# Patient Record
Sex: FEMALE | Race: WHITE | Employment: OTHER | URBAN - METROPOLITAN AREA
[De-identification: names, ages, dates, MRNs, and addresses within clinical notes are randomized per-mention and may not be internally consistent; named-entity substitution may affect disease eponyms.]

---

## 2021-06-22 ENCOUNTER — HOSPITAL ENCOUNTER (OUTPATIENT)
Age: 76
Setting detail: SPECIMEN
Discharge: HOME | End: 2021-06-22

## 2021-06-22 LAB
ALBUMIN SERPL-MCNC: 3.5 G/DL (ref 3.5–5.2)
ALBUMIN/GLOBULIN RATIO: 1.2 (ref 1–2.5)
ALP BLD-CCNC: 66 U/L (ref 35–104)
ALT SERPL-CCNC: 12 U/L (ref 5–33)
ANION GAP SERPL CALCULATED.3IONS-SCNC: 14 MMOL/L (ref 9–17)
AST SERPL-CCNC: 22 U/L
BILIRUB SERPL-MCNC: 0.4 MG/DL (ref 0.3–1.2)
BUN BLDV-MCNC: 26 MG/DL (ref 8–23)
BUN/CREAT BLD: ABNORMAL (ref 9–20)
CALCIUM SERPL-MCNC: 9.8 MG/DL (ref 8.6–10.4)
CHLORIDE BLD-SCNC: 102 MMOL/L (ref 98–107)
CO2: 26 MMOL/L (ref 20–31)
CREAT SERPL-MCNC: 0.86 MG/DL (ref 0.5–0.9)
GFR AFRICAN AMERICAN: >60 ML/MIN
GFR NON-AFRICAN AMERICAN: >60 ML/MIN
GFR SERPL CREATININE-BSD FRML MDRD: ABNORMAL ML/MIN/{1.73_M2}
GFR SERPL CREATININE-BSD FRML MDRD: ABNORMAL ML/MIN/{1.73_M2}
GLUCOSE BLD-MCNC: 154 MG/DL (ref 70–99)
HCT VFR BLD CALC: 31.6 % (ref 36.3–47.1)
HEMOGLOBIN: 10.1 G/DL (ref 11.9–15.1)
MCH RBC QN AUTO: 28.8 PG (ref 25.2–33.5)
MCHC RBC AUTO-ENTMCNC: 32 G/DL (ref 28.4–34.8)
MCV RBC AUTO: 90 FL (ref 82.6–102.9)
NRBC AUTOMATED: 0 PER 100 WBC
PDW BLD-RTO: 13.3 % (ref 11.8–14.4)
PLATELET # BLD: 201 K/UL (ref 138–453)
PMV BLD AUTO: 11.1 FL (ref 8.1–13.5)
POTASSIUM SERPL-SCNC: 4.2 MMOL/L (ref 3.7–5.3)
RBC # BLD: 3.51 M/UL (ref 3.95–5.11)
SODIUM BLD-SCNC: 142 MMOL/L (ref 135–144)
TOTAL PROTEIN: 6.5 G/DL (ref 6.4–8.3)
WBC # BLD: 4.5 K/UL (ref 3.5–11.3)

## 2021-06-22 PROCEDURE — 80053 COMPREHEN METABOLIC PANEL: CPT

## 2021-06-22 PROCEDURE — 85027 COMPLETE CBC AUTOMATED: CPT

## 2021-06-22 PROCEDURE — P9603 ONE-WAY ALLOW PRORATED MILES: HCPCS

## 2021-06-22 PROCEDURE — 36415 COLL VENOUS BLD VENIPUNCTURE: CPT

## 2021-11-30 ENCOUNTER — HOSPITAL ENCOUNTER (OUTPATIENT)
Age: 76
Setting detail: SPECIMEN
Discharge: HOME | End: 2021-11-30

## 2021-11-30 ENCOUNTER — OFFICE VISIT (OUTPATIENT)
Dept: INTERNAL MEDICINE CLINIC | Age: 76
End: 2021-11-30
Payer: MEDICARE

## 2021-11-30 VITALS
HEIGHT: 64 IN | BODY MASS INDEX: 28.51 KG/M2 | SYSTOLIC BLOOD PRESSURE: 136 MMHG | WEIGHT: 167 LBS | DIASTOLIC BLOOD PRESSURE: 72 MMHG | HEART RATE: 69 BPM | OXYGEN SATURATION: 98 %

## 2021-11-30 DIAGNOSIS — Z78.0 POST-MENOPAUSAL: ICD-10-CM

## 2021-11-30 DIAGNOSIS — Z91.81 AT HIGH RISK FOR FALLS: ICD-10-CM

## 2021-11-30 DIAGNOSIS — E11.9 TYPE 2 DIABETES MELLITUS WITHOUT COMPLICATION, WITHOUT LONG-TERM CURRENT USE OF INSULIN (HCC): ICD-10-CM

## 2021-11-30 DIAGNOSIS — E11.59 HYPERTENSION ASSOCIATED WITH DIABETES (HCC): ICD-10-CM

## 2021-11-30 DIAGNOSIS — F32.5 MAJOR DEPRESSIVE DISORDER WITH SINGLE EPISODE, IN FULL REMISSION (HCC): ICD-10-CM

## 2021-11-30 DIAGNOSIS — Z98.890 HISTORY OF HIP SURGERY: ICD-10-CM

## 2021-11-30 DIAGNOSIS — E11.9 TYPE 2 DIABETES MELLITUS WITHOUT COMPLICATION, WITHOUT LONG-TERM CURRENT USE OF INSULIN (HCC): Primary | ICD-10-CM

## 2021-11-30 DIAGNOSIS — I15.2 HYPERTENSION ASSOCIATED WITH DIABETES (HCC): ICD-10-CM

## 2021-11-30 LAB
CREATININE URINE: 105.9 MG/DL (ref 28–217)
MICROALBUMIN/CREAT 24H UR: <12 MG/L
MICROALBUMIN/CREAT UR-RTO: NORMAL MCG/MG CREAT

## 2021-11-30 PROCEDURE — 99204 OFFICE O/P NEW MOD 45 MIN: CPT | Performed by: INTERNAL MEDICINE

## 2021-11-30 RX ORDER — MELOXICAM 7.5 MG/1
7.5 TABLET ORAL DAILY
Qty: 30 TABLET | Refills: 3 | Status: SHIPPED | OUTPATIENT
Start: 2021-11-30 | End: 2022-01-04 | Stop reason: SDUPTHER

## 2021-11-30 RX ORDER — PRAVASTATIN SODIUM 20 MG
20 TABLET ORAL DAILY
COMMUNITY
End: 2022-03-22 | Stop reason: SDUPTHER

## 2021-11-30 RX ORDER — ROPINIROLE 2 MG/1
2 TABLET, FILM COATED ORAL NIGHTLY
COMMUNITY
End: 2022-08-26 | Stop reason: SDUPTHER

## 2021-11-30 RX ORDER — VENLAFAXINE 37.5 MG/1
37.5 TABLET ORAL DAILY
COMMUNITY
End: 2022-08-26 | Stop reason: SDUPTHER

## 2021-11-30 RX ORDER — METOPROLOL TARTRATE 50 MG/1
50 TABLET, FILM COATED ORAL DAILY
COMMUNITY
End: 2022-08-26 | Stop reason: SDUPTHER

## 2021-11-30 RX ORDER — LISINOPRIL AND HYDROCHLOROTHIAZIDE 12.5; 1 MG/1; MG/1
1 TABLET ORAL DAILY
COMMUNITY
End: 2022-03-22 | Stop reason: SDUPTHER

## 2021-11-30 RX ORDER — GLIMEPIRIDE 4 MG/1
4 TABLET ORAL
COMMUNITY
End: 2022-08-26 | Stop reason: SDUPTHER

## 2021-11-30 ASSESSMENT — PATIENT HEALTH QUESTIONNAIRE - PHQ9
SUM OF ALL RESPONSES TO PHQ9 QUESTIONS 1 & 2: 2
1. LITTLE INTEREST OR PLEASURE IN DOING THINGS: 1
SUM OF ALL RESPONSES TO PHQ QUESTIONS 1-9: 2
2. FEELING DOWN, DEPRESSED OR HOPELESS: 1

## 2021-11-30 NOTE — PROGRESS NOTES
Visit Information    Have you changed or started any medications since your last visit including any over-the-counter medicines, vitamins, or herbal medicines? no   Are you having any side effects from any of your medications? -  no  Have you stopped taking any of your medications? Is so, why? -  no    Have you seen any other physician or provider since your last visit? Yes - Records Obtained  Have you had any other diagnostic tests since your last visit? Yes - Records Obtained  Have you been seen in the emergency room and/or had an admission to a hospital since we last saw you? No  Have you had your routine dental cleaning in the past 6 months? no    Have you activated your [employer] account? If not, what are your barriers? No:      Patient Care Team:  Jovana Wilson MD as PCP - General (Internal Medicine)    Medical History Review  Past Medical, Family, and Social History reviewed and does contribute to the patient presenting condition    Health Maintenance   Topic Date Due    Hepatitis C screen  Never done    DTaP/Tdap/Td vaccine (1 - Tdap) Never done    DEXA (modify frequency per FRAX score)  Never done    Flu vaccine  Completed    Shingles Vaccine  Completed    Pneumococcal 65+ years Vaccine  Completed    COVID-19 Vaccine  Completed    Hepatitis A vaccine  Aged Out    Hepatitis B vaccine  Aged Out    Hib vaccine  Aged Out    Meningococcal (ACWY) vaccine  Aged Out     SUBJECTIVE:  Nita Glaser is a 67 y.o. female who  comes for complaints of   Chief Complaint   Patient presents with   [facility]    Diabetes     8/13/21 a1c 5.7         Specialities pt is following with:   Orthopedicis- s/p hip surgery Jun 2021, ORIF # right femur  Podiatry    Chronic conditions being monitored:      Concerns today:  Pain management of right hip pain  Currently taking 800mg BID  Last BMP in Jun reviewed. Getting physical therapy  Advised mobic and tylenol.    If not effective we will consider neurontin    DIABETES MELLITUS:    diagnosed more than 5 years ago  Modifying factors on med:   Severity: un/controlled   Associated signs and symtoms: no neuropathy/ckd/ CAD. aggravated with sugar diet and better with low sugar diet      - Follows a diabetic diet most of the time.   -Is compliant with medication(s) and is tolerating med(s) without any side effects.    -  Reports checking his/her glucose on a once a day schedule with sugars in the fasting 100-120  range. No results found for: LABA1C    HbA1c 8/31 was 5.7  On januvia 100mg daily, anaryl 4mg daily  On pravachol,    never took ASA  On lisinopril  UTD with MARCY, ordered lipids today  Seeing podiatry for foot exams      HYPERTENSION:    Onset more than 2 years ago  Jolly: mild to mod  Usually controlled with current po meds:   Not associated with headaches or blurry vision  No chest pain   -- Patient denies any side effects of their medication(s) and is compliant with their regimen.    -She does not check BP's generally. -Denies regular aerobic exercise. - Watches her diet for sodium, low fat and low cholesterol most of the time. Last 3 Encounter BP Readings:     Date:        BP:  BP Readings from Last 3 Encounters:   11/30/21 136/72   09/04/12 101/64     On lisinopril HCTZ 10-12.5, lopressor 50BID     HYPERLIPIDEMIA:   Patient reports doing well on current therapy of statin:  pravachol  - Denies side effects of muscle weakness or achiness.   - Exercise  -last lipid panel  No results found for: CHOL  No results found for: TRIG  No results found for: HDL  No results found for: LDLCHOLESTEROL, LDLCALC  No results found for: LABVLDL, VLDL  No results found for: CHOLHDLRATIO      Depression   Daughter passed 15yr ago in car accident  On celexa and effexor both  carlos taper off celexa- inbstruction in AVS  effexor she is just once in am  Also on donepezil- pt denies any problems with memory        Alcohol/smoking- does not drink, quit smoking 15yr ago    REVIEW OF SYSTEMS (except Subjective (HPI))    CONSTITUTIONAL: No weight loss, malaise or fevers  HEENT: Negative for frequent or significant headaches, No changes in hearing or vision, no nose bleeds or other nasal problems  NECK: Negative for lumps, goiter, pain and significant neck swelling  RESPIRATORY: Negative for cough, hemoptysis, wheezing, or shortness of breath  CARDIOVASCULAR: Negative for chest pain, leg swelling,or palpitations  GI: No nausea, vomiting, or diarrhea or Constipation  : No history of dysuria, frequency or incontinence, or hematuria  MUSCULOSKELETAL: Negative for joint pain or swelling  SKIN: Negative for lesions, rash, and itching  PSYCH: Negative for sleep disturbance, mood disorder and recent psychosocial stressors  NEURO: No history of headaches, syncope, paralysis, seizures or tremors    ACTIVE PROBLEM LIST  Patient Active Problem List   Diagnosis    Closed fracture of neck of radius         PAST MEDICAL HISTORY:    Past Medical History:   Diagnosis Date    Hypertension     Osteoarthritis     Type II or unspecified type diabetes mellitus without mention of complication, not stated as uncontrolled        PAST SURGICAL HISTORY:    Past Surgical History:   Procedure Laterality Date    FOOT SURGERY      2 surgeries    NOSE SURGERY      3 surgeries       FAMILY HISTORY:    Family History   Problem Relation Age of Onset    Diabetes Father         SOCIAL HISTORY:    Social History     Socioeconomic History    Marital status:      Spouse name: Not on file    Number of children: Not on file    Years of education: Not on file    Highest education level: Not on file   Occupational History    Not on file   Tobacco Use    Smoking status: Never Smoker    Smokeless tobacco: Never Used   Substance and Sexual Activity    Alcohol use: No    Drug use: No    Sexual activity: Not on file   Other Topics Concern    Not on file   Social History Narrative    Not on file Social Determinants of Health     Financial Resource Strain:     Difficulty of Paying Living Expenses: Not on file   Food Insecurity:     Worried About Running Out of Food in the Last Year: Not on file    Felipe of Food in the Last Year: Not on file   Transportation Needs:     Lack of Transportation (Medical): Not on file    Lack of Transportation (Non-Medical):  Not on file   Physical Activity:     Days of Exercise per Week: Not on file    Minutes of Exercise per Session: Not on file   Stress:     Feeling of Stress : Not on file   Social Connections:     Frequency of Communication with Friends and Family: Not on file    Frequency of Social Gatherings with Friends and Family: Not on file    Attends Sabianist Services: Not on file    Active Member of [employer] or Organizations: Not on file    Attends Club or Organization Meetings: Not on file    Marital Status: Not on file   Intimate Partner Violence:     Fear of Current or Ex-Partner: Not on file    Emotionally Abused: Not on file    Physically Abused: Not on file    Sexually Abused: Not on file   Housing Stability:     Unable to Pay for Housing in the Last Year: Not on file    Number of [place] in the Last Year: Not on file    Unstable Housing in the Last Year: Not on file       CURRENT MEDICATIONS:  Current Outpatient Medications   Medication Sig Dispense Refill    lisinopril-hydroCHLOROthiazide (PRINZIDE;ZESTORETIC) 10-12.5 MG per tablet Take 1 tablet by mouth daily      glimepiride (AMARYL) 4 MG tablet Take 4 mg by mouth every morning (before breakfast)      metoprolol tartrate (LOPRESSOR) 50 MG tablet Take 50 mg by mouth 2 times daily      SITagliptin (JANUVIA) 100 MG tablet Take 100 mg by mouth daily      pravastatin (PRAVACHOL) 20 MG tablet Take 20 mg by mouth daily      rOPINIRole (REQUIP) 2 MG tablet Take 2 mg by mouth 3 times daily      DONEPEZIL HCL PO Take by mouth      venlafaxine (EFFEXOR) 37.5 MG tablet Take 37.5 mg by mouth 3 times daily      citalopram (CELEXA) 20 MG tablet Take 20 mg by mouth daily.  cyclobenzaprine (FLEXERIL) 10 MG tablet Take 10 mg by mouth 3 times daily as needed. (Patient not taking: Reported on 11/30/2021)      niacin (NIASPAN) 500 MG CR tablet Take 500 mg by mouth nightly. (Patient not taking: Reported on 11/30/2021)       No current facility-administered medications for this visit. OBJECTIVE:  Vitals:    11/30/21 0901   BP: 136/72   Pulse: 69   SpO2: 98%         General exam (except above):  Constitutional - well appearing, alert, in no acute distress  Eyes: Anicteric sclera. Pupils are equally round and reactive to light. Extraocular movements are intact. Skin: Skin color, texture, turgor normal  Respiratory - Lungs clear to auscultation. No wheezing, rhonchi, rales  Cardiovascular - RRR. S1S2 present. No leg swelling. Gastrointestinal - Abdomen soft, non-tender. Bowel sounds normal. No masses, organomegaly  Musculoskeletal - No joint swelling, deformity, or tenderness  Neuro - Pt Alert, awake and oriented x 3. No gross focal neurological deficits      ASSESSMENT AND PLAN (MEDICAL DECISION MAKING):  Teto Linn was seen today for new patient and diabetes. Diagnoses and all orders for this visit:    Type 2 diabetes mellitus without complication, without long-term current use of insulin ([facility])  -     Cancel: Hemoglobin A1C; Future  -     Microalbumin, Ur; Future  -     Lipid Panel; Future    Post-menopausal  -     DEXA Bone Density Axial Skeleton; Future    Hypertension associated with diabetes ([address]    History of hip surgery  -     meloxicam (MOBIC) 7.5 MG tablet;  Take 1 tablet by mouth daily    Major depressive disorder with single episode, in full remission ([address]  Comments:  taper down celexa    At high risk for falls            Follow up in: 2wk      Prema Bhatt MD          On the basis of positive falls risk screening, assessment and plan is as follows: home safety tips provided, pt already seeing PT.

## 2021-12-17 ENCOUNTER — OFFICE VISIT (OUTPATIENT)
Dept: INTERNAL MEDICINE CLINIC | Age: 76
End: 2021-12-17
Payer: MEDICARE

## 2021-12-17 VITALS
WEIGHT: 166 LBS | BODY MASS INDEX: 28.34 KG/M2 | HEART RATE: 67 BPM | HEIGHT: 64 IN | SYSTOLIC BLOOD PRESSURE: 138 MMHG | OXYGEN SATURATION: 97 % | DIASTOLIC BLOOD PRESSURE: 70 MMHG

## 2021-12-17 DIAGNOSIS — E11.59 HYPERTENSION ASSOCIATED WITH DIABETES (HCC): ICD-10-CM

## 2021-12-17 DIAGNOSIS — R41.81 AGE-RELATED COGNITIVE DECLINE: ICD-10-CM

## 2021-12-17 DIAGNOSIS — I15.2 HYPERTENSION ASSOCIATED WITH DIABETES (HCC): ICD-10-CM

## 2021-12-17 DIAGNOSIS — Z00.00 MEDICARE ANNUAL WELLNESS VISIT, SUBSEQUENT: Primary | ICD-10-CM

## 2021-12-17 DIAGNOSIS — Z00.00 ROUTINE GENERAL MEDICAL EXAMINATION AT A HEALTH CARE FACILITY: ICD-10-CM

## 2021-12-17 DIAGNOSIS — Z78.0 POST-MENOPAUSAL: ICD-10-CM

## 2021-12-17 DIAGNOSIS — E11.9 TYPE 2 DIABETES MELLITUS WITHOUT COMPLICATION, WITHOUT LONG-TERM CURRENT USE OF INSULIN (HCC): ICD-10-CM

## 2021-12-17 DIAGNOSIS — F32.5 MAJOR DEPRESSIVE DISORDER WITH SINGLE EPISODE, IN FULL REMISSION (HCC): ICD-10-CM

## 2021-12-17 DIAGNOSIS — Z13.220 SCREENING FOR HYPERLIPIDEMIA: ICD-10-CM

## 2021-12-17 PROCEDURE — G0438 PPPS, INITIAL VISIT: HCPCS | Performed by: INTERNAL MEDICINE

## 2021-12-17 PROCEDURE — G8484 FLU IMMUNIZE NO ADMIN: HCPCS | Performed by: INTERNAL MEDICINE

## 2021-12-17 PROCEDURE — 4040F PNEUMOC VAC/ADMIN/RCVD: CPT | Performed by: INTERNAL MEDICINE

## 2021-12-17 PROCEDURE — 1123F ACP DISCUSS/DSCN MKR DOCD: CPT | Performed by: INTERNAL MEDICINE

## 2021-12-17 RX ORDER — NAPROXEN 500 MG/1
TABLET ORAL
COMMUNITY
Start: 2021-09-30 | End: 2022-02-14

## 2021-12-17 RX ORDER — SOLIFENACIN SUCCINATE 5 MG/1
5 TABLET, FILM COATED ORAL DAILY
COMMUNITY
Start: 2021-08-16 | End: 2021-12-17

## 2021-12-17 RX ORDER — ALBUTEROL SULFATE 90 UG/1
AEROSOL, METERED RESPIRATORY (INHALATION)
COMMUNITY
Start: 2021-02-01

## 2021-12-17 RX ORDER — SOLIFENACIN SUCCINATE 5 MG/1
TABLET, FILM COATED ORAL
COMMUNITY
Start: 2021-11-15 | End: 2022-01-04 | Stop reason: DRUGHIGH

## 2021-12-17 RX ORDER — GABAPENTIN 100 MG/1
100 CAPSULE ORAL DAILY
COMMUNITY
Start: 2021-08-16 | End: 2022-02-08 | Stop reason: ALTCHOICE

## 2021-12-17 RX ORDER — IBUPROFEN 800 MG/1
TABLET ORAL
COMMUNITY
Start: 2021-11-04 | End: 2022-02-08

## 2021-12-17 RX ORDER — PIOGLITAZONEHYDROCHLORIDE 15 MG/1
15 TABLET ORAL
COMMUNITY
Start: 2021-11-04 | End: 2022-08-26 | Stop reason: SDUPTHER

## 2021-12-17 RX ORDER — B-COMPLEX WITH VITAMIN C
1 TABLET ORAL
COMMUNITY
End: 2022-03-22 | Stop reason: ALTCHOICE

## 2021-12-17 ASSESSMENT — PATIENT HEALTH QUESTIONNAIRE - PHQ9
SUM OF ALL RESPONSES TO PHQ QUESTIONS 1-9: 0
2. FEELING DOWN, DEPRESSED OR HOPELESS: 0
SUM OF ALL RESPONSES TO PHQ9 QUESTIONS 1 & 2: 0
1. LITTLE INTEREST OR PLEASURE IN DOING THINGS: 0

## 2021-12-17 ASSESSMENT — LIFESTYLE VARIABLES: HOW OFTEN DO YOU HAVE A DRINK CONTAINING ALCOHOL: 0

## 2021-12-17 NOTE — PROGRESS NOTES
Visit Information    Have you changed or started any medications since your last visit including any over-the-counter medicines, vitamins, or herbal medicines? no   Are you having any side effects from any of your medications? -  no  Have you stopped taking any of your medications? Is so, why? -  no    Have you seen any other physician or provider since your last visit? No  Have you had any other diagnostic tests since your last visit? No  Have you been seen in the emergency room and/or had an admission to a hospital since we last saw you? No  Have you had your routine dental cleaning in the past 6 months? no    Have you activated your [employer] account? If not, what are your barriers? No:      Patient Care Team:  Andrade Pearl MD as PCP - General (Internal Medicine)  Andrade Pearl MD as PCP - [facility]    Medical History Review  Past Medical, Family, and Social History reviewed and does contribute to the patient presenting condition    Health Maintenance   Topic Date Due    Hepatitis C screen  Never done    Lipid screen  Never done    DTaP/Tdap/Td vaccine (1 - Tdap) Never done    DEXA (modify frequency per FRAX score)  Never done   [employer] Visit (AWV)  Never done    Potassium monitoring  2022    Creatinine monitoring  2022    Flu vaccine  Completed    Shingles Vaccine  Completed    Pneumococcal 65+ years Vaccine  Completed    COVID-19 Vaccine  Completed    Hepatitis A vaccine  Aged Out    Hib vaccine  Aged Out    Meningococcal (ACWY) vaccine  Aged [facility] [employer] [facility] Visit  Name: Geovanni Martines Date: 2021   MRN: [id number] Sex: Female   Age: 67 y.o. Ethnicity: Non- / Non    : 1945 Race: White (non-)      Adrien Auguste is here for Medicare AWV    Screenings for behavioral, psychosocial and functional/safety risks, and cognitive dysfunction are all negative except as indicated below.  These results, Provider, MD   niacin (NIASPAN) 500 MG CR tablet Take 500 mg by mouth nightly   Yes Historical Provider, MD       Past Medical History:   Diagnosis Date    Hypertension     Osteoarthritis     Type II or unspecified type diabetes mellitus without mention of complication, not stated as uncontrolled        Past Surgical History:   Procedure Laterality Date    FOOT SURGERY      2 surgeries    NOSE SURGERY      3 surgeries       Family History   Problem Relation Age of Onset    Diabetes Father        CareTeam (Including outside providers/suppliers regularly involved in providing care):   Patient Care Team:  Oral Catalan MD as PCP - General (Internal Medicine)  Oral Catalan MD as PCP - REHABILITATION [facility] Empaneled Provider    Wt Readings from Last 3 Encounters:   12/17/21 166 lb (75.3 kg)   11/30/21 167 lb (75.8 kg)   10/08/12 164 lb (74.4 kg)     Vitals:    12/17/21 1257   BP: 138/70   Pulse: 67   SpO2: 97%   Weight: 166 lb (75.3 kg)   Height: 5' 4\" (1.626 m)     Body mass index is 28.49 kg/m². Based upon direct observation of the patient, evaluation of cognition reveals recent and remote memory intact. Patient's complete Health Risk Assessment and screening values have been reviewed and are found in Flowsheets. The following problems were reviewed today and where indicated follow up appointments were made and/or referrals ordered. Positive Risk Factor Screenings with Interventions:     Fall Risk:  2 or more falls in past year?: (!) yes  Fall with injury in past year?: (!) yes  Fall Risk Interventions:    · Home safety tips provided  · alerady getting PT        General Health and ACP:  General  In general, how would you say your health is?: Good  In the past 7 days, have you experienced any of the following?  New or Increased Pain, New or Increased Fatigue, Loneliness, Social Isolation, Stress or Anger?: None of These  Do you get the social and emotional support that you need?: Yes  Do you have a Living Will?: (!) No  Advance Directives     Power of  Living Will ACP-Advance Directive ACP-Power of     Not on File Not on File Not on File Not on File      General Health Risk Interventions:  · Pt reports she has a Living Will: [address] addressed with patient today. ADL:  ADLs  In the past 7 days, did you need help from others to perform any of the following everyday activities? Eating, dressing, grooming, bathing, toileting, or walking/balance?: (!) Walking/Balance  In the past 7 days, did you need help from others to take care of any of the following?  Laundry, housekeeping, banking/finances, shopping, telephone use, food preparation, transportation, or taking medications?: None  ADL Interventions:  · Patient declines any further evaluation/treatment for this issue    Personalized Preventive Plan   Current Health Maintenance Status  Immunization History   Administered Date(s) Administered    COVID-19, [place] Peter, PF, 30mcg/0.3mL 02/22/2021, 03/15/2021, 10/05/2021    Influenza Virus Vaccine 10/23/2013, 10/29/2014, 09/23/2015    Influenza, High Dose (Fluzone 65 yrs and older) 09/27/2016, 09/14/2018, 09/16/2019, 10/02/2019    Influenza, High-dose, Quadv, 65 yrs +, IM (Fluzone) 11/04/2020, 10/20/2021    Pneumococcal Conjugate 13-valent ([id number]) 06/12/2017    Pneumococcal Polysaccharide ([id number]) 08/09/2012    Zoster Recombinant (Shingrix) 08/02/2019, 12/30/2020        Health Maintenance   Topic Date Due    Hepatitis C screen  Never done    Lipid screen  Never done    DTaP/Tdap/Td vaccine (1 - Tdap) Never done    DEXA (modify frequency per FRAX score)  Never done   [employer] Visit (AWV)  Never done    Potassium monitoring  06/22/2022    Creatinine monitoring  06/22/2022    Flu vaccine  Completed    Shingles Vaccine  Completed    Pneumococcal 65+ years Vaccine  Completed    COVID-19 Vaccine  Completed    Hepatitis A vaccine  Aged Out    Hib vaccine  Aged C/ Mike Gemma 19 Meningococcal (ACWY) vaccine  Aged Out     Recommendations for Preventive Services Due: see orders and patient instructions/AVS.  . Recommended screening schedule for the next 5-10 years is provided to the patient in written form: see Patient Harriet Francis was seen today for medicare awv.     Diagnoses and all orders for this visit:    Medicare annual wellness visit, subsequent    Post-menopausal    Screening for hyperlipidemia    Type 2 diabetes mellitus without complication, without long-term current use of insulin ([facility]  Comments:  controlled    Major depressive disorder with single episode, in full remission ([facility]  Comments:  off celexa, doing well    Hypertension associated with diabetes ([facility]  Comments:  controlled    Age-related cognitive decline  Comments:  doing well on donepezil             Getting eye exams annually   denies any hearing issues  UTD with COVId shots- had 3 of them  UTD with flushot  Reports had tetanus shot Jun 2021

## 2021-12-20 ENCOUNTER — HOSPITAL ENCOUNTER (OUTPATIENT)
Dept: WOMENS IMAGING | Age: 76
Discharge: HOME | End: 2021-12-22
Payer: MEDICARE

## 2021-12-20 ENCOUNTER — HOSPITAL ENCOUNTER (OUTPATIENT)
Age: 76
Discharge: HOME | End: 2021-12-20
Payer: MEDICARE

## 2021-12-20 DIAGNOSIS — Z78.0 POST-MENOPAUSAL: ICD-10-CM

## 2021-12-20 DIAGNOSIS — E11.9 TYPE 2 DIABETES MELLITUS WITHOUT COMPLICATION, WITHOUT LONG-TERM CURRENT USE OF INSULIN (HCC): ICD-10-CM

## 2021-12-20 LAB
CHOLESTEROL/HDL RATIO: 4.6
CHOLESTEROL: 185 MG/DL
HDLC SERPL-MCNC: 40 MG/DL
LDL CHOLESTEROL: 94 MG/DL (ref 0–130)
TRIGL SERPL-MCNC: 257 MG/DL
VLDLC SERPL CALC-MCNC: ABNORMAL MG/DL (ref 1–30)

## 2021-12-20 PROCEDURE — 80061 LIPID PANEL: CPT

## 2021-12-20 PROCEDURE — 77080 DXA BONE DENSITY AXIAL: CPT

## 2021-12-20 PROCEDURE — 36415 COLL VENOUS BLD VENIPUNCTURE: CPT

## 2021-12-21 NOTE — RESULT ENCOUNTER NOTE
Satisfactory results, one of the numbers-triglycerides are little high but no specific treatment recommended for it right now

## 2022-01-04 ENCOUNTER — HOSPITAL ENCOUNTER (OUTPATIENT)
Age: 77
Setting detail: SPECIMEN
Discharge: HOME | End: 2022-01-04

## 2022-01-04 ENCOUNTER — OFFICE VISIT (OUTPATIENT)
Dept: INTERNAL MEDICINE CLINIC | Age: 77
End: 2022-01-04
Payer: MEDICARE

## 2022-01-04 VITALS
BODY MASS INDEX: 29.02 KG/M2 | HEART RATE: 66 BPM | DIASTOLIC BLOOD PRESSURE: 70 MMHG | HEIGHT: 64 IN | OXYGEN SATURATION: 96 % | SYSTOLIC BLOOD PRESSURE: 130 MMHG | WEIGHT: 170 LBS

## 2022-01-04 DIAGNOSIS — E11.9 TYPE 2 DIABETES MELLITUS WITHOUT COMPLICATION, WITHOUT LONG-TERM CURRENT USE OF INSULIN (HCC): ICD-10-CM

## 2022-01-04 DIAGNOSIS — Z98.890 HISTORY OF HIP SURGERY: ICD-10-CM

## 2022-01-04 DIAGNOSIS — E11.59 HYPERTENSION ASSOCIATED WITH DIABETES (HCC): ICD-10-CM

## 2022-01-04 DIAGNOSIS — M81.0 AGE-RELATED OSTEOPOROSIS WITHOUT CURRENT PATHOLOGICAL FRACTURE: Primary | ICD-10-CM

## 2022-01-04 DIAGNOSIS — R35.1 NOCTURIA: ICD-10-CM

## 2022-01-04 DIAGNOSIS — M81.0 AGE-RELATED OSTEOPOROSIS WITHOUT CURRENT PATHOLOGICAL FRACTURE: ICD-10-CM

## 2022-01-04 DIAGNOSIS — F32.5 MAJOR DEPRESSIVE DISORDER WITH SINGLE EPISODE, IN FULL REMISSION (HCC): ICD-10-CM

## 2022-01-04 DIAGNOSIS — I15.2 HYPERTENSION ASSOCIATED WITH DIABETES (HCC): ICD-10-CM

## 2022-01-04 LAB — HBA1C MFR BLD: 5.8 %

## 2022-01-04 PROCEDURE — 1123F ACP DISCUSS/DSCN MKR DOCD: CPT | Performed by: INTERNAL MEDICINE

## 2022-01-04 PROCEDURE — G8484 FLU IMMUNIZE NO ADMIN: HCPCS | Performed by: INTERNAL MEDICINE

## 2022-01-04 PROCEDURE — G8399 PT W/DXA RESULTS DOCUMENT: HCPCS | Performed by: INTERNAL MEDICINE

## 2022-01-04 PROCEDURE — 1090F PRES/ABSN URINE INCON ASSESS: CPT | Performed by: INTERNAL MEDICINE

## 2022-01-04 PROCEDURE — G8417 CALC BMI ABV UP PARAM F/U: HCPCS | Performed by: INTERNAL MEDICINE

## 2022-01-04 PROCEDURE — 99214 OFFICE O/P EST MOD 30 MIN: CPT | Performed by: INTERNAL MEDICINE

## 2022-01-04 PROCEDURE — 83036 HEMOGLOBIN GLYCOSYLATED A1C: CPT | Performed by: INTERNAL MEDICINE

## 2022-01-04 PROCEDURE — 4040F PNEUMOC VAC/ADMIN/RCVD: CPT | Performed by: INTERNAL MEDICINE

## 2022-01-04 PROCEDURE — G8427 DOCREV CUR MEDS BY ELIG CLIN: HCPCS | Performed by: INTERNAL MEDICINE

## 2022-01-04 PROCEDURE — 1036F TOBACCO NON-USER: CPT | Performed by: INTERNAL MEDICINE

## 2022-01-04 RX ORDER — ALENDRONATE SODIUM 70 MG/1
70 TABLET ORAL
Qty: 12 TABLET | Refills: 1 | Status: SHIPPED | OUTPATIENT
Start: 2022-01-04 | End: 2022-01-05 | Stop reason: SDUPTHER

## 2022-01-04 RX ORDER — MELOXICAM 7.5 MG/1
7.5 TABLET ORAL DAILY
Qty: 90 TABLET | Refills: 3 | Status: SHIPPED | OUTPATIENT
Start: 2022-01-04 | End: 2022-02-14 | Stop reason: SDUPTHER

## 2022-01-04 RX ORDER — SOLIFENACIN SUCCINATE 10 MG/1
10 TABLET, FILM COATED ORAL DAILY
Qty: 90 TABLET | Refills: 3 | Status: SHIPPED | OUTPATIENT
Start: 2022-01-04 | End: 2022-02-14 | Stop reason: SDUPTHER

## 2022-01-04 RX ORDER — ALENDRONATE SODIUM 70 MG/1
70 TABLET ORAL
Qty: 12 TABLET | Refills: 1 | Status: SHIPPED | OUTPATIENT
Start: 2022-01-04 | End: 2022-01-04

## 2022-01-04 SDOH — ECONOMIC STABILITY: FOOD INSECURITY: WITHIN THE PAST 12 MONTHS, YOU WORRIED THAT YOUR FOOD WOULD RUN OUT BEFORE YOU GOT MONEY TO BUY MORE.: NEVER TRUE

## 2022-01-04 SDOH — ECONOMIC STABILITY: FOOD INSECURITY: WITHIN THE PAST 12 MONTHS, THE FOOD YOU BOUGHT JUST DIDN'T LAST AND YOU DIDN'T HAVE MONEY TO GET MORE.: NEVER TRUE

## 2022-01-04 ASSESSMENT — SOCIAL DETERMINANTS OF HEALTH (SDOH): HOW HARD IS IT FOR YOU TO PAY FOR THE VERY BASICS LIKE FOOD, HOUSING, MEDICAL CARE, AND HEATING?: NOT HARD AT ALL

## 2022-01-04 NOTE — PROGRESS NOTES
Visit Information    Have you changed or started any medications since your last visit including any over-the-counter medicines, vitamins, or herbal medicines? no   Are you having any side effects from any of your medications? -  no  Have you stopped taking any of your medications? Is so, why? -  no    Have you seen any other physician or provider since your last visit? No  Have you had any other diagnostic tests since your last visit? Yes - Records Obtained  Have you been seen in the emergency room and/or had an admission to a hospital since we last saw you? No  Have you had your routine dental cleaning in the past 6 months? no    Have you activated your [employer] account? If not, what are your barriers?  Yes     Patient Care Team:  Gerson Poole MD as PCP - General (Internal Medicine)  Gerson Poole MD as PCP - [facility]    Medical History Review  Past Medical, Family, and Social History reviewed and does contribute to the patient presenting condition    Health Maintenance   Topic Date Due    Hepatitis C screen  Never done    DTaP/Tdap/Td vaccine (1 - Tdap) Never done    Potassium monitoring  06/22/2022    Creatinine monitoring  06/22/2022    Depression Monitoring  12/17/2022    Annual Wellness Visit (AWV)  12/18/2022    Lipid screen  12/20/2022    DEXA (modify frequency per FRAX score)  Completed    Flu vaccine  Completed    Shingles Vaccine  Completed    Pneumococcal 65+ years Vaccine  Completed    COVID-19 Vaccine  Completed    Hepatitis A vaccine  Aged Out    Hib vaccine  Aged Out    Meningococcal (ACWY) vaccine  Aged Out     SUBJECTIVE:  Darline Lee is a 67 y.o. female patient who  comes for complaints of   Chief Complaint   Patient presents with    Follow-up    Diabetes     A1c 5.8    Incontinence     Patient states that she is having urinary incontinence issues        Nocturia  5yr  Moderately severe symptoms  Getting up 6times a night  No pain/bluring/bleeding  Taking vesicare- partial help  Will increase dose      Osteoporosis  Per DEXA  Recent hip fracture  Not tried any meds for Osteoporosis  Taking calcium  vit d supplments      No History of esophageal disorders/ GI surgery:  Able to stay upright:  Any dental procedured planed:none  Does not smoke      REVIEW OF SYSTEMS (except Subjective (HPI))  GENERAL: No fevers / chills  RESPIRATORY: Negative for cough, wheezing or shortness of breath  CARDIOVASCULAR: Negative for chest pain or palpitations. GI: no nausea, vomiting, or diarrhea  NEURO: No history of headaches    Past Medical History:   Diagnosis Date    Hypertension     Osteoarthritis     Type II or unspecified type diabetes mellitus without mention of complication, not stated as uncontrolled        SOCIAL HISTORY:  Social History     Socioeconomic History    Marital status:      Spouse name: Not on file    Number of children: Not on file    Years of education: Not on file    Highest education level: Not on file   Occupational History    Not on file   Tobacco Use    Smoking status: Never Smoker    Smokeless tobacco: Never Used   Substance and Sexual Activity    Alcohol use: No    Drug use: No    Sexual activity: Not on file   Other Topics Concern    Not on file   Social History Narrative    Not on file     Social Determinants of Health     Financial Resource Strain: Low Risk     Difficulty of Paying Living Expenses: Not hard at all   Food Insecurity: No Food Insecurity    Worried About Running Out of Food in the Last Year: Never true    Felipe of Food in the Last Year: Never true   Transportation Needs:     Lack of Transportation (Medical): Not on file    Lack of Transportation (Non-Medical):  Not on file   Physical Activity:     Days of Exercise per Week: Not on file    Minutes of Exercise per Session: Not on file   Stress:     Feeling of Stress : Not on file   Social Connections:     Frequency of Communication with Friends and Family: Not on file    Frequency of Social Gatherings with Friends and Family: Not on file    Attends Yarsanism Services: Not on file    Active Member of Clubs or Organizations: Not on file    Attends Club or Organization Meetings: Not on file    Marital Status: Not on file   Intimate Partner Violence:     Fear of Current or Ex-Partner: Not on file    Emotionally Abused: Not on file    Physically Abused: Not on file    Sexually Abused: Not on file   Housing Stability:     Unable to Pay for Housing in the Last Year: Not on file    Number of [place] in the Last Year: Not on file    Unstable Housing in the Last Year: Not on file           CURRENT MEDICATIONS:  Current Outpatient Medications   Medication Sig Dispense Refill    solifenacin (VESICARE) 5 MG tablet       naproxen (NAPROSYN) 500 MG tablet       ibuprofen (ADVIL;MOTRIN) 800 MG tablet       Calcium Carbonate-Vitamin D (OYSTER SHELL CALCIUM/D) 500-200 MG-UNIT TABS Take 1 tablet by mouth      albuterol sulfate HFA (PROAIR HFA) 108 (90 Base) MCG/ACT inhaler USE 2 INHALATIONS EVERY 6 HOURS AS NEEDED FOR WHEEZING      lisinopril-hydroCHLOROthiazide (PRINZIDE;ZESTORETIC) 10-12.5 MG per tablet Take 1 tablet by mouth daily      glimepiride (AMARYL) 4 MG tablet Take 4 mg by mouth every morning (before breakfast)      metoprolol tartrate (LOPRESSOR) 50 MG tablet Take 50 mg by mouth 2 times daily      SITagliptin (JANUVIA) 100 MG tablet Take 100 mg by mouth daily      pravastatin (PRAVACHOL) 20 MG tablet Take 20 mg by mouth daily      rOPINIRole (REQUIP) 2 MG tablet Take 2 mg by mouth 3 times daily      DONEPEZIL HCL PO Take by mouth       venlafaxine (EFFEXOR) 37.5 MG tablet Take 37.5 mg by mouth daily      pioglitazone (ACTOS) 15 MG tablet  (Patient not taking: Reported on 1/4/2022)      gabapentin (NEURONTIN) 100 MG capsule Take 100 mg by mouth daily.  (Patient not taking: Reported on 1/4/2022)      meloxicam (MOBIC) 7.5 MG tablet Take 1 tablet by mouth daily 30 tablet 3    cyclobenzaprine (FLEXERIL) 10 MG tablet Take 10 mg by mouth 3 times daily as needed   (Patient not taking: Reported on 1/4/2022)      niacin (NIASPAN) 500 MG CR tablet Take 500 mg by mouth nightly   (Patient not taking: Reported on 1/4/2022)       No current facility-administered medications for this visit. OBJECTIVE:  Vitals:    01/04/22 0952   BP: 130/70   Pulse: 66   SpO2: 96%     Body mass index is 29.18 kg/m². General exam (except above):  General appearance - well appearing, alert, in no acute distress  Head - Atraumatic, normocephalic  Eyes - EOMI, no jaundice or pallor  Lungs - Lungs clear to auscultation. No wheezing, rhonchi, rales  Heart - RRR without murmur, gallop, or rubs. No ectopy  Abdomen - Abdomen soft, non-tender. Bowel sounds normal. No masses, organomegaly  Extremities -No significant edema, or skin discoloration. Good capillary refill. Neuro - Pt Alert, awake and oriented x 3. No gross focal neurological deficits    ASSESSMENT AND PLAN (MEDICAL DECISION MAKING):     Naa Plaza was seen today for follow-up, diabetes and incontinence. Diagnoses and all orders for this visit:    Age-related osteoporosis without current pathological fracture  -     Discontinue: alendronate (FOSAMAX) 70 MG tablet; Take 1 tablet by mouth every 7 days  -     alendronate (FOSAMAX) 70 MG tablet; Take 1 tablet by mouth every 7 days Take medication on an empty stomach, 1st thing in the morning, with full glass of water, say upright for 30min    Nocturia  -     solifenacin (VESICARE) 10 MG tablet; Take 1 tablet by mouth daily  -     Urinalysis Reflex to Culture;  Future    Hypertension associated with diabetes ([facility] [address]    Type 2 diabetes mellitus without complication, without long-term current use of insulin ([facility])  -     POCT glycosylated hemoglobin (Hb A1C)    Major depressive disorder with single episode, in full remission [facility])    History of hip surgery  -     meloxicam (MOBIC) 7.5 MG tablet;  Take 1 tablet by mouth daily         Follow up in: Tremaine Pimentel MD

## 2022-01-04 NOTE — PATIENT INSTRUCTIONS
Reviewed the need for Calcium and Vitamin D supplements and weight bearing exercise as tolerated  -repeat dexa in 2yrs.  -advised to take fosamax 1st thing in am, with 8ox plain water, and remain upright for 30mins after, then eat breakfast.        Diet: calcium supplements 1200mg daily, 800IU Vit D  Exercise: at least 30 mins of weight bearing exercise daily  Smoking cessation

## 2022-01-04 NOTE — TELEPHONE ENCOUNTER
This was sent to [place] and it needed to go to [facility]. Pharmacy was corrected.   Please approve

## 2022-01-05 LAB
BILIRUBIN URINE: NEGATIVE
COLOR: YELLOW
COMMENT UA: NORMAL
GLUCOSE URINE: NEGATIVE
KETONES, URINE: NEGATIVE
LEUKOCYTE ESTERASE, URINE: NEGATIVE
NITRITE, URINE: NEGATIVE
PH UA: 5 (ref 5–8)
PROTEIN UA: NEGATIVE
SPECIFIC GRAVITY UA: 1.02 (ref 1–1.03)
TURBIDITY: CLEAR
URINE HGB: NEGATIVE
UROBILINOGEN, URINE: NORMAL

## 2022-01-05 RX ORDER — ALENDRONATE SODIUM 70 MG/1
70 TABLET ORAL
Qty: 12 TABLET | Refills: 1 | Status: SHIPPED | OUTPATIENT
Start: 2022-01-05 | End: 2022-02-14 | Stop reason: SDUPTHER

## 2022-02-08 ENCOUNTER — OFFICE VISIT (OUTPATIENT)
Dept: INTERNAL MEDICINE CLINIC | Age: 77
End: 2022-02-08
Payer: MEDICARE

## 2022-02-08 VITALS
HEART RATE: 48 BPM | WEIGHT: 177.6 LBS | HEIGHT: 64 IN | OXYGEN SATURATION: 99 % | BODY MASS INDEX: 30.32 KG/M2 | DIASTOLIC BLOOD PRESSURE: 64 MMHG | SYSTOLIC BLOOD PRESSURE: 110 MMHG

## 2022-02-08 DIAGNOSIS — L30.9 FOOT DERMATITIS: ICD-10-CM

## 2022-02-08 DIAGNOSIS — F32.5 MAJOR DEPRESSIVE DISORDER WITH SINGLE EPISODE, IN FULL REMISSION (HCC): ICD-10-CM

## 2022-02-08 DIAGNOSIS — R35.1 NOCTURIA: ICD-10-CM

## 2022-02-08 DIAGNOSIS — N39.46 MIXED INCONTINENCE URGE AND STRESS: Primary | ICD-10-CM

## 2022-02-08 DIAGNOSIS — M25.559 HIP PAIN: ICD-10-CM

## 2022-02-08 DIAGNOSIS — E11.59 HYPERTENSION ASSOCIATED WITH DIABETES (HCC): ICD-10-CM

## 2022-02-08 DIAGNOSIS — I15.2 HYPERTENSION ASSOCIATED WITH DIABETES (HCC): ICD-10-CM

## 2022-02-08 DIAGNOSIS — E11.9 TYPE 2 DIABETES MELLITUS WITHOUT COMPLICATION, WITHOUT LONG-TERM CURRENT USE OF INSULIN (HCC): ICD-10-CM

## 2022-02-08 PROCEDURE — G8399 PT W/DXA RESULTS DOCUMENT: HCPCS | Performed by: INTERNAL MEDICINE

## 2022-02-08 PROCEDURE — 1036F TOBACCO NON-USER: CPT | Performed by: INTERNAL MEDICINE

## 2022-02-08 PROCEDURE — 1123F ACP DISCUSS/DSCN MKR DOCD: CPT | Performed by: INTERNAL MEDICINE

## 2022-02-08 PROCEDURE — 99214 OFFICE O/P EST MOD 30 MIN: CPT | Performed by: INTERNAL MEDICINE

## 2022-02-08 PROCEDURE — G8427 DOCREV CUR MEDS BY ELIG CLIN: HCPCS | Performed by: INTERNAL MEDICINE

## 2022-02-08 PROCEDURE — G8417 CALC BMI ABV UP PARAM F/U: HCPCS | Performed by: INTERNAL MEDICINE

## 2022-02-08 PROCEDURE — 4040F PNEUMOC VAC/ADMIN/RCVD: CPT | Performed by: INTERNAL MEDICINE

## 2022-02-08 PROCEDURE — 0509F URINE INCON PLAN DOCD: CPT | Performed by: INTERNAL MEDICINE

## 2022-02-08 PROCEDURE — 1090F PRES/ABSN URINE INCON ASSESS: CPT | Performed by: INTERNAL MEDICINE

## 2022-02-08 PROCEDURE — G8484 FLU IMMUNIZE NO ADMIN: HCPCS | Performed by: INTERNAL MEDICINE

## 2022-02-08 RX ORDER — DIAPER,BRIEF,INFANT-TODD,DISP
EACH MISCELLANEOUS
Qty: 30 G | Refills: 1 | Status: SHIPPED | OUTPATIENT
Start: 2022-02-08 | End: 2022-02-15

## 2022-02-08 RX ORDER — GABAPENTIN 300 MG/1
300 CAPSULE ORAL NIGHTLY
Qty: 30 CAPSULE | Refills: 2 | Status: SHIPPED | OUTPATIENT
Start: 2022-02-08 | End: 2022-02-14 | Stop reason: SDUPTHER

## 2022-02-08 RX ORDER — DIAPER,BRIEF,INFANT-TODD,DISP
EACH MISCELLANEOUS
Qty: 120 G | Refills: 1 | Status: CANCELLED | OUTPATIENT
Start: 2022-02-08 | End: 2022-02-15

## 2022-02-08 RX ORDER — GABAPENTIN 300 MG/1
300 CAPSULE ORAL NIGHTLY
Qty: 90 CAPSULE | Refills: 0 | Status: CANCELLED | OUTPATIENT
Start: 2022-02-08 | End: 2022-05-09

## 2022-02-08 NOTE — PROGRESS NOTES
Visit Information    Have you changed or started any medications since your last visit including any over-the-counter medicines, vitamins, or herbal medicines? no   Are you having any side effects from any of your medications? -  no  Have you stopped taking any of your medications? Is so, why? -  no    Have you seen any other physician or provider since your last visit? Yes - Records Obtained  Have you had any other diagnostic tests since your last visit? Yes - Records Obtained  Have you been seen in the emergency room and/or had an admission to a hospital since we last saw you? No  Have you had your routine dental cleaning in the past 6 months? no    Have you activated your [employer] account? If not, what are your barriers?  No:      Patient Care Team:  Sharon Ji MD as PCP - General (Internal Medicine)  Sharon Ji MD as PCP - [facility] Provider    Medical History Review  Past Medical, Family, and Social History reviewed and does contribute to the patient presenting condition    Health Maintenance   Topic Date Due    Hepatitis C screen  Never done    DTaP/Tdap/Td vaccine (1 - Tdap) Never done    Potassium monitoring  06/22/2022    Creatinine monitoring  06/22/2022    Depression Monitoring  12/17/2022    Annual Wellness Visit (AWV)  12/18/2022    Lipid screen  12/20/2022    DEXA (modify frequency per FRAX score)  Completed    Flu vaccine  Completed    Shingles Vaccine  Completed    Pneumococcal 65+ years Vaccine  Completed    COVID-19 Vaccine  Completed    Hepatitis A vaccine  Aged Out    Hib vaccine  Aged Out    Meningococcal (ACWY) vaccine  Aged Out     SUBJECTIVE:  Devorah Guevara is a 67 y.o. female patient who  comes for complaints of   Chief Complaint   Patient presents with   Little Rash Frequency at Night     same, no changes, about every hour          Nocturia  vesicare dose was increased LOV  kegels did not help  Still getting up every hour or so  Will refer to urology    Osteoporosis  Fosamax started tolerating well    H/o hip ORIF jun 2021  Still has pain and stiffness  mobic not helping  Saw ortho- was given high dose ibuprofen, did not help  Has back pain as well    Foot pain  Cracked skin  No  Infection      REVIEW OF SYSTEMS (except Subjective (HPI))  GENERAL: No fevers / chills  RESPIRATORY: Negative for cough, wheezing or shortness of breath  CARDIOVASCULAR: Negative for chest pain or palpitations. GI: no nausea, vomiting, or diarrhea  NEURO: No history of headaches    Past Medical History:   Diagnosis Date    Hypertension     Osteoarthritis     Type II or unspecified type diabetes mellitus without mention of complication, not stated as uncontrolled        SOCIAL HISTORY:  Social History     Socioeconomic History    Marital status:      Spouse name: Not on file    Number of children: Not on file    Years of education: Not on file    Highest education level: Not on file   Occupational History    Not on file   Tobacco Use    Smoking status: Never Smoker    Smokeless tobacco: Never Used   Substance and Sexual Activity    Alcohol use: No    Drug use: No    Sexual activity: Not on file   Other Topics Concern    Not on file   Social History Narrative    Not on file     Social Determinants of Health     Financial Resource Strain: Low Risk     Difficulty of Paying Living Expenses: Not hard at all   Food Insecurity: No Food Insecurity    Worried About Running Out of Food in the Last Year: Never true    Felipe of Food in the Last Year: Never true   Transportation Needs:     Lack of Transportation (Medical): Not on file    Lack of Transportation (Non-Medical):  Not on file   Physical Activity:     Days of Exercise per Week: Not on file    Minutes of Exercise per Session: Not on file   Stress:     Feeling of Stress : Not on file   Social Connections:     Frequency of Communication with Friends and Family: Not on file    Frequency of Social Gatherings with Friends and Family: Not on file    Attends Restorationist Services: Not on file    Active Member of Clubs or Organizations: Not on file    Attends Club or Organization Meetings: Not on file    Marital Status: Not on file   Intimate Partner Violence:     Fear of Current or Ex-Partner: Not on file    Emotionally Abused: Not on file    Physically Abused: Not on file    Sexually Abused: Not on file   Housing Stability:     Unable to Pay for Housing in the Last Year: Not on file    Number of [place] in the Last Year: Not on file    Unstable Housing in the Last Year: Not on file           CURRENT MEDICATIONS:  Current Outpatient Medications   Medication Sig Dispense Refill    alendronate (FOSAMAX) 70 MG tablet Take 1 tablet by mouth every 7 days Take medication on an empty stomach, 1st thing in the morning, with full glass of water, say upright for 30min 12 tablet 1    solifenacin (VESICARE) 10 MG tablet Take 1 tablet by mouth daily 90 tablet 3    meloxicam (MOBIC) 7.5 MG tablet Take 1 tablet by mouth daily 90 tablet 3    pioglitazone (ACTOS) 15 MG tablet       naproxen (NAPROSYN) 500 MG tablet       ibuprofen (ADVIL;MOTRIN) 800 MG tablet       gabapentin (NEURONTIN) 100 MG capsule Take 100 mg by mouth daily.        Calcium Carbonate-Vitamin D (OYSTER SHELL CALCIUM/D) 500-200 MG-UNIT TABS Take 1 tablet by mouth      albuterol sulfate HFA (PROAIR HFA) 108 (90 Base) MCG/ACT inhaler USE 2 INHALATIONS EVERY 6 HOURS AS NEEDED FOR WHEEZING      lisinopril-hydroCHLOROthiazide (PRINZIDE;ZESTORETIC) 10-12.5 MG per tablet Take 1 tablet by mouth daily      glimepiride (AMARYL) 4 MG tablet Take 4 mg by mouth every morning (before breakfast)      metoprolol tartrate (LOPRESSOR) 50 MG tablet Take 50 mg by mouth daily       SITagliptin (JANUVIA) 100 MG tablet Take 100 mg by mouth daily      pravastatin (PRAVACHOL) 20 MG tablet Take 20 mg by mouth daily      rOPINIRole (REQUIP) 2 MG tablet Take 2 mg by mouth nightly       DONEPEZIL HCL PO Take by mouth       venlafaxine (EFFEXOR) 37.5 MG tablet Take 37.5 mg by mouth daily      cyclobenzaprine (FLEXERIL) 10 MG tablet Take 10 mg by mouth 3 times daily as needed       niacin (NIASPAN) 500 MG CR tablet Take 500 mg by mouth nightly   (Patient not taking: Reported on 1/4/2022)       No current facility-administered medications for this visit. OBJECTIVE:  Vitals:    02/08/22 1031   BP: 110/64   Pulse: (!) 48   SpO2: 99%     Body mass index is 30.48 kg/m². Focal exam:  Cracked skin of right heel  General exam (except above):  General appearance - well appearing, alert, in no acute distress  Head - Atraumatic, normocephalic  Eyes - EOMI, no jaundice or pallor  Lungs - Lungs clear to auscultation. No wheezing, rhonchi, rales  Heart - RRR without murmur, gallop, or rubs. No ectopy  Abdomen - Abdomen soft, non-tender. Bowel sounds normal. No masses, organomegaly  Extremities -No significant edema, or skin discoloration. Good capillary refill. Neuro - Pt Alert, awake and oriented x 3. No gross focal neurological deficits    ASSESSMENT AND PLAN (MEDICAL DECISION MAKING):   Chu Curiel was seen today for urniary frequency at night. Diagnoses and all orders for this visit:    Mixed incontinence urge and stress  -     AFL - Maida Rashid MD, Urology, [place]    Hip pain  -     gabapentin (NEURONTIN) 300 MG capsule; Take 1 capsule by mouth nightly for 90 days. Hypertension associated with diabetes ([address]  Comments:  controlled      Type 2 diabetes mellitus without complication, without long-term current use of insulin ([address]    Major depressive disorder with single episode, in full remission ([address]    Nocturia  -     Jaun Beckford MD, Urology, [place]    Foot dermatitis  -     hydrocortisone (ALA-RAMONA) 1 % cream; Apply topically 2 times daily.            Follow up in: 4wk for hip pain      Juanita Carlos MD

## 2022-02-14 DIAGNOSIS — M25.559 HIP PAIN: ICD-10-CM

## 2022-02-14 DIAGNOSIS — Z98.890 HISTORY OF HIP SURGERY: ICD-10-CM

## 2022-02-14 DIAGNOSIS — M81.0 AGE-RELATED OSTEOPOROSIS WITHOUT CURRENT PATHOLOGICAL FRACTURE: ICD-10-CM

## 2022-02-14 DIAGNOSIS — R35.1 NOCTURIA: ICD-10-CM

## 2022-02-14 RX ORDER — MELOXICAM 7.5 MG/1
7.5 TABLET ORAL DAILY
Qty: 90 TABLET | Refills: 2 | Status: SHIPPED | OUTPATIENT
Start: 2022-02-14 | End: 2022-06-08

## 2022-02-14 RX ORDER — SOLIFENACIN SUCCINATE 10 MG/1
10 TABLET, FILM COATED ORAL DAILY
Qty: 90 TABLET | Refills: 2 | Status: SHIPPED | OUTPATIENT
Start: 2022-02-14 | End: 2022-03-22 | Stop reason: ALTCHOICE

## 2022-02-14 RX ORDER — GABAPENTIN 300 MG/1
300 CAPSULE ORAL NIGHTLY
Qty: 90 CAPSULE | Refills: 2 | Status: SHIPPED | OUTPATIENT
Start: 2022-02-14 | End: 2022-03-22 | Stop reason: ALTCHOICE

## 2022-02-14 RX ORDER — ALENDRONATE SODIUM 70 MG/1
70 TABLET ORAL
Qty: 12 TABLET | Refills: 2 | Status: SHIPPED | OUTPATIENT
Start: 2022-02-14

## 2022-02-14 NOTE — TELEPHONE ENCOUNTER
These were recently approved but she can not go thru her local pharmacy any longer. Pended for [facility] please.

## 2022-03-21 RX ORDER — CHLORHEXIDINE GLUCONATE 0.12 MG/ML
RINSE ORAL
COMMUNITY
Start: 2022-02-09 | End: 2022-03-22 | Stop reason: ALTCHOICE

## 2022-03-22 ENCOUNTER — OFFICE VISIT (OUTPATIENT)
Dept: INTERNAL MEDICINE CLINIC | Age: 77
End: 2022-03-22
Payer: MEDICARE

## 2022-03-22 VITALS
HEART RATE: 64 BPM | OXYGEN SATURATION: 97 % | BODY MASS INDEX: 31.07 KG/M2 | SYSTOLIC BLOOD PRESSURE: 136 MMHG | DIASTOLIC BLOOD PRESSURE: 72 MMHG | WEIGHT: 182 LBS | HEIGHT: 64 IN

## 2022-03-22 DIAGNOSIS — I15.2 HYPERTENSION ASSOCIATED WITH DIABETES (HCC): ICD-10-CM

## 2022-03-22 DIAGNOSIS — E11.59 HYPERTENSION ASSOCIATED WITH DIABETES (HCC): ICD-10-CM

## 2022-03-22 DIAGNOSIS — M25.551 RIGHT HIP PAIN: ICD-10-CM

## 2022-03-22 DIAGNOSIS — E11.9 TYPE 2 DIABETES MELLITUS WITHOUT COMPLICATION, WITHOUT LONG-TERM CURRENT USE OF INSULIN (HCC): Primary | ICD-10-CM

## 2022-03-22 PROCEDURE — 1090F PRES/ABSN URINE INCON ASSESS: CPT | Performed by: INTERNAL MEDICINE

## 2022-03-22 PROCEDURE — G8484 FLU IMMUNIZE NO ADMIN: HCPCS | Performed by: INTERNAL MEDICINE

## 2022-03-22 PROCEDURE — 99214 OFFICE O/P EST MOD 30 MIN: CPT | Performed by: INTERNAL MEDICINE

## 2022-03-22 PROCEDURE — 1123F ACP DISCUSS/DSCN MKR DOCD: CPT | Performed by: INTERNAL MEDICINE

## 2022-03-22 PROCEDURE — 4040F PNEUMOC VAC/ADMIN/RCVD: CPT | Performed by: INTERNAL MEDICINE

## 2022-03-22 PROCEDURE — G8427 DOCREV CUR MEDS BY ELIG CLIN: HCPCS | Performed by: INTERNAL MEDICINE

## 2022-03-22 PROCEDURE — G8417 CALC BMI ABV UP PARAM F/U: HCPCS | Performed by: INTERNAL MEDICINE

## 2022-03-22 PROCEDURE — 3044F HG A1C LEVEL LT 7.0%: CPT | Performed by: INTERNAL MEDICINE

## 2022-03-22 PROCEDURE — 1036F TOBACCO NON-USER: CPT | Performed by: INTERNAL MEDICINE

## 2022-03-22 PROCEDURE — G8399 PT W/DXA RESULTS DOCUMENT: HCPCS | Performed by: INTERNAL MEDICINE

## 2022-03-22 RX ORDER — PRAVASTATIN SODIUM 20 MG
20 TABLET ORAL DAILY
Qty: 90 TABLET | Refills: 3 | Status: SHIPPED | OUTPATIENT
Start: 2022-03-22 | End: 2022-08-26 | Stop reason: SDUPTHER

## 2022-03-22 RX ORDER — LISINOPRIL AND HYDROCHLOROTHIAZIDE 12.5; 1 MG/1; MG/1
1 TABLET ORAL DAILY
Qty: 90 TABLET | Refills: 3 | Status: SHIPPED | OUTPATIENT
Start: 2022-03-22 | End: 2022-08-26 | Stop reason: SDUPTHER

## 2022-03-22 NOTE — PROGRESS NOTES
Visit Information    Have you changed or started any medications since your last visit including any over-the-counter medicines, vitamins, or herbal medicines? no   Are you having any side effects from any of your medications? -  no  Have you stopped taking any of your medications? Is so, why? -  no    Have you seen any other physician or provider since your last visit? No  Have you had any other diagnostic tests since your last visit? No  Have you been seen in the emergency room and/or had an admission to a hospital since we last saw you? No  Have you had your routine dental cleaning in the past 6 months? no    Have you activated your [employer] account? If not, what are your barriers?  No:      Patient Care Team:  Roseanne Amos MD as PCP - General (Internal Medicine)  [facility]    Medical History Review  Past Medical, Family, and Social History reviewed and does contribute to the patient presenting condition    Health Maintenance   Topic Date Due    Hepatitis C screen  Never done    DTaP/Tdap/Td vaccine (1 - Tdap) Never done    Potassium monitoring  06/22/2022    Creatinine monitoring  06/22/2022    Depression Monitoring  12/17/2022    Annual Wellness Visit (AWV)  12/18/2022    Lipid screen  12/20/2022    DEXA (modify frequency per FRAX score)  Completed    Flu vaccine  Completed    Shingles Vaccine  Completed    Pneumococcal 65+ years Vaccine  Completed    COVID-19 Vaccine  Completed    Hepatitis A vaccine  Aged Out    Hib vaccine  Aged Out    Meningococcal (ACWY) vaccine  Aged Out     SUBJECTIVE:  Pricila Haines is a 67 y.o. female patient who  comes for complaints of   Chief Complaint   Patient presents with    Hypertension    Diabetes     1/4/22 a1c 5.8    Hip Pain         Hip pain  neurontin did not help- will stop  Naproxen helped-pt already on mobic advised not to take both      Hypertension   Patient indicates that s/he is feeling well and denies any symptoms referable to elevated blood pressure. - Specifically denies headache, chest pain, palpitations, dyspnea and peripheral edema.  - Patient denies any side effects of their medication(s) and is compliant with their regimen. - Watches her diet for sodium, low fat and low cholesterol most of the time. Last 3 Encounter BP Readings  BP Readings from Last 3 Encounters:   03/22/22 136/72   02/08/22 110/64   01/04/22 130/70      DIABETES MELLITUS:    diagnosed more than 5 years ago  Modifying factors on med:   Severity:controlled   Associated signs and symtoms: neuropathy/ckd/ CAD. aggravated with sugar diet and better with low sugar diet        Hemoglobin A1C   Date Value Ref Range Status   01/04/2022 5.8 % Final       On amaryljorje        HYPERLIPIDEMIA:   Patient reports doing well on current therapy of statin:  pravachol 20  - Denies side effects of muscle weakness or achiness. - Exercise  -last lipid panel  Lab Results   Component Value Date    CHOL 185 12/20/2021     Lab Results   Component Value Date    TRIG 257 (H) 12/20/2021     Lab Results   Component Value Date    HDL 40 (L) 12/20/2021     Lab Results   Component Value Date    LDLCHOLESTEROL 94 12/20/2021     Lab Results   Component Value Date    VLDL NOT REPORTED (H) 12/20/2021     Lab Results   Component Value Date    CHOLHDLRATIO 4.6 12/20/2021         Bladder instability  Does not think solifenacin is helping  Will stip and see  Has been referred toruology  Needs to set up appt        REVIEW OF SYSTEMS (except Subjective (HPI))  GENERAL: No fevers / chills  RESPIRATORY: Negative for cough, wheezing or shortness of breath  CARDIOVASCULAR: Negative for chest pain or palpitations.   GI: no nausea, vomiting, or diarrhea  NEURO: No history of headaches    Past Medical History:   Diagnosis Date    Hypertension     Osteoarthritis     Type II or unspecified type diabetes mellitus without mention of complication, not stated as uncontrolled        SOCIAL HISTORY:  Social History     Socioeconomic History    Marital status:      Spouse name: Not on file    Number of children: Not on file    Years of education: Not on file    Highest education level: Not on file   Occupational History    Not on file   Tobacco Use    Smoking status: Never Smoker    Smokeless tobacco: Never Used   Substance and Sexual Activity    Alcohol use: No    Drug use: No    Sexual activity: Not on file   Other Topics Concern    Not on file   Social History Narrative    Not on file     Social Determinants of Health     Financial Resource Strain: Low Risk     Difficulty of Paying Living Expenses: Not hard at all   Food Insecurity: No Food Insecurity    Worried About [address] [facility] in the Last Year: Never true    [facility] in the Last Year: Never true   Transportation Needs:     Lack of Transportation (Medical): Not on file    Lack of Transportation (Non-Medical):  Not on file   Physical Activity:     Days of Exercise per Week: Not on file    Minutes of Exercise per Session: Not on file   Stress:     Feeling of Stress : Not on file   Social Connections:     Frequency of Communication with Friends and Family: Not on file    Frequency of Social Gatherings with Friends and Family: Not on file    Attends [employer] Services: Not on file    Active Member of [address] [place] [address] [employer] [facility] or Organizations: Not on file    Attends Club or Organization Meetings: Not on file    Marital Status: Not on file   Intimate Partner Violence:     Fear of Current or Ex-Partner: Not on file    Emotionally Abused: Not on file    Physically Abused: Not on file    Sexually Abused: Not on file   Housing Stability:     Unable to Pay for Housing in the Last Year: Not on file    Number of [place] in the Last Year: Not on file    Unstable Housing in the Last Year: Not on file           CURRENT MEDICATIONS:  Current Outpatient Medications   Medication Sig Dispense Refill  gabapentin (NEURONTIN) 300 MG capsule Take 1 capsule by mouth nightly for 90 days. 90 capsule 2    alendronate (FOSAMAX) 70 MG tablet Take 1 tablet by mouth every 7 days Take medication on an empty stomach, 1st thing in the morning, with full glass of water, say upright for 30min 12 tablet 2    solifenacin (VESICARE) 10 MG tablet Take 1 tablet by mouth daily 90 tablet 2    meloxicam (MOBIC) 7.5 MG tablet Take 1 tablet by mouth daily 90 tablet 2    pioglitazone (ACTOS) 15 MG tablet       lisinopril-hydroCHLOROthiazide (PRINZIDE;ZESTORETIC) 10-12.5 MG per tablet Take 1 tablet by mouth daily      glimepiride (AMARYL) 4 MG tablet Take 4 mg by mouth every morning (before breakfast)      metoprolol tartrate (LOPRESSOR) 50 MG tablet Take 50 mg by mouth daily       SITagliptin (JANUVIA) 100 MG tablet Take 100 mg by mouth daily      pravastatin (PRAVACHOL) 20 MG tablet Take 20 mg by mouth daily      rOPINIRole (REQUIP) 2 MG tablet Take 2 mg by mouth nightly       DONEPEZIL HCL PO Take by mouth       venlafaxine (EFFEXOR) 37.5 MG tablet Take 37.5 mg by mouth daily      chlorhexidine (PERIDEX) 0.12 % solution  (Patient not taking: Reported on 3/22/2022)      Calcium Carbonate-Vitamin D (OYSTER SHELL CALCIUM/D) 500-200 MG-UNIT TABS Take 1 tablet by mouth (Patient not taking: Reported on 3/22/2022)      albuterol sulfate HFA (PROAIR HFA) 108 (90 Base) MCG/ACT inhaler USE 2 INHALATIONS EVERY 6 HOURS AS NEEDED FOR WHEEZING (Patient not taking: Reported on 3/22/2022)      cyclobenzaprine (FLEXERIL) 10 MG tablet Take 10 mg by mouth 3 times daily as needed  (Patient not taking: Reported on 3/22/2022)      niacin (NIASPAN) 500 MG CR tablet Take 500 mg by mouth nightly  (Patient not taking: Reported on 3/22/2022)       No current facility-administered medications for this visit. OBJECTIVE:  Vitals:    03/22/22 1136   BP: 136/72   Pulse: 64   SpO2: 97%     Body mass index is 31.24 kg/m².           General exam (except above):  General appearance - well appearing, alert, in no acute distress  Head - Atraumatic, normocephalic  Eyes - EOMI, no jaundice or pallor  Lungs - Lungs clear to auscultation. No wheezing, rhonchi, rales  Heart - RRR without murmur, gallop, or rubs. No ectopy  Abdomen - Abdomen soft, non-tender. Bowel sounds normal. No masses, organomegaly  Extremities -No significant edema, or skin discoloration. Good capillary refill. Neuro - Pt Alert, awake and oriented x 3. No gross focal neurological deficits    ASSESSMENT AND PLAN (MEDICAL DECISION MAKING):       Dena Fairchild was seen today for hypertension, diabetes and hip pain. Diagnoses and all orders for this visit:    Type 2 diabetes mellitus without complication, without long-term current use of insulin (HCC)  Comments:  controlled on amaryl, actos, januvia  Orders:  -     pravastatin (PRAVACHOL) 20 MG tablet; Take 1 tablet by mouth daily    Hypertension associated with diabetes (HCC)  -     lisinopril-hydroCHLOROthiazide (PRINZIDE;ZESTORETIC) 10-12.5 MG per tablet;  Take 1 tablet by mouth daily    Right hip pain         Follow up in: Petra Lynch MD

## 2022-04-11 ENCOUNTER — OFFICE VISIT (OUTPATIENT)
Dept: UROLOGY | Age: 77
End: 2022-04-11
Payer: MEDICARE

## 2022-04-11 VITALS
TEMPERATURE: 97.7 F | HEIGHT: 64 IN | WEIGHT: 182 LBS | DIASTOLIC BLOOD PRESSURE: 80 MMHG | SYSTOLIC BLOOD PRESSURE: 142 MMHG | BODY MASS INDEX: 31.07 KG/M2

## 2022-04-11 DIAGNOSIS — R35.1 NOCTURIA: ICD-10-CM

## 2022-04-11 DIAGNOSIS — R39.15 URGENCY OF URINATION: ICD-10-CM

## 2022-04-11 DIAGNOSIS — R35.0 FREQUENCY OF URINATION: Primary | ICD-10-CM

## 2022-04-11 DIAGNOSIS — N39.41 URGE INCONTINENCE: ICD-10-CM

## 2022-04-11 PROCEDURE — 1090F PRES/ABSN URINE INCON ASSESS: CPT | Performed by: UROLOGY

## 2022-04-11 PROCEDURE — 1123F ACP DISCUSS/DSCN MKR DOCD: CPT | Performed by: UROLOGY

## 2022-04-11 PROCEDURE — G8399 PT W/DXA RESULTS DOCUMENT: HCPCS | Performed by: UROLOGY

## 2022-04-11 PROCEDURE — 4040F PNEUMOC VAC/ADMIN/RCVD: CPT | Performed by: UROLOGY

## 2022-04-11 PROCEDURE — 99204 OFFICE O/P NEW MOD 45 MIN: CPT | Performed by: UROLOGY

## 2022-04-11 PROCEDURE — 1036F TOBACCO NON-USER: CPT | Performed by: UROLOGY

## 2022-04-11 PROCEDURE — G8427 DOCREV CUR MEDS BY ELIG CLIN: HCPCS | Performed by: UROLOGY

## 2022-04-11 PROCEDURE — 0509F URINE INCON PLAN DOCD: CPT | Performed by: UROLOGY

## 2022-04-11 PROCEDURE — G8417 CALC BMI ABV UP PARAM F/U: HCPCS | Performed by: UROLOGY

## 2022-04-11 RX ORDER — IBUPROFEN 800 MG/1
800 TABLET ORAL EVERY 8 HOURS PRN
COMMUNITY
Start: 2022-03-16

## 2022-04-11 RX ORDER — MIRABEGRON 50 MG/1
50 TABLET, FILM COATED, EXTENDED RELEASE ORAL DAILY
Qty: 90 TABLET | Refills: 3 | Status: SHIPPED | OUTPATIENT
Start: 2022-04-11 | End: 2022-06-08

## 2022-04-11 ASSESSMENT — ENCOUNTER SYMPTOMS
COUGH: 0
BACK PAIN: 0
SHORTNESS OF BREATH: 0
VOMITING: 0
EYE REDNESS: 0
ABDOMINAL PAIN: 0
NAUSEA: 0
EYE PAIN: 0
CONSTIPATION: 0
WHEEZING: 0

## 2022-04-11 NOTE — PROGRESS NOTES
Review of Systems   Constitutional: Positive for fatigue. Negative for chills and fever. Eyes: Negative for pain, redness and visual disturbance. Respiratory: Negative for cough, shortness of breath and wheezing. Cardiovascular: Negative for chest pain and leg swelling. Gastrointestinal: Negative for abdominal pain, constipation, nausea and vomiting. Genitourinary: Positive for frequency and urgency. Negative for difficulty urinating, dysuria, flank pain and hematuria. Musculoskeletal: Negative for back pain, joint swelling and myalgias. Skin: Negative for rash and wound. Neurological: Negative for dizziness, tremors and numbness. Hematological: Bruises/bleeds easily.

## 2022-04-11 NOTE — PROGRESS NOTES
[address] [facility] [id number]  Dept:  Georgina Mahajan [facility] Office Note - New Patient    Patient:  Sanchez Renee [id number]  YOB: 1945  Date: 4/11/2022    The patient is a 67 y.o. female who presentstoday for evaluation of the following problems:   Chief Complaint   Patient presents with    New Patient     nocturia mixed incontience, \"I can go every hour on the hour\"    referred by Rodríguez Corado MD.    HPI  This is a very pleasant 65-year-old female who is seeing us for voiding dysfunction. She does have significant urgency and frequency as well as urge incontinence. She is also having a tremendous amount of back pain for which she sees pain management. She has tried Vesicare in the past and thinks she has also tried a few other medications. She has not had any relief of her urinary symptoms. She does wear up to 20 pads per day per her history. She says that they are always soaked when she changes them. (Patient's old records have been requested, reviewed and summarized in today's note.)    Summary of old records: N/A    History: N/A    ProceduresToday: N/A    Urinalysis today:  No results found for this visit on 04/11/22. AUA Symptom Score (4/11/2022):                                Last BUN andcreatinine:  Lab Results   Component Value Date    BUN 26 (H) 06/22/2021     Lab Results   Component Value Date    CREATININE 0.86 06/22/2021       Additional Lab/Culture results: none    Reviewed during this Office Visit: none  (results were independently reviewed byphysician and radiology report verified)    PAST MEDICAL, FAMILY AND SOCIAL HISTORY:  Past Medical History:   Diagnosis Date    Hypertension     Osteoarthritis     Type II or unspecified type diabetes mellitus without mention of complication, not stated as uncontrolled      Past Surgical History: Procedure Laterality Date    FOOT SURGERY      2 surgeries    NOSE SURGERY      3 surgeries     Family History   Problem Relation Age of Onset    Diabetes Father      Outpatient Medications Marked as Taking for the 4/11/22 encounter (Office Visit) with Stephen Hines MD   Medication Sig Dispense Refill    ibuprofen (ADVIL;MOTRIN) 800 MG tablet       MYRBETRIQ 50 MG TB24 Take 50 mg by mouth daily 90 tablet 3    lisinopril-hydroCHLOROthiazide (PRINZIDE;ZESTORETIC) 10-12.5 MG per tablet Take 1 tablet by mouth daily 90 tablet 3    pravastatin (PRAVACHOL) 20 MG tablet Take 1 tablet by mouth daily 90 tablet 3    alendronate (FOSAMAX) 70 MG tablet Take 1 tablet by mouth every 7 days Take medication on an empty stomach, 1st thing in the morning, with full glass of water, say upright for 30min 12 tablet 2    meloxicam (MOBIC) 7.5 MG tablet Take 1 tablet by mouth daily 90 tablet 2    pioglitazone (ACTOS) 15 MG tablet       albuterol sulfate HFA (PROAIR HFA) 108 (90 Base) MCG/ACT inhaler USE 2 INHALATIONS EVERY 6 HOURS AS NEEDED FOR WHEEZING      glimepiride (AMARYL) 4 MG tablet Take 4 mg by mouth every morning (before breakfast)      metoprolol tartrate (LOPRESSOR) 50 MG tablet Take 50 mg by mouth daily       SITagliptin (JANUVIA) 100 MG tablet Take 100 mg by mouth daily      rOPINIRole (REQUIP) 2 MG tablet Take 2 mg by mouth nightly       DONEPEZIL HCL PO Take by mouth       venlafaxine (EFFEXOR) 37.5 MG tablet Take 37.5 mg by mouth daily      niacin (NIASPAN) 500 MG CR tablet Take 500 mg by mouth nightly          Diphenhydramine hcl, Dust mite extract, Guaifenesin, and Wasp venom protein  Social History     Tobacco Use   Smoking Status Never Smoker   Smokeless Tobacco Never Used      (If patient a smoker, smoking cessation counseling offered)   Social History     Substance and Sexual Activity   Alcohol Use No       REVIEW OF SYSTEMS:  Review of Systems    Physical Exam:    This a 67 y.o. female Vitals:    04/11/22 1049   BP: (!) 142/80   Temp: 97.7 °F (36.5 °C)     Body mass index is 31.24 kg/m². Physical Exam  Constitutional: Patient in no acute distress, ggod grooming, appropriately dressed  Neuro: Alert and oriented to person, place and time. Psych:Mood normal, affect normal  Skin: No rash noted  HEENT: Head: Normocephalic and atraumatic,Conjunctivae and EOM are normal,Nose- normal, Right/Left External Ear: normal, Mouth: Mucosa Moist  Neck: Supple  Lungs: Respiratory effort is normal  Cardiovascular: strong and regular, no lower leg edema  Abdomen: Soft, non-tender, non-distended with no CVA,    Lymphatics: No cervical palpable lymphadenopathy. Bladder non-tender and not distended. Musculoskeletal: Normal gait and station        Assessment and Plan      1. Frequency of urination    2. Urgency of urination    3. Nocturia    4. Urge incontinence            Plan:   myrbetriq  F/u 2 mo  Pt had failed vesicare       Prescriptions Ordered:  Orders Placed This Encounter   Medications    MYRBETRIQ 50 MG TB24     Sig: Take 50 mg by mouth daily     Dispense:  90 tablet     Refill:  3      Orders Placed:  No orders of the defined types were placed in this encounter. Matt Hernandez MD    Agree with the ROS entered by the MA.

## 2022-05-05 ENCOUNTER — OFFICE VISIT (OUTPATIENT)
Dept: INTERNAL MEDICINE CLINIC | Age: 77
End: 2022-05-05
Payer: MEDICARE

## 2022-05-05 VITALS
WEIGHT: 188.2 LBS | SYSTOLIC BLOOD PRESSURE: 124 MMHG | BODY MASS INDEX: 32.13 KG/M2 | OXYGEN SATURATION: 99 % | HEIGHT: 64 IN | HEART RATE: 58 BPM | DIASTOLIC BLOOD PRESSURE: 86 MMHG

## 2022-05-05 DIAGNOSIS — E11.9 TYPE 2 DIABETES MELLITUS WITHOUT COMPLICATION, WITHOUT LONG-TERM CURRENT USE OF INSULIN (HCC): Primary | ICD-10-CM

## 2022-05-05 DIAGNOSIS — M25.552 BILATERAL HIP PAIN: ICD-10-CM

## 2022-05-05 DIAGNOSIS — M25.551 BILATERAL HIP PAIN: ICD-10-CM

## 2022-05-05 DIAGNOSIS — Z87.81 HISTORY OF FRACTURE OF RIGHT HIP: ICD-10-CM

## 2022-05-05 LAB — HBA1C MFR BLD: 6 %

## 2022-05-05 PROCEDURE — 1090F PRES/ABSN URINE INCON ASSESS: CPT | Performed by: NURSE PRACTITIONER

## 2022-05-05 PROCEDURE — G8399 PT W/DXA RESULTS DOCUMENT: HCPCS | Performed by: NURSE PRACTITIONER

## 2022-05-05 PROCEDURE — G8427 DOCREV CUR MEDS BY ELIG CLIN: HCPCS | Performed by: NURSE PRACTITIONER

## 2022-05-05 PROCEDURE — 4040F PNEUMOC VAC/ADMIN/RCVD: CPT | Performed by: NURSE PRACTITIONER

## 2022-05-05 PROCEDURE — 83036 HEMOGLOBIN GLYCOSYLATED A1C: CPT | Performed by: NURSE PRACTITIONER

## 2022-05-05 PROCEDURE — 1123F ACP DISCUSS/DSCN MKR DOCD: CPT | Performed by: NURSE PRACTITIONER

## 2022-05-05 PROCEDURE — 3044F HG A1C LEVEL LT 7.0%: CPT | Performed by: NURSE PRACTITIONER

## 2022-05-05 PROCEDURE — 99213 OFFICE O/P EST LOW 20 MIN: CPT | Performed by: NURSE PRACTITIONER

## 2022-05-05 PROCEDURE — G8417 CALC BMI ABV UP PARAM F/U: HCPCS | Performed by: NURSE PRACTITIONER

## 2022-05-05 PROCEDURE — 1036F TOBACCO NON-USER: CPT | Performed by: NURSE PRACTITIONER

## 2022-05-05 RX ORDER — GABAPENTIN 300 MG/1
1 CAPSULE ORAL DAILY
COMMUNITY
Start: 2022-04-22 | End: 2022-06-08

## 2022-05-05 RX ORDER — CHLORHEXIDINE GLUCONATE 0.12 MG/ML
RINSE ORAL
COMMUNITY
Start: 2022-05-04

## 2022-05-05 RX ORDER — DONEPEZIL HYDROCHLORIDE 5 MG/1
1 TABLET, FILM COATED ORAL DAILY
COMMUNITY
Start: 2022-04-11 | End: 2022-08-26 | Stop reason: SDUPTHER

## 2022-05-05 NOTE — PROGRESS NOTES
Subjective:      Patient ID: Mimi Lujan is a 67 y.o. female. Chief Complaint   Patient presents with    Diabetes     A1c Today    Osteoporosis     Patient states that she has a lot of pain all over her body       HPI   Patient is here for follow up of DM, bilateral hip pain. DM-diagnosed more than 5+ years ago. Reports compliance with meds, not so much with diabetic diet. Hip pain- history of right hip fracture  Has been doing PT, states pain is  constant daily, all night, in both hips. Currently on mobic, doesn't seem to help much. Review of Systems   Constitutional: Positive for fatigue. Negative for chills and fever. Respiratory: Negative for cough, shortness of breath and wheezing. Cardiovascular: Negative for chest pain, palpitations and leg swelling. Gastrointestinal: Negative for abdominal pain. Endocrine: Negative for polydipsia, polyphagia and polyuria. Genitourinary: Positive for frequency (has been referred to urology). Musculoskeletal: Positive for arthralgias and gait problem. Neurological: Negative for weakness and numbness. Psychiatric/Behavioral: The patient is nervous/anxious. Objective:   Physical Exam  Constitutional:       General: She is not in acute distress. Appearance: Normal appearance. She is well-developed. HENT:      Head: Normocephalic and atraumatic. Right Ear: External ear normal.      Left Ear: External ear normal.      Nose: Nose normal.   Eyes:      Conjunctiva/sclera: Conjunctivae normal.   Cardiovascular:      Rate and Rhythm: Normal rate and regular rhythm. Pulses: Normal pulses. Heart sounds: Normal heart sounds. No murmur heard. Pulmonary:      Effort: Pulmonary effort is normal.      Breath sounds: Normal breath sounds. No wheezing. Abdominal:      General: Bowel sounds are normal.      Palpations: Abdomen is soft. Tenderness: There is no abdominal tenderness.    Musculoskeletal:      Cervical back: Normal range of motion and neck supple. Right hip: Tenderness present. Decreased range of motion. Left hip: Tenderness present. Decreased range of motion. Neurological:      Mental Status: She is alert. Gait: Gait normal.   Psychiatric:         Mood and Affect: Mood is anxious. Behavior: Behavior normal.         Assessment / Plan:      Visit Diagnoses and Associated Orders     Type 2 diabetes mellitus without complication, without long-term current use of insulin ([facility])    -  Primary    Controlled, A1c 6.0. Continue Januvia/Actos    POCT glycosylated hemoglobin (Hb A1C) [[address] [employer]]           Bilateral hip pain        Chronic, will refer to ortho at her request    Karen Dejesus MD, Orthopedic Surgery, [place] [employer]]           History of fracture of right hip        Karen Dejesus MD, Orthopedic Surgery, [facility]                               Return if symptoms worsen or fail to improve, for routine follow up at next scheduled appointment. Visit Information    Have you changed or started any medications since your last visit including any over-the-counter medicines, vitamins, or herbal medicines? no   Are you having any side effects from any of your medications? -  no  Have you stopped taking any of your medications? Is so, why? -  no    Have you seen any other physician or provider since your last visit? Yes - Records Obtained  Have you had any other diagnostic tests since your last visit? No  Have you been seen in the emergency room and/or had an admission to a hospital since we last saw you? No  Have you had your routine dental cleaning in the past 6 months? no    Have you activated your [employer] account? If not, what are your barriers?  No:      Patient Care Team:  Freeman Norton MD as PCP - General (Internal Medicine)  Freeman Norton MD as PCP - REHABILITATION [facility] Provider    Medical History Review  Past Medical, Family, and Social History

## 2022-05-19 ASSESSMENT — ENCOUNTER SYMPTOMS
COUGH: 0
SHORTNESS OF BREATH: 0
WHEEZING: 0
ABDOMINAL PAIN: 0

## 2022-05-25 ENCOUNTER — OFFICE VISIT (OUTPATIENT)
Dept: ORTHOPEDIC SURGERY | Age: 77
End: 2022-05-25

## 2022-05-25 DIAGNOSIS — M25.561 RIGHT KNEE PAIN, UNSPECIFIED CHRONICITY: Primary | ICD-10-CM

## 2022-05-25 PROCEDURE — G8399 PT W/DXA RESULTS DOCUMENT: HCPCS | Performed by: ORTHOPAEDIC SURGERY

## 2022-05-25 PROCEDURE — 1036F TOBACCO NON-USER: CPT | Performed by: ORTHOPAEDIC SURGERY

## 2022-05-25 PROCEDURE — 99203 OFFICE O/P NEW LOW 30 MIN: CPT | Performed by: ORTHOPAEDIC SURGERY

## 2022-05-25 PROCEDURE — 1090F PRES/ABSN URINE INCON ASSESS: CPT | Performed by: ORTHOPAEDIC SURGERY

## 2022-05-25 PROCEDURE — G8417 CALC BMI ABV UP PARAM F/U: HCPCS | Performed by: ORTHOPAEDIC SURGERY

## 2022-05-25 PROCEDURE — G8428 CUR MEDS NOT DOCUMENT: HCPCS | Performed by: ORTHOPAEDIC SURGERY

## 2022-05-25 PROCEDURE — 1123F ACP DISCUSS/DSCN MKR DOCD: CPT | Performed by: ORTHOPAEDIC SURGERY

## 2022-05-25 RX ORDER — TRAMADOL HYDROCHLORIDE 50 MG/1
50 TABLET ORAL EVERY 4 HOURS PRN
Qty: 42 TABLET | Refills: 0 | Status: SHIPPED | OUTPATIENT
Start: 2022-05-25 | End: 2022-06-01

## 2022-05-25 NOTE — PROGRESS NOTES
Macarthur Schwab M.D.            [address] [facility] [address] [facility],[address], [address] [facility]           Dept Phone: [phone]           Dept Fax:  [phone] [address] [facility]           Peter English          Dept Phone: [phone]           Dept Fax:  [phone]      Chief Compliant:  Chief Complaint   Patient presents with    Pain     Rt knee & hip        History of Present Illness: This is a 67 y.o. female who presents to the clinic today for evaluation / follow up of right knee pain. Patient is a 78-year-old patient who has had right knee pain on and off for some time but she is noticed in the last several months she has had intermittent catching locking and giving sensation to her right knee. She states that sometimes feels like she may have a fall. She denies any prior surgical intervention to her knees. She did have a TFN of her left hip done at an outside facility. Patient describes that her sometimes her knee feels wobbly wants to give way on her sometimes it occasionally will swell up on her. .       Review of Systems   Constitutional: Negative for fever, chills, sweats. Eyes: Negative for changes in vision, or pain. HENT: Negative for ear ache, epistaxis, or sore throat. Respiratory/Cardio: Negative for Chest pain, palpitations, SOB, or cough. Gastrointestinal: Negative for abdominal pain, N/V/D. Genitourinary: Negative for dysuria, frequency, urgency, or hematuria. Neurological: Negative for headache, numbness, or weakness. Integumentary: Negative for rash, itching, laceration, or abrasion. Musculoskeletal: Positive for Pain (Rt knee & hip)       Physical Exam:  Constitutional: Patient is oriented to person, place, and time. Patient appears well-developed and well nourished.    HENT: Negative otherwise noted  Head: Normocephalic and Atraumatic  Nose: Normal  Eyes: Conjunctivae and EOM are normal  Neck: Normal range of motion Neck supple. Respiratory/Cardio: Effort normal. No respiratory distress. Musculoskeletal: Examination of patient's right knee notes a modest effusion. Knee motion is 0 to 110degrees her collaterals appear to be appropriate endpoint seems to be good mild patellofemoral pain she has a very positive reproducible Fauzia's medially. No hip rotational pain no IT band findings    Neurological: Patient is alert and oriented to person, place, and time. Normal strenght. No sensory deficit. Skin: Skin is warm and dry  Psychiatric: Behavior is normal. Thought content normal.  Nursing note and vitals reviewed. Labs and Imaging:     XR taken today:  XR KNEE RIGHT (1-2 VIEWS)    Result Date: 5/25/2022  X-rays taken today reviewed by me show standing AP of both knees and a lateral of the right knee. Patient has some very modest medial joint space narrowing the right knee but certainly nothing too severe. Left knee is relatively unremarkable. Lateral view of the patient's right knee no some mild patellofemoral narrowing but nothing too severe. She may have evidence for slight effusion. No other acute process is noted          Orders Placed This Encounter   Procedures    XR KNEE RIGHT (1-2 VIEWS)     Standing Status:   Future     Number of Occurrences:   1     Standing Expiration Date:   5/25/2023       Assessment and Plan:  1. Right knee pain, unspecified chronicity    2. Suspicious for medial meniscus tear right knee        This is a 67 y.o. female who presents to the clinic today for evaluation / follow up of suspicious for medial meniscus tear right knee. Past History:    Current Outpatient Medications:     donepezil (ARICEPT) 5 MG tablet, Take 1 tablet by mouth daily, Disp: , Rfl:     gabapentin (NEURONTIN) 300 MG capsule, Take 1 capsule by mouth daily. , Disp: , Rfl:     chlorhexidine (PERIDEX) 0.12 % solution, , Disp: , Rfl:     ibuprofen (ADVIL;MOTRIN) 800 MG tablet, , Disp: , Rfl:     MYRBETRIQ 50 MG TB24, Take 50 mg by mouth daily, Disp: 90 tablet, Rfl: 3    lisinopril-hydroCHLOROthiazide (PRINZIDE;ZESTORETIC) 10-12.5 MG per tablet, Take 1 tablet by mouth daily, Disp: 90 tablet, Rfl: 3    pravastatin (PRAVACHOL) 20 MG tablet, Take 1 tablet by mouth daily, Disp: 90 tablet, Rfl: 3    alendronate (FOSAMAX) 70 MG tablet, Take 1 tablet by mouth every 7 days Take medication on an empty stomach, 1st thing in the morning, with full glass of water, say upright for 30min, Disp: 12 tablet, Rfl: 2    meloxicam (MOBIC) 7.5 MG tablet, Take 1 tablet by mouth daily, Disp: 90 tablet, Rfl: 2    pioglitazone (ACTOS) 15 MG tablet, , Disp: , Rfl:     albuterol sulfate HFA (PROAIR HFA) 108 (90 Base) MCG/ACT inhaler, USE 2 INHALATIONS EVERY 6 HOURS AS NEEDED FOR WHEEZING, Disp: , Rfl:     glimepiride (AMARYL) 4 MG tablet, Take 4 mg by mouth every morning (before breakfast), Disp: , Rfl:     metoprolol tartrate (LOPRESSOR) 50 MG tablet, Take 50 mg by mouth daily , Disp: , Rfl:     SITagliptin (JANUVIA) 100 MG tablet, Take 100 mg by mouth daily, Disp: , Rfl:     rOPINIRole (REQUIP) 2 MG tablet, Take 2 mg by mouth nightly , Disp: , Rfl:     venlafaxine (EFFEXOR) 37.5 MG tablet, Take 37.5 mg by mouth daily, Disp: , Rfl:     niacin (NIASPAN) 500 MG CR tablet, Take 500 mg by mouth nightly , Disp: , Rfl:   Allergies   Allergen Reactions    Diphenhydramine Hcl     Dust Mite Extract     Guaifenesin     Wasp Venom Protein      Social History     Socioeconomic History    Marital status:      Spouse name: Not on file    Number of children: Not on file    Years of education: Not on file    Highest education level: Not on file   Occupational History    Not on file   Tobacco Use    Smoking status: Never Smoker    Smokeless tobacco: Never Used   Substance and Sexual Activity    Alcohol use: No    Drug use: No    Sexual activity: Not on file   Other Topics Concern    Not on file   Social History Narrative    Not on file     Social Determinants of Health     Financial Resource Strain: Low Risk     Difficulty of Paying Living Expenses: Not hard at all   Food Insecurity: No Food Insecurity    Worried About Running Out of Food in the Last Year: Never true    [address] in the Last Year: Never true   Transportation Needs:     Lack of Transportation (Medical): Not on file    Lack of Transportation (Non-Medical): Not on file   Physical Activity:     Days of Exercise per Week: Not on file    Minutes of Exercise per Session: Not on file   Stress:     Feeling of Stress : Not on file   Social Connections:     Frequency of Communication with Friends and Family: Not on file    Frequency of Social Gatherings with Friends and Family: Not on file    Attends Rastafarian Services: Not on file    Active Member of [address] [place] [address] [employer] or Organizations: Not on file    Attends Club or Organization Meetings: Not on file    Marital Status: Not on file   Intimate Partner Violence:     Fear of Current or Ex-Partner: Not on file    Emotionally Abused: Not on file    Physically Abused: Not on file    Sexually Abused: Not on file   Housing Stability:     Unable to Pay for Housing in the Last Year: Not on file    Number of [place] in the Last Year: Not on file    Unstable Housing in the Last Year: Not on file     Past Medical History:   Diagnosis Date    Hypertension     Osteoarthritis     Type II or unspecified type diabetes mellitus without mention of complication, not stated as uncontrolled      Past Surgical History:   Procedure Laterality Date    FOOT SURGERY      2 surgeries    NOSE SURGERY      3 surgeries     Family History   Problem Relation Age of Onset    Diabetes Father    Plan  Patient be scheduled for MRI to her right knee.   I did discuss with her and her  that if the MRI is indeed positive for medial meniscus tear that I would recommend arthroscopic intervention. We discussed the details of procedure as well as the risk and benefits. We will get her scheduled accordingly pending the results of the MRI determine whether arthroscopic intervention is necessary      Provider Attestation:  Anne Mccain, personally performed the services described in this documentation. All medical record entries made by the scribe were at my direction and in my presence. I have reviewed the chart and discharge instructions and agree that the records reflect my personal performance and is accurate and complete. Lamar Franklin MD. 05/25/22      Please note that this chart was generated using voice recognition Dragon dictation software. Although every effort was made to ensure the accuracy of this automated transcription, some errors in transcription may have occurred.

## 2022-06-02 ENCOUNTER — HOSPITAL ENCOUNTER (OUTPATIENT)
Dept: MRI IMAGING | Age: 77
Discharge: HOME | End: 2022-06-04

## 2022-06-02 ENCOUNTER — TELEPHONE (OUTPATIENT)
Dept: ORTHOPEDIC SURGERY | Age: 77
End: 2022-06-02

## 2022-06-02 DIAGNOSIS — M25.561 RIGHT KNEE PAIN, UNSPECIFIED CHRONICITY: ICD-10-CM

## 2022-06-02 NOTE — TELEPHONE ENCOUNTER
If patient's symptoms persist then since we are unable to do the MRI then we will proceed arthroscopically again if her symptoms persist

## 2022-06-02 NOTE — TELEPHONE ENCOUNTER
Eve called from [address] [place] [address] to make Dr Jordan Haile aware per the [facility] the Patient is not able to complete an MRI due to having a [employer] she is not safe to complete even tho patient is stating the stimulator does not work. Please advise thank you!

## 2022-06-06 ENCOUNTER — TELEPHONE (OUTPATIENT)
Dept: ORTHOPEDIC SURGERY | Age: 77
End: 2022-06-06

## 2022-06-06 NOTE — TELEPHONE ENCOUNTER
Patient called, has a referral for a MRI, however, the patient has a device in her back which makes her unable to have a MRI.     Please call pt and advise [phone]

## 2022-06-08 ENCOUNTER — HOSPITAL ENCOUNTER (OUTPATIENT)
Dept: PREADMISSION TESTING | Age: 77
Discharge: HOME | End: 2022-06-12
Payer: MEDICARE

## 2022-06-08 VITALS
SYSTOLIC BLOOD PRESSURE: 140 MMHG | DIASTOLIC BLOOD PRESSURE: 52 MMHG | OXYGEN SATURATION: 100 % | WEIGHT: 173 LBS | HEIGHT: 64 IN | TEMPERATURE: 97.5 F | BODY MASS INDEX: 29.53 KG/M2 | RESPIRATION RATE: 16 BRPM | HEART RATE: 68 BPM

## 2022-06-08 DIAGNOSIS — Z01.818 PREOP EXAMINATION: ICD-10-CM

## 2022-06-08 PROBLEM — M17.9 OSTEOARTHRITIS OF KNEE: Status: ACTIVE | Noted: 2022-06-08

## 2022-06-08 PROBLEM — M96.1 POST-LAMINECTOMY SYNDROME: Status: ACTIVE | Noted: 2022-06-08

## 2022-06-08 PROBLEM — M46.1 SACROILIITIS, NOT ELSEWHERE CLASSIFIED (HCC): Status: ACTIVE | Noted: 2022-06-08

## 2022-06-08 PROBLEM — B02.29 POSTHERPETIC NEURALGIA: Status: ACTIVE | Noted: 2022-06-08

## 2022-06-08 PROBLEM — M51.26 DISPLACEMENT OF LUMBAR INTERVERTEBRAL DISC WITHOUT MYELOPATHY: Status: ACTIVE | Noted: 2022-06-08

## 2022-06-08 PROBLEM — G89.4 CHRONIC PAIN DISORDER: Status: ACTIVE | Noted: 2022-06-08

## 2022-06-08 PROBLEM — M46.1 BILATERAL SACROILIITIS (HCC): Status: ACTIVE | Noted: 2022-06-08

## 2022-06-08 LAB
ABSOLUTE EOS #: 0.2 K/UL (ref 0–0.4)
ABSOLUTE LYMPH #: 1.3 K/UL (ref 1–4.8)
ABSOLUTE MONO #: 0.5 K/UL (ref 0.1–1.3)
ANION GAP SERPL CALCULATED.3IONS-SCNC: 11 MMOL/L (ref 9–17)
BASOPHILS # BLD: 1 % (ref 0–2)
BASOPHILS ABSOLUTE: 0.1 K/UL (ref 0–0.2)
BUN BLDV-MCNC: 27 MG/DL (ref 8–23)
CALCIUM SERPL-MCNC: 10 MG/DL (ref 8.6–10.4)
CHLORIDE BLD-SCNC: 101 MMOL/L (ref 98–107)
CO2: 28 MMOL/L (ref 20–31)
CREAT SERPL-MCNC: 1.01 MG/DL (ref 0.5–0.9)
EOSINOPHILS RELATIVE PERCENT: 4 % (ref 0–4)
GFR AFRICAN AMERICAN: >60 ML/MIN
GFR NON-AFRICAN AMERICAN: 53 ML/MIN
GFR SERPL CREATININE-BSD FRML MDRD: ABNORMAL ML/MIN/{1.73_M2}
GLUCOSE BLD-MCNC: 92 MG/DL (ref 70–99)
HCT VFR BLD CALC: 41.3 % (ref 36–46)
HEMOGLOBIN: 13.7 G/DL (ref 12–16)
LYMPHOCYTES # BLD: 24 % (ref 24–44)
MCH RBC QN AUTO: 29.7 PG (ref 26–34)
MCHC RBC AUTO-ENTMCNC: 33.3 G/DL (ref 31–37)
MCV RBC AUTO: 89.2 FL (ref 80–100)
MONOCYTES # BLD: 9 % (ref 1–7)
PDW BLD-RTO: 14.1 % (ref 11.5–14.9)
PLATELET # BLD: 249 K/UL (ref 150–450)
PMV BLD AUTO: 8.4 FL (ref 6–12)
POTASSIUM SERPL-SCNC: 5 MMOL/L (ref 3.7–5.3)
RBC # BLD: 4.63 M/UL (ref 4–5.2)
SEG NEUTROPHILS: 62 % (ref 36–66)
SEGMENTED NEUTROPHILS ABSOLUTE COUNT: 3.4 K/UL (ref 1.3–9.1)
SODIUM BLD-SCNC: 140 MMOL/L (ref 135–144)
WBC # BLD: 5.4 K/UL (ref 3.5–11)

## 2022-06-08 PROCEDURE — 93005 ELECTROCARDIOGRAM TRACING: CPT | Performed by: NURSE PRACTITIONER

## 2022-06-08 PROCEDURE — 36415 COLL VENOUS BLD VENIPUNCTURE: CPT

## 2022-06-08 PROCEDURE — APPSS45 APP SPLIT SHARED TIME 31-45 MINUTES: Performed by: NURSE PRACTITIONER

## 2022-06-08 PROCEDURE — 85025 COMPLETE CBC W/AUTO DIFF WBC: CPT

## 2022-06-08 PROCEDURE — 80048 BASIC METABOLIC PNL TOTAL CA: CPT

## 2022-06-08 ASSESSMENT — ENCOUNTER SYMPTOMS
CONSTIPATION: 0
VOMITING: 0
COUGH: 0
ABDOMINAL PAIN: 0
BACK PAIN: 1
ANAL BLEEDING: 0
DIARRHEA: 0
BLOOD IN STOOL: 0
SORE THROAT: 0
RHINORRHEA: 0
NAUSEA: 0
TROUBLE SWALLOWING: 0
WHEEZING: 0
SHORTNESS OF BREATH: 0

## 2022-06-08 ASSESSMENT — PAIN DESCRIPTION - FREQUENCY: FREQUENCY: OTHER (COMMENT)

## 2022-06-08 ASSESSMENT — PAIN DESCRIPTION - DESCRIPTORS: DESCRIPTORS: ACHING;BURNING

## 2022-06-08 ASSESSMENT — PAIN - FUNCTIONAL ASSESSMENT: PAIN_FUNCTIONAL_ASSESSMENT: PREVENTS OR INTERFERES SOME ACTIVE ACTIVITIES AND ADLS

## 2022-06-08 ASSESSMENT — PAIN DESCRIPTION - LOCATION: LOCATION: KNEE

## 2022-06-08 ASSESSMENT — PAIN DESCRIPTION - ORIENTATION: ORIENTATION: RIGHT

## 2022-06-08 ASSESSMENT — PAIN DESCRIPTION - PAIN TYPE: TYPE: CHRONIC PAIN

## 2022-06-08 NOTE — H&P (VIEW-ONLY)
HISTORY and [address]       NAME:  Lamar Harding  MRN: [id number]   YOB: 1945   Date: 6/8/2022   Age: 67 y.o. Gender: female     COMPLAINT AND PRESENT HISTORY:   Lamar Harding is 67 y.o.,  female, presents for pre-anesthesia/admission testing for KNEE ARTHROSCOPY- RIGHT per Dr. Anastasiya Spears. Primary dx: MENISCUS TEAR RIGHT KNEE.    HPI:  Knee Pain   Incident onset: Patient states she fell d/t right knee kept \"wobbling\"- fall was 6-18-21, then had right hip surgery, right knee pain progressed- + numbness ankle up to knee, sometimes further. The injury mechanism was a fall. The pain is present in the right knee, left ankle and right leg. The quality of the pain is described as aching. The pain is at a severity of 4/10. The pain has been fluctuating since onset. Associated symptoms include an inability to bear weight, a loss of motion and numbness. Pertinent negatives include no tingling. The symptoms are aggravated by weight bearing (Prolonged sitting). Treatments tried: PT, pain medication- states she has high tolerance to pain medication, states oxycodone has been helpful in the past.     Testing completed r/t condition:  X-RAY RIGHT KNEE, 5-25-22:  Narrative   X-rays taken today reviewed by me show standing AP of both knees and a    lateral of the right knee.  Patient has some very modest medial joint    space narrowing the right knee but certainly nothing too severe.  Left    knee is relatively unremarkable.  Lateral view of the patient's right knee    no some mild patellofemoral narrowing but nothing too severe.  She may    have evidence for slight effusion.  No other acute process is noted     Review of additional significant medical hx:  HTN: Does not f/u with cardiology. Denies current/recent chest pain, palpitations, SOB, dizziness, + leg swelling, denies headache.   Current medications r/t condition: LISINOPRIL-HCTZ, METOPROLOL, NIACIN, PRAVASTATIN  BP Readings from Last 3 Encounters:   06/08/22 (!) 140/52   05/05/22 124/86   04/11/22 (!) 142/80     DM II: Does not check BS regularly. Current medications r/t condition: ACTOS, NAJMA MARGYERENDIRA  Lab Results   Component Value Date    LABA1C 6.0 05/05/2022     No results found for: EAG    Activity level: Patient used to do routine exercise, not currently. Functional Capacity per patient:              1. Patient IS able to walk 2 city blocks on level ground without SOB. 2. Patient IS able to climb 2 flights of stairs without SOB. 3. Patient IS able to walk up a hill or 1-2 city blocks without SOB. Denies hx of MRSA infection. Denies hx of blood clots. Denies hx of any personal or family hx of complications w/anesthesia EXCEPT PATIENT STATES SHE DID WAKE UP X1. PAST MEDICAL HISTORY     Past Medical History:   Diagnosis Date    Abnormal EKG     Arthritis     Asthma     Noted per [facility] chart- patient denies    Gout     History of fall     Hypertension     Osteoarthritis     Right knee meniscal tear     Sciatica     Seasonal allergies     Snoring     Type II or unspecified type diabetes mellitus without mention of complication, not stated as uncontrolled     Urinary incontinence     Wears dentures     partial upper and full lower       SURGICAL HISTORY       Past Surgical History:   Procedure Laterality Date    COLONOSCOPY      x 3    ELBOW FRACTURE SURGERY Right     EYE SURGERY Bilateral     optic nerve surgery as a child    FINGER TRIGGER RELEASE Right 11/05/2018    Long middle and ring    FOOT SURGERY Left     2 surgeries    HIP FRACTURE SURGERY Right 06/18/2021    with hardware /  Procedure: [address]; Surgeon: Sherian Kawasaki, MD; Location: [address] [place] [address]; Service: Orthopedics;  Laterality: Right; fluro and ortho table    HYSTERECTOMY (CERVIX STATUS UNKNOWN)      vaginal    NOSE SURGERY      3 surgeries    OTHER SURGICAL HISTORY      excision cysts from ovaries    SPINAL CORD STIMULATOR IMPLANT      Patient states it does not work    TUBAL LIGATION         SOCIAL HISTORY       Social History     Socioeconomic History    Marital status:      Spouse name: None    Number of children: None    Years of education: None    Highest education level: None   Occupational History    None   Tobacco Use    Smoking status: Former Smoker     Packs/day: 0.50     Years: 20.00     Pack years: 10.00     Types: Cigarettes     Quit date: 2001     Years since quittin.0    Smokeless tobacco: Never Used    Tobacco comment: for 20 years   Vaping Use    Vaping Use: Never used   Substance and Sexual Activity    Alcohol use: No    Drug use: Not Currently     Types: Marijuana Vanessa Dao    Sexual activity: None   Other Topics Concern    None   Social History Narrative    None     Social Determinants of Health     Financial Resource Strain: Low Risk     Difficulty of Paying Living Expenses: Not hard at all   Food Insecurity: No Food Insecurity    Worried About Running Out of Food in the Last Year: Never true    Felipe of Food in the Last Year: Never true   Transportation Needs:     Lack of Transportation (Medical): Not on file    Lack of Transportation (Non-Medical):  Not on file   Physical Activity:     Days of Exercise per Week: Not on file    Minutes of Exercise per Session: Not on file   Stress:     Feeling of Stress : Not on file   Social Connections:     Frequency of Communication with Friends and Family: Not on file    Frequency of Social Gatherings with Friends and Family: Not on file    Attends Oriental orthodox Services: Not on file    Active Member of Clubs or Organizations: Not on file    Attends Club or Organization Meetings: Not on file    Marital Status: Not on file   Intimate Partner Violence:     Fear of Current or Ex-Partner: Not on file    Emotionally Abused: Not on file   Suresh Physically Abused: Not on file    Sexually Abused: Not on file   Housing Stability:     Unable to Pay for Housing in the Last Year: Not on file    Number of [place] in the Last Year: Not on file    Unstable Housing in the Last Year: Not on file       REVIEW OF SYSTEMS      Allergies   Allergen Reactions    Diphenhydramine Hcl     Dust Mite Extract     Guaifenesin     Seasonal     Wasp Venom Protein        Current Outpatient Medications on File Prior to Encounter   Medication Sig Dispense Refill    donepezil (ARICEPT) 5 MG tablet Take 1 tablet by mouth daily      chlorhexidine (PERIDEX) 0.12 % solution       ibuprofen (ADVIL;MOTRIN) 800 MG tablet Take 800 mg by mouth every 8 hours as needed for Pain       lisinopril-hydroCHLOROthiazide (PRINZIDE;ZESTORETIC) 10-12.5 MG per tablet Take 1 tablet by mouth daily 90 tablet 3    pravastatin (PRAVACHOL) 20 MG tablet Take 1 tablet by mouth daily 90 tablet 3    alendronate (FOSAMAX) 70 MG tablet Take 1 tablet by mouth every 7 days Take medication on an empty stomach, 1st thing in the morning, with full glass of water, say upright for 30min 12 tablet 2    pioglitazone (ACTOS) 15 MG tablet Take 15 mg by mouth Daily with supper       albuterol sulfate HFA (PROAIR HFA) 108 (90 Base) MCG/ACT inhaler USE 2 INHALATIONS EVERY 6 HOURS AS NEEDED FOR WHEEZING      glimepiride (AMARYL) 4 MG tablet Take 4 mg by mouth every morning (before breakfast)      metoprolol tartrate (LOPRESSOR) 50 MG tablet Take 50 mg by mouth daily       SITagliptin (JANUVIA) 100 MG tablet Take 100 mg by mouth daily      rOPINIRole (REQUIP) 2 MG tablet Take 2 mg by mouth nightly       venlafaxine (EFFEXOR) 37.5 MG tablet Take 37.5 mg by mouth daily      niacin (NIASPAN) 500 MG CR tablet Take 500 mg by mouth nightly        No current facility-administered medications on file prior to encounter. Review of Systems   Constitutional: Negative for chills and fever.    HENT: Negative for congestion, ear pain, rhinorrhea, sore throat and trouble swallowing. Respiratory: Negative for cough, shortness of breath and wheezing. Apnea: + snoring- advised f/u with PCP. Cardiovascular: Positive for leg swelling (Chronic). Negative for chest pain and palpitations. Gastrointestinal: Negative for abdominal pain, anal bleeding, blood in stool, constipation, diarrhea, nausea and vomiting. Genitourinary: Positive for frequency (Chronic, incontinence). Negative for dysuria. Musculoskeletal: Positive for back pain and myalgias (Cramping to b/l LE's). See HPI. Skin: Negative for rash. Neurological: Positive for numbness. Negative for dizziness, tingling and headaches. Hematological: Does not bruise/bleed easily. GENERAL PHYSICAL EXAM     Vitals: BP (!) 140/52   Pulse 68   Temp 97.5 °F (36.4 °C) (Temporal)   Resp 16   Ht 5' 3.5\" (1.613 m)   Wt 173 lb (78.5 kg)   SpO2 100%   BMI 30.16 kg/m²               Physical Exam  Vitals reviewed. Constitutional:       General: She is not in acute distress. Appearance: She is well-developed. She is not ill-appearing, toxic-appearing or diaphoretic. HENT:      Head: Normocephalic. Right Ear: External ear normal.      Left Ear: External ear normal.      Nose: Nose normal.      Mouth/Throat:      Dentition: Has dentures (Top, partial). Pharynx: No oropharyngeal exudate or posterior oropharyngeal erythema. Tonsils: No tonsillar abscesses. Eyes:      General:         Right eye: No discharge. Left eye: No discharge. Conjunctiva/sclera: Conjunctivae normal.      Pupils: Pupils are equal, round, and reactive to light. Cardiovascular:      Rate and Rhythm: Normal rate and regular rhythm. Pulses: Intact distal pulses. Heart sounds: Normal heart sounds. Pulmonary:      Effort: Pulmonary effort is normal. No accessory muscle usage or respiratory distress. Breath sounds: Normal breath sounds.  No decreased breath sounds, wheezing, rhonchi or rales. Comments: Patient did appear slightly SOB w/walking, talking- patient denies SOB  Abdominal:      General: Bowel sounds are normal. There is no distension. Palpations: Abdomen is soft. There is no mass. Tenderness: There is no abdominal tenderness. There is no guarding or rebound. Musculoskeletal:      Lumbar back: Deformity (Spinal stimulator palpated right of lower spine subdermal) present. Right knee: Swelling (Mild, no erythema/warmth) present. Decreased range of motion. Tenderness present over the medial joint line. Normal pulse. Right lower leg: Swelling present. No tenderness. Left lower leg: Swelling present. No tenderness. Comments: Negative Pranav's sign b/l. Mild swelling to b/l LE's, no erythema/warmth (patient states swelling is chronic). Lymphadenopathy:      Cervical: No cervical adenopathy. Skin:     General: Skin is warm and dry. Neurological:      Mental Status: She is alert and oriented to person, place, and time. Gait: Gait abnormal (Antalgic, using rolling walker).    Psychiatric:         Behavior: Behavior normal.       LAB REVIEW     Lab Results   Component Value Date     06/08/2022    K 5.0 06/08/2022     06/08/2022    CO2 28 06/08/2022    BUN 27 (H) 06/08/2022    CREATININE 1.01 (H) 06/08/2022    GLUCOSE 92 06/08/2022    CALCIUM 10.0 06/08/2022    PROT 6.5 06/22/2021    LABALBU 3.5 06/22/2021    BILITOT 0.40 06/22/2021    ALKPHOS 66 06/22/2021    AST 22 06/22/2021    ALT 12 06/22/2021    LABGLOM 53 (L) 06/08/2022    GFRAA >60 06/08/2022     Lab Results   Component Value Date    WBC 5.4 06/08/2022    HGB 13.7 06/08/2022    HCT 41.3 06/08/2022    MCV 89.2 06/08/2022     06/08/2022     PRELIMINARY EKG REVIEW, DATE: 6-8-22     NSR  LEFT AXIS DEVIATION  INCOMPLETE RBBB  INFERIOR INFARCT, AGE UNDETERMINED  ABNORMAL ECG  60 BPM    Advised patient to f/u with PCP regarding abnormal EKG noted today. SURGERY / PROVISIONAL DIAGNOSES:      KNEE ARTHROSCOPY- RIGHT    MENISCUS TEAR RIGHT KNEE    Patient Active Problem List    Diagnosis Date Noted    Bilateral sacroiliitis ([address] 06/08/2022    Chronic pain disorder 06/08/2022    Displacement of lumbar intervertebral disc without myelopathy 06/08/2022    Osteoarthritis of knee 06/08/2022    Postherpetic neuralgia 06/08/2022    Post-laminectomy syndrome 06/08/2022    Sacroiliitis, not elsewhere classified ([address] 06/08/2022    Preop examination 06/08/2022    Right hip pain 03/22/2022    Age-related osteoporosis without current pathological fracture 01/04/2022    Nocturia 01/04/2022    Age-related cognitive decline 12/17/2021    Type 2 diabetes mellitus without complication, without long-term current use of insulin ([address] 11/30/2021    Hypertension associated with diabetes ([address] 11/30/2021    Major depressive disorder with single episode, in full remission ([address] 11/30/2021    Closed fracture of neck of radius 08/21/2012           CLEARANCE: Based on my personal evaluation of patient including review of patient's chart, MEDICAL clearance required for scheduled surgery w/special attention to abnormal EKG that was completed today. Surgery scheduling will notify Dr. Nelly Saxena office who will be responsible for making sure the clearance is obtained and is in the chart for surgery.     Total time spent on encounter- PAT provider minutes: 31-40 minutes     BETSEY Cedillo CNP on 6/8/2022 at 2:35 PM

## 2022-06-08 NOTE — H&P
HISTORY and Treinta MARVA [id number]       NAME:  Beck Thornton  MRN: [id number]   YOB: 1945   Date: 6/8/2022   Age: 67 y.o. Gender: female     COMPLAINT AND PRESENT HISTORY:   Beck Thornton is 67 y.o.,  female, presents for pre-anesthesia/admission testing for KNEE ARTHROSCOPY- RIGHT per Dr. Landy Paz. Primary dx: MENISCUS TEAR RIGHT KNEE.    HPI:  Knee Pain   Incident onset: Patient states she fell d/t right knee kept \"wobbling\"- fall was 6-18-21, then had right hip surgery, right knee pain progressed- + numbness ankle up to knee, sometimes further. The injury mechanism was a fall. The pain is present in the right knee, left ankle and right leg. The quality of the pain is described as aching. The pain is at a severity of 4/10. The pain has been fluctuating since onset. Associated symptoms include an inability to bear weight, a loss of motion and numbness. Pertinent negatives include no tingling. The symptoms are aggravated by weight bearing (Prolonged sitting). Treatments tried: PT, pain medication- states she has high tolerance to pain medication, states oxycodone has been helpful in the past.     Testing completed r/t condition:  X-RAY RIGHT KNEE, 5-25-22:  Narrative   X-rays taken today reviewed by me show standing AP of both knees and a    lateral of the right knee.  Patient has some very modest medial joint    space narrowing the right knee but certainly nothing too severe.  Left    knee is relatively unremarkable.  Lateral view of the patient's right knee    no some mild patellofemoral narrowing but nothing too severe.  She may    have evidence for slight effusion.  No other acute process is noted     Review of additional significant medical hx:  HTN: Does not f/u with cardiology. Denies current/recent chest pain, palpitations, SOB, dizziness, + leg swelling, denies headache.   Current medications r/t condition: LISINOPRIL-HCTZ, METOPROLOL, NIACIN, PRAVASTATIN  BP Readings from Last 3 Encounters:   06/08/22 (!) 140/52   05/05/22 124/86   04/11/22 (!) 142/80     DM II: Does not check BS regularly. Current medications r/t condition: ACTOS, NAJMA MARGYERENDIRA  Lab Results   Component Value Date    LABA1C 6.0 05/05/2022     No results found for: EAG    Activity level: Patient used to do routine exercise, not currently. Functional Capacity per patient:              1. Patient IS able to walk 2 city blocks on level ground without SOB. 2. Patient IS able to climb 2 flights of stairs without SOB. 3. Patient IS able to walk up a hill or 1-2 city blocks without SOB. Denies hx of MRSA infection. Denies hx of blood clots. Denies hx of any personal or family hx of complications w/anesthesia EXCEPT PATIENT STATES SHE DID WAKE UP X1. PAST MEDICAL HISTORY     Past Medical History:   Diagnosis Date    Abnormal EKG     Arthritis     Asthma     Noted per [facility] chart- patient denies    Gout     History of fall     Hypertension     Osteoarthritis     Right knee meniscal tear     Sciatica     Seasonal allergies     Snoring     Type II or unspecified type diabetes mellitus without mention of complication, not stated as uncontrolled     Urinary incontinence     Wears dentures     partial upper and full lower       SURGICAL HISTORY       Past Surgical History:   Procedure Laterality Date    COLONOSCOPY      x 3    ELBOW FRACTURE SURGERY Right     EYE SURGERY Bilateral     optic nerve surgery as a child    FINGER TRIGGER RELEASE Right 11/05/2018    Long middle and ring    FOOT SURGERY Left     2 surgeries    HIP FRACTURE SURGERY Right 06/18/2021    with hardware /  Procedure: [address]; Surgeon: Kayleigh Garner MD; Location: [address] [place] [address]; Service: Orthopedics;  Laterality: Right; fluro and ortho table    HYSTERECTOMY (CERVIX STATUS UNKNOWN)      vaginal    NOSE SURGERY      3 surgeries    OTHER SURGICAL HISTORY      excision cysts from ovaries    SPINAL CORD STIMULATOR IMPLANT      Patient states it does not work    TUBAL LIGATION         SOCIAL HISTORY       Social History     Socioeconomic History    Marital status:      Spouse name: None    Number of children: None    Years of education: None    Highest education level: None   Occupational History    None   Tobacco Use    Smoking status: Former Smoker     Packs/day: 0.50     Years: 20.00     Pack years: 10.00     Types: Cigarettes     Quit date: 2001     Years since quittin.0    Smokeless tobacco: Never Used    Tobacco comment: for 20 years   Vaping Use    Vaping Use: Never used   Substance and Sexual Activity    Alcohol use: No    Drug use: Not Currently     Types: Marijuana Zollie Axe)    Sexual activity: None   Other Topics Concern    None   Social History Narrative    None     Social Determinants of Health     Financial Resource Strain: Low Risk     Difficulty of Paying Living Expenses: Not hard at all   Food Insecurity: No Food Insecurity    Worried About Running Out of Food in the Last Year: Never true    Felipe of Food in the Last Year: Never true   Transportation Needs:     Lack of Transportation (Medical): Not on file    Lack of Transportation (Non-Medical):  Not on file   Physical Activity:     Days of Exercise per Week: Not on file    Minutes of Exercise per Session: Not on file   Stress:     Feeling of Stress : Not on file   Social Connections:     Frequency of Communication with Friends and Family: Not on file    Frequency of Social Gatherings with Friends and Family: Not on file    Attends Jehovah's witness Services: Not on file    Active Member of Clubs or Organizations: Not on file    Attends Club or Organization Meetings: Not on file    Marital Status: Not on file   Intimate Partner Violence:     Fear of Current or Ex-Partner: Not on file    Emotionally Abused: Not on file   Deshawn Loser Physically Abused: Not on file    Sexually Abused: Not on file   Housing Stability:     Unable to Pay for Housing in the Last Year: Not on file    Number of [place] in the Last Year: Not on file    Unstable Housing in the Last Year: Not on file       REVIEW OF SYSTEMS      Allergies   Allergen Reactions    Diphenhydramine Hcl     Dust Mite Extract     Guaifenesin     Seasonal     Wasp Venom Protein        Current Outpatient Medications on File Prior to Encounter   Medication Sig Dispense Refill    donepezil (ARICEPT) 5 MG tablet Take 1 tablet by mouth daily      chlorhexidine (PERIDEX) 0.12 % solution       ibuprofen (ADVIL;MOTRIN) 800 MG tablet Take 800 mg by mouth every 8 hours as needed for Pain       lisinopril-hydroCHLOROthiazide (PRINZIDE;ZESTORETIC) 10-12.5 MG per tablet Take 1 tablet by mouth daily 90 tablet 3    pravastatin (PRAVACHOL) 20 MG tablet Take 1 tablet by mouth daily 90 tablet 3    alendronate (FOSAMAX) 70 MG tablet Take 1 tablet by mouth every 7 days Take medication on an empty stomach, 1st thing in the morning, with full glass of water, say upright for 30min 12 tablet 2    pioglitazone (ACTOS) 15 MG tablet Take 15 mg by mouth Daily with supper       albuterol sulfate HFA (PROAIR HFA) 108 (90 Base) MCG/ACT inhaler USE 2 INHALATIONS EVERY 6 HOURS AS NEEDED FOR WHEEZING      glimepiride (AMARYL) 4 MG tablet Take 4 mg by mouth every morning (before breakfast)      metoprolol tartrate (LOPRESSOR) 50 MG tablet Take 50 mg by mouth daily       SITagliptin (JANUVIA) 100 MG tablet Take 100 mg by mouth daily      rOPINIRole (REQUIP) 2 MG tablet Take 2 mg by mouth nightly       venlafaxine (EFFEXOR) 37.5 MG tablet Take 37.5 mg by mouth daily      niacin (NIASPAN) 500 MG CR tablet Take 500 mg by mouth nightly        No current facility-administered medications on file prior to encounter. Review of Systems   Constitutional: Negative for chills and fever.    HENT: Negative for congestion, ear pain, rhinorrhea, sore throat and trouble swallowing. Respiratory: Negative for cough, shortness of breath and wheezing. Apnea: + snoring- advised f/u with PCP. Cardiovascular: Positive for leg swelling (Chronic). Negative for chest pain and palpitations. Gastrointestinal: Negative for abdominal pain, anal bleeding, blood in stool, constipation, diarrhea, nausea and vomiting. Genitourinary: Positive for frequency (Chronic, incontinence). Negative for dysuria. Musculoskeletal: Positive for back pain and myalgias (Cramping to b/l LE's). See HPI. Skin: Negative for rash. Neurological: Positive for numbness. Negative for dizziness, tingling and headaches. Hematological: Does not bruise/bleed easily. GENERAL PHYSICAL EXAM     Vitals: BP (!) 140/52   Pulse 68   Temp 97.5 °F (36.4 °C) (Temporal)   Resp 16   Ht 5' 3.5\" (1.613 m)   Wt 173 lb (78.5 kg)   SpO2 100%   BMI 30.16 kg/m²               Physical Exam  Vitals reviewed. Constitutional:       General: She is not in acute distress. Appearance: She is well-developed. She is not ill-appearing, toxic-appearing or diaphoretic. HENT:      Head: Normocephalic. Right Ear: External ear normal.      Left Ear: External ear normal.      Nose: Nose normal.      Mouth/Throat:      Dentition: Has dentures (Top, partial). Pharynx: No oropharyngeal exudate or posterior oropharyngeal erythema. Tonsils: No tonsillar abscesses. Eyes:      General:         Right eye: No discharge. Left eye: No discharge. Conjunctiva/sclera: Conjunctivae normal.      Pupils: Pupils are equal, round, and reactive to light. Cardiovascular:      Rate and Rhythm: Normal rate and regular rhythm. Pulses: Intact distal pulses. Heart sounds: Normal heart sounds. Pulmonary:      Effort: Pulmonary effort is normal. No accessory muscle usage or respiratory distress. Breath sounds: Normal breath sounds.  No decreased breath sounds, wheezing, rhonchi or rales. Comments: Patient did appear slightly SOB w/walking, talking- patient denies SOB  Abdominal:      General: Bowel sounds are normal. There is no distension. Palpations: Abdomen is soft. There is no mass. Tenderness: There is no abdominal tenderness. There is no guarding or rebound. Musculoskeletal:      Lumbar back: Deformity (Spinal stimulator palpated right of lower spine subdermal) present. Right knee: Swelling (Mild, no erythema/warmth) present. Decreased range of motion. Tenderness present over the medial joint line. Normal pulse. Right lower leg: Swelling present. No tenderness. Left lower leg: Swelling present. No tenderness. Comments: Negative Pranav's sign b/l. Mild swelling to b/l LE's, no erythema/warmth (patient states swelling is chronic). Lymphadenopathy:      Cervical: No cervical adenopathy. Skin:     General: Skin is warm and dry. Neurological:      Mental Status: She is alert and oriented to person, place, and time. Gait: Gait abnormal (Antalgic, using rolling walker).    Psychiatric:         Behavior: Behavior normal.       LAB REVIEW     Lab Results   Component Value Date     06/08/2022    K 5.0 06/08/2022     06/08/2022    CO2 28 06/08/2022    BUN 27 (H) 06/08/2022    CREATININE 1.01 (H) 06/08/2022    GLUCOSE 92 06/08/2022    CALCIUM 10.0 06/08/2022    PROT 6.5 06/22/2021    LABALBU 3.5 06/22/2021    BILITOT 0.40 06/22/2021    ALKPHOS 66 06/22/2021    AST 22 06/22/2021    ALT 12 06/22/2021    LABGLOM 53 (L) 06/08/2022    GFRAA >60 06/08/2022     Lab Results   Component Value Date    WBC 5.4 06/08/2022    HGB 13.7 06/08/2022    HCT 41.3 06/08/2022    MCV 89.2 06/08/2022     06/08/2022     PRELIMINARY EKG REVIEW, DATE: 6-8-22     NSR  LEFT AXIS DEVIATION  INCOMPLETE RBBB  INFERIOR INFARCT, AGE UNDETERMINED  ABNORMAL ECG  60 BPM    Advised patient to f/u with PCP regarding abnormal EKG noted today. SURGERY / PROVISIONAL DIAGNOSES:      KNEE ARTHROSCOPY- RIGHT    MENISCUS TEAR RIGHT KNEE    Patient Active Problem List    Diagnosis Date Noted    Bilateral sacroiliitis ([address] 06/08/2022    Chronic pain disorder 06/08/2022    Displacement of lumbar intervertebral disc without myelopathy 06/08/2022    Osteoarthritis of knee 06/08/2022    Postherpetic neuralgia 06/08/2022    Post-laminectomy syndrome 06/08/2022    Sacroiliitis, not elsewhere classified ([address] 06/08/2022    Preop examination 06/08/2022    Right hip pain 03/22/2022    Age-related osteoporosis without current pathological fracture 01/04/2022    Nocturia 01/04/2022    Age-related cognitive decline 12/17/2021    Type 2 diabetes mellitus without complication, without long-term current use of insulin ([address] 11/30/2021    Hypertension associated with diabetes ([address] 11/30/2021    Major depressive disorder with single episode, in full remission ([address] 11/30/2021    Closed fracture of neck of radius 08/21/2012           CLEARANCE: Based on my personal evaluation of patient including review of patient's chart, MEDICAL clearance required for scheduled surgery w/special attention to abnormal EKG that was completed today. Surgery scheduling will notify Dr. Shaq Tang office who will be responsible for making sure the clearance is obtained and is in the chart for surgery.     Total time spent on encounter- PAT provider minutes: 31-40 minutes     BETSEY Meraz CNP on 6/8/2022 at 2:35 PM

## 2022-06-09 ENCOUNTER — TELEPHONE (OUTPATIENT)
Dept: INTERNAL MEDICINE CLINIC | Age: 77
End: 2022-06-09

## 2022-06-09 LAB
EKG ATRIAL RATE: 60 BPM
EKG P AXIS: 17 DEGREES
EKG P-R INTERVAL: 164 MS
EKG Q-T INTERVAL: 424 MS
EKG QRS DURATION: 102 MS
EKG QTC CALCULATION (BAZETT): 424 MS
EKG R AXIS: -39 DEGREES
EKG T AXIS: -18 DEGREES
EKG VENTRICULAR RATE: 60 BPM

## 2022-06-09 PROCEDURE — 93010 ELECTROCARDIOGRAM REPORT: CPT | Performed by: INTERNAL MEDICINE

## 2022-06-09 NOTE — TELEPHONE ENCOUNTER
Medical surgical clearance request received 06/09/22    Surgeon: Dr Adolfo Edwards    Procedure: Arthroscopy right knee    Date of Procedure: 06/21/22  Last appt:  5/5/2022    Next appt: 9/22/2022    PATs received:    [] yes   [x] no

## 2022-06-10 NOTE — TELEPHONE ENCOUNTER
Patient needs office visit before surgery on the 21st, attempted to call patient to schedule on the 15th in the late morning but phone rang busy. Will try again later.

## 2022-06-15 RX ORDER — SOLIFENACIN SUCCINATE 10 MG/1
TABLET, FILM COATED ORAL
COMMUNITY
Start: 2022-05-18 | End: 2022-06-16 | Stop reason: ALTCHOICE

## 2022-06-16 ENCOUNTER — OFFICE VISIT (OUTPATIENT)
Dept: INTERNAL MEDICINE CLINIC | Age: 77
End: 2022-06-16
Payer: MEDICARE

## 2022-06-16 VITALS
DIASTOLIC BLOOD PRESSURE: 64 MMHG | WEIGHT: 168 LBS | HEIGHT: 64 IN | BODY MASS INDEX: 28.68 KG/M2 | OXYGEN SATURATION: 98 % | HEART RATE: 78 BPM | SYSTOLIC BLOOD PRESSURE: 108 MMHG

## 2022-06-16 DIAGNOSIS — F32.5 MAJOR DEPRESSIVE DISORDER WITH SINGLE EPISODE, IN FULL REMISSION (HCC): ICD-10-CM

## 2022-06-16 DIAGNOSIS — M23.51 CHRONIC INSTABILITY OF RIGHT KNEE: Primary | ICD-10-CM

## 2022-06-16 DIAGNOSIS — E11.59 HYPERTENSION ASSOCIATED WITH DIABETES (HCC): ICD-10-CM

## 2022-06-16 DIAGNOSIS — I15.2 HYPERTENSION ASSOCIATED WITH DIABETES (HCC): ICD-10-CM

## 2022-06-16 DIAGNOSIS — N39.46 MIXED INCONTINENCE URGE AND STRESS: ICD-10-CM

## 2022-06-16 DIAGNOSIS — E11.9 TYPE 2 DIABETES MELLITUS WITHOUT COMPLICATION, WITHOUT LONG-TERM CURRENT USE OF INSULIN (HCC): ICD-10-CM

## 2022-06-16 DIAGNOSIS — N18.31 STAGE 3A CHRONIC KIDNEY DISEASE (HCC): ICD-10-CM

## 2022-06-16 PROBLEM — N18.30 CHRONIC RENAL DISEASE, STAGE III (HCC): Status: ACTIVE | Noted: 2022-06-16

## 2022-06-16 PROCEDURE — G8399 PT W/DXA RESULTS DOCUMENT: HCPCS | Performed by: INTERNAL MEDICINE

## 2022-06-16 PROCEDURE — 0509F URINE INCON PLAN DOCD: CPT | Performed by: INTERNAL MEDICINE

## 2022-06-16 PROCEDURE — G8427 DOCREV CUR MEDS BY ELIG CLIN: HCPCS | Performed by: INTERNAL MEDICINE

## 2022-06-16 PROCEDURE — 1123F ACP DISCUSS/DSCN MKR DOCD: CPT | Performed by: INTERNAL MEDICINE

## 2022-06-16 PROCEDURE — G8417 CALC BMI ABV UP PARAM F/U: HCPCS | Performed by: INTERNAL MEDICINE

## 2022-06-16 PROCEDURE — 99214 OFFICE O/P EST MOD 30 MIN: CPT | Performed by: INTERNAL MEDICINE

## 2022-06-16 PROCEDURE — 1090F PRES/ABSN URINE INCON ASSESS: CPT | Performed by: INTERNAL MEDICINE

## 2022-06-16 PROCEDURE — 3044F HG A1C LEVEL LT 7.0%: CPT | Performed by: INTERNAL MEDICINE

## 2022-06-16 PROCEDURE — 1036F TOBACCO NON-USER: CPT | Performed by: INTERNAL MEDICINE

## 2022-06-16 RX ORDER — TOLTERODINE 4 MG/1
4 CAPSULE, EXTENDED RELEASE ORAL DAILY
Qty: 90 CAPSULE | Refills: 1 | Status: SHIPPED | OUTPATIENT
Start: 2022-06-16 | End: 2022-08-26 | Stop reason: SDUPTHER

## 2022-06-16 RX ORDER — TOLTERODINE 4 MG/1
4 CAPSULE, EXTENDED RELEASE ORAL DAILY
Qty: 30 CAPSULE | Refills: 3 | Status: SHIPPED | OUTPATIENT
Start: 2022-06-16 | End: 2022-06-16 | Stop reason: ALTCHOICE

## 2022-06-16 ASSESSMENT — PATIENT HEALTH QUESTIONNAIRE - PHQ9
SUM OF ALL RESPONSES TO PHQ9 QUESTIONS 1 & 2: 0
SUM OF ALL RESPONSES TO PHQ QUESTIONS 1-9: 2
SUM OF ALL RESPONSES TO PHQ QUESTIONS 1-9: 2
10. IF YOU CHECKED OFF ANY PROBLEMS, HOW DIFFICULT HAVE THESE PROBLEMS MADE IT FOR YOU TO DO YOUR WORK, TAKE CARE OF THINGS AT HOME, OR GET ALONG WITH OTHER PEOPLE: 0
1. LITTLE INTEREST OR PLEASURE IN DOING THINGS: 0
2. FEELING DOWN, DEPRESSED OR HOPELESS: 0
6. FEELING BAD ABOUT YOURSELF - OR THAT YOU ARE A FAILURE OR HAVE LET YOURSELF OR YOUR FAMILY DOWN: 0
SUM OF ALL RESPONSES TO PHQ QUESTIONS 1-9: 2
7. TROUBLE CONCENTRATING ON THINGS, SUCH AS READING THE NEWSPAPER OR WATCHING TELEVISION: 0
9. THOUGHTS THAT YOU WOULD BE BETTER OFF DEAD, OR OF HURTING YOURSELF: 0
8. MOVING OR SPEAKING SO SLOWLY THAT OTHER PEOPLE COULD HAVE NOTICED. OR THE OPPOSITE, BEING SO FIGETY OR RESTLESS THAT YOU HAVE BEEN MOVING AROUND A LOT MORE THAN USUAL: 0
5. POOR APPETITE OR OVEREATING: 0
SUM OF ALL RESPONSES TO PHQ QUESTIONS 1-9: 2
4. FEELING TIRED OR HAVING LITTLE ENERGY: 1
3. TROUBLE FALLING OR STAYING ASLEEP: 1

## 2022-06-16 NOTE — PROGRESS NOTES
Visit Information    Have you changed or started any medications since your last visit including any over-the-counter medicines, vitamins, or herbal medicines? no   Are you having any side effects from any of your medications? -  no  Have you stopped taking any of your medications? Is so, why? -  no    Have you seen any other physician or provider since your last visit? Yes - Records Obtained  Have you had any other diagnostic tests since your last visit? Yes - Records Obtained  Have you been seen in the emergency room and/or had an admission to a hospital since we last saw you? No  Have you had your routine dental cleaning in the past 6 months? no    Have you activated your [employer] account? If not, what are your barriers? No:      Patient Care Team:  General Juliane MD as PCP - General (Internal Medicine)  General Juliane MD as PCP [facility]    Medical History Review  Past Medical, Family, and Social History reviewed and does contribute to the patient presenting condition    Health Maintenance   Topic Date Due    Hepatitis C screen  Never done    DTaP/Tdap/Td vaccine (1 - Tdap) Never done    Depression Monitoring  12/17/2022    Annual Wellness Visit (AWV)  12/18/2022    Lipids  12/20/2022    DEXA (modify frequency per FRAX score)  Completed    Flu vaccine  Completed    Shingles vaccine  Completed    Pneumococcal 65+ years Vaccine  Completed    COVID-19 Vaccine  Completed    Hepatitis A vaccine  Aged Out    Hib vaccine  Aged Out    Meningococcal (ACWY) vaccine  Aged Out     SUBJECTIVE:  Cortney Urias is a 67 y.o. female patient who  comes for complaints of   Chief Complaint   Patient presents with    Surgical Clearance     right knee scope DOS- 6/21/22, Dr. Dominguez Oh Diabetes     A1C-5/5/22= 6       Chronic knee pain   Going for arthroscopy on 6/21 by Dr Adolfo Edwards    Due for right knee arthroscopy 6/21/22  Feels well today.       BP well controlled-  advised to take  blood pressure medication with a sip of water on the day or surgery. Diabetes well controlled   Not on any blood thinners  Not on any alpha-1 antagonists  No current infections or antibiotic therapy  reviewd drug allergies  No h/o previous anesthetic complications. RCRI: Low risk procedure (not vascular or open intraperitoneal or intrathoracic procedure)  No h/o heart failure  No h/o CVA  No h/o DM needing insulin  Pre-op serum creat <2    Pt denies any exertional symp  No sob/CP/dizzines      DIABETES MELLITUS:    Well controlled   Hemoglobin A1C   Date Value Ref Range Status   05/05/2022 6.0 % Final   01/04/2022 5.8 % Final     Januvia, amayl, actos       HYPERTENSION:    Onset more than 2 years ago  Jolly: mild to mod  Usually controlled with current po meds:   Not associated with headaches or blurry vision  Last 3 Encounter BP Readings:     Date:        BP:  BP Readings from Last 3 Encounters:   06/16/22 108/64   06/08/22 (!) 140/52   05/05/22 124/86     Metoprolol and lisinopril- HCTZ     HYPERLIPIDEMIA:   Patient reports doing well on current therapy of statin:  pravachol  - Denies side effects of muscle weakness or achiness. - Exercise  -last lipid panel  Lab Results   Component Value Date    CHOL 185 12/20/2021     Lab Results   Component Value Date    TRIG 257 (H) 12/20/2021     Lab Results   Component Value Date    HDL 40 (L) 12/20/2021     Lab Results   Component Value Date    LDLCHOLESTEROL 94 12/20/2021     Lab Results   Component Value Date    VLDL NOT REPORTED (H) 12/20/2021     Lab Results   Component Value Date    CHOLHDLRATIO 4.6 12/20/2021           REVIEW OF SYSTEMS (except Subjective (HPI))  GENERAL: No fevers / chills  RESPIRATORY: Negative for cough, wheezing or shortness of breath  CARDIOVASCULAR: Negative for chest pain or palpitations.   GI: no nausea, vomiting, or diarrhea  NEURO: No history of headaches    Past Medical History:   Diagnosis Date    Abnormal EKG     Arthritis     Asthma     Noted per [facility] chart- patient denies    Gout     History of fall     Hypertension     Incomplete RBBB     Osteoarthritis     Right knee meniscal tear     Sciatica     Seasonal allergies     Snoring     Type II or unspecified type diabetes mellitus without mention of complication, not stated as uncontrolled     Urinary incontinence     Wears dentures     partial upper and full lower       SOCIAL HISTORY:  Social History     Socioeconomic History    Marital status:      Spouse name: Not on file    Number of children: Not on file    Years of education: Not on file    Highest education level: Not on file   Occupational History    Not on file   Tobacco Use    Smoking status: Former Smoker     Packs/day: 0.50     Years: 20.00     Pack years: 10.00     Types: Cigarettes     Quit date: 2001     Years since quittin.0    Smokeless tobacco: Never Used    Tobacco comment: for 20 years   Vaping Use    Vaping Use: Never used   Substance and Sexual Activity    Alcohol use: No    Drug use: Not Currently     Types: Marijuana Alix Dinning)    Sexual activity: Not on file   Other Topics Concern    Not on file   Social History Narrative    Not on file     Social Determinants of Health     Financial Resource Strain: Low Risk     Difficulty of Paying Living Expenses: Not hard at all   Food Insecurity: No Food Insecurity    Worried About Running Out of Food in the Last Year: Never true    Felipe of Food in the Last Year: Never true   Transportation Needs:     Lack of Transportation (Medical): Not on file    Lack of Transportation (Non-Medical):  Not on file   Physical Activity:     Days of Exercise per Week: Not on file    Minutes of Exercise per Session: Not on file   Stress:     Feeling of Stress : Not on file   Social Connections:     Frequency of Communication with Friends and Family: Not on file    Frequency of Social Gatherings with Friends and Family: Not on file    Attends Mandaeism Services: Not on file   [facility] or Organizations: Not on file    Attends Club or Organization Meetings: Not on file    Marital Status: Not on file   Intimate Partner Violence:     Fear of Current or Ex-Partner: Not on file    Emotionally Abused: Not on file    Physically Abused: Not on file    Sexually Abused: Not on file   Housing Stability:     Unable to Pay for Housing in the Last Year: Not on file    Number of [place] in the Last Year: Not on file    Unstable Housing in the Last Year: Not on file           CURRENT MEDICATIONS:  Current Outpatient Medications   Medication Sig Dispense Refill    solifenacin (VESICARE) 10 MG tablet       donepezil (ARICEPT) 5 MG tablet Take 1 tablet by mouth daily      chlorhexidine (PERIDEX) 0.12 % solution       ibuprofen (ADVIL;MOTRIN) 800 MG tablet Take 800 mg by mouth every 8 hours as needed for Pain       lisinopril-hydroCHLOROthiazide (PRINZIDE;ZESTORETIC) 10-12.5 MG per tablet Take 1 tablet by mouth daily 90 tablet 3    pravastatin (PRAVACHOL) 20 MG tablet Take 1 tablet by mouth daily 90 tablet 3    alendronate (FOSAMAX) 70 MG tablet Take 1 tablet by mouth every 7 days Take medication on an empty stomach, 1st thing in the morning, with full glass of water, say upright for 30min 12 tablet 2    pioglitazone (ACTOS) 15 MG tablet Take 15 mg by mouth Daily with supper       albuterol sulfate HFA (PROAIR HFA) 108 (90 Base) MCG/ACT inhaler USE 2 INHALATIONS EVERY 6 HOURS AS NEEDED FOR WHEEZING      glimepiride (AMARYL) 4 MG tablet Take 4 mg by mouth every morning (before breakfast)      metoprolol tartrate (LOPRESSOR) 50 MG tablet Take 50 mg by mouth daily       SITagliptin (JANUVIA) 100 MG tablet Take 100 mg by mouth daily      rOPINIRole (REQUIP) 2 MG tablet Take 2 mg by mouth nightly       venlafaxine (EFFEXOR) 37.5 MG tablet Take 37.5 mg by mouth daily      niacin (NIASPAN) 500 MG CR tablet Take 500 mg by mouth nightly        No current facility-administered medications for this visit. OBJECTIVE:  Vitals:    06/16/22 1117   BP: 108/64   Pulse: 78   SpO2: 98%     Body mass index is 29.29 kg/m². General exam (except above):  General appearance - well appearing, alert, in no acute distress  Head - Atraumatic, normocephalic  Eyes - EOMI, no jaundice or pallor  Lungs - Lungs clear to auscultation. No wheezing, rhonchi, rales  Heart - RRR without murmur, gallop, or rubs. No ectopy  Abdomen - Abdomen soft, non-tender. Bowel sounds normal. No masses, organomegaly  Extremities -No significant edema, or skin discoloration. Good capillary refill. Neuro - Pt Alert, awake and oriented x 3. No gross focal neurological deficits    ASSESSMENT AND PLAN (MEDICAL DECISION MAKING):   Esau Anton was seen today for surgical clearance and diabetes. Diagnoses and all orders for this visit:    Chronic instability of right knee  Comments:  cleared for arthroscopy    Type 2 diabetes mellitus without complication, without long-term current use of insulin ([facility])    Major depressive disorder with single episode, in full remission ([address]    Hypertension associated with diabetes ([address]  Comments:  contolled    Mixed incontinence urge and stress  -     Discontinue: tolterodine (DETROL LA) 4 MG extended release capsule; Take 1 capsule by mouth daily  -     tolterodine (DETROL LA) 4 MG extended release capsule;  Take 1 capsule by mouth daily    Stage 3a chronic kidney disease ([address]  Comments:  stable,            Follow up in: Peter Nur MD

## 2022-06-20 ENCOUNTER — ANESTHESIA EVENT (OUTPATIENT)
Dept: OPERATING ROOM | Age: 77
End: 2022-06-20
Payer: MEDICARE

## 2022-06-21 ENCOUNTER — HOSPITAL ENCOUNTER (OUTPATIENT)
Age: 77
Setting detail: OUTPATIENT SURGERY
Discharge: HOME | End: 2022-06-21
Attending: ORTHOPAEDIC SURGERY | Admitting: ORTHOPAEDIC SURGERY
Payer: MEDICARE

## 2022-06-21 ENCOUNTER — ANESTHESIA (OUTPATIENT)
Dept: OPERATING ROOM | Age: 77
End: 2022-06-21
Payer: MEDICARE

## 2022-06-21 VITALS
OXYGEN SATURATION: 96 % | RESPIRATION RATE: 14 BRPM | HEIGHT: 64 IN | WEIGHT: 173 LBS | TEMPERATURE: 97.7 F | BODY MASS INDEX: 29.53 KG/M2 | HEART RATE: 66 BPM | SYSTOLIC BLOOD PRESSURE: 151 MMHG | DIASTOLIC BLOOD PRESSURE: 65 MMHG

## 2022-06-21 DIAGNOSIS — S83.241A ACUTE MEDIAL MENISCUS TEAR OF RIGHT KNEE, INITIAL ENCOUNTER: Primary | ICD-10-CM

## 2022-06-21 LAB
GLUCOSE BLD-MCNC: 132 MG/DL (ref 65–105)
GLUCOSE BLD-MCNC: 137 MG/DL (ref 65–105)

## 2022-06-21 PROCEDURE — 2580000003 HC RX 258: Performed by: ANESTHESIOLOGY

## 2022-06-21 PROCEDURE — 6360000002 HC RX W HCPCS: Performed by: NURSE ANESTHETIST, CERTIFIED REGISTERED

## 2022-06-21 PROCEDURE — 2709999900 HC NON-CHARGEABLE SUPPLY: Performed by: ORTHOPAEDIC SURGERY

## 2022-06-21 PROCEDURE — 3700000000 HC ANESTHESIA ATTENDED CARE: Performed by: ORTHOPAEDIC SURGERY

## 2022-06-21 PROCEDURE — 2500000003 HC RX 250 WO HCPCS: Performed by: NURSE ANESTHETIST, CERTIFIED REGISTERED

## 2022-06-21 PROCEDURE — 6360000002 HC RX W HCPCS: Performed by: ORTHOPAEDIC SURGERY

## 2022-06-21 PROCEDURE — 7100000000 HC PACU RECOVERY - FIRST 15 MIN: Performed by: ORTHOPAEDIC SURGERY

## 2022-06-21 PROCEDURE — 82947 ASSAY GLUCOSE BLOOD QUANT: CPT

## 2022-06-21 PROCEDURE — 29880 ARTHRS KNE SRG MNISECTMY M&L: CPT | Performed by: ORTHOPAEDIC SURGERY

## 2022-06-21 PROCEDURE — 6370000000 HC RX 637 (ALT 250 FOR IP): Performed by: ANESTHESIOLOGY

## 2022-06-21 PROCEDURE — 7100000031 HC ASPR PHASE II RECOVERY - ADDTL 15 MIN: Performed by: ORTHOPAEDIC SURGERY

## 2022-06-21 PROCEDURE — 7100000010 HC PHASE II RECOVERY - FIRST 15 MIN: Performed by: ORTHOPAEDIC SURGERY

## 2022-06-21 PROCEDURE — 3600000003 HC SURGERY LEVEL 3 BASE: Performed by: ORTHOPAEDIC SURGERY

## 2022-06-21 PROCEDURE — 3700000001 HC ADD 15 MINUTES (ANESTHESIA): Performed by: ORTHOPAEDIC SURGERY

## 2022-06-21 PROCEDURE — 7100000030 HC ASPR PHASE II RECOVERY - FIRST 15 MIN: Performed by: ORTHOPAEDIC SURGERY

## 2022-06-21 PROCEDURE — 7100000001 HC PACU RECOVERY - ADDTL 15 MIN: Performed by: ORTHOPAEDIC SURGERY

## 2022-06-21 PROCEDURE — 7100000011 HC PHASE II RECOVERY - ADDTL 15 MIN: Performed by: ORTHOPAEDIC SURGERY

## 2022-06-21 PROCEDURE — 3600000013 HC SURGERY LEVEL 3 ADDTL 15MIN: Performed by: ORTHOPAEDIC SURGERY

## 2022-06-21 RX ORDER — DEXAMETHASONE SODIUM PHOSPHATE 4 MG/ML
INJECTION, SOLUTION INTRA-ARTICULAR; INTRALESIONAL; INTRAMUSCULAR; INTRAVENOUS; SOFT TISSUE PRN
Status: DISCONTINUED | OUTPATIENT
Start: 2022-06-21 | End: 2022-06-21 | Stop reason: SDUPTHER

## 2022-06-21 RX ORDER — FENTANYL CITRATE 50 UG/ML
25 INJECTION, SOLUTION INTRAMUSCULAR; INTRAVENOUS EVERY 5 MIN PRN
Status: DISCONTINUED | OUTPATIENT
Start: 2022-06-21 | End: 2022-06-21 | Stop reason: HOSPADM

## 2022-06-21 RX ORDER — LABETALOL HYDROCHLORIDE 5 MG/ML
10 INJECTION, SOLUTION INTRAVENOUS
Status: DISCONTINUED | OUTPATIENT
Start: 2022-06-21 | End: 2022-06-21 | Stop reason: HOSPADM

## 2022-06-21 RX ORDER — SODIUM CHLORIDE 9 MG/ML
25 INJECTION, SOLUTION INTRAVENOUS PRN
Status: DISCONTINUED | OUTPATIENT
Start: 2022-06-21 | End: 2022-06-21 | Stop reason: HOSPADM

## 2022-06-21 RX ORDER — LIDOCAINE HYDROCHLORIDE 10 MG/ML
INJECTION, SOLUTION EPIDURAL; INFILTRATION; INTRACAUDAL; PERINEURAL PRN
Status: DISCONTINUED | OUTPATIENT
Start: 2022-06-21 | End: 2022-06-21 | Stop reason: SDUPTHER

## 2022-06-21 RX ORDER — PROPOFOL 10 MG/ML
INJECTION, EMULSION INTRAVENOUS PRN
Status: DISCONTINUED | OUTPATIENT
Start: 2022-06-21 | End: 2022-06-21 | Stop reason: SDUPTHER

## 2022-06-21 RX ORDER — HYDRALAZINE HYDROCHLORIDE 20 MG/ML
INJECTION INTRAMUSCULAR; INTRAVENOUS PRN
Status: DISCONTINUED | OUTPATIENT
Start: 2022-06-21 | End: 2022-06-21 | Stop reason: SDUPTHER

## 2022-06-21 RX ORDER — SODIUM CHLORIDE 9 MG/ML
INJECTION, SOLUTION INTRAVENOUS CONTINUOUS
Status: DISCONTINUED | OUTPATIENT
Start: 2022-06-21 | End: 2022-06-21 | Stop reason: HOSPADM

## 2022-06-21 RX ORDER — HYDROCODONE BITARTRATE AND ACETAMINOPHEN 5; 325 MG/1; MG/1
1 TABLET ORAL EVERY 6 HOURS PRN
Qty: 20 TABLET | Refills: 0 | Status: SHIPPED | OUTPATIENT
Start: 2022-06-21 | End: 2022-06-26

## 2022-06-21 RX ORDER — ONDANSETRON 2 MG/ML
4 INJECTION INTRAMUSCULAR; INTRAVENOUS
Status: DISCONTINUED | OUTPATIENT
Start: 2022-06-21 | End: 2022-06-21 | Stop reason: HOSPADM

## 2022-06-21 RX ORDER — METOCLOPRAMIDE HYDROCHLORIDE 5 MG/ML
10 INJECTION INTRAMUSCULAR; INTRAVENOUS
Status: DISCONTINUED | OUTPATIENT
Start: 2022-06-21 | End: 2022-06-21 | Stop reason: HOSPADM

## 2022-06-21 RX ORDER — FENTANYL CITRATE 50 UG/ML
INJECTION, SOLUTION INTRAMUSCULAR; INTRAVENOUS PRN
Status: DISCONTINUED | OUTPATIENT
Start: 2022-06-21 | End: 2022-06-21 | Stop reason: SDUPTHER

## 2022-06-21 RX ORDER — SODIUM CHLORIDE 0.9 % (FLUSH) 0.9 %
5-40 SYRINGE (ML) INJECTION PRN
Status: DISCONTINUED | OUTPATIENT
Start: 2022-06-21 | End: 2022-06-21 | Stop reason: HOSPADM

## 2022-06-21 RX ORDER — HYDROCODONE BITARTRATE AND ACETAMINOPHEN 5; 325 MG/1; MG/1
1 TABLET ORAL EVERY 6 HOURS PRN
Status: COMPLETED | OUTPATIENT
Start: 2022-06-21 | End: 2022-06-21

## 2022-06-21 RX ORDER — ROPIVACAINE HYDROCHLORIDE 5 MG/ML
INJECTION, SOLUTION EPIDURAL; INFILTRATION; PERINEURAL PRN
Status: DISCONTINUED | OUTPATIENT
Start: 2022-06-21 | End: 2022-06-21 | Stop reason: ALTCHOICE

## 2022-06-21 RX ORDER — ONDANSETRON 2 MG/ML
INJECTION INTRAMUSCULAR; INTRAVENOUS PRN
Status: DISCONTINUED | OUTPATIENT
Start: 2022-06-21 | End: 2022-06-21 | Stop reason: SDUPTHER

## 2022-06-21 RX ORDER — SODIUM CHLORIDE 0.9 % (FLUSH) 0.9 %
5-40 SYRINGE (ML) INJECTION EVERY 12 HOURS SCHEDULED
Status: DISCONTINUED | OUTPATIENT
Start: 2022-06-21 | End: 2022-06-21 | Stop reason: HOSPADM

## 2022-06-21 RX ORDER — 0.9 % SODIUM CHLORIDE 0.9 %
500 INTRAVENOUS SOLUTION INTRAVENOUS ONCE
Status: DISCONTINUED | OUTPATIENT
Start: 2022-06-21 | End: 2022-06-21 | Stop reason: HOSPADM

## 2022-06-21 RX ORDER — LIDOCAINE HYDROCHLORIDE 10 MG/ML
1 INJECTION, SOLUTION EPIDURAL; INFILTRATION; INTRACAUDAL; PERINEURAL
Status: DISCONTINUED | OUTPATIENT
Start: 2022-06-21 | End: 2022-06-21 | Stop reason: HOSPADM

## 2022-06-21 RX ORDER — HYDRALAZINE HYDROCHLORIDE 20 MG/ML
10 INJECTION INTRAMUSCULAR; INTRAVENOUS
Status: DISCONTINUED | OUTPATIENT
Start: 2022-06-21 | End: 2022-06-21 | Stop reason: HOSPADM

## 2022-06-21 RX ADMIN — LIDOCAINE HYDROCHLORIDE 40 MG: 10 INJECTION, SOLUTION EPIDURAL; INFILTRATION; INTRACAUDAL; PERINEURAL at 13:27

## 2022-06-21 RX ADMIN — HYDRALAZINE HYDROCHLORIDE 10 MG: 20 INJECTION INTRAMUSCULAR; INTRAVENOUS at 13:38

## 2022-06-21 RX ADMIN — SODIUM CHLORIDE: 9 INJECTION, SOLUTION INTRAVENOUS at 11:34

## 2022-06-21 RX ADMIN — HYDROCODONE BITARTRATE AND ACETAMINOPHEN 1 TABLET: 5; 325 TABLET ORAL at 14:51

## 2022-06-21 RX ADMIN — FENTANYL CITRATE 50 MCG: 50 INJECTION, SOLUTION INTRAMUSCULAR; INTRAVENOUS at 13:28

## 2022-06-21 RX ADMIN — PROPOFOL 140 MG: 10 INJECTION, EMULSION INTRAVENOUS at 13:27

## 2022-06-21 RX ADMIN — DEXAMETHASONE SODIUM PHOSPHATE 4 MG: 4 INJECTION, SOLUTION INTRAMUSCULAR; INTRAVENOUS at 13:37

## 2022-06-21 RX ADMIN — ONDANSETRON 4 MG: 2 INJECTION INTRAMUSCULAR; INTRAVENOUS at 13:37

## 2022-06-21 RX ADMIN — FENTANYL CITRATE 50 MCG: 50 INJECTION, SOLUTION INTRAMUSCULAR; INTRAVENOUS at 13:32

## 2022-06-21 RX ADMIN — HYDRALAZINE HYDROCHLORIDE 10 MG: 20 INJECTION INTRAMUSCULAR; INTRAVENOUS at 13:56

## 2022-06-21 ASSESSMENT — PAIN DESCRIPTION - ORIENTATION
ORIENTATION: RIGHT
ORIENTATION: RIGHT

## 2022-06-21 ASSESSMENT — PAIN DESCRIPTION - LOCATION
LOCATION: KNEE
LOCATION: KNEE

## 2022-06-21 ASSESSMENT — PAIN SCALES - GENERAL
PAINLEVEL_OUTOF10: 0
PAINLEVEL_OUTOF10: 5
PAINLEVEL_OUTOF10: 5
PAINLEVEL_OUTOF10: 4

## 2022-06-21 ASSESSMENT — PAIN DESCRIPTION - PAIN TYPE: TYPE: SURGICAL PAIN

## 2022-06-21 ASSESSMENT — PAIN DESCRIPTION - FREQUENCY: FREQUENCY: CONTINUOUS

## 2022-06-21 ASSESSMENT — PAIN DESCRIPTION - DESCRIPTORS: DESCRIPTORS: BURNING

## 2022-06-21 ASSESSMENT — PAIN - FUNCTIONAL ASSESSMENT: PAIN_FUNCTIONAL_ASSESSMENT: 0-10

## 2022-06-21 NOTE — INTERVAL H&P NOTE
Update History & Physical    The patient's History and Physical of June 8, 2022 was reviewed with the patient and I examined the patient. There was no change. Patient did obtain her medical clearance. The surgical site was confirmed by the patient and me. Patient will be having: KNEE ARTHROSCOPY - Right. Patient reports present pain at 3/10. Patient has been NPO since midnight. No blood thinners in the past 5-7 days. Patient took metoprolol with sip of water. Patient denies any personal or family problems with anesthesia.        Electronically signed by BETSEY De Anda CNP on 6/21/2022 at 10:55 AM

## 2022-06-21 NOTE — ANESTHESIA PRE PROCEDURE
Department of Anesthesiology  Preprocedure Note       Name:  Alice Sorto   Age:  67 y.o.  :  1945                                          MRN:  [id number]         Date:  2022      Surgeon: Douglas Tinsley):  Maira Mccain MD    Procedure: Procedure(s):  KNEE ARTHROSCOPY    Medications prior to admission:   Prior to Admission medications    Medication Sig Start Date End Date Taking? Authorizing Provider   HYDROcodone-acetaminophen (NORCO) 5-325 MG per tablet Take 1 tablet by mouth every 6 hours as needed for Pain for up to 5 days. Intended supply: 5 days.  Take lowest dose possible to manage pain 22 Yes Maira Mccain MD   tolterodine (DETROL LA) 4 MG extended release capsule Take 1 capsule by mouth daily 22   Henrik Peters MD   donepezil (ARICEPT) 5 MG tablet Take 1 tablet by mouth daily 22   Historical Provider, MD   chlorhexidine (PERIDEX) 0.12 % solution  22   Historical Provider, MD   ibuprofen (ADVIL;MOTRIN) 800 MG tablet Take 800 mg by mouth every 8 hours as needed for Pain  3/16/22   Historical Provider, MD   lisinopril-hydroCHLOROthiazide (PRINZIDE;ZESTORETIC) 10-12.5 MG per tablet Take 1 tablet by mouth daily 3/22/22   Henrik Peters MD   pravastatin (PRAVACHOL) 20 MG tablet Take 1 tablet by mouth daily 3/22/22   Henrik Peters MD   alendronate (FOSAMAX) 70 MG tablet Take 1 tablet by mouth every 7 days Take medication on an empty stomach, 1st thing in the morning, with full glass of water, say upright for 30min 22   Henrik Peters MD   pioglitazone (ACTOS) 15 MG tablet Take 15 mg by mouth Daily with supper  21   Historical Provider, MD   albuterol sulfate HFA (PROAIR HFA) 108 (90 Base) MCG/ACT inhaler USE 2 INHALATIONS EVERY 6 HOURS AS NEEDED FOR WHEEZING 21   Historical Provider, MD   glimepiride (AMARYL) 4 MG tablet Take 4 mg by mouth every morning (before breakfast)    Historical Provider, MD   metoprolol tartrate (LOPRESSOR) 50 MG tablet Take 50 mg by mouth daily     Historical Provider, MD   SITagliptin (JANUVIA) 100 MG tablet Take 100 mg by mouth daily    Historical Provider, MD   rOPINIRole (REQUIP) 2 MG tablet Take 2 mg by mouth nightly     Historical Provider, MD   venlafaxine (EFFEXOR) 37.5 MG tablet Take 37.5 mg by mouth daily    Historical Provider, MD   niacin (NIASPAN) 500 MG CR tablet Take 500 mg by mouth nightly     Historical Provider, MD       Current medications:    Current Facility-Administered Medications   Medication Dose Route Frequency Provider Last Rate Last Admin    0.9 % sodium chloride bolus  500 mL IntraVENous Once Cyndy Connor MD        lidocaine PF 1 % injection 1 mL  1 mL IntraDERmal Once PRN Cyndy Connor MD        0.9 % sodium chloride infusion   IntraVENous Continuous [place] MD Marlyn 100 mL/hr at 06/21/22 1134 New Bag at 06/21/22 1134       Allergies:     Allergies   Allergen Reactions    Diphenhydramine Hcl     Dust Mite Extract     Guaifenesin     Seasonal     Wasp Venom Protein        Problem List:    Patient Active Problem List   Diagnosis Code    Closed fracture of neck of radius S52.133A    Type 2 diabetes mellitus without complication, without long-term current use of insulin ([facility]) E11.9    Hypertension associated with diabetes ([facility] E11.59, I15.2    Major depressive disorder with single episode, in full remission ([facility]) F32.5    Age-related cognitive decline R41.81    Age-related osteoporosis without current pathological fracture M81.0    Nocturia R35.1    Right hip pain M25.551    Bilateral sacroiliitis ([facility]) M46.1    Chronic pain disorder G89.4    Displacement of lumbar intervertebral disc without myelopathy M51.26    Osteoarthritis of knee M17.10    Postherpetic neuralgia B02.29    Post-laminectomy syndrome M96.1    Sacroiliitis, not elsewhere classified ([address] M46.1    Preop examination Z01.818    Chronic renal disease, stage III ([address] [[id number]] N18.30       Past Medical History: Diagnosis Date    Abnormal EKG     Arthritis     Asthma     Noted per [facility] chart- patient denies    Gout     History of fall     Hypertension     Incomplete RBBB     Osteoarthritis     Right knee meniscal tear     Sciatica     Seasonal allergies     Snoring     Type II or unspecified type diabetes mellitus without mention of complication, not stated as uncontrolled     Urinary incontinence     Wears dentures     partial upper and full lower       Past Surgical History:        Procedure Laterality Date    COLONOSCOPY      x 3    ELBOW FRACTURE SURGERY Right     EYE SURGERY Bilateral     optic nerve surgery as a child    FINGER TRIGGER RELEASE Right 2018    Long middle and ring    FOOT SURGERY Left     2 surgeries    HIP FRACTURE SURGERY Right 2021    with hardware /  Procedure: [address]; Surgeon: Bennie Martinez MD; Location: [address] [place] [address]; Service: Orthopedics; Laterality: Right; fluro and ortho table    HYSTERECTOMY (CERVIX STATUS UNKNOWN)      vaginal    NOSE SURGERY      3 surgeries    OTHER SURGICAL HISTORY      excision cysts from ovaries    SPINAL CORD STIMULATOR IMPLANT      Patient states it does not work    TUBAL LIGATION         Social History:    Social History     Tobacco Use    Smoking status: Former Smoker     Packs/day: 0.50     Years: 20.00     Pack years: 10.00     Types: Cigarettes     Quit date: 2001     Years since quittin.0    Smokeless tobacco: Never Used    Tobacco comment: for 20 years   Substance Use Topics    Alcohol use:  No                                Counseling given: Not Answered  Comment: for 20 years      Vital Signs (Current):   Vitals:    22 1057 22 1104   BP:  (!) 178/55   Pulse:  57   Resp:  16   Temp:  97.5 °F (36.4 °C)   TempSrc:  Infrared   SpO2:  97%   Weight: 173 lb (78.5 kg)    Height: 5' 3.5\" (1.613 m) BP Readings from Last 3 Encounters:   06/21/22 (!) 178/55   06/16/22 108/64   06/08/22 (!) 140/52       NPO Status: Time of last liquid consumption: 2100                        Time of last solid consumption: 2100                        Date of last liquid consumption: 06/20/22                        Date of last solid food consumption: 06/20/22    BMI:   Wt Readings from Last 3 Encounters:   06/21/22 173 lb (78.5 kg)   06/16/22 168 lb (76.2 kg)   06/08/22 173 lb (78.5 kg)     Body mass index is 30.16 kg/m².     CBC:   Lab Results   Component Value Date    WBC 5.4 06/08/2022    RBC 4.63 06/08/2022    HGB 13.7 06/08/2022    HCT 41.3 06/08/2022    MCV 89.2 06/08/2022    RDW 14.1 06/08/2022     06/08/2022       CMP:   Lab Results   Component Value Date     06/08/2022    K 5.0 06/08/2022     06/08/2022    CO2 28 06/08/2022    BUN 27 06/08/2022    CREATININE 1.01 06/08/2022    GFRAA >60 06/08/2022    LABGLOM 53 06/08/2022    GLUCOSE 92 06/08/2022    PROT 6.5 06/22/2021    CALCIUM 10.0 06/08/2022    BILITOT 0.40 06/22/2021    ALKPHOS 66 06/22/2021    AST 22 06/22/2021    ALT 12 06/22/2021       POC Tests:   Recent Labs     06/21/22  1129   POCGLU 132*       Coags: No results found for: PROTIME, INR, APTT    HCG (If Applicable): No results found for: PREGTESTUR, PREGSERUM, HCG, HCGQUANT     ABGs: No results found for: PHART, PO2ART, RUF8FKH, ZQL2WHE, BEART, O4DTROLJ     Type & Screen (If Applicable):  No results found for: LABABO, LABRH    Drug/Infectious Status (If Applicable):  No results found for: HIV, HEPCAB    COVID-19 Screening (If Applicable): No results found for: COVID19        Anesthesia Evaluation  Patient summary reviewed and Nursing notes reviewed no history of anesthetic complications:   Airway: Mallampati: II  TM distance: >3 FB   Neck ROM: full  Mouth opening: > = 3 FB   Dental:    (+) partials      Pulmonary:normal exam  breath sounds clear to auscultation  (+) asthma: Cardiovascular:    (+) hypertension:, hyperlipidemia        Rhythm: regular  Rate: normal                    Neuro/Psych:   (+) neuromuscular disease:, psychiatric history:depression/anxiety              ROS comment: Back pain -s/p surgery  Post Laminectomy syndrome GI/Hepatic/Renal:   (+) renal disease: CRI,           Endo/Other:    (+) DiabetesType II DM, , : arthritis: OA., .                 Abdominal:             Vascular: Other Findings:           Anesthesia Plan      general     ASA 3       Induction: intravenous. MIPS: Postoperative opioids intended and Prophylactic antiemetics administered. Anesthetic plan and risks discussed with patient. Plan discussed with CRNA.                     Petra Yoo MD   6/21/2022

## 2022-06-21 NOTE — OP NOTE
Operative Note      Patient: Noy Bush  YOB: 1945  MRN: [id number]    Date of Procedure: 6/21/2022    Pre-Op Diagnosis: Medial and lateral meniscus tears right knee    Post-Op Diagnosis: Same       Procedure(s):  KNEE ARTHROSCOPY FOR PARTIAL MEDIAL MENISCECTOMY AND PARTIAL LATERAL MENISCECTOMY    Surgeon(s):  Vignesh Wright MD    Assistant:   Resident: Sil López DO    Anesthesia: General    Estimated Blood Loss (mL): Minimal    Complications: None    Specimens:   * No specimens in log *    Implants:  * No implants in log *      Drains: * No LDAs found *    Findings: Medial and lateral meniscus tears right knee    Detailed Description of Procedure:     Patient is a 67y.o. year old female who presents with a history of pain, locking, and giving away sensations of their right knee. Physical examination was positive for Fauzia's examination. MRI was consistent with tears of both the medial and lateral meniscus. . Having failed conservative treatment, it was advised that arthroscopic examination and treatment of their knee would be beneficial and consent was obtained with risk and benefits explained to the patient. The patient was taken tot he operative room and general anesthesia was administered. A tourniquet was placed to the operatives lower extremity and then placed in the leg santana. The leg was exsanguinated and the tourniquet inflated to the 300mmHg. The leg was the prepped and draped in the usual sterile fashion. Time-out was called to verify laterality. Medial and lateral portals were made and the scope placed in the lateral portal. The patella femoral joint was examined and noted grade III chondromalacia. The scope was then passes down the medial gutter into the medial compartment. A probe was used to assess the medial meniscus and a tear was identified in the posterior horn and into the body of the medial meniscus.  A partial medial menisectomy was carried ou with basket forceps and then smoothed out with a shaver. Repeat probing of the meniscus found it to be stable. There was early grade III chondromalacia of the medial femoral condyle but nothing requiring any intervention. The arthroscope was then passed into the notch of the knee. The ACL was found not to have any significant damage or laxity. The scope was then passed in the lateral compartment. A tear was identified at the junction of the posterior horn and body of the lateral meniscus. Partial lateral meniscectomy was carried out with straight and up-biting basket forceps down to stable remnants without the 4.0 time. Reprobed and found remainder the meniscus to be stable. She had grade 1 softening of the articular surfaces. The scope was then passed into the suprapatellar area and the shaver was used to remove any further soft tissue debris. The scope was removed and the knee evacuated of fluid and injected with 20cc of 0.5% ropivacaine. The sterile bulky dressing was applied and the leg then wrapped with a large 6-inch ace bandage from toes to the mid-thigh. The tourniquet was then deflated at less than 30 minutes. The patient was awaken and taken to the PACU in good condition.      Electronically signed by Flaco Silveira MD on 6/21/2022 at 1:59 PM

## 2022-06-22 NOTE — ANESTHESIA POSTPROCEDURE EVALUATION
Department of Anesthesiology  Postprocedure Note    Patient: Brodie Rosas  MRN: [id number]  YOB: 1945  Date of evaluation: 6/21/2022      Procedure Summary     Date: 06/21/22 Room / Location: [address] [facility]     Anesthesia Start: 3778 Anesthesia Stop: 0195    Procedure: KNEE ARTHROSCOPY FOR PARTIAL MEDIAL MENISCECTOMY AND PARTIAL LATERAL MENISCECTOMY (Right Knee) Diagnosis:       Acute medial meniscus tear of right knee, initial encounter      (MENISCUS TEAR RIGHT KNEE)    Surgeons: Petra Perez MD Responsible Provider: Radha Thompson MD    Anesthesia Type: general ASA Status: 3          Anesthesia Type: No value filed. Isela Phase I: Isela Score: 9    Isela Phase II: Isela Score: 10    Last vitals:   Vitals Value Taken Time   /47 06/21/22 1440   Temp 97.7 °F (36.5 °C) 06/21/22 1440   Pulse 71 06/21/22 1443   Resp 13 06/21/22 1443   SpO2 97 % 06/21/22 1444   Vitals shown include unvalidated device data.       Anesthesia Post Evaluation    Comments: POST- ANESTHESIA EVALUATION       Pt Name: Brodie Rosas  MRN: [id number]  YOB: 1945  Date of evaluation: 6/21/2022  Time:  9:59 PM      BP (!) 151/65   Pulse 66   Temp 97.7 °F (36.5 °C) (Infrared)   Resp 14   Ht 5' 3.5\" (1.613 m)   Wt 173 lb (78.5 kg)   SpO2 96%   BMI 30.16 kg/m²      Consciousness Level  Awake  Cardiopulmonary Status  Stable  Pain Adequately Treated YES  Nausea / Vomiting  NO  Adequate Hydration  YES  Anesthesia Related Complications NONE      Electronically signed by Ralf Javier MD on 6/21/2022 at 9:59 PM

## 2022-07-06 ENCOUNTER — OFFICE VISIT (OUTPATIENT)
Dept: ORTHOPEDIC SURGERY | Age: 77
End: 2022-07-06

## 2022-07-06 DIAGNOSIS — Z98.890 S/P RIGHT KNEE ARTHROSCOPY: Primary | ICD-10-CM

## 2022-07-06 PROCEDURE — 99024 POSTOP FOLLOW-UP VISIT: CPT | Performed by: ORTHOPAEDIC SURGERY

## 2022-07-06 RX ORDER — CELECOXIB 200 MG/1
200 CAPSULE ORAL DAILY
Qty: 90 CAPSULE | Refills: 3 | Status: SHIPPED | OUTPATIENT
Start: 2022-07-06

## 2022-07-06 NOTE — PROGRESS NOTES
Patient returns today status post right knee arthroscopy with partial (medial/lateral) meniscectomy. Patient has no major complaints other than expected tightness/swelling with ROM. Sharp/stabbing pain has improved. On exam, portal sites are without redness or drainage. No calf tenderness; negative Pranav's sign. Motion is 0-100 degrees. No significant effusion. Assessment  Status post right knee arthroscopy    Plan  Patient given exercises to perform. Patient given activities/ motions to complete. Continue activities at home. Return to work. RTO PRN. Call with any future problems.

## 2022-07-08 PROBLEM — Z01.818 PREOP EXAMINATION: Status: RESOLVED | Noted: 2022-06-08 | Resolved: 2022-07-08

## 2022-07-21 ENCOUNTER — TELEPHONE (OUTPATIENT)
Dept: INTERNAL MEDICINE CLINIC | Age: 77
End: 2022-07-21

## 2022-08-26 ENCOUNTER — OFFICE VISIT (OUTPATIENT)
Dept: INTERNAL MEDICINE CLINIC | Age: 77
End: 2022-08-26
Payer: MEDICARE

## 2022-08-26 VITALS
HEART RATE: 70 BPM | WEIGHT: 186 LBS | HEIGHT: 64 IN | SYSTOLIC BLOOD PRESSURE: 134 MMHG | DIASTOLIC BLOOD PRESSURE: 80 MMHG | BODY MASS INDEX: 31.76 KG/M2 | OXYGEN SATURATION: 98 %

## 2022-08-26 DIAGNOSIS — I15.2 HYPERTENSION ASSOCIATED WITH DIABETES (HCC): Primary | ICD-10-CM

## 2022-08-26 DIAGNOSIS — N39.46 MIXED INCONTINENCE URGE AND STRESS: ICD-10-CM

## 2022-08-26 DIAGNOSIS — F32.5 MAJOR DEPRESSIVE DISORDER WITH SINGLE EPISODE, IN FULL REMISSION (HCC): ICD-10-CM

## 2022-08-26 DIAGNOSIS — M46.1 SACROILIITIS, NOT ELSEWHERE CLASSIFIED (HCC): ICD-10-CM

## 2022-08-26 DIAGNOSIS — E11.9 TYPE 2 DIABETES MELLITUS WITHOUT COMPLICATION, WITHOUT LONG-TERM CURRENT USE OF INSULIN (HCC): ICD-10-CM

## 2022-08-26 DIAGNOSIS — G25.81 RESTLESS LEG: ICD-10-CM

## 2022-08-26 DIAGNOSIS — R41.81 AGE-RELATED COGNITIVE DECLINE: ICD-10-CM

## 2022-08-26 DIAGNOSIS — N18.31 STAGE 3A CHRONIC KIDNEY DISEASE (HCC): ICD-10-CM

## 2022-08-26 DIAGNOSIS — G30.9 ALZHEIMER'S DISEASE, UNSPECIFIED (CODE) (HCC): ICD-10-CM

## 2022-08-26 DIAGNOSIS — E11.59 HYPERTENSION ASSOCIATED WITH DIABETES (HCC): Primary | ICD-10-CM

## 2022-08-26 LAB — HBA1C MFR BLD: 5.9 %

## 2022-08-26 PROCEDURE — 1123F ACP DISCUSS/DSCN MKR DOCD: CPT | Performed by: INTERNAL MEDICINE

## 2022-08-26 PROCEDURE — 99214 OFFICE O/P EST MOD 30 MIN: CPT | Performed by: INTERNAL MEDICINE

## 2022-08-26 PROCEDURE — 1090F PRES/ABSN URINE INCON ASSESS: CPT | Performed by: INTERNAL MEDICINE

## 2022-08-26 PROCEDURE — 1036F TOBACCO NON-USER: CPT | Performed by: INTERNAL MEDICINE

## 2022-08-26 PROCEDURE — G8427 DOCREV CUR MEDS BY ELIG CLIN: HCPCS | Performed by: INTERNAL MEDICINE

## 2022-08-26 PROCEDURE — 0509F URINE INCON PLAN DOCD: CPT | Performed by: INTERNAL MEDICINE

## 2022-08-26 PROCEDURE — 83036 HEMOGLOBIN GLYCOSYLATED A1C: CPT | Performed by: INTERNAL MEDICINE

## 2022-08-26 PROCEDURE — 3044F HG A1C LEVEL LT 7.0%: CPT | Performed by: INTERNAL MEDICINE

## 2022-08-26 PROCEDURE — G8399 PT W/DXA RESULTS DOCUMENT: HCPCS | Performed by: INTERNAL MEDICINE

## 2022-08-26 PROCEDURE — G8417 CALC BMI ABV UP PARAM F/U: HCPCS | Performed by: INTERNAL MEDICINE

## 2022-08-26 RX ORDER — GLIMEPIRIDE 4 MG/1
4 TABLET ORAL
Qty: 90 TABLET | Refills: 3 | Status: SHIPPED | OUTPATIENT
Start: 2022-08-26

## 2022-08-26 RX ORDER — METOPROLOL TARTRATE 50 MG/1
50 TABLET, FILM COATED ORAL DAILY
Qty: 90 TABLET | Refills: 3 | Status: SHIPPED | OUTPATIENT
Start: 2022-08-26

## 2022-08-26 RX ORDER — VENLAFAXINE 37.5 MG/1
37.5 TABLET ORAL DAILY
Qty: 90 TABLET | Refills: 3 | Status: SHIPPED | OUTPATIENT
Start: 2022-08-26

## 2022-08-26 RX ORDER — PIOGLITAZONEHYDROCHLORIDE 15 MG/1
15 TABLET ORAL
Qty: 90 TABLET | Refills: 3 | Status: SHIPPED | OUTPATIENT
Start: 2022-08-26 | End: 2022-10-12 | Stop reason: ALTCHOICE

## 2022-08-26 RX ORDER — DONEPEZIL HYDROCHLORIDE 5 MG/1
5 TABLET, FILM COATED ORAL DAILY
Qty: 90 TABLET | Refills: 3 | Status: SHIPPED | OUTPATIENT
Start: 2022-08-26

## 2022-08-26 RX ORDER — LISINOPRIL AND HYDROCHLOROTHIAZIDE 12.5; 1 MG/1; MG/1
1 TABLET ORAL DAILY
Qty: 90 TABLET | Refills: 3 | Status: SHIPPED | OUTPATIENT
Start: 2022-08-26

## 2022-08-26 RX ORDER — ROPINIROLE 2 MG/1
2 TABLET, FILM COATED ORAL NIGHTLY
Qty: 90 TABLET | Refills: 3 | Status: SHIPPED | OUTPATIENT
Start: 2022-08-26

## 2022-08-26 RX ORDER — PRAVASTATIN SODIUM 20 MG
20 TABLET ORAL DAILY
Qty: 90 TABLET | Refills: 3 | Status: SHIPPED | OUTPATIENT
Start: 2022-08-26

## 2022-08-26 RX ORDER — TOLTERODINE 4 MG/1
4 CAPSULE, EXTENDED RELEASE ORAL DAILY
Qty: 90 CAPSULE | Refills: 1 | Status: SHIPPED | OUTPATIENT
Start: 2022-08-26

## 2022-08-26 RX ORDER — GABAPENTIN 100 MG/1
100 CAPSULE ORAL NIGHTLY
Qty: 90 CAPSULE | Refills: 1 | Status: SHIPPED | OUTPATIENT
Start: 2022-08-26 | End: 2023-02-22

## 2022-08-26 RX ORDER — MELOXICAM 7.5 MG/1
TABLET ORAL
Qty: 30 TABLET | Status: CANCELLED | OUTPATIENT
Start: 2022-08-26

## 2022-08-26 NOTE — PROGRESS NOTES
Visit Information    Have you changed or started any medications since your last visit including any over-the-counter medicines, vitamins, or herbal medicines? no   Are you having any side effects from any of your medications? -  no  Have you stopped taking any of your medications? Is so, why? -  no    Have you seen any other physician or provider since your last visit? Yes - Records Obtained  Have you had any other diagnostic tests since your last visit? Yes - Records Obtained  Have you been seen in the emergency room and/or had an admission to a hospital since we last saw you? Yes - Records Obtained  Have you had your routine dental cleaning in the past 6 months? no    Have you activated your [employer] account? If not, what are your barriers?  No:      Patient Care Team:  Glo Bradford MD as PCP - General (Internal Medicine)  Glo Bradford MD [facility] P[facility] - [facility]    Medical History Review  Past Medical, Family, and Social History reviewed and does contribute to the patient presenting condition    Health Maintenance   Topic Date Due    Hepatitis C screen  Never done    DTaP/Tdap/Td vaccine (1 - Tdap) Never done    COVID-19 Vaccine (4 - Booster for [employer] series) 02/05/2022    Flu vaccine (1) 09/01/2022    Annual Wellness Visit (AWV)  12/18/2022    Lipids  12/20/2022    Depression Monitoring  06/16/2023    DEXA (modify frequency per FRAX score)  Completed    Shingles vaccine  Completed    Pneumococcal 65+ years Vaccine  Completed    Hepatitis A vaccine  Aged Out    Hib vaccine  Aged Out    Meningococcal (ACWY) vaccine  Aged Out     SUBJECTIVE:  Jordyn Alvarez is a 67 y.o. female patient who  comes for complaints of   Chief Complaint   Patient presents with    Diabetes     A1C- 5/5/22= 6.0    Back Pain         Duration-  Severity-  Context-  Associated signs and symptoms-  Modifying factors-      Got rt knee arthroscopic repair in June  Doing better    Persistent left sciatica pain  5yr  Pt has a spine stimulator in situ  Pt due to see pain m/m Dr Shayna Colunga:    diagnosed more than 5 years ago  Modifying factors on med:   Severity: un/controlled   Associated signs and symtoms: ckd/   aggravated with sugar diet and better with low sugar diet      - Follows a diabetic diet most of the time.   -Is compliant with medication(s) and is tolerating med(s) without any side effects.    -  Reports checking his/her glucose on a once a day schedule with sugars in the fasting 101-110 range. Hemoglobin A1C   Date Value Ref Range Status   08/26/2022 5.9 % Final   05/05/2022 6.0 % Final   01/04/2022 5.8 % Final     Januvia, amaryl, actos  Will stop [place] now   [employer]  Urine test due in Nov  Lipids due in Dec  On lisinopril,        HYPERTENSION:    Onset more than 2 years ago  Jolly: mild to mod  Usually controlled with current po meds:   Not associated with headaches or blurry vision  No chest pain   -- Patient denies any side effects of their medication(s) and is compliant with their regimen. - Watches his/her diet for sodium, low fat and low cholesterol most of the time. Last 3 Encounter BP Readings:     Date:        BP:  BP Readings from Last 3 Encounters:   08/26/22 134/80   06/21/22 (!) 151/65   06/16/22 108/64   O nlisinopril - HCTZ 10-12.5  Taking metoprolol 50 once daily  BMP last done in JUne       HYPERLIPIDEMIA:   Patient reports doing well on current therapy of statin:  pravachol 20  - Denies side effects of muscle weakness or achiness.   - Exercise  -last lipid panel  Lab Results   Component Value Date    CHOL 185 12/20/2021     Lab Results   Component Value Date    TRIG 257 (H) 12/20/2021     Lab Results   Component Value Date    HDL 40 (L) 12/20/2021     Lab Results   Component Value Date    LDLCHOLESTEROL 94 12/20/2021     Lab Results   Component Value Date    VLDL NOT REPORTED (H) 12/20/2021     Lab Results   Component Value Date    CHOLHDLRATIO 4.6 12/20/2021       B/l sacroiliitis  Celebrex did not  help  Try nrointin    Dementia  On aricept      REVIEW OF SYSTEMS (except Subjective (HPI))  GENERAL: No fevers / chills  RESPIRATORY: Negative for cough, wheezing or shortness of breath  CARDIOVASCULAR: Negative for chest pain or palpitations.   GI: no nausea, vomiting, or diarrhea  NEURO: No history of headaches    Past Medical History:   Diagnosis Date    Abnormal EKG     Arthritis     Asthma     Noted per [facility] chart- patient denies    Gout     History of fall     Hypertension     Incomplete RBBB     Osteoarthritis     Right knee meniscal tear     Sciatica     Seasonal allergies     Snoring     Type II or unspecified type diabetes mellitus without mention of complication, not stated as uncontrolled     Urinary incontinence     Wears dentures     partial upper and full lower       SOCIAL HISTORY:  Social History     Socioeconomic History    Marital status:      Spouse name: Not on file    Number of children: Not on file    Years of education: Not on file    Highest education level: Not on file   Occupational History    Not on file   Tobacco Use    Smoking status: Former     Packs/day: 0.50     Years: 20.00     Pack years: 10.00     Types: Cigarettes     Quit date: 2001     Years since quittin.2    Smokeless tobacco: Never    Tobacco comments:     for 20 years   Vaping Use    Vaping Use: Never used   Substance and Sexual Activity    Alcohol use: No    Drug use: Not Currently     Types: Marijuana Michelle Gouty)    Sexual activity: Not on file   Other Topics Concern    Not on file   Social History Narrative    Not on file     Social Determinants of Health     Financial Resource Strain: Low Risk     Difficulty of Paying Living Expenses: Not hard at all   Food Insecurity: No Food Insecurity    Worried About Running Out of Food in the Last Year: Never true    Ran Out of Food in the Last Year: Never true   Transportation Needs: Not on file   Physical Activity: Not on file Stress: Not on file   Social Connections: Not on file   Intimate Partner Violence: Not on file   Housing Stability: Not on file           CURRENT MEDICATIONS:  Current Outpatient Medications   Medication Sig Dispense Refill    meloxicam (MOBIC) 7.5 MG tablet       celecoxib (CELEBREX) 200 MG capsule Take 1 capsule by mouth daily 90 capsule 3    tolterodine (DETROL LA) 4 MG extended release capsule Take 1 capsule by mouth daily 90 capsule 1    donepezil (ARICEPT) 5 MG tablet Take 1 tablet by mouth daily      chlorhexidine (PERIDEX) 0.12 % solution       ibuprofen (ADVIL;MOTRIN) 800 MG tablet Take 800 mg by mouth every 8 hours as needed for Pain       lisinopril-hydroCHLOROthiazide (PRINZIDE;ZESTORETIC) 10-12.5 MG per tablet Take 1 tablet by mouth daily 90 tablet 3    pravastatin (PRAVACHOL) 20 MG tablet Take 1 tablet by mouth daily 90 tablet 3    alendronate (FOSAMAX) 70 MG tablet Take 1 tablet by mouth every 7 days Take medication on an empty stomach, 1st thing in the morning, with full glass of water, say upright for 30min 12 tablet 2    pioglitazone (ACTOS) 15 MG tablet Take 15 mg by mouth Daily with supper       albuterol sulfate HFA (PROVENTIL;VENTOLIN;PROAIR) 108 (90 Base) MCG/ACT inhaler USE 2 INHALATIONS EVERY 6 HOURS AS NEEDED FOR WHEEZING      glimepiride (AMARYL) 4 MG tablet Take 4 mg by mouth every morning (before breakfast)      metoprolol tartrate (LOPRESSOR) 50 MG tablet Take 50 mg by mouth daily       SITagliptin (JANUVIA) 100 MG tablet Take 100 mg by mouth daily      rOPINIRole (REQUIP) 2 MG tablet Take 2 mg by mouth nightly       venlafaxine (EFFEXOR) 37.5 MG tablet Take 37.5 mg by mouth daily      niacin (NIASPAN) 500 MG CR tablet Take 500 mg by mouth nightly        No current facility-administered medications for this visit. OBJECTIVE:  Vitals:    08/26/22 1234   BP: 134/80   Pulse: 70   SpO2: 98%     Body mass index is 32.43 kg/m².         General exam (except above):  General appearance - well appearing, alert, in no acute distress  Head - Atraumatic, normocephalic  Eyes - EOMI, no jaundice or pallor  Lungs - Lungs clear to auscultation. No wheezing, rhonchi, rales  Heart - RRR without murmur, gallop, or rubs. No ectopy  Abdomen - Abdomen soft, non-tender. Bowel sounds normal. No masses, organomegaly  Extremities -No significant edema, or skin discoloration. Good capillary refill. Neuro - Pt Alert, awake and oriented x 3. No gross focal neurological deficits    ASSESSMENT AND PLAN (MEDICAL DECISION MAKING):   Marion Oneill was seen today for diabetes and back pain. Diagnoses and all orders for this visit:    Hypertension associated with diabetes ([facility]  Comments:  c/w current meds, controlled  Orders:  -     lisinopril-hydroCHLOROthiazide (PRINZIDE;ZESTORETIC) 10-12.5 MG per tablet; Take 1 tablet by mouth daily    Type 2 diabetes mellitus without complication, without long-term current use of insulin ([facility])  Comments:  , controlled  stop [place]  Orders:  -     POCT glycosylated hemoglobin (Hb A1C)  -     pravastatin (PRAVACHOL) 20 MG tablet; Take 1 tablet by mouth daily  -     pioglitazone (ACTOS) 15 MG tablet; Take 1 tablet by mouth Daily with supper  -     glimepiride (AMARYL) 4 MG tablet; Take 1 tablet by mouth every morning (before breakfast)    Stage 3a chronic kidney disease ([facility]  Comments:  stable    Sacroiliitis, not elsewhere classified (HCC)  -     gabapentin (NEURONTIN) 100 MG capsule; Take 1 capsule by mouth nightly for 180 days. Intended supply: 90 days    Major depressive disorder with single episode, in full remission ([facility]  -     venlafaxine (EFFEXOR) 37.5 MG tablet; Take 1 tablet by mouth daily    Mixed incontinence urge and stress  -     tolterodine (DETROL LA) 4 MG extended release capsule;  Take 1 capsule by mouth daily    Type 2 diabetes mellitus without complication, without long-term current use of insulin ([facility]  Comments:  controlled on amaryl, actos, januvia  Orders:  - POCT glycosylated hemoglobin (Hb A1C)  -     pravastatin (PRAVACHOL) 20 MG tablet; Take 1 tablet by mouth daily  -     pioglitazone (ACTOS) 15 MG tablet; Take 1 tablet by mouth Daily with supper  -     glimepiride (AMARYL) 4 MG tablet; Take 1 tablet by mouth every morning (before breakfast)    Age-related cognitive decline  -     donepezil (ARICEPT) 5 MG tablet; Take 1 tablet by mouth daily    Restless leg  -     rOPINIRole (REQUIP) 2 MG tablet; Take 1 tablet by mouth nightly    Alzheimer's disease, unspecified    Other orders  -     metoprolol tartrate (LOPRESSOR) 50 MG tablet;  Take 1 tablet by mouth daily           Follow up in: Eber Nur MD

## 2022-10-10 ENCOUNTER — HOSPITAL ENCOUNTER (EMERGENCY)
Age: 77
Discharge: HOME | End: 2022-10-10
Attending: EMERGENCY MEDICINE
Payer: MEDICARE

## 2022-10-10 VITALS
RESPIRATION RATE: 14 BRPM | BODY MASS INDEX: 30.05 KG/M2 | TEMPERATURE: 98.2 F | OXYGEN SATURATION: 97 % | WEIGHT: 176 LBS | HEIGHT: 64 IN | HEART RATE: 75 BPM | SYSTOLIC BLOOD PRESSURE: 137 MMHG | DIASTOLIC BLOOD PRESSURE: 105 MMHG

## 2022-10-10 DIAGNOSIS — B34.9 VIRAL ILLNESS: Primary | ICD-10-CM

## 2022-10-10 LAB
ABSOLUTE EOS #: 0.1 K/UL (ref 0–0.4)
ABSOLUTE LYMPH #: 1.2 K/UL (ref 1–4.8)
ABSOLUTE MONO #: 0.5 K/UL (ref 0.1–1.3)
ALBUMIN SERPL-MCNC: 4.4 G/DL (ref 3.5–5.2)
ALP BLD-CCNC: 75 U/L (ref 35–104)
ALT SERPL-CCNC: 17 U/L (ref 5–33)
ANION GAP SERPL CALCULATED.3IONS-SCNC: 15 MMOL/L (ref 9–17)
AST SERPL-CCNC: 22 U/L
BACTERIA: ABNORMAL
BASOPHILS # BLD: 1 % (ref 0–2)
BASOPHILS ABSOLUTE: 0 K/UL (ref 0–0.2)
BILIRUB SERPL-MCNC: 0.5 MG/DL (ref 0.3–1.2)
BILIRUBIN URINE: NEGATIVE
BUN BLDV-MCNC: 26 MG/DL (ref 8–23)
CALCIUM SERPL-MCNC: 10.1 MG/DL (ref 8.6–10.4)
CASTS UA: ABNORMAL /LPF
CHLORIDE BLD-SCNC: 98 MMOL/L (ref 98–107)
CO2: 27 MMOL/L (ref 20–31)
COLOR: YELLOW
CREAT SERPL-MCNC: 1.07 MG/DL (ref 0.5–0.9)
EOSINOPHILS RELATIVE PERCENT: 2 % (ref 0–4)
EPITHELIAL CELLS UA: ABNORMAL /HPF
GFR SERPL CREATININE-BSD FRML MDRD: 53 ML/MIN/1.73M2
GLUCOSE BLD-MCNC: 183 MG/DL (ref 70–99)
GLUCOSE URINE: NEGATIVE
HCT VFR BLD CALC: 44.5 % (ref 36–46)
HEMOGLOBIN: 14.5 G/DL (ref 12–16)
INFLUENZA A: NOT DETECTED
INFLUENZA B: NOT DETECTED
KETONES, URINE: ABNORMAL
LEUKOCYTE ESTERASE, URINE: ABNORMAL
LIPASE: 37 U/L (ref 13–60)
LYMPHOCYTES # BLD: 23 % (ref 24–44)
MCH RBC QN AUTO: 29.8 PG (ref 26–34)
MCHC RBC AUTO-ENTMCNC: 32.6 G/DL (ref 31–37)
MCV RBC AUTO: 91.5 FL (ref 80–100)
MONOCYTES # BLD: 10 % (ref 1–7)
MYOGLOBIN: 53 NG/ML (ref 25–58)
MYOGLOBIN: 54 NG/ML (ref 25–58)
NITRITE, URINE: NEGATIVE
PDW BLD-RTO: 13.9 % (ref 11.5–14.9)
PH UA: 7 (ref 5–8)
PLATELET # BLD: 220 K/UL (ref 150–450)
PMV BLD AUTO: 8.5 FL (ref 6–12)
POTASSIUM SERPL-SCNC: 4.5 MMOL/L (ref 3.7–5.3)
PROTEIN UA: NEGATIVE
RBC # BLD: 4.86 M/UL (ref 4–5.2)
RBC UA: ABNORMAL /HPF
SARS-COV-2 RNA, RT PCR: NOT DETECTED
SEG NEUTROPHILS: 64 % (ref 36–66)
SEGMENTED NEUTROPHILS ABSOLUTE COUNT: 3.3 K/UL (ref 1.3–9.1)
SODIUM BLD-SCNC: 140 MMOL/L (ref 135–144)
SOURCE: NORMAL
SPECIFIC GRAVITY UA: 1.02 (ref 1–1.03)
SPECIMEN DESCRIPTION: NORMAL
THYROXINE, FREE: 1.16 NG/DL (ref 0.93–1.7)
TOTAL PROTEIN: 7.5 G/DL (ref 6.4–8.3)
TROPONIN, HIGH SENSITIVITY: 13 NG/L (ref 0–14)
TROPONIN, HIGH SENSITIVITY: 13 NG/L (ref 0–14)
TSH SERPL DL<=0.05 MIU/L-ACNC: 1.33 UIU/ML (ref 0.3–5)
TURBIDITY: CLEAR
URINE HGB: NEGATIVE
UROBILINOGEN, URINE: NORMAL
WBC # BLD: 5.1 K/UL (ref 3.5–11)
WBC UA: ABNORMAL /HPF

## 2022-10-10 PROCEDURE — 36415 COLL VENOUS BLD VENIPUNCTURE: CPT

## 2022-10-10 PROCEDURE — 99284 EMERGENCY DEPT VISIT MOD MDM: CPT

## 2022-10-10 PROCEDURE — 83874 ASSAY OF MYOGLOBIN: CPT

## 2022-10-10 PROCEDURE — 83690 ASSAY OF LIPASE: CPT

## 2022-10-10 PROCEDURE — 93005 ELECTROCARDIOGRAM TRACING: CPT | Performed by: EMERGENCY MEDICINE

## 2022-10-10 PROCEDURE — 87636 SARSCOV2 & INF A&B AMP PRB: CPT

## 2022-10-10 PROCEDURE — 84439 ASSAY OF FREE THYROXINE: CPT

## 2022-10-10 PROCEDURE — 85025 COMPLETE CBC W/AUTO DIFF WBC: CPT

## 2022-10-10 PROCEDURE — 80053 COMPREHEN METABOLIC PANEL: CPT

## 2022-10-10 PROCEDURE — 96374 THER/PROPH/DIAG INJ IV PUSH: CPT

## 2022-10-10 PROCEDURE — 2580000003 HC RX 258: Performed by: EMERGENCY MEDICINE

## 2022-10-10 PROCEDURE — 96372 THER/PROPH/DIAG INJ SC/IM: CPT

## 2022-10-10 PROCEDURE — 84443 ASSAY THYROID STIM HORMONE: CPT

## 2022-10-10 PROCEDURE — 81001 URINALYSIS AUTO W/SCOPE: CPT

## 2022-10-10 PROCEDURE — 84484 ASSAY OF TROPONIN QUANT: CPT

## 2022-10-10 PROCEDURE — 6360000002 HC RX W HCPCS: Performed by: EMERGENCY MEDICINE

## 2022-10-10 RX ORDER — 0.9 % SODIUM CHLORIDE 0.9 %
1000 INTRAVENOUS SOLUTION INTRAVENOUS ONCE
Status: COMPLETED | OUTPATIENT
Start: 2022-10-10 | End: 2022-10-10

## 2022-10-10 RX ORDER — ONDANSETRON 2 MG/ML
4 INJECTION INTRAMUSCULAR; INTRAVENOUS ONCE
Status: COMPLETED | OUTPATIENT
Start: 2022-10-10 | End: 2022-10-10

## 2022-10-10 RX ORDER — DICYCLOMINE HYDROCHLORIDE 10 MG/1
10 CAPSULE ORAL EVERY 6 HOURS PRN
Qty: 20 CAPSULE | Refills: 0 | Status: SHIPPED | OUTPATIENT
Start: 2022-10-10

## 2022-10-10 RX ORDER — ONDANSETRON 4 MG/1
4 TABLET, ORALLY DISINTEGRATING ORAL EVERY 8 HOURS PRN
Qty: 10 TABLET | Refills: 0 | Status: SHIPPED | OUTPATIENT
Start: 2022-10-10

## 2022-10-10 RX ORDER — DICYCLOMINE HYDROCHLORIDE 10 MG/ML
20 INJECTION INTRAMUSCULAR ONCE
Status: COMPLETED | OUTPATIENT
Start: 2022-10-10 | End: 2022-10-10

## 2022-10-10 RX ADMIN — DICYCLOMINE HYDROCHLORIDE 20 MG: 20 INJECTION, SOLUTION INTRAMUSCULAR at 12:36

## 2022-10-10 RX ADMIN — SODIUM CHLORIDE 1000 ML: 9 INJECTION, SOLUTION INTRAVENOUS at 12:10

## 2022-10-10 RX ADMIN — ONDANSETRON 4 MG: 2 INJECTION INTRAMUSCULAR; INTRAVENOUS at 12:33

## 2022-10-10 ASSESSMENT — PAIN DESCRIPTION - ORIENTATION
ORIENTATION: LEFT

## 2022-10-10 ASSESSMENT — ENCOUNTER SYMPTOMS
EYE REDNESS: 0
NAUSEA: 1
COLOR CHANGE: 0
BLOOD IN STOOL: 0
RHINORRHEA: 0
BACK PAIN: 0
CONSTIPATION: 0
ABDOMINAL PAIN: 1
SINUS PRESSURE: 0
TROUBLE SWALLOWING: 0
SHORTNESS OF BREATH: 0
DIARRHEA: 0
FACIAL SWELLING: 0
SORE THROAT: 0
WHEEZING: 0
CHEST TIGHTNESS: 0
VOMITING: 1
EYE PAIN: 0
EYE DISCHARGE: 0
COUGH: 1

## 2022-10-10 ASSESSMENT — PAIN SCALES - GENERAL
PAINLEVEL_OUTOF10: 4
PAINLEVEL_OUTOF10: 2
PAINLEVEL_OUTOF10: 4

## 2022-10-10 ASSESSMENT — PAIN DESCRIPTION - LOCATION
LOCATION: ABDOMEN;CHEST
LOCATION: RIB CAGE
LOCATION: RIB CAGE

## 2022-10-10 ASSESSMENT — PAIN - FUNCTIONAL ASSESSMENT
PAIN_FUNCTIONAL_ASSESSMENT: 0-10
PAIN_FUNCTIONAL_ASSESSMENT: 0-10

## 2022-10-10 ASSESSMENT — PAIN DESCRIPTION - DESCRIPTORS
DESCRIPTORS: ACHING
DESCRIPTORS: ACHING

## 2022-10-10 ASSESSMENT — LIFESTYLE VARIABLES
HOW OFTEN DO YOU HAVE A DRINK CONTAINING ALCOHOL: NEVER
HOW MANY STANDARD DRINKS CONTAINING ALCOHOL DO YOU HAVE ON A TYPICAL DAY: PATIENT DOES NOT DRINK

## 2022-10-10 NOTE — ED PROVIDER NOTES
[address] ED  eMERGENCY dEPARTMENT eNCOUnter      Pt Name: Honey Donahue  MRN: [id number]  [place] 1945  Date of evaluation: 10/10/22      CHIEF COMPLAINT       Chief Complaint   Patient presents with    Chest Pain    Abdominal Pain    Nausea & Vomiting         HISTORY OF PRESENT ILLNESS    Honey Donahue is a 67 y.o. female who presents complaining of vomiting. Patient states for the past week she has been having problems with nausea and vomiting. Patient states that she really cannot keep anything down other than just a small sip of water to take a pill. Patient states that she is having some pain kind of in her left lower ribs side area kind of in the flank. Patient states that she is not having any fevers not having any runny nose sore throat or earaches. Patient does state that she has a slight cough but nothing that she really would consider much of a cough. Patient states that she really cannot walk. Patient states she has been having pain in her right leg for the last 2 days. Patient has had multiple orthopedic issues and bad arthritis. No recent ill contacts that she knows of. REVIEW OF SYSTEMS       Review of Systems   Constitutional:  Negative for activity change, appetite change, chills, diaphoresis and fever. HENT:  Negative for congestion, ear pain, facial swelling, nosebleeds, rhinorrhea, sinus pressure, sore throat and trouble swallowing. Eyes:  Negative for pain, discharge and redness. Respiratory:  Positive for cough. Negative for chest tightness, shortness of breath and wheezing. Cardiovascular:  Positive for chest pain. Negative for palpitations and leg swelling. Gastrointestinal:  Positive for abdominal pain, nausea and vomiting. Negative for blood in stool, constipation and diarrhea. Genitourinary:  Negative for difficulty urinating, dysuria, flank pain, frequency, genital sores and hematuria.    Musculoskeletal:  Negative for arthralgias, back pain, gait problem, joint swelling, myalgias and neck pain. Right upper leg pain   Skin:  Negative for color change, pallor, rash and wound. Neurological:  Negative for dizziness, tremors, seizures, syncope, speech difficulty, weakness, numbness and headaches. Psychiatric/Behavioral:  Negative for confusion, decreased concentration, hallucinations, self-injury, sleep disturbance and suicidal ideas. PAST MEDICAL HISTORY     Past Medical History:   Diagnosis Date    Abnormal EKG     Arthritis     Asthma     Noted per [facility] chart- patient denies    Gout     History of fall     Hypertension     Incomplete RBBB     Osteoarthritis     Right knee meniscal tear     Sciatica     Seasonal allergies     Snoring     Type II or unspecified type diabetes mellitus without mention of complication, not stated as uncontrolled     Urinary incontinence     Wears dentures     partial upper and full lower       SURGICAL HISTORY       Past Surgical History:   Procedure Laterality Date    COLONOSCOPY      x 3    ELBOW FRACTURE SURGERY Right     EYE SURGERY Bilateral     optic nerve surgery as a child    FINGER TRIGGER RELEASE Right 11/05/2018    Long middle and ring    FOOT SURGERY Left     2 surgeries    HIP FRACTURE SURGERY Right 06/18/2021    with hardware /  Procedure: [address]; Surgeon: Carlitos Beckham MD; Location: [address] [place] [address]; Service: Orthopedics;  Laterality: Right; fluro and ortho table    HYSTERECTOMY (CERVIX STATUS UNKNOWN)      vaginal    KNEE ARTHROSCOPY Right 6/21/2022    KNEE ARTHROSCOPY FOR PARTIAL MEDIAL MENISCECTOMY AND PARTIAL LATERAL MENISCECTOMY performed by Rosanna Alvarado MD at [address] [place] [address]      3 surgeries    OTHER SURGICAL HISTORY      excision cysts from ovaries    SPINAL CORD STIMULATOR IMPLANT      Patient states it does not work    TUBAL LIGATION         CURRENT MEDICATIONS       Previous Medications    ALBUTEROL SULFATE HFA (PROVENTIL;VENTOLIN;PROAIR) 108 (90 BASE) MCG/ACT INHALER    USE 2 INHALATIONS EVERY 6 HOURS AS NEEDED FOR WHEEZING    ALENDRONATE (FOSAMAX) 70 MG TABLET    Take 1 tablet by mouth every 7 days Take medication on an empty stomach, 1st thing in the morning, with full glass of water, say upright for 30min    CELECOXIB (CELEBREX) 200 MG CAPSULE    Take 1 capsule by mouth daily    CHLORHEXIDINE (PERIDEX) 0.12 % SOLUTION        DONEPEZIL (ARICEPT) 5 MG TABLET    Take 1 tablet by mouth daily    GABAPENTIN (NEURONTIN) 100 MG CAPSULE    Take 1 capsule by mouth nightly for 180 days. Intended supply: 90 days    GLIMEPIRIDE (AMARYL) 4 MG TABLET    Take 1 tablet by mouth every morning (before breakfast)    IBUPROFEN (ADVIL;MOTRIN) 800 MG TABLET    Take 800 mg by mouth every 8 hours as needed for Pain     LISINOPRIL-HYDROCHLOROTHIAZIDE (PRINZIDE;ZESTORETIC) 10-12.5 MG PER TABLET    Take 1 tablet by mouth daily    METOPROLOL TARTRATE (LOPRESSOR) 50 MG TABLET    Take 1 tablet by mouth daily    NIACIN (NIASPAN) 500 MG CR TABLET    Take 500 mg by mouth nightly     PIOGLITAZONE (ACTOS) 15 MG TABLET    Take 1 tablet by mouth Daily with supper    PRAVASTATIN (PRAVACHOL) 20 MG TABLET    Take 1 tablet by mouth daily    ROPINIROLE (REQUIP) 2 MG TABLET    Take 1 tablet by mouth nightly    TOLTERODINE (DETROL LA) 4 MG EXTENDED RELEASE CAPSULE    Take 1 capsule by mouth daily    VENLAFAXINE (EFFEXOR) 37.5 MG TABLET    Take 1 tablet by mouth daily       ALLERGIES     is allergic to diphenhydramine hcl, dust mite extract, guaifenesin, seasonal, and wasp venom protein. FAMILY HISTORY     [unfilled]     SOCIAL HISTORY      reports that she quit smoking about 21 years ago. Her smoking use included cigarettes. She has a 10.00 pack-year smoking history. She has never used smokeless tobacco. She reports that she does not currently use drugs after having used the following drugs: Marijuana Alfornia Cashing).  She reports that she does not drink alcohol. PHYSICAL EXAM     INITIAL VITALS: BP (!) 54/54   Pulse 90   Temp 98.2 °F (36.8 °C) (Oral)   Resp 17   Ht 5' 4\" (1.626 m)   Wt 176 lb (79.8 kg)   SpO2 99%   BMI 30.21 kg/m²      Physical Exam  Vitals and nursing note reviewed. Constitutional:       General: She is not in acute distress. Appearance: She is well-developed. She is not diaphoretic. HENT:      Head: Normocephalic and atraumatic. Eyes:      General: No scleral icterus. Right eye: No discharge. Left eye: No discharge. Conjunctiva/sclera: Conjunctivae normal.      Pupils: Pupils are equal, round, and reactive to light. Cardiovascular:      Rate and Rhythm: Normal rate and regular rhythm. Heart sounds: Normal heart sounds. No murmur heard. No friction rub. No gallop. Pulmonary:      Effort: Pulmonary effort is normal. Tachypnea present. No respiratory distress. Breath sounds: Normal breath sounds. No wheezing or rales. Chest:      Chest wall: No tenderness. Abdominal:      General: Bowel sounds are normal. There is no distension. Palpations: Abdomen is soft. There is no mass. Tenderness: There is abdominal tenderness in the epigastric area and left upper quadrant. There is no guarding or rebound. Musculoskeletal:         General: No tenderness. Normal range of motion. Skin:     General: Skin is warm and dry. Coloration: Skin is not pale. Findings: No erythema or rash. Neurological:      Mental Status: She is alert and oriented to person, place, and time. Cranial Nerves: No cranial nerve deficit. Sensory: No sensory deficit. Motor: No abnormal muscle tone. Coordination: Coordination normal.      Deep Tendon Reflexes: Reflexes normal.   Psychiatric:         Behavior: Behavior normal.         Thought Content:  Thought content normal.         Judgment: Judgment normal.       MEDICAL DECISION MAKING:     This point time I feel the patient's symptoms are all more infectious related could be due to pancreatitis but I think more likely this is a viral illness causing some muscle aches. We will check COVID and influenza just because of her symptoms we will get her some fluids medication try to help her feel better and check her electrolytes also do a cardiac work-up because of where her pain is. DIAGNOSTIC RESULTS     EKG: All EKG's are interpreted by the Emergency Department Physician who either signs or Co-signs this chart in the absence of a cardiologist.    EKG Interpretation    Interpreted by emergency department physician    Rhythm: normal sinus   Rate: normal  Axis: left  Ectopy: none  Conduction: left anterior fasciclar block  ST Segments: nonspecific changes  T Waves: non specific changes  Q Waves: none    Clinical Impression: EKG: normal sinus rhythm, nonspecific ST and T waves changes, left axis deviation, left anterior fascicular block. Soledad Smith MD      RADIOLOGY:All plain film, CT, MRI, and formal ultrasound images (except ED bedside ultrasound)are read by the radiologist and interpretations are directly viewed by the emergency physician. No results found. LABS: All lab results were reviewed bymyself, and all abnormals are listed below.   Labs Reviewed   CBC WITH AUTO DIFFERENTIAL - Abnormal; Notable for the following components:       Result Value    Lymphocytes 23 (*)     Monocytes 10 (*)     All other components within normal limits   COMPREHENSIVE METABOLIC PANEL - Abnormal; Notable for the following components:    Glucose 183 (*)     BUN 26 (*)     Creatinine 1.07 (*)     Est, Glom Filt Rate 53 (*)     All other components within normal limits   URINALYSIS WITH REFLEX TO CULTURE - Abnormal; Notable for the following components:    Ketones, Urine MOD (*)     Leukocyte Esterase, Urine SMALL (*)     All other components within normal limits   MICROSCOPIC URINALYSIS - Abnormal; Notable for the following components: Bacteria, UA FEW (*)     All other components within normal limits   COVID-19 & INFLUENZA COMBO   LIPASE   TSH   T4, FREE   TROP/MYOGLOBIN   TROP/MYOGLOBIN         EMERGENCY DEPARTMENT COURSE:   Vitals:    Vitals:    10/10/22 1137 10/10/22 1230 10/10/22 1300   BP: (!) 184/80  (!) 54/54   Pulse: 94 94 90   Resp: 22  17   Temp: 98.4 °F (36.9 °C)  98.2 °F (36.8 °C)   TempSrc: Oral  Oral   SpO2: 99%  99%   Weight: 176 lb (79.8 kg)     Height: 5' 4\" (1.626 m)         The patient was given the following medications while in the emergency department:     Orders Placed This Encounter   Medications    dicyclomine (BENTYL) injection 20 mg    0.9 % sodium chloride bolus    ondansetron (ZOFRAN) injection 4 mg    dicyclomine (BENTYL) 10 MG capsule     Sig: Take 1 capsule by mouth every 6 hours as needed (cramps)     Dispense:  20 capsule     Refill:  0    ondansetron (ZOFRAN ODT) 4 MG disintegrating tablet     Sig: Take 1 tablet by mouth every 8 hours as needed for Nausea or Vomiting     Dispense:  10 tablet     Refill:  0       -------------------------  2:59 PM EDT  Patient was reevaluated and is feeling much better and is very happy with her current care. I feel comfortable discharging home as she has no significant lab abnormalities and she improved with fluids and medications. CRITICAL CARE:   None    CONSULTS:  None    PROCEDURES:  None    FINAL IMPRESSION      1.  Viral illness          DISPOSITION/PLAN   DISPOSITION Decision To Discharge 10/10/2022 02:57:10 PM      PATIENT REFERRED TO:  Lino Washburn MD  [address]  [address] [address]  [phone]    In 1 week      [facility] ED  [facility] [address] [phone]    If symptoms worsen    DISCHARGE MEDICATIONS:  New Prescriptions    DICYCLOMINE (BENTYL) 10 MG CAPSULE    Take 1 capsule by mouth every 6 hours as needed (cramps)    ONDANSETRON (ZOFRAN ODT) 4 MG DISINTEGRATING TABLET    Take 1 tablet by mouth every 8 hours as needed for Nausea or Vomiting       (Please note that portions of this note were completed with a voice recognition program.  Efforts were made to edit the dictations but occasionally words are mis-transcribed.)    Brittany Alexander MD  Attending Severino Mackenzie MD  10/10/22 2313

## 2022-10-11 LAB
EKG ATRIAL RATE: 84 BPM
EKG P AXIS: 15 DEGREES
EKG P-R INTERVAL: 150 MS
EKG Q-T INTERVAL: 380 MS
EKG QRS DURATION: 104 MS
EKG QTC CALCULATION (BAZETT): 449 MS
EKG R AXIS: -54 DEGREES
EKG T AXIS: 79 DEGREES
EKG VENTRICULAR RATE: 84 BPM

## 2022-10-11 PROCEDURE — 93010 ELECTROCARDIOGRAM REPORT: CPT | Performed by: INTERNAL MEDICINE

## 2022-10-12 ENCOUNTER — OFFICE VISIT (OUTPATIENT)
Dept: INTERNAL MEDICINE CLINIC | Age: 77
End: 2022-10-12
Payer: MEDICARE

## 2022-10-12 VITALS
SYSTOLIC BLOOD PRESSURE: 154 MMHG | BODY MASS INDEX: 30.32 KG/M2 | HEIGHT: 64 IN | WEIGHT: 177.6 LBS | DIASTOLIC BLOOD PRESSURE: 100 MMHG | HEART RATE: 99 BPM | OXYGEN SATURATION: 99 %

## 2022-10-12 DIAGNOSIS — I15.2 HYPERTENSION ASSOCIATED WITH DIABETES (HCC): ICD-10-CM

## 2022-10-12 DIAGNOSIS — E11.9 TYPE 2 DIABETES MELLITUS WITHOUT COMPLICATION, WITHOUT LONG-TERM CURRENT USE OF INSULIN (HCC): Primary | ICD-10-CM

## 2022-10-12 DIAGNOSIS — F32.5 MAJOR DEPRESSIVE DISORDER WITH SINGLE EPISODE, IN FULL REMISSION (HCC): ICD-10-CM

## 2022-10-12 DIAGNOSIS — E11.59 HYPERTENSION ASSOCIATED WITH DIABETES (HCC): ICD-10-CM

## 2022-10-12 DIAGNOSIS — G30.9 ALZHEIMER'S DISEASE, UNSPECIFIED (CODE) (HCC): ICD-10-CM

## 2022-10-12 DIAGNOSIS — R41.81 AGE-RELATED COGNITIVE DECLINE: ICD-10-CM

## 2022-10-12 PROCEDURE — G8417 CALC BMI ABV UP PARAM F/U: HCPCS | Performed by: INTERNAL MEDICINE

## 2022-10-12 PROCEDURE — 1090F PRES/ABSN URINE INCON ASSESS: CPT | Performed by: INTERNAL MEDICINE

## 2022-10-12 PROCEDURE — 1123F ACP DISCUSS/DSCN MKR DOCD: CPT | Performed by: INTERNAL MEDICINE

## 2022-10-12 PROCEDURE — G8399 PT W/DXA RESULTS DOCUMENT: HCPCS | Performed by: INTERNAL MEDICINE

## 2022-10-12 PROCEDURE — G8484 FLU IMMUNIZE NO ADMIN: HCPCS | Performed by: INTERNAL MEDICINE

## 2022-10-12 PROCEDURE — 99214 OFFICE O/P EST MOD 30 MIN: CPT | Performed by: INTERNAL MEDICINE

## 2022-10-12 PROCEDURE — G8427 DOCREV CUR MEDS BY ELIG CLIN: HCPCS | Performed by: INTERNAL MEDICINE

## 2022-10-12 PROCEDURE — 1036F TOBACCO NON-USER: CPT | Performed by: INTERNAL MEDICINE

## 2022-10-12 PROCEDURE — 3044F HG A1C LEVEL LT 7.0%: CPT | Performed by: INTERNAL MEDICINE

## 2022-10-12 RX ORDER — OXYCODONE HYDROCHLORIDE AND ACETAMINOPHEN 5; 325 MG/1; MG/1
TABLET ORAL
COMMUNITY
Start: 2022-09-16

## 2022-10-12 NOTE — PROGRESS NOTES
mixed with a different medication         ER follow up  Of viral illness, Vomiting  Now tolerating fluid  Advised gatoraade- sips through th edya  BRAT diet  No fever  No diarrhea    DIABETES MELLITUS:    diagnosed more than 5 years ago  Modifying factors on med:   Severity: un/controlled   Associated signs and symtoms: ckd   aggravated with sugar diet and better with low sugar diet      - Follows a diabetic diet most of the time.   -Is compliant with medication(s) and is tolerating med(s) without any side effects. Not checked sugars in a while  Hemoglobin A1C   Date Value Ref Range Status   08/26/2022 5.9 % Final   05/05/2022 6.0 % Final   01/04/2022 5.8 % Final     Meridee Cure was stopped 8/26  Advise stop actos and amaryl  now, she  hasnt takien any for a week any way  Needs new BG monitor supplies  Gets annual MARCY  Urine test due in Nov  Lipids due in Dec  On lisinopril- HCTZ      HYPERTENSION:    Onset more than 2 years ago  Jolly: mild to mod  Usually controlled with current po meds:   Not associated with headaches or blurry vision  No chest pain   -- Patient denies any side effects of their medication(s) and is compliant with their regimen. - Watches his/her diet for sodium, low fat and low cholesterol most of the time. Last 3 Encounter BP Readings:     Date:        BP:  BP Readings from Last 3 Encounters:   10/12/22 (!) 154/100   10/10/22 (!) 137/105   08/26/22 134/80     On metorpolol 50 daily. lisinopril-HCTZ 10-12.5     HYPERLIPIDEMIA:   Patient reports doing well on current therapy of statin:  pravachol  - Denies side effects of muscle weakness or achiness.   - Exercise  -last lipid panel  Lab Results   Component Value Date    CHOL 185 12/20/2021     Lab Results   Component Value Date    TRIG 257 (H) 12/20/2021     Lab Results   Component Value Date    HDL 40 (L) 12/20/2021     Lab Results   Component Value Date    LDLCHOLESTEROL 94 12/20/2021     Lab Results   Component Value Date    VLDL NOT REPORTED (H) 2021     Lab Results   Component Value Date    MADISONLRWAYNEO 4.6 2021         Depression   On effexor- 37.5  Not taken Last week or so due to vomiting  Advised resume    Osteoporosis  C/w fosamax     REVIEW OF SYSTEMS (except Subjective (HPI))  GENERAL: No fevers / chills  RESPIRATORY: Negative for cough, wheezing or shortness of breath  CARDIOVASCULAR: Negative for chest pain or palpitations.   GI: no nausea, vomiting, or diarrhea  NEURO: No history of headaches    Past Medical History:   Diagnosis Date    Abnormal EKG     Arthritis     Asthma     Noted per [facility] chart- patient denies    Gout     History of fall     Hypertension     Incomplete RBBB     Osteoarthritis     Right knee meniscal tear     Sciatica     Seasonal allergies     Snoring     Type II or unspecified type diabetes mellitus without mention of complication, not stated as uncontrolled     Urinary incontinence     Wears dentures     partial upper and full lower       SOCIAL HISTORY:  Social History     Socioeconomic History    Marital status:      Spouse name: Not on file    Number of children: Not on file    Years of education: Not on file    Highest education level: Not on file   Occupational History    Not on file   Tobacco Use    Smoking status: Former     Packs/day: 0.50     Years: 20.00     Pack years: 10.00     Types: Cigarettes     Quit date: 2001     Years since quittin.3    Smokeless tobacco: Never    Tobacco comments:     for 20 years   Vaping Use    Vaping Use: Never used   Substance and Sexual Activity    Alcohol use: No    Drug use: Not Currently     Types: Marijuana Luis Alberto [place])    Sexual activity: Not on file   Other Topics Concern    Not on file   Social History Narrative    Not on file     Social Determinants of Health     Financial Resource Strain: Low Risk     Difficulty of Paying Living Expenses: Not hard at all   Food Insecurity: No Food Insecurity    Worried About [address] in the Last Year: Never true    Ran Out of Food in the Last Year: Never true   Transportation Needs: Not on file   Physical Activity: Not on file   Stress: Not on file   Social Connections: Not on file   Intimate Partner Violence: Not on file   Housing Stability: Not on file           CURRENT MEDICATIONS:  Current Outpatient Medications   Medication Sig Dispense Refill    oxyCODONE-acetaminophen (PERCOCET) 5-325 MG per tablet TAKE 1 TABLET BY MOUTH TWO TIMES A DAY AS NEEDED      dicyclomine (BENTYL) 10 MG capsule Take 1 capsule by mouth every 6 hours as needed (cramps) 20 capsule 0    ondansetron (ZOFRAN ODT) 4 MG disintegrating tablet Take 1 tablet by mouth every 8 hours as needed for Nausea or Vomiting 10 tablet 0    tolterodine (DETROL LA) 4 MG extended release capsule Take 1 capsule by mouth daily 90 capsule 1    pravastatin (PRAVACHOL) 20 MG tablet Take 1 tablet by mouth daily 90 tablet 3    lisinopril-hydroCHLOROthiazide (PRINZIDE;ZESTORETIC) 10-12.5 MG per tablet Take 1 tablet by mouth daily 90 tablet 3    venlafaxine (EFFEXOR) 37.5 MG tablet Take 1 tablet by mouth daily 90 tablet 3    rOPINIRole (REQUIP) 2 MG tablet Take 1 tablet by mouth nightly 90 tablet 3    pioglitazone (ACTOS) 15 MG tablet Take 1 tablet by mouth Daily with supper 90 tablet 3    metoprolol tartrate (LOPRESSOR) 50 MG tablet Take 1 tablet by mouth daily 90 tablet 3    glimepiride (AMARYL) 4 MG tablet Take 1 tablet by mouth every morning (before breakfast) 90 tablet 3    donepezil (ARICEPT) 5 MG tablet Take 1 tablet by mouth daily 90 tablet 3    gabapentin (NEURONTIN) 100 MG capsule Take 1 capsule by mouth nightly for 180 days.  Intended supply: 90 days 90 capsule 1    celecoxib (CELEBREX) 200 MG capsule Take 1 capsule by mouth daily 90 capsule 3    chlorhexidine (PERIDEX) 0.12 % solution       ibuprofen (ADVIL;MOTRIN) 800 MG tablet Take 800 mg by mouth every 8 hours as needed for Pain       alendronate (FOSAMAX) 70 MG tablet Take 1 tablet by mouth every 7 days Take medication on an empty stomach, 1st thing in the morning, with full glass of water, say upright for 30min 12 tablet 2    albuterol sulfate HFA (PROVENTIL;VENTOLIN;PROAIR) 108 (90 Base) MCG/ACT inhaler USE 2 INHALATIONS EVERY 6 HOURS AS NEEDED FOR WHEEZING      niacin (NIASPAN) 500 MG CR tablet Take 500 mg by mouth nightly        No current facility-administered medications for this visit. OBJECTIVE:  Vitals:    10/12/22 1524   BP: (!) 154/100   Pulse:    SpO2:      Body mass index is 30.48 kg/m². General exam (except above):  General appearance - well appearing, alert, in no acute distress  Head - Atraumatic, normocephalic  Eyes - EOMI, no jaundice or pallor  Lungs - Lungs clear to auscultation. No wheezing, rhonchi, rales  Heart - RRR without murmur, gallop, or rubs. No ectopy  Abdomen - Abdomen soft, non-tender. Bowel sounds normal. No masses, organomegaly  Extremities -No significant edema, or skin discoloration. Good capillary refill. Neuro - Pt Alert, awake and oriented x 3. No gross focal neurological deficits    ASSESSMENT AND PLAN (MEDICAL DECISION MAKING):     Danielle Burris was seen today for hypertension and diabetes. Diagnoses and all orders for this visit:    Type 2 diabetes mellitus without complication, without long-term current use of insulin ([facility])  -     blood glucose monitor kit and supplies; Dispense sufficient amount for indicated testing frequency plus additional to accommodate PRN testing needs. Dispense all needed supplies to include: monitor, strips, lancing device, lancets, control solutions, alcohol swabs.     Hypertension associated with diabetes ([address]  Comments:  usually runs good readings  has had viral gastritis- not increasing meds today    Alzheimer's disease, unspecified    Age-related cognitive decline    Major depressive disorder with single episode, in full remission ([address]           Follow up in: 2wk for blood pressure, blood sugar readings      Francesco Baltazar MD

## 2022-11-29 ENCOUNTER — OFFICE VISIT (OUTPATIENT)
Dept: INTERNAL MEDICINE CLINIC | Age: 77
End: 2022-11-29
Payer: MEDICARE

## 2022-11-29 VITALS
HEART RATE: 90 BPM | WEIGHT: 172 LBS | DIASTOLIC BLOOD PRESSURE: 78 MMHG | OXYGEN SATURATION: 99 % | BODY MASS INDEX: 29.37 KG/M2 | HEIGHT: 64 IN | SYSTOLIC BLOOD PRESSURE: 128 MMHG

## 2022-11-29 DIAGNOSIS — E11.9 TYPE 2 DIABETES MELLITUS WITHOUT COMPLICATION, WITHOUT LONG-TERM CURRENT USE OF INSULIN (HCC): ICD-10-CM

## 2022-11-29 DIAGNOSIS — E11.59 HYPERTENSION ASSOCIATED WITH DIABETES (HCC): Primary | ICD-10-CM

## 2022-11-29 DIAGNOSIS — M46.1 SACROILIITIS, NOT ELSEWHERE CLASSIFIED (HCC): ICD-10-CM

## 2022-11-29 DIAGNOSIS — M46.1 BILATERAL SACROILIITIS (HCC): ICD-10-CM

## 2022-11-29 DIAGNOSIS — N18.30 STAGE 3 CHRONIC KIDNEY DISEASE, UNSPECIFIED WHETHER STAGE 3A OR 3B CKD (HCC): ICD-10-CM

## 2022-11-29 DIAGNOSIS — F32.5 MAJOR DEPRESSIVE DISORDER WITH SINGLE EPISODE, IN FULL REMISSION (HCC): ICD-10-CM

## 2022-11-29 DIAGNOSIS — I15.2 HYPERTENSION ASSOCIATED WITH DIABETES (HCC): Primary | ICD-10-CM

## 2022-11-29 DIAGNOSIS — Z01.00 EYE EXAM, ROUTINE: ICD-10-CM

## 2022-11-29 LAB — HBA1C MFR BLD: 6.9 %

## 2022-11-29 PROCEDURE — G8417 CALC BMI ABV UP PARAM F/U: HCPCS | Performed by: INTERNAL MEDICINE

## 2022-11-29 PROCEDURE — 99214 OFFICE O/P EST MOD 30 MIN: CPT | Performed by: INTERNAL MEDICINE

## 2022-11-29 PROCEDURE — 83036 HEMOGLOBIN GLYCOSYLATED A1C: CPT | Performed by: INTERNAL MEDICINE

## 2022-11-29 PROCEDURE — G8399 PT W/DXA RESULTS DOCUMENT: HCPCS | Performed by: INTERNAL MEDICINE

## 2022-11-29 PROCEDURE — 1123F ACP DISCUSS/DSCN MKR DOCD: CPT | Performed by: INTERNAL MEDICINE

## 2022-11-29 PROCEDURE — G8427 DOCREV CUR MEDS BY ELIG CLIN: HCPCS | Performed by: INTERNAL MEDICINE

## 2022-11-29 PROCEDURE — 3044F HG A1C LEVEL LT 7.0%: CPT | Performed by: INTERNAL MEDICINE

## 2022-11-29 PROCEDURE — 3078F DIAST BP <80 MM HG: CPT | Performed by: INTERNAL MEDICINE

## 2022-11-29 PROCEDURE — 1036F TOBACCO NON-USER: CPT | Performed by: INTERNAL MEDICINE

## 2022-11-29 PROCEDURE — G8484 FLU IMMUNIZE NO ADMIN: HCPCS | Performed by: INTERNAL MEDICINE

## 2022-11-29 PROCEDURE — 1090F PRES/ABSN URINE INCON ASSESS: CPT | Performed by: INTERNAL MEDICINE

## 2022-11-29 PROCEDURE — 3074F SYST BP LT 130 MM HG: CPT | Performed by: INTERNAL MEDICINE

## 2022-11-29 RX ORDER — BLOOD-GLUCOSE METER
KIT MISCELLANEOUS
COMMUNITY
Start: 2022-10-12

## 2022-11-29 RX ORDER — LISINOPRIL 10 MG/1
10 TABLET ORAL DAILY
Qty: 90 TABLET | Refills: 1 | Status: SHIPPED | OUTPATIENT
Start: 2022-11-29

## 2022-11-29 RX ORDER — LANCETS 28 GAUGE
EACH MISCELLANEOUS
COMMUNITY
Start: 2022-10-12

## 2022-11-29 NOTE — PATIENT INSTRUCTIONS
Today- we will change to lisinopril 10mg daily, stop the HCTZ and pt agrees to take metoprolol 25mg twice a day

## 2022-11-29 NOTE — PROGRESS NOTES
Visit Information    Have you changed or started any medications since your last visit including any over-the-counter medicines, vitamins, or herbal medicines? no   Are you having any side effects from any of your medications? -  no  Have you stopped taking any of your medications? Is so, why? -  no    Have you seen any other physician or provider since your last visit? No  Have you had any other diagnostic tests since your last visit? No  Have you been seen in the emergency room and/or had an admission to a hospital since we last saw you? No  Have you had your routine dental cleaning in the past 6 months? no    Have you activated your [employer] account? If not, what are your barriers?  No:      Patient Care Team:  Ania Gutierrez MD as PCP - General (Internal Medicine)  Ania Gutierrez MD as PCP - [facility]    Medical History Review  Past Medical, Family, and Social History reviewed and does contribute to the patient presenting condition    Health Maintenance   Topic Date Due    Hepatitis C screen  Never done    DTaP/Tdap/Td vaccine (1 - Tdap) Never done    COVID-19 Vaccine (4 - Booster for [place] series) 11/30/2021    Flu vaccine (1) 08/01/2022    Annual Wellness Visit (AWV)  12/18/2022    Lipids  12/20/2022    Depression Monitoring  06/16/2023    DEXA (modify frequency per FRAX score)  Completed    Shingles vaccine  Completed    Pneumococcal 65+ years Vaccine  Completed    Hepatitis A vaccine  Aged Out    Hib vaccine  Aged Out    Meningococcal (ACWY) vaccine  Aged Out     SUBJECTIVE:  Sai Pichardo is a 65 y.o. female patient who  comes for complaints of   Chief Complaint   Patient presents with    Diabetes     A1C- 8/26/22= 5.9    Medication Check             DIABETES MELLITUS:    diagnosed more than 5 years ago  Modifying factors on med:   Severity: un/controlled   Associated signs and symtoms: ckd   aggravated with sugar diet and better with low sugar diet      - Follows a diabetic diet most of the time.   -Is compliant with medication(s) and is tolerating med(s) without any side effects.    -  Reports checking his/her glucose on a once a day schedule with sugars in the fasting  range. Hemoglobin A1C   Date Value Ref Range Status   11/29/2022 6.9 % Final   08/26/2022 5.9 % Final   05/05/2022 6.0 % Final     Sharin Mortimer was stopped 8/26  Pt had stopped actos and amaryl  1 wk prior to [address]  Not currently on any nmeds  Metformin made her sick. Will monitor off meds; c/w diet and exercise- discussed     HYPERTENSION:    Onset more than 2 years ago  Jolly: mild to mod  Usually controlled with current po meds:   Not associated with headaches or blurry vision  No chest pain   -- Patient denies any side effects of their medication(s) and is compliant with their regimen.    -She does not check BP's generally. -Does/ denies regular aerobic exercise. - Watches his/her diet for sodium, low fat and low cholesterol most of the time. Last 3 Encounter BP Readings:     Date:        BP:  BP Readings from Last 3 Encounters:   11/29/22 128/78   10/12/22 (!) 154/100   10/10/22 (!) 137/105     on metorpolol 50 daily. lisinopril-HCTZ 10-12.5  Says has stopped metoprolol few weeks ago  Wants to stop HCTZ  Today- we will change to lisinopril 10mg daily, stop the HCTZ and pt agrees to take metoprolol 25mg twice a day       HYPERLIPIDEMIA:   Patient reports doing well on current therapy of statin:  pravachol 20  Has not taken it for a while- agrees to resume  - Denies side effects of muscle weakness or achiness.   - Exercise  -last lipid panel  Lab Results   Component Value Date    CHOL 185 12/20/2021     Lab Results   Component Value Date    TRIG 257 (H) 12/20/2021     Lab Results   Component Value Date    HDL 40 (L) 12/20/2021     Lab Results   Component Value Date    LDLCHOLESTEROL 94 12/20/2021     Lab Results   Component Value Date    VLDL NOT REPORTED (H) 12/20/2021     Lab Results   Component Value Date CHOLHDLRATIALEXANDR 4.6 2021       Depression  Feels ok   Has been off effexor for may weeks  Will d/c   Has stopped aricept aswell- advised to resume    Says she came off Requip- will d/c    Taking fosamax- will keep    REVIEW OF SYSTEMS (except Subjective (HPI))  GENERAL: No fevers / chills  RESPIRATORY: Negative for cough, wheezing or shortness of breath  CARDIOVASCULAR: Negative for chest pain or palpitations.   GI: no nausea, vomiting, or diarrhea  NEURO: No history of headaches    Past Medical History:   Diagnosis Date    Abnormal EKG     Arthritis     Asthma     Noted per [facility] chart- patient denies    Gout     History of fall     Hypertension     Incomplete RBBB     Osteoarthritis     Right knee meniscal tear     Sciatica     Seasonal allergies     Snoring     Type II or unspecified type diabetes mellitus without mention of complication, not stated as uncontrolled     Urinary incontinence     Wears dentures     partial upper and full lower       SOCIAL HISTORY:  Social History     Socioeconomic History    Marital status:      Spouse name: Not on file    Number of children: Not on file    Years of education: Not on file    Highest education level: Not on file   Occupational History    Not on file   Tobacco Use    Smoking status: Former     Packs/day: 0.50     Years: 20.00     Pack years: 10.00     Types: Cigarettes     Quit date: 2001     Years since quittin.4    Smokeless tobacco: Never    Tobacco comments:     for 20 years   Vaping Use    Vaping Use: Never used   Substance and Sexual Activity    Alcohol use: No    Drug use: Not Currently     Types: Marijuana Burnell [place])    Sexual activity: Not on file   Other Topics Concern    Not on file   Social History Narrative    Not on file     Social Determinants of Health     Financial Resource Strain: Low Risk     Difficulty of Paying Living Expenses: Not hard at all   Food Insecurity: No Food Insecurity    Worried About [address] in the Last Year: Never true    Ran Out of Food in the Last Year: Never true   Transportation Needs: Not on file   Physical Activity: Not on file   Stress: Not on file   Social Connections: Not on file   Intimate Partner Violence: Not on file   Housing Stability: Not on file           CURRENT MEDICATIONS:  Current Outpatient Medications   Medication Sig Dispense Refill    FREESTYLE LITE strip       FreeStyle Lancets MISC       oxyCODONE-acetaminophen (PERCOCET) 5-325 MG per tablet TAKE 1 TABLET BY MOUTH TWO TIMES A DAY AS NEEDED      blood glucose monitor kit and supplies Dispense sufficient amount for indicated testing frequency plus additional to accommodate PRN testing needs. Dispense all needed supplies to include: monitor, strips, lancing device, lancets, control solutions, alcohol swabs. 1 kit 0    dicyclomine (BENTYL) 10 MG capsule Take 1 capsule by mouth every 6 hours as needed (cramps) 20 capsule 0    ondansetron (ZOFRAN ODT) 4 MG disintegrating tablet Take 1 tablet by mouth every 8 hours as needed for Nausea or Vomiting 10 tablet 0    tolterodine (DETROL LA) 4 MG extended release capsule Take 1 capsule by mouth daily 90 capsule 1    pravastatin (PRAVACHOL) 20 MG tablet Take 1 tablet by mouth daily 90 tablet 3    lisinopril-hydroCHLOROthiazide (PRINZIDE;ZESTORETIC) 10-12.5 MG per tablet Take 1 tablet by mouth daily 90 tablet 3    venlafaxine (EFFEXOR) 37.5 MG tablet Take 1 tablet by mouth daily 90 tablet 3    rOPINIRole (REQUIP) 2 MG tablet Take 1 tablet by mouth nightly 90 tablet 3    metoprolol tartrate (LOPRESSOR) 50 MG tablet Take 1 tablet by mouth daily 90 tablet 3    glimepiride (AMARYL) 4 MG tablet Take 1 tablet by mouth every morning (before breakfast) 90 tablet 3    donepezil (ARICEPT) 5 MG tablet Take 1 tablet by mouth daily 90 tablet 3    gabapentin (NEURONTIN) 100 MG capsule Take 1 capsule by mouth nightly for 180 days.  Intended supply: 90 days 90 capsule 1    celecoxib (CELEBREX) 200 MG capsule Take 1 capsule by mouth daily 90 capsule 3    chlorhexidine (PERIDEX) 0.12 % solution       ibuprofen (ADVIL;MOTRIN) 800 MG tablet Take 800 mg by mouth every 8 hours as needed for Pain       alendronate (FOSAMAX) 70 MG tablet Take 1 tablet by mouth every 7 days Take medication on an empty stomach, 1st thing in the morning, with full glass of water, say upright for 30min 12 tablet 2    albuterol sulfate HFA (PROVENTIL;VENTOLIN;PROAIR) 108 (90 Base) MCG/ACT inhaler USE 2 INHALATIONS EVERY 6 HOURS AS NEEDED FOR WHEEZING      niacin (NIASPAN) 500 MG CR tablet Take 500 mg by mouth nightly        No current facility-administered medications for this visit. OBJECTIVE:  Vitals:    11/29/22 1306   BP: 128/78   Pulse: 90   SpO2: 99%     Body mass index is 29.52 kg/m². Focal exam:    General exam (except above):  General appearance - well appearing, alert, in no acute distress  Head - Atraumatic, normocephalic  Eyes - EOMI, no jaundice or pallor  Lungs - Lungs clear to auscultation. No wheezing, rhonchi, rales  Heart - RRR without murmur, gallop, or rubs. No ectopy  Abdomen - Abdomen soft, non-tender. Bowel sounds normal. No masses, organomegaly  Extremities -No significant edema, or skin discoloration. Good capillary refill. Neuro - Pt Alert, awake and oriented x 3. No gross focal neurological deficits    ASSESSMENT AND PLAN (MEDICAL DECISION MAKING):   Carina West was seen today for diabetes and medication check. Diagnoses and all orders for this visit:    Hypertension associated with diabetes ([facility]  -     lisinopril (PRINIVIL;ZESTRIL) 10 MG tablet; Take 1 tablet by mouth daily  -     metoprolol tartrate (LOPRESSOR) 25 MG tablet; Take 1 tablet by mouth 2 times daily    Type 2 diabetes mellitus without complication, without long-term current use of insulin (HCC)  -     Lipid, Fasting;  Future  -     POCT glycosylated hemoglobin (Hb A1C)    Bilateral sacroiliitis (HCC)    Sacroiliitis, not elsewhere classified ([facility]    Stage 3 chronic kidney disease, unspecified whether stage 3a or 3b CKD ([facility]    Major depressive disorder with single episode, in full remission ([facility]           Follow up in: 1nth for Carl Knowles MD

## 2022-11-30 ENCOUNTER — HOSPITAL ENCOUNTER (OUTPATIENT)
Dept: PREADMISSION TESTING | Age: 77
Discharge: HOME | End: 2022-11-30
Payer: MEDICARE

## 2022-11-30 VITALS
HEIGHT: 64 IN | OXYGEN SATURATION: 99 % | WEIGHT: 171.96 LBS | BODY MASS INDEX: 29.36 KG/M2 | DIASTOLIC BLOOD PRESSURE: 55 MMHG | SYSTOLIC BLOOD PRESSURE: 147 MMHG | RESPIRATION RATE: 16 BRPM | TEMPERATURE: 97.8 F | HEART RATE: 80 BPM

## 2022-11-30 LAB
ANION GAP SERPL CALCULATED.3IONS-SCNC: 12 MMOL/L (ref 9–17)
BUN BLDV-MCNC: 26 MG/DL (ref 8–23)
BUN/CREAT BLD: 25 (ref 9–20)
CALCIUM SERPL-MCNC: 9.7 MG/DL (ref 8.6–10.4)
CHLORIDE BLD-SCNC: 100 MMOL/L (ref 98–107)
CO2: 27 MMOL/L (ref 20–31)
CREAT SERPL-MCNC: 1.06 MG/DL (ref 0.5–0.9)
GFR SERPL CREATININE-BSD FRML MDRD: 54 ML/MIN/1.73M2
GLUCOSE BLD-MCNC: 184 MG/DL (ref 70–99)
HCT VFR BLD CALC: 39.8 % (ref 36.3–47.1)
HEMOGLOBIN: 12.6 G/DL (ref 11.9–15.1)
MCH RBC QN AUTO: 29.5 PG (ref 25.2–33.5)
MCHC RBC AUTO-ENTMCNC: 31.7 G/DL (ref 28.4–34.8)
MCV RBC AUTO: 93.2 FL (ref 82.6–102.9)
NRBC AUTOMATED: 0 PER 100 WBC
PDW BLD-RTO: 13 % (ref 11.8–14.4)
PLATELET # BLD: 230 K/UL (ref 138–453)
PMV BLD AUTO: 9.9 FL (ref 8.1–13.5)
POTASSIUM SERPL-SCNC: 4.5 MMOL/L (ref 3.7–5.3)
RBC # BLD: 4.27 M/UL (ref 3.95–5.11)
SODIUM BLD-SCNC: 139 MMOL/L (ref 135–144)
WBC # BLD: 4.8 K/UL (ref 3.5–11.3)

## 2022-11-30 PROCEDURE — 80048 BASIC METABOLIC PNL TOTAL CA: CPT

## 2022-11-30 PROCEDURE — 85027 COMPLETE CBC AUTOMATED: CPT

## 2022-11-30 PROCEDURE — 36415 COLL VENOUS BLD VENIPUNCTURE: CPT

## 2022-11-30 ASSESSMENT — PAIN DESCRIPTION - LOCATION: LOCATION: TOE (COMMENT WHICH ONE)

## 2022-11-30 ASSESSMENT — PAIN DESCRIPTION - ORIENTATION: ORIENTATION: LEFT;RIGHT

## 2022-11-30 ASSESSMENT — PAIN DESCRIPTION - DESCRIPTORS: DESCRIPTORS: ACHING

## 2022-11-30 ASSESSMENT — PAIN SCALES - GENERAL: PAINLEVEL_OUTOF10: 8

## 2022-11-30 NOTE — PRE-PROCEDURE INSTRUCTIONS
[facility] Wednesday, December 14,2022 at 08:30 AM    Once you enter the hospital lobby, take the elevators to the second floor. Check-In is at the surgery registration desk. Continue to take your home medications as you normally do up to and including the night before surgery with the exception of any blood thinning medications. Please stop any blood thinning medications as directed by your surgeon or prescribing physician. Failure to stop certain medications may interfere with your scheduled surgery. These may include:  Aspirin, Warfarin (Coumadin), Clopidogrel (Plavix), Ibuprofen (Motrin, Advil), Naproxen (Aleve), Meloxicam (Mobic), Celecoxib (Celebrex), Eliquis, Pradaxa, Xarelto, Effient, Fish Oil, Herbal supplements. Stop ibuprofen 7 days before surgery  Tylenol is okay to take up until surgery    If you are diabetic, do not take any of your diabetic medications by mouth the morning of surgery. If you are taking insulin contact the doctor that manages your diabetes for instructions about any changes to your insulin dosages the day before surgery. Do not inject insulin or other injectable diabetic medications the morning of surgery unless otherwise instructed by the doctor who manages your diabetes. Please take the following medication(s) the day of surgery with a small sip of water:  Metoprolol,aricept,    Please use your inhaler(s) if needed and bring your inhaler(s) from home the day of surgery. PREPARING FOR YOUR SURGERY:     Before surgery, you can play an important role in your own health. Because skin is not sterile, we need to be sure that your skin is as free of germs as possible before surgery by carefully washing before surgery. Preparing or prepping skin before surgery can reduce the risk of a surgical site infection.   Do not shave the area of your body where your surgery will be performed unless you received specific permission from your physician. You will need to shower at home the night before surgery and the morning of surgery with a special soap called chlorhexidine gluconate (CHG*). *Not to be used by people allergic to Chlorhexidine Gluconate (CHG). Following these instructions will help you be sure that your skin is clean before surgery. Instructions on cleaning your skin before surgery: The night before your surgery: You will need to shower with warm water (not hot) and the CHG soap. Use a clean wash cloth and a clean towel. Have clean clothes available to put on after the shower. First wash your hair with regular shampoo. Rinse your hair and body thoroughly to remove the shampoo. Wash your face and genital area (private parts) with your regular soap or water only. Thoroughly rinse your body with warm water from the neck down. Turn water off to prevent rinsing the soap off too soon. With a clean wet washcloth and half of the CHG soap in the bottle, lather your entire body from the neck down. Do not use CHG soap near your eyes or ears to avoid injury to those areas. Wash thoroughly, paying special attention to the area where your surgery will be performed. Wash your body gently for five (5) minutes. Avoid scrubbing your skin too hard. Turn the water back on and rinse your body thoroughly. Pat yourself dry with a clean, soft towel. Do not apply lotion, cream or powder. Dress with clean freshly washed clothes. The morning of surgery:    Repeat shower following steps above - using remaining half of CHG soap in bottle. Patient Instructions: If you are having any type of anesthesia you are to have nothing to eat or drink after midnight the night before your surgery. This includes gum, hard candy, mints, water or smoking or chewing tobacco.  The only exception to this is a small sip of water to take with any morning dose of heart, blood pressure, or seizure medications.   No alcoholic beverages for 24 hours prior to surgery. Brush your teeth but do not swallow water. Bring your eyeglasses and case with you. No contacts are to be worn the day of surgery. You also may bring your hearing aids. Most surgical procedures involving anesthesia will require that you remove your dentures prior to surgery. Do not wear any jewelry or body piercings day of surgery. Also, NO lotion, perfume or deodorant to be used the day of surgery. No nail polish on the operative extremity (arm/leg surgeries)    Please wear loose, comfortable clothing. If you are potentially going to have a cast or brace bring clothing that will fit over them. In case of illness - If you have cold or flu like symptoms (high fever, runny nose, sore throat, cough, etc.) rash, nausea, vomiting, loose stools, and/or recent contact with someone who has a contagious disease (chicken pox, measles, etc.) Please call your doctor before coming to the hospital.         Day of Surgery/Procedure:    As a patient at [facility] you can expect quality medical and nursing care that is centered on your individual needs. Our goal is to make your surgical experience as comfortable as possible    . Transportation After Your Surgery/Procedure: You will need a friend or family member to drive you home after your procedure. Your  must be 25years of age or older and able to sign off on your discharge instructions. A taxi cab or any other form of public transportation is not acceptable. Your friend or family member must stay at the hospital throughout your procedure. Someone must remain with you for the first 24 hours after your surgery if you receive anesthesia or medication. If you do not have someone to stay with you, your procedure may be cancelled.       If you have any other questions regarding your procedure or the day of surgery, please call [phone]      _________________________  ____________________________  Signature (Patient)              Signature (Provider) & date

## 2022-12-01 NOTE — FLOWSHEET NOTE
12/01/22 1605   Anesthesia PAT Clearance   Anesthesia Review Status Terri has reviewed patient for surgery  (Dr. Kassie Rivas reviewed ekg and history and is requesting cardiac clearance due ekg changes. left a voice message at the office for EUFEMIA CRESPO.  asked that she calls back to confirm this message.)

## 2022-12-02 ENCOUNTER — TELEPHONE (OUTPATIENT)
Dept: INTERNAL MEDICINE CLINIC | Age: 77
End: 2022-12-02

## 2022-12-02 DIAGNOSIS — Z01.818 PRE-OPERATIVE CLEARANCE: Primary | ICD-10-CM

## 2022-12-02 NOTE — TELEPHONE ENCOUNTER
----- Message from Job Gabriel sent at 12/2/2022 10:57 AM EST -----  Subject: Referral Request    Reason for referral request? [facility] office is calling PT   is needing cardiac clearance for surgery and needs a referral to   Cardiologist  Provider patient wants to be referred to(if known):     Provider Phone Number(if known):     Additional Information for Provider?   ---------------------------------------------------------------------------  --------------  [id number]    [phone]; OK to leave message on voicemail  ---------------------------------------------------------------------------  --------------

## 2022-12-07 DIAGNOSIS — M81.0 AGE-RELATED OSTEOPOROSIS WITHOUT CURRENT PATHOLOGICAL FRACTURE: ICD-10-CM

## 2022-12-07 RX ORDER — ALENDRONATE SODIUM 70 MG/1
TABLET ORAL
Qty: 12 TABLET | Refills: 3 | Status: SHIPPED | OUTPATIENT
Start: 2022-12-07

## 2022-12-09 ENCOUNTER — HOSPITAL ENCOUNTER (OUTPATIENT)
Age: 77
Setting detail: SPECIMEN
Discharge: HOME | End: 2022-12-09
Payer: MEDICARE

## 2022-12-09 ENCOUNTER — HOSPITAL ENCOUNTER (OUTPATIENT)
Dept: WOMENS IMAGING | Age: 77
End: 2022-12-09
Payer: MEDICARE

## 2022-12-09 DIAGNOSIS — E11.9 TYPE 2 DIABETES MELLITUS WITHOUT COMPLICATION, WITHOUT LONG-TERM CURRENT USE OF INSULIN (HCC): ICD-10-CM

## 2022-12-09 DIAGNOSIS — Z12.31 ENCOUNTER FOR SCREENING MAMMOGRAM FOR BREAST CANCER: ICD-10-CM

## 2022-12-09 LAB
ALT SERPL-CCNC: 12 U/L (ref 5–33)
AST SERPL-CCNC: 18 U/L
CHOLESTEROL, FASTING: 201 MG/DL
CHOLESTEROL, FASTING: 204 MG/DL
CHOLESTEROL/HDL RATIO: 5.5
CHOLESTEROL/HDL RATIO: 5.6
HDLC SERPL-MCNC: 36 MG/DL
HDLC SERPL-MCNC: 37 MG/DL
LDL CHOLESTEROL: 100 MG/DL (ref 0–130)
LDL CHOLESTEROL: 101 MG/DL (ref 0–130)
TRIGLYCERIDE, FASTING: 324 MG/DL
TRIGLYCERIDE, FASTING: 331 MG/DL

## 2022-12-09 PROCEDURE — 80061 LIPID PANEL: CPT

## 2022-12-09 PROCEDURE — 36415 COLL VENOUS BLD VENIPUNCTURE: CPT

## 2022-12-09 PROCEDURE — 77063 BREAST TOMOSYNTHESIS BI: CPT

## 2022-12-09 PROCEDURE — 84450 TRANSFERASE (AST) (SGOT): CPT

## 2022-12-09 PROCEDURE — 84460 ALANINE AMINO (ALT) (SGPT): CPT

## 2022-12-09 NOTE — RESULT ENCOUNTER NOTE
Satisfactory results  Benign, no evidence of malignancy. Normal interval follow-up is recommended  in 12 months.

## 2022-12-15 ENCOUNTER — HOSPITAL ENCOUNTER (OUTPATIENT)
Dept: NON INVASIVE DIAGNOSTICS | Age: 77
Discharge: HOME | End: 2022-12-15
Payer: MEDICARE

## 2022-12-15 ENCOUNTER — HOSPITAL ENCOUNTER (OUTPATIENT)
Dept: VASCULAR LAB | Age: 77
Discharge: HOME | End: 2022-12-15
Payer: MEDICARE

## 2022-12-15 ENCOUNTER — HOSPITAL ENCOUNTER (OUTPATIENT)
Dept: NUCLEAR MEDICINE | Age: 77
Discharge: HOME | End: 2022-12-17
Payer: MEDICARE

## 2022-12-15 DIAGNOSIS — R09.89 BRUIT: ICD-10-CM

## 2022-12-15 DIAGNOSIS — R06.09 DYSPNEA ON EXERTION: ICD-10-CM

## 2022-12-15 DIAGNOSIS — R09.89 CAROTID BRUIT, UNSPECIFIED LATERALITY: ICD-10-CM

## 2022-12-15 DIAGNOSIS — R94.31 ABNORMAL EKG: ICD-10-CM

## 2022-12-15 DIAGNOSIS — I10 HYPERTENSION, UNSPECIFIED TYPE: ICD-10-CM

## 2022-12-15 PROCEDURE — 6360000002 HC RX W HCPCS: Performed by: INTERNAL MEDICINE

## 2022-12-15 PROCEDURE — A9500 TC99M SESTAMIBI: HCPCS | Performed by: INTERNAL MEDICINE

## 2022-12-15 PROCEDURE — 93880 EXTRACRANIAL BILAT STUDY: CPT

## 2022-12-15 PROCEDURE — 78452 HT MUSCLE IMAGE SPECT MULT: CPT

## 2022-12-15 PROCEDURE — 3430000000 HC RX DIAGNOSTIC RADIOPHARMACEUTICAL: Performed by: INTERNAL MEDICINE

## 2022-12-15 PROCEDURE — 2580000003 HC RX 258: Performed by: INTERNAL MEDICINE

## 2022-12-15 PROCEDURE — 93017 CV STRESS TEST TRACING ONLY: CPT

## 2022-12-15 PROCEDURE — 93306 TTE W/DOPPLER COMPLETE: CPT

## 2022-12-15 RX ORDER — SODIUM CHLORIDE 0.9 % (FLUSH) 0.9 %
5-40 SYRINGE (ML) INJECTION PRN
Status: DISCONTINUED | OUTPATIENT
Start: 2022-12-15 | End: 2022-12-15 | Stop reason: HOSPADM

## 2022-12-15 RX ORDER — AMINOPHYLLINE DIHYDRATE 25 MG/ML
50 INJECTION, SOLUTION INTRAVENOUS PRN
Status: DISCONTINUED | OUTPATIENT
Start: 2022-12-15 | End: 2022-12-15 | Stop reason: HOSPADM

## 2022-12-15 RX ORDER — METOPROLOL TARTRATE 5 MG/5ML
5 INJECTION INTRAVENOUS EVERY 5 MIN PRN
Status: DISCONTINUED | OUTPATIENT
Start: 2022-12-15 | End: 2022-12-15 | Stop reason: HOSPADM

## 2022-12-15 RX ORDER — TECHNETIUM TC-99M SESTAMIBI 1 MG/10ML
34.8 INJECTION INTRAVENOUS
Status: COMPLETED | OUTPATIENT
Start: 2022-12-15 | End: 2022-12-15

## 2022-12-15 RX ORDER — SODIUM CHLORIDE 0.9 % (FLUSH) 0.9 %
10 SYRINGE (ML) INJECTION PRN
Status: DISCONTINUED | OUTPATIENT
Start: 2022-12-15 | End: 2022-12-18 | Stop reason: HOSPADM

## 2022-12-15 RX ORDER — SODIUM CHLORIDE 9 MG/ML
500 INJECTION, SOLUTION INTRAVENOUS CONTINUOUS PRN
Status: DISCONTINUED | OUTPATIENT
Start: 2022-12-15 | End: 2022-12-15 | Stop reason: HOSPADM

## 2022-12-15 RX ORDER — NITROGLYCERIN 0.4 MG/1
0.4 TABLET SUBLINGUAL EVERY 5 MIN PRN
Status: DISCONTINUED | OUTPATIENT
Start: 2022-12-15 | End: 2022-12-15 | Stop reason: HOSPADM

## 2022-12-15 RX ORDER — ATROPINE SULFATE 0.1 MG/ML
0.5 INJECTION INTRAVENOUS EVERY 5 MIN PRN
Status: DISCONTINUED | OUTPATIENT
Start: 2022-12-15 | End: 2022-12-15 | Stop reason: HOSPADM

## 2022-12-15 RX ORDER — ALBUTEROL SULFATE 90 UG/1
2 AEROSOL, METERED RESPIRATORY (INHALATION) PRN
Status: DISCONTINUED | OUTPATIENT
Start: 2022-12-15 | End: 2022-12-15 | Stop reason: HOSPADM

## 2022-12-15 RX ORDER — TECHNETIUM TC-99M SESTAMIBI 1 MG/10ML
10 INJECTION INTRAVENOUS
Status: COMPLETED | OUTPATIENT
Start: 2022-12-15 | End: 2022-12-15

## 2022-12-15 RX ADMIN — SODIUM CHLORIDE, PRESERVATIVE FREE 10 ML: 5 INJECTION INTRAVENOUS at 07:53

## 2022-12-15 RX ADMIN — REGADENOSON 0.4 MG: 0.08 INJECTION, SOLUTION INTRAVENOUS at 09:40

## 2022-12-15 RX ADMIN — Medication 10.9 MILLICURIE: at 07:53

## 2022-12-15 RX ADMIN — Medication 34.8 MILLICURIE: at 09:42

## 2022-12-15 NOTE — PROCEDURES
[address] [place], [place] [address]                              CARDIAC STRESS TEST    PATIENT NAME: Primo BUSCH              :        1945  MED REC NO:   [id number]                              ROOM:  ACCOUNT NO:   [de-identified]                           ADMIT DATE: 12/15/2022  PROVIDER:     Michelle Sanchez    DATE OF STUDY:  12/15/2022    TEST TYPE: LEXISCAN CARDIOLYTE STRESS TEST  INDICATION: ABNORMAL EKG  REFERRING PHYSICIAN: REGAN BARAJAS    RESTING HEART RATE: 69 BEATS PER MINUTE  RESTING BLOOD PRESSURE: 188/75    MEDICATION(S) GIVEN: 0.4MG IV LEXISCAN  REASON FOR TERMINATION: MEDICATION INFUSION COMPLETE    RESTING EKG: ABNORMAL, SINUS RHYTHM, 69 BEATS PER MINUTE NON-SPECIFIC  T-WAVE CHANGES  STRESS HEART RESPONSE: NORMAL RESPONSE  BLOOD PRESSURE RESPONSE: APPROPRIATE  STRESS EKGs: T-WAVE ABNORMALITY, NORMALIZED  CHEST DISCOMFORT: NO PAIN DURING STRESS  ISCHEMIC EKG CHANGES: NON-DIAGNOSTIC    EKG IMPRESSION: ELECTROCARDIOGRAPHICALLY NON-DIAGNOSTIC LEXISCAN STRESS  TEST. RADIOISOTOPE RESULTS TO FOLLOW FROM THE DEPARTMENT OF NUCLEAR  MEDICINE.     COMMENTS: ECG ABNORMALITIES (T-WAVE), NORMALIZED 1 MINUTE AFTER Yareli Henry    D: 12/15/2022 12:58:35       T: 12/15/2022 13:00:10     AS/GHADA  Job#: [id number]     Doc#: Unknown    CC:    (Retain this field even if not dictated or not decipherable)

## 2022-12-15 NOTE — PROGRESS NOTES
Asad Bhat. Stress Tech performs patient preparation of physical comfort, review test procedures, pre-stress EKG. Lung Sounds clear in all fields. Consent verified. Educated patient on test procedure and possible side effects of Lexiscan. Cardiologist reviewed pre-test EKG and is present for test. Patient tolerated test well with minor lightheadedness which resolved to baseline after test with caffeine. EKG portion of test complete, Nuc Med portion pending.   Pretest VS: /75 HR 69  Post test VS: /64 HR 82

## 2023-01-07 ENCOUNTER — HOSPITAL ENCOUNTER (OUTPATIENT)
Age: 78
Discharge: HOME | End: 2023-01-07
Payer: MEDICARE

## 2023-01-07 LAB
ABSOLUTE EOS #: 0.2 K/UL (ref 0–0.4)
ABSOLUTE LYMPH #: 1.3 K/UL (ref 1–4.8)
ABSOLUTE MONO #: 0.3 K/UL (ref 0.1–1.3)
ANION GAP SERPL CALCULATED.3IONS-SCNC: 11 MMOL/L (ref 9–17)
BASOPHILS # BLD: 1 % (ref 0–2)
BASOPHILS ABSOLUTE: 0.1 K/UL (ref 0–0.2)
BUN BLDV-MCNC: 27 MG/DL (ref 8–23)
CALCIUM SERPL-MCNC: 9 MG/DL (ref 8.6–10.4)
CHLORIDE BLD-SCNC: 101 MMOL/L (ref 98–107)
CO2: 27 MMOL/L (ref 20–31)
CREAT SERPL-MCNC: 1.02 MG/DL (ref 0.5–0.9)
EOSINOPHILS RELATIVE PERCENT: 3 % (ref 0–4)
GFR SERPL CREATININE-BSD FRML MDRD: 56 ML/MIN/1.73M2
GLUCOSE BLD-MCNC: 190 MG/DL (ref 70–99)
HCT VFR BLD CALC: 38.9 % (ref 36–46)
HEMOGLOBIN: 12.8 G/DL (ref 12–16)
LYMPHOCYTES # BLD: 27 % (ref 24–44)
MCH RBC QN AUTO: 29.5 PG (ref 26–34)
MCHC RBC AUTO-ENTMCNC: 32.9 G/DL (ref 31–37)
MCV RBC AUTO: 89.6 FL (ref 80–100)
MONOCYTES # BLD: 7 % (ref 1–7)
PDW BLD-RTO: 13.3 % (ref 11.5–14.9)
PLATELET # BLD: 245 K/UL (ref 150–450)
PMV BLD AUTO: 8.1 FL (ref 6–12)
POTASSIUM SERPL-SCNC: 3.7 MMOL/L (ref 3.7–5.3)
RBC # BLD: 4.34 M/UL (ref 4–5.2)
SEG NEUTROPHILS: 62 % (ref 36–66)
SEGMENTED NEUTROPHILS ABSOLUTE COUNT: 3 K/UL (ref 1.3–9.1)
SODIUM BLD-SCNC: 139 MMOL/L (ref 135–144)
WBC # BLD: 4.8 K/UL (ref 3.5–11)

## 2023-01-07 PROCEDURE — 36415 COLL VENOUS BLD VENIPUNCTURE: CPT

## 2023-01-07 PROCEDURE — 80048 BASIC METABOLIC PNL TOTAL CA: CPT

## 2023-01-07 PROCEDURE — 85025 COMPLETE CBC W/AUTO DIFF WBC: CPT

## 2023-01-10 ENCOUNTER — HOSPITAL ENCOUNTER (INPATIENT)
Dept: CARDIAC CATH/INVASIVE PROCEDURES | Age: 78
LOS: 7 days | Discharge: HOME HEALTH | DRG: 233 | End: 2023-01-17
Attending: INTERNAL MEDICINE | Admitting: INTERNAL MEDICINE
Payer: MEDICARE

## 2023-01-10 ENCOUNTER — HOSPITAL ENCOUNTER (OUTPATIENT)
Dept: CT IMAGING | Age: 78
Discharge: HOME | End: 2023-01-12
Attending: INTERNAL MEDICINE
Payer: MEDICARE

## 2023-01-10 DIAGNOSIS — M81.0 AGE-RELATED OSTEOPOROSIS WITHOUT CURRENT PATHOLOGICAL FRACTURE: ICD-10-CM

## 2023-01-10 PROBLEM — I25.10 CAD IN NATIVE ARTERY: Status: ACTIVE | Noted: 2023-01-10

## 2023-01-10 LAB
HCT VFR BLD CALC: 36 % (ref 36.3–47.1)
HEMOGLOBIN: 11.6 G/DL (ref 11.9–15.1)
MCH RBC QN AUTO: 30.1 PG (ref 25.2–33.5)
MCHC RBC AUTO-ENTMCNC: 32.2 G/DL (ref 28.4–34.8)
MCV RBC AUTO: 93.5 FL (ref 82.6–102.9)
NRBC AUTOMATED: 0 PER 100 WBC
PARTIAL THROMBOPLASTIN TIME: 25.5 SEC (ref 20.5–30.5)
PDW BLD-RTO: 13.2 % (ref 11.8–14.4)
PLATELET # BLD: 194 K/UL (ref 138–453)
PMV BLD AUTO: 10.6 FL (ref 8.1–13.5)
RBC # BLD: 3.85 M/UL (ref 3.95–5.11)
WBC # BLD: 5.5 K/UL (ref 3.5–11.3)

## 2023-01-10 PROCEDURE — 85027 COMPLETE CBC AUTOMATED: CPT

## 2023-01-10 PROCEDURE — B2111ZZ FLUOROSCOPY OF MULTIPLE CORONARY ARTERIES USING LOW OSMOLAR CONTRAST: ICD-10-PCS | Performed by: INTERNAL MEDICINE

## 2023-01-10 PROCEDURE — B2151ZZ FLUOROSCOPY OF LEFT HEART USING LOW OSMOLAR CONTRAST: ICD-10-PCS | Performed by: INTERNAL MEDICINE

## 2023-01-10 PROCEDURE — 2500000003 HC RX 250 WO HCPCS

## 2023-01-10 PROCEDURE — 7100000000 HC PACU RECOVERY - FIRST 15 MIN

## 2023-01-10 PROCEDURE — 71250 CT THORAX DX C-: CPT

## 2023-01-10 PROCEDURE — 2709999900 HC NON-CHARGEABLE SUPPLY

## 2023-01-10 PROCEDURE — 99222 1ST HOSP IP/OBS MODERATE 55: CPT | Performed by: NURSE PRACTITIONER

## 2023-01-10 PROCEDURE — 99153 MOD SED SAME PHYS/QHP EA: CPT

## 2023-01-10 PROCEDURE — 2060000000 HC ICU INTERMEDIATE R&B

## 2023-01-10 PROCEDURE — 36415 COLL VENOUS BLD VENIPUNCTURE: CPT

## 2023-01-10 PROCEDURE — C1894 INTRO/SHEATH, NON-LASER: HCPCS

## 2023-01-10 PROCEDURE — 93458 L HRT ARTERY/VENTRICLE ANGIO: CPT

## 2023-01-10 PROCEDURE — 85730 THROMBOPLASTIN TIME PARTIAL: CPT

## 2023-01-10 PROCEDURE — 6370000000 HC RX 637 (ALT 250 FOR IP): Performed by: STUDENT IN AN ORGANIZED HEALTH CARE EDUCATION/TRAINING PROGRAM

## 2023-01-10 PROCEDURE — 4A023N7 MEASUREMENT OF CARDIAC SAMPLING AND PRESSURE, LEFT HEART, PERCUTANEOUS APPROACH: ICD-10-PCS | Performed by: INTERNAL MEDICINE

## 2023-01-10 PROCEDURE — 6360000002 HC RX W HCPCS: Performed by: STUDENT IN AN ORGANIZED HEALTH CARE EDUCATION/TRAINING PROGRAM

## 2023-01-10 PROCEDURE — C1769 GUIDE WIRE: HCPCS

## 2023-01-10 PROCEDURE — 7100000001 HC PACU RECOVERY - ADDTL 15 MIN

## 2023-01-10 PROCEDURE — 99152 MOD SED SAME PHYS/QHP 5/>YRS: CPT

## 2023-01-10 PROCEDURE — 6360000002 HC RX W HCPCS

## 2023-01-10 PROCEDURE — C1887 CATHETER, GUIDING: HCPCS

## 2023-01-10 PROCEDURE — 6360000004 HC RX CONTRAST MEDICATION

## 2023-01-10 RX ORDER — SODIUM CHLORIDE 0.9 % (FLUSH) 0.9 %
5-40 SYRINGE (ML) INJECTION PRN
Status: DISCONTINUED | OUTPATIENT
Start: 2023-01-10 | End: 2023-01-13

## 2023-01-10 RX ORDER — ACETAMINOPHEN 325 MG/1
650 TABLET ORAL EVERY 4 HOURS PRN
Status: DISCONTINUED | OUTPATIENT
Start: 2023-01-10 | End: 2023-01-14

## 2023-01-10 RX ORDER — HEPARIN SODIUM AND DEXTROSE 10000; 5 [USP'U]/100ML; G/100ML
5-30 INJECTION INTRAVENOUS CONTINUOUS
Status: DISCONTINUED | OUTPATIENT
Start: 2023-01-10 | End: 2023-01-13

## 2023-01-10 RX ORDER — HEPARIN SODIUM 1000 [USP'U]/ML
2000 INJECTION, SOLUTION INTRAVENOUS; SUBCUTANEOUS PRN
Status: DISCONTINUED | OUTPATIENT
Start: 2023-01-10 | End: 2023-01-13

## 2023-01-10 RX ORDER — ATORVASTATIN CALCIUM 40 MG/1
40 TABLET, FILM COATED ORAL NIGHTLY
Status: DISCONTINUED | OUTPATIENT
Start: 2023-01-10 | End: 2023-01-14

## 2023-01-10 RX ORDER — HEPARIN SODIUM 1000 [USP'U]/ML
4000 INJECTION, SOLUTION INTRAVENOUS; SUBCUTANEOUS ONCE
Status: COMPLETED | OUTPATIENT
Start: 2023-01-10 | End: 2023-01-10

## 2023-01-10 RX ORDER — SODIUM CHLORIDE 0.9 % (FLUSH) 0.9 %
5-40 SYRINGE (ML) INJECTION EVERY 12 HOURS SCHEDULED
Status: DISCONTINUED | OUTPATIENT
Start: 2023-01-10 | End: 2023-01-13

## 2023-01-10 RX ORDER — HEPARIN SODIUM 1000 [USP'U]/ML
4000 INJECTION, SOLUTION INTRAVENOUS; SUBCUTANEOUS PRN
Status: DISCONTINUED | OUTPATIENT
Start: 2023-01-10 | End: 2023-01-13

## 2023-01-10 RX ORDER — ASPIRIN 81 MG/1
81 TABLET, CHEWABLE ORAL DAILY
Status: DISCONTINUED | OUTPATIENT
Start: 2023-01-10 | End: 2023-01-14

## 2023-01-10 RX ORDER — SODIUM CHLORIDE 9 MG/ML
INJECTION, SOLUTION INTRAVENOUS PRN
Status: DISCONTINUED | OUTPATIENT
Start: 2023-01-10 | End: 2023-01-13

## 2023-01-10 RX ADMIN — ASPIRIN 81 MG: 81 TABLET, CHEWABLE ORAL at 19:47

## 2023-01-10 RX ADMIN — ACETAMINOPHEN 650 MG: 325 TABLET ORAL at 15:46

## 2023-01-10 RX ADMIN — HEPARIN SODIUM 4000 UNITS: 1000 INJECTION INTRAVENOUS; SUBCUTANEOUS at 19:03

## 2023-01-10 RX ADMIN — HEPARIN SODIUM 957.6 UNITS/HR: 10000 INJECTION, SOLUTION INTRAVENOUS at 19:02

## 2023-01-10 RX ADMIN — ACETAMINOPHEN 650 MG: 325 TABLET ORAL at 22:32

## 2023-01-10 RX ADMIN — DESMOPRESSIN ACETATE 40 MG: 0.2 TABLET ORAL at 19:47

## 2023-01-10 ASSESSMENT — PAIN SCALES - GENERAL
PAINLEVEL_OUTOF10: 4
PAINLEVEL_OUTOF10: 0
PAINLEVEL_OUTOF10: 4

## 2023-01-10 NOTE — PROGRESS NOTES
Patient admitted, consent signed and questions answered. Patient ready for procedure. Call light to reach with side rails up 2 of 2. B/L Groin clipped with writer and Goyo Lane present. Family at bedside with patient. History and physical Needs completed.

## 2023-01-10 NOTE — H&P
[facility]  Procedure History and Physical Update          Patient Name: Ronan Adan  MRN:    [id number]  YOB: 1945  Date of evaluation:  1/10/2023    Procedure:    Cardiac cath +/- PCI    Indication for procedure:  Abnormal stress test      Please refer to the office note completed by MsDeb Otis Thompson on 1/3/2023 in the medical record and note that:    [x] I have examined the patient and reviewed the H&P/Consult and there are no changes to be made to the assessment or plan. [] I have examined the patient and reviewed the H&P/Consult and have noted the following changes:    Past Medical History:   Diagnosis Date    Abnormal EKG     Arthritis     Asthma     Noted per [facility] chart- patient denies    Colon polyps     Gout     History of fall     Hyperlipidemia     Hypertension     Incomplete RBBB     Osteoarthritis     Osteopetrosis     Right knee meniscal tear     Sciatica     Seasonal allergies     Snoring     Type II or unspecified type diabetes mellitus without mention of complication, not stated as uncontrolled     diet controlled    Urinary incontinence     Wears dentures     partial upper and full lower       Past Surgical History:   Procedure Laterality Date    COLONOSCOPY      x 3    ELBOW FRACTURE SURGERY Right     EYE SURGERY Bilateral     optic nerve surgery as a child    FINGER TRIGGER RELEASE Right 11/05/2018    Long middle and ring    FOOT SURGERY Left     2 surgeries    HIP FRACTURE SURGERY Right 06/18/2021    with hardware /  Procedure: [address]; Surgeon: Prabha Hartmann MD; Location: [address] [place] [address]; Service: Orthopedics;  Laterality: Right; fluro and ortho table    HYSTERECTOMY (CERVIX STATUS UNKNOWN)      vaginal    KNEE ARTHROSCOPY Right 6/21/2022    KNEE ARTHROSCOPY FOR PARTIAL MEDIAL MENISCECTOMY AND PARTIAL LATERAL MENISCECTOMY performed by Gavino Kerr MD at [address] [place] [address] 3 surgeries    OTHER SURGICAL HISTORY      excision cysts from ovaries    SPINAL CORD STIMULATOR IMPLANT      Patient states it does not work    TUBAL LIGATION         Family History   Problem Relation Age of Onset    Stroke Mother     Diabetes Maternal Grandmother        Allergies   Allergen Reactions    Wasp Venom Protein Other (See Comments)     Redness & swelling of area that was stung    Dust Mite Extract     Seasonal     Guaifenesin Other (See Comments)     unsure       Prior to Admission medications    Medication Sig Start Date End Date Taking? Authorizing Provider   alendronate (FOSAMAX) 70 MG tablet TAKE 1 TABLET EVERY 7 DAYS ON AN EMPTY STOMACH, FIRST THING IN THE MORNING, WITH FULL GLASS OF WATER, STAY UPRIGHT FOR 30 MINUTES. 12/7/22   Kenny Sampson MD   FREESTYLE LITE strip  10/12/22   Historical Provider, MD Ramseyyle Lancets [address] [facility]  10/12/22   Historical Provider, MD   lisinopril (PRINIVIL;ZESTRIL) 10 MG tablet Take 1 tablet by mouth daily 11/29/22   Kenny Sampson MD   metoprolol tartrate (LOPRESSOR) 25 MG tablet Take 1 tablet by mouth 2 times daily 11/29/22   Kenny Sampson MD   oxyCODONE-acetaminophen (PERCOCET) 5-325 MG per tablet TAKE 1 TABLET BY MOUTH TWO TIMES A DAY AS NEEDED 9/16/22   Historical Provider, MD   blood glucose monitor kit and supplies Dispense sufficient amount for indicated testing frequency plus additional to accommodate PRN testing needs. Dispense all needed supplies to include: monitor, strips, lancing device, lancets, control solutions, alcohol swabs.  10/12/22   Kenny Sampson MD   dicyclomine (BENTYL) 10 MG capsule Take 1 capsule by mouth every 6 hours as needed (cramps) 10/10/22   Piedad Zaragoza MD   ondansetron (ZOFRAN ODT) 4 MG disintegrating tablet Take 1 tablet by mouth every 8 hours as needed for Nausea or Vomiting 10/10/22   Piedad Zaragoza MD   pravastatin (PRAVACHOL) 20 MG tablet Take 1 tablet by mouth daily 8/26/22   Kenny Sampson MD   donepezil (ARICEPT) 5 MG tablet Take 1 tablet by mouth daily 8/26/22   Samantha Lozano MD   chlorhexidine (PERIDEX) 0.12 % solution Take 15 mLs by mouth as needed 5/4/22   Historical Provider, MD   ibuprofen (ADVIL;MOTRIN) 800 MG tablet Take 800 mg by mouth every 8 hours as needed for Pain  3/16/22   Historical Provider, MD   albuterol sulfate HFA (PROVENTIL;VENTOLIN;PROAIR) 108 (90 Base) MCG/ACT inhaler USE 2 INHALATIONS EVERY 6 HOURS AS NEEDED FOR WHEEZING 2/1/21   Historical Provider, MD   niacin (NIASPAN) 500 MG CR tablet Take 500 mg by mouth nightly     Historical Provider, MD     Data:   Stress test 12/15/2022    1. Infarct of the inferior wall. Inferoapical wall minimal reversible   ischemia is suspected. 2. Left ventricular ejection fraction of 58%   3. Please see report for EKG portion of the examination which will be   performed separately by physician from cardiology. Risk stratification:  Intermediate risk     EKG IMPRESSION: ELECTROCARDIOGRAPHICALLY NON-DIAGNOSTIC LEXISCAN STRESS  TEST. RADIOISOTOPE RESULTS TO FOLLOW FROM THE DEPARTMENT OF NUCLEAR  MEDICINE. COMMENTS: ECG ABNORMALITIES (T-WAVE), NORMALIZED 1 MINUTE AFTER LEXISCAN  BOLUSING    ECHO 12/15/2022  The Left ventricle appears normal in size. Normal wall thickness. No obvious wall motion abnormality noted. Normal LV systolic function, Ejection Fraction > 55%  Normal RV size and function. RV systolic pressure is normal  The LA and RA appears normal in size. No obvious significant structural valvular abnormality noted. No significant valvular stenosis or regurgitation noted. Normal aortic root dimensions. No significant pericardial infusion seen. The IVC appears normal in size, normal respiratory variation suggestive of  normal right atrial filling pressure. There were no vitals filed for this visit.     Constitutional and General Appearance:   alert, cooperative, no distress and appears stated age  HEENT:  PERRL, EOMI  Respiratory:  Normal excursion and expansion without use of accessory muscles  Resp Auscultation:  Good respiratory effort. No for increased work of breathing. On auscultation: clear to auscultation bilaterally  Cardiovascular:  Regular rate and rhythm. S1/S2  No murmurs. The apical impulse is not displaced  Abdomen:  Soft  Bowel sounds present  Non-tender to palpation  Extremities:  No cyanosis or clubbing  Lower extremity edema: No.  Skin:  Warm and dry  Neurological:  Alert and oriented. Moves all extremities well    Impression:   1. Abnormal stress test as above  2. HTN  3. HLD  4. Type II DM    Plan:  Proceed with planned procedure. Further orders to follow. Risks, benefits, and alternatives of cardiac catheterization were discussed, in detail, with patient. Risks include, but not limited to, bleeding, requiring blood transfusion, vascular complication requiring surgery, renal failure with need of dialysis, CVA, MI, death and anesthesia complications including intubation were discussed. Patient verbalized understanding and agreed to proceed with the procedure understanding the above risks and alternatives to the procedure.     Electronically signed on 01/10/23 at 8:22 AM by:    Sol Lozoya MD, MD   Fellow, [address]

## 2023-01-10 NOTE — OP NOTE
[facility]    CARDIAC CATHETERIZATION    Date:   1/10/2023  Patient name:  Marlyn Pedro  Date of admission:  1/10/2023  9:14 AM  MRN:   [id number]  YOB: 1945    Operators:  Primary:   ASHLIE King MD (Attending Physician)    Assistant/CV fellow:  Blanca Jesus MD      Procedure performed:      Left Heart Catheterization. [] Graft Angiography. [x][] Left Ventriculography. [] Right Heart Catheterization. [x] Coronary Angiography. [] Aortic Valve Studies. [] PCI:      [] Other:       Pre Procedure Conscious Sedation Data:  ASA Class:    [] I [] II [x] III [] IV    Mallampati Class:  [x] I [] II [] III [] IV      Indication:  [] STEMI      [x] + Stress test  [] ACS      [] + EKG Changes  [] Non Q MI       [] Significant Risk Factors  [] Recurrent Angina             [] Diabetes Mellitus    [] New LBBB      [] Uncontrolled HTN. [] CHF / Low EF changes     [] Abnormal CTA / Ca Score      Procedure:  Access:  [] Femoral  [x] Radial  artery       [x] Right  [] Left    Procedure: After informed consent was obtained with explanation of the risks and benefits, patient was brought to the cath lab. The access area was prepped and draped in sterile fashion. 1% lidocaine was used for local block. The artery was cannulated with 6  Fr sheath with brisk arterial blood return. The side port was frequently flushed and aspirated with normal saline. Findings:  Angiographic Findings        Cardiac Arteries and Lesion Findings       LMCA: has ostial 75% stenosis with moderate calcification. LAD: has proximal 30% stenosis. The D1 is normal.     LCx: is normal. The OM1 is normal. The OM2 is small and is normal.     RCA: has ostial 99% stenosis and mid 50% stenosis. Coronary Tree        Dominance: Right       LV Analysis   LV function assessed as:Normal.   Ejection Fraction   +----------------------------------------------------------------------+---+   ! Method !EF%!   +----------------------------------------------------------------------+---+   ! LV gram                                                               !55 !   +----------------------------------------------------------------------+---+     Procedure Data      Procedure Summary        Tortuous right subclavian with difficulty engaging coronary arteries. Significant ostial RCA and significant ostial LM disease. Normal LV systolic function. Recommendations        Medical therapy as needed. Risk factor modification. Routine Post Diagnostic Cath orders. CT surgery consult for CABG. Estimated Blood Loss: 10 mL      ____________________________________________________________________    History and Risk Factors    [x] Hypertension     [] Family history of CAD  [x] Hyperlipidemia     [] Cerebrovascular Disease   [] Prior MI       [] Peripheral Vascular disease   [] Prior PCI              [] Diabetes Mellitus    [] Left Main PCI. [] Currently on Dialysis. [] Prior CABG. [] Currently smoker. [] Cardiac Arrest outside of healthcare facility. [] Yes    [x] No        Witnessed     [] Yes   [] No     Arrest after arrival of EMS  [] Yes   [] No     [] Cardiac Arrest at other Facility. [] Yes   [x] No    Pre-Procedure Information. Heart Failure       [] Yes    [x] No        Class  [] I      [] II  [] III    [] IV. New Diagnosis    [] Yes  [] No    HF Type      [] Systolic   [] Diastolic          [] Unknown. Diagnostic Test:   EKG       [] Normal   [x] Abnormal    New antiarrhythmia medications:    [] Yes   [] No   New onset atrial fibrillation / Flutter     [] Yes   [] No   ECG Abnormalities:      [] V. Fib   [] Lashawn V. Tach           [] NS V. T   [] New LBBB           [] T.  Inv  []  ST dev > 0.5 mm         [] PVC's freq  [] PVC's infrequent    Stress Test Performed:      [x] Yes    [] No     Type:     [] Stress Echo   [] Exercise Stress Test (no imaging)      [x] Stress Nuclear  [] Stress Imaging     Results   [] Negative   [x] Positive        [] Indeterminate  [] Unavailable     If Positive/ Risk / Extent of Ischemia:       [] Low  [x] Intermediate         [] High  [] Unavailable      Cardiac CTA Performed:     [] Yes    [x] No      Results   [] CAD   [] Non obstructive CAD      [] No CAD   [] Uncertain      [] Unknown   [] Structural Disease. Pre Procedure Medications:   [x] Yes    [] No         [x] ASA   [] Beta Blockers      [] Nitrate   [] Ca Channel Blockers      [] Ranolazine   [] Statin       [] Plavix/Others antiplatelets      Electronically signed on 1/10/2023 at 12:28 PM by:    Deidre Julian MD  Fellow, [address]    I was present during entire procedure and performed all critical elements of the procedure.     Hans Morrow MD

## 2023-01-10 NOTE — PROGRESS NOTES
Received post cardiac cath procedure to [facility] room 05. Assessment obtained. Restrictions reviewed with patient. Post procedure pathway initiated. Rt. Radial site soft. No hematoma noted. Family at side. Patient without complaints.

## 2023-01-10 NOTE — CONSULTS
[facility]  Consult    Patient's Name/Date of Birth: Reza Lee / [birth year] (23 y.o.)    Date: January 10, 2023     Chief Complaint: MVCAD    HPI: Reza Lee is a 65 y.o.  female who presented to cardiothoracic surgery after multivessel CAD specifically the left main was diagnosed after cardiac catheterization. Patient was in [facility] today after abnormal EKG. Patient is in the process of getting a spinal pain stimulator exchanged requiring cardiac clearance. She does not have any significant comorbidities. Considers himself very healthy. Is able to ambulate at home but has pain due to ingrown toenails. Currently patient is resting in bed with no chest pain or shortness of breath. Patient is planned to be admitted per cardiology for further work-up. She does not take any AC or AP medications at home. Is patient on any AC or AP medication prior to CTS consult?  No    ROS:   CONSTITUTIONAL: Alert and oriented x4  Respiratory: negative  Cardiovascular: negative  Gastrointestinal: negative  Genitourinary:negative  Hematologic/lymphatic: negative  Musculoskeletal: No pain due to ingrown toenails makes it difficult for her to walk  Neurological: negative  Endocrine: negative  Psychiatric: negative  Past Medical History:   Diagnosis Date    Abnormal EKG     Arthritis     Asthma     Noted per [facility] chart- patient denies    Colon polyps     Gout     History of fall     Hyperlipidemia     Hypertension     Incomplete RBBB     Osteoarthritis     Osteopetrosis     Right knee meniscal tear     Sciatica     Seasonal allergies     Snoring     Type II or unspecified type diabetes mellitus without mention of complication, not stated as uncontrolled     diet controlled    Urinary incontinence     Wears dentures     partial upper and full lower     Past Surgical History:   Procedure Laterality Date    COLONOSCOPY      x 3    ELBOW FRACTURE SURGERY Right     EYE SURGERY Bilateral     optic nerve surgery as a child    FINGER TRIGGER RELEASE Right 2018    Long middle and ring    FOOT SURGERY Left     2 surgeries    HIP FRACTURE SURGERY Right 2021    with hardware /  Procedure: INSERTION INTRAMEDULLARY TROCHANTERIC FIXATION NAIL HIP; Surgeon: Nixon Coleman MD; Location: [address] [place] [address]; Service: Orthopedics;  Laterality: Right; fluro and ortho table    HYSTERECTOMY (CERVIX STATUS UNKNOWN)      vaginal    KNEE ARTHROSCOPY Right 2022    KNEE ARTHROSCOPY FOR PARTIAL MEDIAL MENISCECTOMY AND PARTIAL LATERAL MENISCECTOMY performed by Sorin Ahumada MD at [address] [place] [address]      3 surgeries    OTHER SURGICAL HISTORY      excision cysts from ovaries    SPINAL CORD STIMULATOR IMPLANT      Patient states it does not work    TUBAL LIGATION       Allergies   Allergen Reactions    Wasp Venom Protein Other (See Comments)     Redness & swelling of area that was stung    Dust Mite Extract     Seasonal     Guaifenesin Other (See Comments)     unsure     Family History   Problem Relation Age of Onset    Stroke Mother     Diabetes Maternal Grandmother      Social History     Socioeconomic History    Marital status:      Spouse name: Not on file    Number of children: Not on file    Years of education: Not on file    Highest education level: Not on file   Occupational History    Not on file   Tobacco Use    Smoking status: Former     Packs/day: 0.50     Years: 20.00     Pack years: 10.00     Types: Cigarettes     Quit date: 2001     Years since quittin.6    Smokeless tobacco: Never    Tobacco comments:     for 20 years   Vaping Use    Vaping Use: Never used   Substance and Sexual Activity    Alcohol use: No    Drug use: Not Currently     Types: Marijuana Garon Kettle)    Sexual activity: Not on file   Other Topics Concern    Not on file   Social History Narrative    Not on file     Social Determinants of Health     Financial Resource Strain: Not on file   Food Insecurity: Not on file   Transportation Needs: Not on file   Physical Activity: Not on file   Stress: Not on file   Social Connections: Not on file   Intimate Partner Violence: Not on file   Housing Stability: Not on file       Current Facility-Administered Medications   Medication Dose Route Frequency Provider Last Rate Last Admin    aspirin chewable tablet 81 mg  81 mg Oral Daily Cj Morse MD        atorvastatin (LIPITOR) tablet 40 mg  40 mg Oral Nightly Cj Morse MD        heparin (porcine) injection 4,790 Units  60 Units/kg IntraVENous Once Cj Morse MD        heparin (porcine) injection 4,790 Units  60 Units/kg IntraVENous PRN Cj Morse MD        heparin (porcine) injection 2,390 Units  30 Units/kg IntraVENous PRN Cj Morse MD        heparin 25,000 units in dextrose 5 % 250 mL infusion (rate based)  5-30 Units/kg/hr IntraVENous Continuous Cj Morse MD        sodium chloride flush 0.9 % injection 5-40 mL  5-40 mL IntraVENous 2 times per day Cj Morse MD        sodium chloride flush 0.9 % injection 5-40 mL  5-40 mL IntraVENous PRN Cj Morse MD        0.9 % sodium chloride infusion   IntraVENous PRN Cj Morse MD        acetaminophen (TYLENOL) tablet 650 mg  650 mg Oral Q4H PRN Cj Morse MD         Current Outpatient Medications   Medication Sig Dispense Refill    alendronate (FOSAMAX) 70 MG tablet TAKE 1 TABLET EVERY 7 DAYS ON AN EMPTY STOMACH, FIRST THING IN THE MORNING, WITH FULL GLASS OF WATER, STAY UPRIGHT FOR 30 MINUTES.  12 tablet 3    FREESTYLE LITE strip       FreeStyle Lancets MISC       lisinopril (PRINIVIL;ZESTRIL) 10 MG tablet Take 1 tablet by mouth daily 90 tablet 1    metoprolol tartrate (LOPRESSOR) 25 MG tablet Take 1 tablet by mouth 2 times daily 180 tablet 1    oxyCODONE-acetaminophen (PERCOCET) 5-325 MG per tablet TAKE 1 TABLET BY MOUTH TWO TIMES A DAY AS NEEDED      blood glucose monitor kit and supplies Dispense sufficient amount for indicated testing frequency plus additional to accommodate PRN testing needs. Dispense all needed supplies to include: monitor, strips, lancing device, lancets, control solutions, alcohol swabs. 1 kit 0    dicyclomine (BENTYL) 10 MG capsule Take 1 capsule by mouth every 6 hours as needed (cramps) 20 capsule 0    ondansetron (ZOFRAN ODT) 4 MG disintegrating tablet Take 1 tablet by mouth every 8 hours as needed for Nausea or Vomiting 10 tablet 0    pravastatin (PRAVACHOL) 20 MG tablet Take 1 tablet by mouth daily 90 tablet 3    donepezil (ARICEPT) 5 MG tablet Take 1 tablet by mouth daily 90 tablet 3    chlorhexidine (PERIDEX) 0.12 % solution Take 15 mLs by mouth as needed      ibuprofen (ADVIL;MOTRIN) 800 MG tablet Take 800 mg by mouth every 8 hours as needed for Pain       albuterol sulfate HFA (PROVENTIL;VENTOLIN;PROAIR) 108 (90 Base) MCG/ACT inhaler USE 2 INHALATIONS EVERY 6 HOURS AS NEEDED FOR WHEEZING      niacin (NIASPAN) 500 MG CR tablet Take 500 mg by mouth nightly          Physical Exam:  Vitals:    01/10/23 1330   BP: (!) 166/46   Pulse: 70   Resp:    SpO2: 100%     Weight: Weight: 176 lb (79.8 kg)    Weight: 176 lb (79.8 kg)        General: Alert and Oriented x3. Sitting up in bed. No apparent distress. HEENT:  Normocephalic and atraumatic. PERRL. EOMI. Lips and oral mucosa moist and without lesions. Neck:  Supple. Trachea midline. Chest:  No abnormality. Equal and symmetric expansion with respiration. Lungs:  Clear to auscultation. Cardiac:  Regular rate and rhythm without murmurs, rubs or gallops. Abdomen:  Soft, non-tender, normoactive bowel sounds. No masses or organomegaly. Extremities:  No cyanosis, clubbing, or edema. Intact pulses in all four extremities. Musculoskeletal:  Intact range of motion of peripheral joints. Normal muscular strength. Has sciatica pain that is chronic  Neurologic:  Cranial nerves are grossly intact.  Non-focal sensory deficits on exam.  Psychiatric: Mood and affect are appropriate. Imaging Studies:    Cardiac Cath:     Cardiac Arteries and Lesion Findings       LMCA: has ostial 75% stenosis with moderate calcification. LAD: has proximal 30% stenosis. The D1 is normal.     LCx: is normal. The OM1 is normal. The OM2 is small and is normal.     RCA: has ostial 99% stenosis and mid 50% stenosis. Coronary Tree     Echo:  The Left ventricle appears normal in size. Normal wall thickness. No obvious wall motion abnormality noted. Normal LV systolic function, Ejection Fraction > 55%  Normal RV size and function. RV systolic pressure is normal  The LA and RA appears normal in size. No obvious significant structural valvular abnormality noted. No significant valvular stenosis or regurgitation noted. Normal aortic root dimensions. No significant pericardial infusion seen. The IVC appears normal in size, normal respiratory variation suggestive of  normal right atrial filling pressure. CT:Pending Ct scan    Prior Labs reviewed:   No appreciated labs of concern noted. Normal CBC normal BMP    Patient has no concerns noted on carotid ultrasound.     Assessment   Patient Active Problem List   Diagnosis    Closed fracture of neck of radius    Type 2 diabetes mellitus without complication, without long-term current use of insulin ([address]    Hypertension associated with diabetes ([address]    Major depressive disorder with single episode, in full remission ([address]    Age-related cognitive decline    Age-related osteoporosis without current pathological fracture    Nocturia    Right hip pain    Bilateral sacroiliitis (HCC)    Chronic pain disorder    Displacement of lumbar intervertebral disc without myelopathy    Osteoarthritis of knee    Postherpetic neuralgia    Post-laminectomy syndrome    Sacroiliitis, not elsewhere classified ([address]    Chronic renal disease, stage III ([address] [671966]    Alzheimer's disease, unspecified    CAD in native artery       Risks Reviewed w/pt  - Risk for stroke: Yes  - Risk for bleeding:Yes  - Risk for cardiac arrythmias: Yes  - Small risk of death: Yes  - Risks of pneumonia from ventilator: Yes  - Risk for infection: Yes    PLAN:  -We will obtain vascular scans. Vein mapping, review carotid ultrasound and upper arterial mapping. Obtain CT chest.    -Review echocardiogram.  -We will discuss timing of surgery with surgeon     30 min spent    [facility], BETSEY - NP, CNP  Phone: [phone]    Attending Physician Statement:     I have discussed the care of this patient , including pertinent history and exam findings, with the mid-level and resident. I have seen and examined the patient and the key elements of all parts of the encounter have been performed by me. I agree with the assessment, plan and orders as documented by the Mid-level/resident, after I modified exam findings and plan of treatments, and the final version is my approved version of the assessment.

## 2023-01-11 LAB
ANION GAP SERPL CALCULATED.3IONS-SCNC: 10 MMOL/L (ref 9–17)
BUN BLDV-MCNC: 17 MG/DL (ref 8–23)
CALCIUM SERPL-MCNC: 8.7 MG/DL (ref 8.6–10.4)
CHLORIDE BLD-SCNC: 103 MMOL/L (ref 98–107)
CO2: 24 MMOL/L (ref 20–31)
CREAT SERPL-MCNC: 0.81 MG/DL (ref 0.5–0.9)
GFR SERPL CREATININE-BSD FRML MDRD: >60 ML/MIN/1.73M2
GLUCOSE BLD-MCNC: 151 MG/DL (ref 70–99)
HCT VFR BLD CALC: 35.2 % (ref 36.3–47.1)
HEMOGLOBIN: 11 G/DL (ref 11.9–15.1)
MCH RBC QN AUTO: 29.7 PG (ref 25.2–33.5)
MCHC RBC AUTO-ENTMCNC: 31.3 G/DL (ref 28.4–34.8)
MCV RBC AUTO: 95.1 FL (ref 82.6–102.9)
NRBC AUTOMATED: 0 PER 100 WBC
PARTIAL THROMBOPLASTIN TIME: 40.3 SEC (ref 20.5–30.5)
PARTIAL THROMBOPLASTIN TIME: 47.5 SEC (ref 20.5–30.5)
PARTIAL THROMBOPLASTIN TIME: 72.8 SEC (ref 20.5–30.5)
PDW BLD-RTO: 13.2 % (ref 11.8–14.4)
PLATELET # BLD: 178 K/UL (ref 138–453)
PMV BLD AUTO: 11.2 FL (ref 8.1–13.5)
POTASSIUM SERPL-SCNC: 3.9 MMOL/L (ref 3.7–5.3)
RBC # BLD: 3.7 M/UL (ref 3.95–5.11)
SODIUM BLD-SCNC: 137 MMOL/L (ref 135–144)
WBC # BLD: 5 K/UL (ref 3.5–11.3)

## 2023-01-11 PROCEDURE — 6370000000 HC RX 637 (ALT 250 FOR IP)

## 2023-01-11 PROCEDURE — 36415 COLL VENOUS BLD VENIPUNCTURE: CPT

## 2023-01-11 PROCEDURE — 93970 EXTREMITY STUDY: CPT

## 2023-01-11 PROCEDURE — 2580000003 HC RX 258: Performed by: STUDENT IN AN ORGANIZED HEALTH CARE EDUCATION/TRAINING PROGRAM

## 2023-01-11 PROCEDURE — 2060000000 HC ICU INTERMEDIATE R&B

## 2023-01-11 PROCEDURE — 6360000002 HC RX W HCPCS: Performed by: STUDENT IN AN ORGANIZED HEALTH CARE EDUCATION/TRAINING PROGRAM

## 2023-01-11 PROCEDURE — 80048 BASIC METABOLIC PNL TOTAL CA: CPT

## 2023-01-11 PROCEDURE — 85027 COMPLETE CBC AUTOMATED: CPT

## 2023-01-11 PROCEDURE — 6370000000 HC RX 637 (ALT 250 FOR IP): Performed by: STUDENT IN AN ORGANIZED HEALTH CARE EDUCATION/TRAINING PROGRAM

## 2023-01-11 PROCEDURE — 85730 THROMBOPLASTIN TIME PARTIAL: CPT

## 2023-01-11 PROCEDURE — 93931 UPPER EXTREMITY STUDY: CPT

## 2023-01-11 RX ORDER — HYDRALAZINE HYDROCHLORIDE 20 MG/ML
10 INJECTION INTRAMUSCULAR; INTRAVENOUS EVERY 6 HOURS PRN
Status: DISCONTINUED | OUTPATIENT
Start: 2023-01-11 | End: 2023-01-17 | Stop reason: HOSPADM

## 2023-01-11 RX ADMIN — METOPROLOL TARTRATE 25 MG: 25 TABLET ORAL at 20:35

## 2023-01-11 RX ADMIN — ASPIRIN 81 MG: 81 TABLET, CHEWABLE ORAL at 09:17

## 2023-01-11 RX ADMIN — METOPROLOL TARTRATE 25 MG: 25 TABLET ORAL at 13:34

## 2023-01-11 RX ADMIN — ACETAMINOPHEN 650 MG: 325 TABLET ORAL at 09:18

## 2023-01-11 RX ADMIN — ACETAMINOPHEN 650 MG: 325 TABLET ORAL at 13:39

## 2023-01-11 RX ADMIN — DESMOPRESSIN ACETATE 40 MG: 0.2 TABLET ORAL at 20:35

## 2023-01-11 RX ADMIN — ACETAMINOPHEN 650 MG: 325 TABLET ORAL at 20:35

## 2023-01-11 RX ADMIN — HEPARIN SODIUM 2000 UNITS: 1000 INJECTION INTRAVENOUS; SUBCUTANEOUS at 13:34

## 2023-01-11 RX ADMIN — HYDRALAZINE HYDROCHLORIDE 10 MG: 20 INJECTION INTRAMUSCULAR; INTRAVENOUS at 18:15

## 2023-01-11 RX ADMIN — HEPARIN SODIUM 2000 UNITS: 1000 INJECTION INTRAVENOUS; SUBCUTANEOUS at 04:20

## 2023-01-11 RX ADMIN — HEPARIN SODIUM 16 UNITS/KG/HR: 10000 INJECTION, SOLUTION INTRAVENOUS at 17:59

## 2023-01-11 RX ADMIN — SODIUM CHLORIDE, PRESERVATIVE FREE 10 ML: 5 INJECTION INTRAVENOUS at 09:28

## 2023-01-11 ASSESSMENT — PAIN SCALES - GENERAL
PAINLEVEL_OUTOF10: 10
PAINLEVEL_OUTOF10: 5
PAINLEVEL_OUTOF10: 2
PAINLEVEL_OUTOF10: 4
PAINLEVEL_OUTOF10: 8
PAINLEVEL_OUTOF10: 4

## 2023-01-11 ASSESSMENT — PAIN DESCRIPTION - PAIN TYPE
TYPE: CHRONIC PAIN
TYPE: CHRONIC PAIN;ACUTE PAIN

## 2023-01-11 ASSESSMENT — PAIN DESCRIPTION - ORIENTATION
ORIENTATION: RIGHT
ORIENTATION: RIGHT
ORIENTATION: MID
ORIENTATION: RIGHT
ORIENTATION: RIGHT

## 2023-01-11 ASSESSMENT — PAIN DESCRIPTION - LOCATION
LOCATION: FOOT
LOCATION: FOOT
LOCATION: HEAD
LOCATION: FOOT
LOCATION: FOOT

## 2023-01-11 ASSESSMENT — PAIN DESCRIPTION - DESCRIPTORS
DESCRIPTORS: ACHING;CRAMPING
DESCRIPTORS: ACHING;DISCOMFORT;TINGLING
DESCRIPTORS: ACHING;DISCOMFORT
DESCRIPTORS: CRAMPING;ACHING

## 2023-01-11 ASSESSMENT — PAIN - FUNCTIONAL ASSESSMENT
PAIN_FUNCTIONAL_ASSESSMENT: PREVENTS OR INTERFERES SOME ACTIVE ACTIVITIES AND ADLS
PAIN_FUNCTIONAL_ASSESSMENT: PREVENTS OR INTERFERES WITH MANY ACTIVE NOT PASSIVE ACTIVITIES
PAIN_FUNCTIONAL_ASSESSMENT: PREVENTS OR INTERFERES SOME ACTIVE ACTIVITIES AND ADLS
PAIN_FUNCTIONAL_ASSESSMENT: PREVENTS OR INTERFERES WITH MANY ACTIVE NOT PASSIVE ACTIVITIES

## 2023-01-11 NOTE — PLAN OF CARE
Problem: Chronic Conditions and Co-morbidities  Goal: Patient's chronic conditions and co-morbidity symptoms are monitored and maintained or improved  Outcome: Progressing     Problem: Discharge Planning  Goal: Discharge to home or other facility with appropriate resources  Outcome: Progressing  Flowsheets  Taken 1/10/2023 1607 by Colt James RN  Discharge to home or other facility with appropriate resources:   Identify barriers to discharge with patient and caregiver   Arrange for needed discharge resources and transportation as appropriate   Identify discharge learning needs (meds, wound care, etc)  Taken 1/10/2023 1600 by Colt James RN  Discharge to home or other facility with appropriate resources:   Identify barriers to discharge with patient and caregiver   Arrange for needed discharge resources and transportation as appropriate     Problem: Pain  Goal: Verbalizes/displays adequate comfort level or baseline comfort level  Outcome: Progressing     Problem: Safety - Adult  Goal: Free from fall injury  Outcome: Progressing

## 2023-01-11 NOTE — PROGRESS NOTES
[facility]  Progress Note                   Date:   1/11/2023  Patient name: Carissa Wall  Date of admission:  1/10/2023  3:33 PM  MRN:   [id number]  YOB: 1945  PCP: Isa Velazquez MD    Reason for Admission: Abnormal stress ECG [R94.39]  CAD in native artery [I25.10]    Subjective:       Clinical Changes /Abnormalities: Patient seen and examined in room. She denies chest pain and SOB. Labs/vitals/tele reviewed. Review of Systems    Medications:   Scheduled Meds:   aspirin  81 mg Oral Daily    atorvastatin  40 mg Oral Nightly    sodium chloride flush  5-40 mL IntraVENous 2 times per day     Continuous Infusions:   heparin (PORCINE) Infusion 14 Units/kg/hr (01/11/23 0426)    sodium chloride       CBC:   Recent Labs     01/10/23  1605 01/11/23  0236   WBC 5.5 5.0   HGB 11.6* 11.0*    178     BMP:    Recent Labs     01/11/23  0236      K 3.9      CO2 24   BUN 17   CREATININE 0.81   GLUCOSE 151*     Hepatic:No results for input(s): AST, ALT, ALB, BILITOT, ALKPHOS in the last 72 hours. Troponin: No results for input(s): TROPHS in the last 72 hours. BNP: No results for input(s): BNP in the last 72 hours. Lipids: No results for input(s): CHOL, HDL in the last 72 hours. Invalid input(s): LDLCALCU  INR: No results for input(s): INR in the last 72 hours. Objective:   Vitals: BP (!) 159/49   Pulse 88   Temp 97.6 °F (36.4 °C) (Oral)   Resp 22   Ht 5' 4\" (1.626 m)   Wt 176 lb (79.8 kg)   SpO2 99%   BMI 30.21 kg/m²   General appearance: alert and cooperative with exam  HEENT: Head: Normocephalic, no lesions, without obvious abnormality. Neck:no JVD, trachea midline, no adenopathy  Lungs: Clear to auscultation  Heart: Regular rate and rhythm, s1/s2 auscultated, no murmurs, SR, right radial ecchymosis noted, soft, CDI, no active bleeding, positive pulse.    Abdomen: soft, non-tender, bowel sounds active  Extremities: no edema  Neurologic: not done        Assessment / Acute Cardiac Problems:   Abnormal stress test as above  HTN  HLD  Type II DM    Patient Active Problem List:     Closed fracture of neck of radius     Type 2 diabetes mellitus without complication, without long-term current use of insulin ([address]     Hypertension associated with diabetes ([address]     Major depressive disorder with single episode, in full remission ([address]     Age-related cognitive decline     Age-related osteoporosis without current pathological fracture     Nocturia     Right hip pain     Bilateral sacroiliitis (HCC)     Chronic pain disorder     Displacement of lumbar intervertebral disc without myelopathy     Osteoarthritis of knee     Postherpetic neuralgia     Post-laminectomy syndrome     Sacroiliitis, not elsewhere classified ([address]     Chronic renal disease, stage III ([address] [155290]     Alzheimer's disease, unspecified     CAD in native artery      Plan of Treatment:   Cardiac cath reviewed as above. Discussed in detail with patient post cath POC including but not limited to medications, diet, exercise, right radial artery site care, and follow-up. Questions and concerns addressed. Continue ASA, statin, and heparin gtt. CTS consult for CABG. Pre op testing in progress. Await surgical timing. Will resume home lopressor 25 mg BID.      Electronically signed by BETSEY Hay CNP on 1/11/2023 at [address] [place] [address].  [phone]

## 2023-01-11 NOTE — CARE COORDINATION
Case Management Assessment  Initial Evaluation    Date/Time of Evaluation: 1/11/2023 1:13 PM  Assessment Completed by: Flavia Oneil RN    If patient is discharged prior to next notation, then this note serves as note for discharge by case management. Patient Name: Carissa Wall                   YOB: 1945  Diagnosis: Abnormal stress ECG [R94.39]  CAD in native artery [I25.10]                   Date / Time: 1/10/2023  3:33 PM    Patient Admission Status: Inpatient   Readmission Risk (Low < 19, Mod (19-27), High > 27): Readmission Risk Score: 8.9    Current PCP: Isa Velazquez MD  PCP verified by CM? Yes    Chart Reviewed: Yes      History Provided by: Patient  Patient Orientation: Alert and Oriented    Patient Cognition: Alert    Hospitalization in the last 30 days (Readmission):  No    If yes, Readmission Assessment in CM Navigator will be completed. Advance Directives:      Code Status: Full Code   Patient's Primary Decision Maker is: Legal Next of Kin    Primary Decision MakerLance Amador Spouse - [phone]    Discharge Planning:    Patient lives with: Spouse/Significant Other Type of Home: House  Primary Care Giver: Self  Patient Support Systems include: Spouse/Significant Other, Friends/Neighbors   Current Financial resources: Medicare  Current community resources:    Current services prior to admission: None            Current DME:              Type of Home Care services:  None    ADLS  Prior functional level: Independent in ADLs/IADLs  Current functional level: Independent in ADLs/IADLs    PT AM-PAC:   /24  OT AM-PAC:   /24    Family can provide assistance at DC: Yes  Would you like Case Management to discuss the discharge plan with any other family members/significant others, and if so, who?     Plans to Return to Present Housing: Yes  Other Identified Issues/Barriers to RETURNING to current housing: if has OHS may need rehab  Potential Assistance needed at Patient called regarding the work excuse. Informed him about  recommendation and agreed to schedule an apt with pcp   discharge: Other (Comment) (possible rehab or HC if has OHS)            Potential DME:    Patient expects to discharge to: [facility] for transportation at discharge:      Financial    Payor: Ernie Hough / Plan: MEDICARE PART A AND B / Product Type: *No Product type* /     Does insurance require precert for SNF: No    Potential assistance Purchasing Medications: No  Meds-to-Beds request:        [address], [address] [phone] - f [phone]  [address]  Phone: [phone] Fax: [phone]    Arturo Gold [id number] [facility] [address] [place] [address] [phone] Orlean Figures [phone]  [facility] [address]  Phone: [phone] Fax: [phone] [address] [facility] [address] [facility] [address], [address] [place] [address] [phone] - f [phone]  [address] [place] [address]  Phone: [phone] Fax: [address] [phone] Orlean Figures [phone]  [facility] [place] [address]  Phone: [phone] Fax: [phone] [facility][note 2], [address] [facility] [phone] Orlean Figures [phone]  [address] [facility] [address]  Phone: [phone] Fax: [phone]      Notes:    Factors facilitating achievement of predicted outcomes: Family support, Cooperative, and Pleasant    Barriers to discharge: Medical complications    Additional Case Management Notes: Patient plans to return home with . OHS consult. Follow for needs if has surgery. The Plan for Transition of Care is related to the following treatment goals of Abnormal stress ECG [R94.39]  CAD in native artery [C84.28]    IF APPLICABLE: The Patient and/or patient representative Kristi Donis and her family were provided with a choice of provider and agrees with the discharge plan.  Freedom of choice list with basic dialogue that supports the patient's individualized plan of care/goals and shares the quality data associated with the providers was provided to: Patient   Patient Representative Name:       The Patient and/or Patient Representative Agree with the Discharge Plan?  Yes    Lima Lowe RN  Case Management Department  Ph:   Fax:

## 2023-01-11 NOTE — PLAN OF CARE
After rounding with Dr. Danielle Gotti we would like to see patient get evaluated with IVUS regarding left main that is 75%  Pt NPO tonight

## 2023-01-12 ENCOUNTER — ANESTHESIA EVENT (OUTPATIENT)
Dept: OPERATING ROOM | Age: 78
End: 2023-01-12
Payer: MEDICARE

## 2023-01-12 ENCOUNTER — APPOINTMENT (OUTPATIENT)
Dept: CARDIAC CATH/INVASIVE PROCEDURES | Age: 78
DRG: 233 | End: 2023-01-12
Attending: INTERNAL MEDICINE
Payer: MEDICARE

## 2023-01-12 LAB
ABSOLUTE EOS #: 0.09 K/UL (ref 0–0.44)
ABSOLUTE IMMATURE GRANULOCYTE: <0.03 K/UL (ref 0–0.3)
ABSOLUTE LYMPH #: 1.29 K/UL (ref 1.1–3.7)
ABSOLUTE MONO #: 0.61 K/UL (ref 0.1–1.2)
ACTIVATED CLOTTING TIME: 156 SEC (ref 79–149)
ANION GAP SERPL CALCULATED.3IONS-SCNC: 11 MMOL/L (ref 9–17)
BASOPHILS # BLD: 1 % (ref 0–2)
BASOPHILS ABSOLUTE: 0.03 K/UL (ref 0–0.2)
BILIRUBIN URINE: NEGATIVE
BUN BLDV-MCNC: 13 MG/DL (ref 8–23)
C-REACTIVE PROTEIN: 34.3 MG/L (ref 0–5)
CALCIUM SERPL-MCNC: 9.3 MG/DL (ref 8.6–10.4)
CHLORIDE BLD-SCNC: 104 MMOL/L (ref 98–107)
CO2: 25 MMOL/L (ref 20–31)
COLOR: YELLOW
COMMENT UA: NORMAL
CREAT SERPL-MCNC: 0.82 MG/DL (ref 0.5–0.9)
EOSINOPHILS RELATIVE PERCENT: 2 % (ref 1–4)
GFR SERPL CREATININE-BSD FRML MDRD: >60 ML/MIN/1.73M2
GLUCOSE BLD-MCNC: 174 MG/DL (ref 70–99)
GLUCOSE URINE: NEGATIVE
HCT VFR BLD CALC: 39.6 % (ref 36.3–47.1)
HEMOGLOBIN: 12.4 G/DL (ref 11.9–15.1)
IMMATURE GRANULOCYTES: 0 %
KETONES, URINE: NEGATIVE
LEUKOCYTE ESTERASE, URINE: NEGATIVE
LYMPHOCYTES # BLD: 21 % (ref 24–43)
MCH RBC QN AUTO: 29.6 PG (ref 25.2–33.5)
MCHC RBC AUTO-ENTMCNC: 31.3 G/DL (ref 28.4–34.8)
MCV RBC AUTO: 94.5 FL (ref 82.6–102.9)
MONOCYTES # BLD: 10 % (ref 3–12)
NITRITE, URINE: NEGATIVE
NRBC AUTOMATED: 0 PER 100 WBC
PARTIAL THROMBOPLASTIN TIME: 47.1 SEC (ref 20.5–30.5)
PARTIAL THROMBOPLASTIN TIME: 49.6 SEC (ref 20.5–30.5)
PARTIAL THROMBOPLASTIN TIME: 56 SEC (ref 20.5–30.5)
PDW BLD-RTO: 13.1 % (ref 11.8–14.4)
PH UA: 7 (ref 5–8)
PLATELET # BLD: 212 K/UL (ref 138–453)
PMV BLD AUTO: 11.2 FL (ref 8.1–13.5)
POTASSIUM SERPL-SCNC: 4.1 MMOL/L (ref 3.7–5.3)
PROTEIN UA: NEGATIVE
RBC # BLD: 4.19 M/UL (ref 3.95–5.11)
SEDIMENTATION RATE, ERYTHROCYTE: 54 MM/HR (ref 0–30)
SEG NEUTROPHILS: 66 % (ref 36–65)
SEGMENTED NEUTROPHILS ABSOLUTE COUNT: 4.03 K/UL (ref 1.5–8.1)
SODIUM BLD-SCNC: 140 MMOL/L (ref 135–144)
SPECIFIC GRAVITY UA: 1.03 (ref 1–1.03)
TURBIDITY: CLEAR
URIC ACID: 6.6 MG/DL (ref 2.4–5.7)
URINE HGB: NEGATIVE
UROBILINOGEN, URINE: NORMAL
WBC # BLD: 6.1 K/UL (ref 3.5–11.3)

## 2023-01-12 PROCEDURE — C1753 CATH, INTRAVAS ULTRASOUND: HCPCS

## 2023-01-12 PROCEDURE — B2151ZZ FLUOROSCOPY OF LEFT HEART USING LOW OSMOLAR CONTRAST: ICD-10-PCS | Performed by: INTERNAL MEDICINE

## 2023-01-12 PROCEDURE — C1894 INTRO/SHEATH, NON-LASER: HCPCS

## 2023-01-12 PROCEDURE — 2500000003 HC RX 250 WO HCPCS

## 2023-01-12 PROCEDURE — 2580000003 HC RX 258: Performed by: PHYSICIAN ASSISTANT

## 2023-01-12 PROCEDURE — 6360000002 HC RX W HCPCS: Performed by: STUDENT IN AN ORGANIZED HEALTH CARE EDUCATION/TRAINING PROGRAM

## 2023-01-12 PROCEDURE — 6370000000 HC RX 637 (ALT 250 FOR IP): Performed by: PHYSICIAN ASSISTANT

## 2023-01-12 PROCEDURE — 36415 COLL VENOUS BLD VENIPUNCTURE: CPT

## 2023-01-12 PROCEDURE — 81003 URINALYSIS AUTO W/O SCOPE: CPT

## 2023-01-12 PROCEDURE — 93454 CORONARY ARTERY ANGIO S&I: CPT

## 2023-01-12 PROCEDURE — 99152 MOD SED SAME PHYS/QHP 5/>YRS: CPT

## 2023-01-12 PROCEDURE — 2580000003 HC RX 258: Performed by: STUDENT IN AN ORGANIZED HEALTH CARE EDUCATION/TRAINING PROGRAM

## 2023-01-12 PROCEDURE — 86850 RBC ANTIBODY SCREEN: CPT

## 2023-01-12 PROCEDURE — 6370000000 HC RX 637 (ALT 250 FOR IP)

## 2023-01-12 PROCEDURE — 6360000002 HC RX W HCPCS

## 2023-01-12 PROCEDURE — 85347 COAGULATION TIME ACTIVATED: CPT

## 2023-01-12 PROCEDURE — 6360000004 HC RX CONTRAST MEDICATION

## 2023-01-12 PROCEDURE — 93005 ELECTROCARDIOGRAM TRACING: CPT | Performed by: PHYSICIAN ASSISTANT

## 2023-01-12 PROCEDURE — 80048 BASIC METABOLIC PNL TOTAL CA: CPT

## 2023-01-12 PROCEDURE — 86140 C-REACTIVE PROTEIN: CPT

## 2023-01-12 PROCEDURE — 86920 COMPATIBILITY TEST SPIN: CPT

## 2023-01-12 PROCEDURE — 84550 ASSAY OF BLOOD/URIC ACID: CPT

## 2023-01-12 PROCEDURE — 2709999900 HC NON-CHARGEABLE SUPPLY

## 2023-01-12 PROCEDURE — 85652 RBC SED RATE AUTOMATED: CPT

## 2023-01-12 PROCEDURE — 99222 1ST HOSP IP/OBS MODERATE 55: CPT | Performed by: INTERNAL MEDICINE

## 2023-01-12 PROCEDURE — 86900 BLOOD TYPING SEROLOGIC ABO: CPT

## 2023-01-12 PROCEDURE — 86901 BLOOD TYPING SEROLOGIC RH(D): CPT

## 2023-01-12 PROCEDURE — P9047 ALBUMIN (HUMAN), 25%, 50ML: HCPCS

## 2023-01-12 PROCEDURE — 99153 MOD SED SAME PHYS/QHP EA: CPT

## 2023-01-12 PROCEDURE — 6370000000 HC RX 637 (ALT 250 FOR IP): Performed by: STUDENT IN AN ORGANIZED HEALTH CARE EDUCATION/TRAINING PROGRAM

## 2023-01-12 PROCEDURE — 85730 THROMBOPLASTIN TIME PARTIAL: CPT

## 2023-01-12 PROCEDURE — 87641 MR-STAPH DNA AMP PROBE: CPT

## 2023-01-12 PROCEDURE — C1887 CATHETER, GUIDING: HCPCS

## 2023-01-12 PROCEDURE — 85025 COMPLETE CBC W/AUTO DIFF WBC: CPT

## 2023-01-12 PROCEDURE — 2060000000 HC ICU INTERMEDIATE R&B

## 2023-01-12 PROCEDURE — 93880 EXTRACRANIAL BILAT STUDY: CPT

## 2023-01-12 PROCEDURE — C1769 GUIDE WIRE: HCPCS

## 2023-01-12 PROCEDURE — B2101ZZ FLUOROSCOPY OF SINGLE CORONARY ARTERY USING LOW OSMOLAR CONTRAST: ICD-10-PCS | Performed by: INTERNAL MEDICINE

## 2023-01-12 RX ORDER — SODIUM CHLORIDE 9 MG/ML
INJECTION, SOLUTION INTRAVENOUS CONTINUOUS
Status: DISCONTINUED | OUTPATIENT
Start: 2023-01-13 | End: 2023-01-13

## 2023-01-12 RX ORDER — SODIUM CHLORIDE 9 MG/ML
INJECTION, SOLUTION INTRAVENOUS PRN
Status: DISCONTINUED | OUTPATIENT
Start: 2023-01-12 | End: 2023-01-13

## 2023-01-12 RX ORDER — ALPRAZOLAM 0.5 MG/1
0.5 TABLET ORAL 2 TIMES DAILY PRN
Status: DISCONTINUED | OUTPATIENT
Start: 2023-01-12 | End: 2023-01-17 | Stop reason: HOSPADM

## 2023-01-12 RX ORDER — PANTOPRAZOLE SODIUM 40 MG/1
40 TABLET, DELAYED RELEASE ORAL
Status: DISCONTINUED | OUTPATIENT
Start: 2023-01-12 | End: 2023-01-14

## 2023-01-12 RX ORDER — NAPROXEN 250 MG/1
500 TABLET ORAL 2 TIMES DAILY WITH MEALS
Status: DISCONTINUED | OUTPATIENT
Start: 2023-01-12 | End: 2023-01-12

## 2023-01-12 RX ORDER — COLCHICINE 0.6 MG/1
1.2 TABLET ORAL ONCE
Status: COMPLETED | OUTPATIENT
Start: 2023-01-12 | End: 2023-01-12

## 2023-01-12 RX ORDER — AMLODIPINE BESYLATE 5 MG/1
5 TABLET ORAL DAILY
Status: DISCONTINUED | OUTPATIENT
Start: 2023-01-12 | End: 2023-01-15

## 2023-01-12 RX ORDER — SODIUM CHLORIDE 0.9 % (FLUSH) 0.9 %
5-40 SYRINGE (ML) INJECTION EVERY 12 HOURS SCHEDULED
Status: DISCONTINUED | OUTPATIENT
Start: 2023-01-12 | End: 2023-01-13

## 2023-01-12 RX ORDER — COLCHICINE 0.6 MG/1
0.6 TABLET ORAL DAILY
Status: DISCONTINUED | OUTPATIENT
Start: 2023-01-12 | End: 2023-01-13

## 2023-01-12 RX ORDER — CHLORHEXIDINE GLUCONATE 4 G/100ML
SOLUTION TOPICAL SEE ADMIN INSTRUCTIONS
Status: DISCONTINUED | OUTPATIENT
Start: 2023-01-12 | End: 2023-01-13 | Stop reason: HOSPADM

## 2023-01-12 RX ORDER — SODIUM CHLORIDE 0.9 % (FLUSH) 0.9 %
10 SYRINGE (ML) INJECTION PRN
Status: DISCONTINUED | OUTPATIENT
Start: 2023-01-12 | End: 2023-01-13

## 2023-01-12 RX ADMIN — MUPIROCIN: 20 OINTMENT TOPICAL at 21:37

## 2023-01-12 RX ADMIN — ACETAMINOPHEN 650 MG: 325 TABLET ORAL at 08:42

## 2023-01-12 RX ADMIN — COLCHICINE 1.2 MG: 0.6 TABLET, FILM COATED ORAL at 18:46

## 2023-01-12 RX ADMIN — SODIUM CHLORIDE, PRESERVATIVE FREE 10 ML: 5 INJECTION INTRAVENOUS at 08:37

## 2023-01-12 RX ADMIN — PANTOPRAZOLE SODIUM 40 MG: 40 TABLET, DELAYED RELEASE ORAL at 10:28

## 2023-01-12 RX ADMIN — COLCHICINE 0.6 MG: 0.6 TABLET, FILM COATED ORAL at 18:46

## 2023-01-12 RX ADMIN — HEPARIN SODIUM 2000 UNITS: 1000 INJECTION INTRAVENOUS; SUBCUTANEOUS at 04:23

## 2023-01-12 RX ADMIN — METOPROLOL TARTRATE 25 MG: 25 TABLET ORAL at 21:37

## 2023-01-12 RX ADMIN — DESMOPRESSIN ACETATE 40 MG: 0.2 TABLET ORAL at 21:37

## 2023-01-12 RX ADMIN — HEPARIN SODIUM 16 UNITS/KG/HR: 10000 INJECTION, SOLUTION INTRAVENOUS at 16:42

## 2023-01-12 RX ADMIN — NAPROXEN 500 MG: 250 TABLET ORAL at 10:28

## 2023-01-12 RX ADMIN — SODIUM CHLORIDE, PRESERVATIVE FREE 10 ML: 5 INJECTION INTRAVENOUS at 21:37

## 2023-01-12 RX ADMIN — NAPROXEN 500 MG: 250 TABLET ORAL at 16:45

## 2023-01-12 RX ADMIN — ACETAMINOPHEN 650 MG: 325 TABLET ORAL at 02:07

## 2023-01-12 RX ADMIN — ASPIRIN 81 MG: 81 TABLET, CHEWABLE ORAL at 08:30

## 2023-01-12 RX ADMIN — HYDRALAZINE HYDROCHLORIDE 10 MG: 20 INJECTION INTRAMUSCULAR; INTRAVENOUS at 00:22

## 2023-01-12 RX ADMIN — ALPRAZOLAM 0.5 MG: 0.5 TABLET ORAL at 13:20

## 2023-01-12 RX ADMIN — AMLODIPINE BESYLATE 5 MG: 5 TABLET ORAL at 12:19

## 2023-01-12 RX ADMIN — METOPROLOL TARTRATE 25 MG: 25 TABLET ORAL at 08:30

## 2023-01-12 ASSESSMENT — PAIN SCALES - GENERAL
PAINLEVEL_OUTOF10: 10
PAINLEVEL_OUTOF10: 10

## 2023-01-12 ASSESSMENT — ENCOUNTER SYMPTOMS
APNEA: 0
ABDOMINAL PAIN: 0
ABDOMINAL DISTENTION: 0

## 2023-01-12 NOTE — PLAN OF CARE
Problem: Chronic Conditions and Co-morbidities  Goal: Patient's chronic conditions and co-morbidity symptoms are monitored and maintained or improved  1/12/2023 1155 by Qing Cardenas RN  Outcome: Progressing  1/12/2023 0559 by Yahir Solis  Outcome: Progressing  1/11/2023 2205 by Kezia Rogers RN  Outcome: Progressing  Flowsheets (Taken 1/11/2023 2000)  Care Plan - Patient's Chronic Conditions and Co-Morbidity Symptoms are Monitored and Maintained or Improved:   Monitor and assess patient's chronic conditions and comorbid symptoms for stability, deterioration, or improvement   Update acute care plan with appropriate goals if chronic or comorbid symptoms are exacerbated and prevent overall improvement and discharge     Problem: Discharge Planning  Goal: Discharge to home or other facility with appropriate resources  1/12/2023 1155 by Qing Cardenas RN  Outcome: Progressing  1/12/2023 0559 by Yahir Solis  Outcome: Progressing  1/11/2023 2205 by Kezia Rogers RN  Outcome: Progressing  Flowsheets (Taken 1/11/2023 2000)  Discharge to home or other facility with appropriate resources:   Identify barriers to discharge with patient and caregiver   Identify discharge learning needs (meds, wound care, etc)     Problem: Pain  Goal: Verbalizes/displays adequate comfort level or baseline comfort level  1/12/2023 1155 by Qing Cardenas RN  Outcome: Progressing  1/12/2023 0559 by Yahir Solis  Outcome: Not Progressing  1/11/2023 2205 by Kezia Rogers RN  Outcome: Progressing  Flowsheets (Taken 1/11/2023 1912)  Verbalizes/displays adequate comfort level or baseline comfort level:   Encourage patient to monitor pain and request assistance   Assess pain using appropriate pain scale     Problem: Safety - Adult  Goal: Free from fall injury  1/12/2023 1155 by Qing Cardenas RN  Outcome: Progressing  1/12/2023 0559 by Yahir Solis  Outcome: Progressing  1/11/2023 2205 by Ana Oswald RN  Outcome: Progressing  Flowsheets (Taken 1/11/2023 2000)  Free From Fall Injury:   Instruct family/caregiver on patient safety   Based on caregiver fall risk screen, instruct family/caregiver to ask for assistance with transferring infant if caregiver noted to have fall risk factors     Problem: Pain  Goal: Verbalizes/displays adequate comfort level or baseline comfort level  1/12/2023 1155 by Emmett Whitley RN  Outcome: Progressing  1/12/2023 0559 by Keysha Franco  Outcome: Not Progressing  1/11/2023 2205 by Ana Oswald RN  Outcome: Progressing  Flowsheets (Taken 1/11/2023 1912)  Verbalizes/displays adequate comfort level or baseline comfort level:   Encourage patient to monitor pain and request assistance   Assess pain using appropriate pain scale

## 2023-01-12 NOTE — CONSULTS
[facility]     Department of Internal Medicine - Staff Internal Medicine Teaching Service          ADMISSION NOTE/HISTORY AND PHYSICAL EXAMINATION   Date: 1/12/2023  Patient Name: Ruby Toussaint  Date of admission: 1/10/2023  3:33 PM  YOB: 1945  PCP: Carmencita Sneed MD  History Obtained From:  patient, electronic medical record    Consult Reason:     Consult Reason: Acute Gout Flare     HISTORY OF PRESENTING ILLNESS     The patient is a pleasant 65 y.o. female with past medical history coronary artery disease, hypertension, diabetes, CKD stage III and arthritis. Patient is currently being followed primarily by cardiology due to 90% ostial stenosis seen in recent cardiac cath. Patient is scheduled for CABG tomorrow. Cardiology consulted internal medicine for acute gout flare. After speaking with the patient she has had GERD symptoms for the last 5-1/2 months recently within the last few weeks it has gotten worse. Episodes in general have been intermittent. Pain is mostly associated with left great toe. Patient states that she is not on any Medications at home. Review of Systems   Constitutional:  Positive for activity change. Negative for fever. HENT:  Negative for congestion. Respiratory:  Negative for apnea. Cardiovascular:  Negative for leg swelling. Gastrointestinal:  Negative for abdominal distention and abdominal pain. Endocrine: Negative for polyuria. Genitourinary:  Negative for difficulty urinating. Musculoskeletal:  Negative for arthralgias. Allergic/Immunologic: Negative for immunocompromised state. Neurological:  Negative for dizziness. Hematological:  Negative for adenopathy. Psychiatric/Behavioral:  Negative for agitation.       PAST MEDICAL HISTORY     Past Medical History:   Diagnosis Date    Abnormal EKG     Arthritis     Asthma     Noted per [facility] chart- patient denies    Colon polyps     Gout     History of fall     Hyperlipidemia     Hypertension     Incomplete RBBB     Osteoarthritis     Osteopetrosis     Right knee meniscal tear     Sciatica     Seasonal allergies     Snoring     Type II or unspecified type diabetes mellitus without mention of complication, not stated as uncontrolled     diet controlled    Urinary incontinence     Wears dentures     partial upper and full lower       PAST SURGICAL HISTORY     Past Surgical History:   Procedure Laterality Date    COLONOSCOPY      x 3    ELBOW FRACTURE SURGERY Right     EYE SURGERY Bilateral     optic nerve surgery as a child    FINGER TRIGGER RELEASE Right 11/05/2018    Long middle and ring    FOOT SURGERY Left     2 surgeries    HIP FRACTURE SURGERY Right 06/18/2021    with hardware /  Procedure: Kendra [address] Dr; Surgeon: Anastasiya Stephens MD; Location: [address] [place] [address]; Service: Orthopedics; Laterality: Right; fluro and ortho table    HYSTERECTOMY (CERVIX STATUS UNKNOWN)      vaginal    KNEE ARTHROSCOPY Right 6/21/2022    KNEE ARTHROSCOPY FOR PARTIAL MEDIAL MENISCECTOMY AND PARTIAL LATERAL MENISCECTOMY performed by Clementina Boswell MD at [address] [place] [address]      3 surgeries    OTHER SURGICAL HISTORY      excision cysts from ovaries    SPINAL CORD STIMULATOR IMPLANT      Patient states it does not work    TUBAL LIGATION         ALLERGIES     Wasp venom protein, Dust mite extract, Seasonal, and Guaifenesin    MEDICATIONS PRIOR TO ADMISSION     Prior to Admission medications    Medication Sig Start Date End Date Taking? Authorizing Provider   alendronate (FOSAMAX) 70 MG tablet TAKE 1 TABLET EVERY 7 DAYS ON AN EMPTY STOMACH, FIRST THING IN THE MORNING, WITH FULL GLASS OF WATER, STAY UPRIGHT FOR 30 MINUTES.  12/7/22   MD JAYDEN Srivastava LITE strip  10/12/22   Historical Provider, MD   FreeStyle [facility] [address] [facility]  10/12/22   Historical Provider, MD   lisinopril (PRINIVIL;ZESTRIL) 10 MG tablet Take 1 tablet by mouth daily  Patient taking differently: Take 20 mg by mouth daily 22   Lilia Isbell MD   metoprolol tartrate (LOPRESSOR) 25 MG tablet Take 1 tablet by mouth 2 times daily 22   Lilia Isbell MD   oxyCODONE-acetaminophen (PERCOCET) 5-325 MG per tablet TAKE 1 TABLET BY MOUTH TWO TIMES A DAY AS NEEDED 22   Historical Provider, MD   blood glucose monitor kit and supplies Dispense sufficient amount for indicated testing frequency plus additional to accommodate PRN testing needs. Dispense all needed supplies to include: monitor, strips, lancing device, lancets, control solutions, alcohol swabs. 10/12/22   Lilia Isbell MD   ondansetron (ZOFRAN ODT) 4 MG disintegrating tablet Take 1 tablet by mouth every 8 hours as needed for Nausea or Vomiting 10/10/22   Sienna Spence MD   donepezil (ARICEPT) 5 MG tablet Take 1 tablet by mouth daily 22   Lilia Isbell MD   chlorhexidine (PERIDEX) 0.12 % solution Take 15 mLs by mouth as needed 22   Historical Provider, MD   ibuprofen (ADVIL;MOTRIN) 800 MG tablet Take 800 mg by mouth every 8 hours as needed for Pain  3/16/22   Historical Provider, MD   albuterol sulfate HFA (PROVENTIL;VENTOLIN;PROAIR) 108 (90 Base) MCG/ACT inhaler USE 2 INHALATIONS EVERY 6 HOURS AS NEEDED FOR WHEEZING 21   Historical Provider, MD       SOCIAL HISTORY     Tobacco: Former 20-pack-year history, quit in   Alcohol: None  Illicits: History of marijuana use, not currently using.   Occupation: None    FAMILY HISTORY     Family History   Problem Relation Age of Onset    Stroke Mother     Diabetes Maternal Grandmother        PHYSICAL EXAM     Vitals: BP (!) 144/49   Pulse 70   Temp 97.7 °F (36.5 °C) (Oral)   Resp 18   Ht 5' 4\" (1.626 m)   Wt 176 lb (79.8 kg)   SpO2 96%   BMI 30.21 kg/m²   Tmax: Temp (24hrs), Av °F (36.7 °C), Min:97.7 °F (36.5 °C), Max:98.7 °F (37.1 °C)    Last Body weight:   Wt Readings from Last 3 Encounters:   01/10/23 176 lb (79.8 kg)   11/30/22 171 lb 15.3 oz (78 kg)   11/29/22 172 lb (78 kg)     Body Mass Index : Body mass index is 30.21 kg/m². Physical Exam  Constitutional:       General: She is not in acute distress. Appearance: She is not ill-appearing. HENT:      Head: Normocephalic and atraumatic. Right Ear: External ear normal.      Left Ear: External ear normal.      Mouth/Throat:      Mouth: Mucous membranes are moist.      Pharynx: Oropharynx is clear. Eyes:      Extraocular Movements: Extraocular movements intact. Conjunctiva/sclera: Conjunctivae normal.   Cardiovascular:      Rate and Rhythm: Normal rate and regular rhythm. Pulses: Normal pulses. Heart sounds: Normal heart sounds. Pulmonary:      Breath sounds: Normal breath sounds. Abdominal:      General: Bowel sounds are normal.      Palpations: Abdomen is soft. Musculoskeletal:         General: Swelling and tenderness present. Right lower leg: No edema. Left lower leg: No edema. Comments: Swelling and tenderness over left great toe. Neurological:      Mental Status: She is alert and oriented to person, place, and time.    Psychiatric:         Mood and Affect: Mood normal.         Behavior: Behavior normal.           INVESTIGATIONS     Laboratory Testing:     Recent Results (from the past 24 hour(s))   APTT    Collection Time: 01/11/23  7:12 PM   Result Value Ref Range    PTT 72.8 (H) 20.5 - 30.5 sec   APTT    Collection Time: 01/12/23  3:34 AM   Result Value Ref Range    PTT 47.1 (H) 20.5 - 30.5 sec   APTT    Collection Time: 01/12/23 10:01 AM   Result Value Ref Range    PTT 56.0 (H) 20.5 - 30.5 sec   CBC with Auto Differential    Collection Time: 01/12/23 10:01 AM   Result Value Ref Range    WBC 6.1 3.5 - 11.3 k/uL    RBC 4.19 3.95 - 5.11 m/uL    Hemoglobin 12.4 11.9 - 15.1 g/dL    Hematocrit 39.6 36.3 - 47.1 %    MCV 94.5 82.6 - 102.9 fL    MCH 29.6 25.2 - 33.5 pg    MCHC 31.3 28.4 - 34.8 g/dL    RDW 13.1 11.8 - 14.4 %    Platelets 656 138 - 453 k/uL    MPV 11.2 8.1 - 13.5 fL    NRBC Automated 0.0 0.0 per 100 WBC    Seg Neutrophils 66 (H) 36 - 65 %    Lymphocytes 21 (L) 24 - 43 %    Monocytes 10 3 - 12 %    Eosinophils % 2 1 - 4 %    Basophils 1 0 - 2 %    Immature Granulocytes 0 0 %    Segs Absolute 4.03 1.50 - 8.10 k/uL    Absolute Lymph # 1.29 1.10 - 3.70 k/uL    Absolute Mono # 0.61 0.10 - 1.20 k/uL    Absolute Eos # 0.09 0.00 - 0.44 k/uL    Basophils Absolute 0.03 0.00 - 0.20 k/uL    Absolute Immature Granulocyte <0.03 0.00 - 0.30 k/uL   Basic Metabolic Panel    Collection Time: 01/12/23 10:01 AM   Result Value Ref Range    Glucose 174 (H) 70 - 99 mg/dL    BUN 13 8 - 23 mg/dL    Creatinine 0.82 0.50 - 0.90 mg/dL    Est, Glom Filt Rate >60 >60 mL/min/1.73m2    Calcium 9.3 8.6 - 10.4 mg/dL    Sodium 140 135 - 144 mmol/L    Potassium 4.1 3.7 - 5.3 mmol/L    Chloride 104 98 - 107 mmol/L    CO2 25 20 - 31 mmol/L    Anion Gap 11 9 - 17 mmol/L   C-Reactive Protein    Collection Time: 01/12/23 10:01 AM   Result Value Ref Range    CRP 34.3 (H) 0.0 - 5.0 mg/L   Sedimentation Rate    Collection Time: 01/12/23 10:01 AM   Result Value Ref Range    Sed Rate 54 (H) 0 - 30 mm/Hr   Uric Acid    Collection Time: 01/12/23 10:01 AM   Result Value Ref Range    Uric Acid 6.6 (H) 2.4 - 5.7 mg/dL   Activated clotting time    Collection Time: 01/12/23 11:18 AM   Result Value Ref Range    Activated Clotting Time 156 (H) 79 - 149 sec       Imaging:   CT CHEST WO CONTRAST    Result Date: 1/11/2023  1. No acute intrathoracic process. 2.  Moderate atherosclerosis with incidental aberrant right subclavian artery. ASSESSMENT & PLAN     ASSESSMENT / PLAN:     Thank you for allowing us to see Berna Rafi in consultation for acute gout flare. Patient states that this has been an ongoing issue for the last 5-1/2 months, has had intermittent episodes. Over the last few weeks episodes are becoming more frequent.      Acute gout flare:  - Uric acid: 6.6  - ESR: 54  - CRP: 34.3  - Colchicine    Coronary artery disease: 90% stenosis of the left ostial  - Cardiac cath today showed 90% stenosis of left ostial.  - Patient scheduled for CABG tomorrow  - Cardiology is primary and currently treating. Eddie Mir MD  Internal Medicine Resident, PGY-1  [address], [place]  1/12/2023, 1:38 PM  Attending Physician Statement  I have discussed the care of Ruby Toussaint, including pertinent history and exam findings,  with the resident. I have seen and examined the patient and the key elements of all parts of the encounter have been performed by me. I agree with the assessment, plan and orders as documented by the resident with additions . Agree with colchici n  Casth rsults noted. No CHF    n  N      Treatment plan Discussed with nursing staff in detail , all questions answered . Electronically signed by Richy Narayanan MD on   1/12/23 at 6:52 PM EST    Please note that this chart was generated using voice recognition Dragon dictation software. Although every effort was made to ensure the accuracy of this automated transcription, some errors in transcription may have occurred. Please note that part of this chart was generated using voice recognition dictation software. Although every effort was made to ensure the accuracy of this automated transcription, some errors in transcription may have occurred.

## 2023-01-12 NOTE — PLAN OF CARE
Problem: Chronic Conditions and Co-morbidities  Goal: Patient's chronic conditions and co-morbidity symptoms are monitored and maintained or improved  Outcome: Progressing  Flowsheets (Taken 1/11/2023 2000)  Care Plan - Patient's Chronic Conditions and Co-Morbidity Symptoms are Monitored and Maintained or Improved:   Monitor and assess patient's chronic conditions and comorbid symptoms for stability, deterioration, or improvement   Update acute care plan with appropriate goals if chronic or comorbid symptoms are exacerbated and prevent overall improvement and discharge     Problem: Discharge Planning  Goal: Discharge to home or other facility with appropriate resources  Outcome: Progressing  Flowsheets (Taken 1/11/2023 2000)  Discharge to home or other facility with appropriate resources:   Identify barriers to discharge with patient and caregiver   Identify discharge learning needs (meds, wound care, etc)     Problem: Pain  Goal: Verbalizes/displays adequate comfort level or baseline comfort level  Outcome: Progressing  Flowsheets (Taken 1/11/2023 1912)  Verbalizes/displays adequate comfort level or baseline comfort level:   Encourage patient to monitor pain and request assistance   Assess pain using appropriate pain scale     Problem: Safety - Adult  Goal: Free from fall injury  Outcome: Progressing  Flowsheets (Taken 1/11/2023 2000)  Free From Fall Injury:   Instruct family/caregiver on patient safety   Based on caregiver fall risk screen, instruct family/caregiver to ask for assistance with transferring infant if caregiver noted to have fall risk factors

## 2023-01-12 NOTE — OP NOTE
[facility]    CARDIAC CATHETERIZATION    Date:   1/12/2023  Patient name:  Reza Lee  Date of admission:  1/10/2023  3:33 PM  MRN:   [id number]  YOB: 1945    Operators:  Primary:   Adrien Alfaro MD (Attending Physician)    Procedure performed:       [x] Left Heart Catheterization. [] Graft Angiography. [x] Left Ventriculography. [] Right Heart Catheterization. [x] Coronary Angiography. [] Aortic Valve Studies. [] PCI:      [] Other:       Pre Procedure Conscious Sedation Data:  ASA Class:    [] I [x] II [] III [] IV    Mallampati Class:  [] I [x] II [] III [] IV      Indication:  [] STEMI      [x] + Stress test  [] ACS      [] + EKG Changes  [] Non Q MI       [] Significant Risk Factors  [] Recurrent Angina             [] Diabetes Mellitus    [] New LBBB      [] Uncontrolled HTN. [] CHF / Low EF changes     [] Abnormal CTA / Ca Score      Procedure:  Access:  [x] Femoral  [] Radial  artery       [x] Right  [] Left    Procedure: After informed consent was obtained with explanation of the risks and benefits, patient was brought to the cath lab. The access area was prepped and draped in sterile fashion. 1% lidocaine was used for local block. The artery was cannulated with 6  Fr sheath with brisk arterial blood return. The side port was frequently flushed and aspirated with normal saline. Findings:     Angiographic Findings        Cardiac Arteries and Lesion Findings       LMCA: Single stenosis. Lesion on LMCA:         Lesion on LMCA: Ostial.90% stenosis. Coronary Tree        Dominance: Right       LV Analysis   LV function assessed as:Normal.   Ejection Fraction   +----------------------------------------------------------------------+---+   ! Method                                                                ! EF%! +----------------------------------------------------------------------+---+   ! LV gram !55 !   +----------------------------------------------------------------------+---+     Estimated Blood Loss: 10 mL    Conclusions       Procedure Summary        90% ostial left main stenosis with pressure dampning. No need for IVUS       Recommendations        CTS consult for CABG. Medical therapy as needed. Risk factor modification. ____________________________________________________________________  History and Risk Factors    [x] Hypertension                                                         [] Family history of CAD  [x] Hyperlipidemia                                                       [] Cerebrovascular Disease    [] Prior MI                                                                   [] Peripheral Vascular disease   [] Prior PCI                                                                [] Diabetes Mellitus     [] Left Main PCI. [] Currently on Dialysis. [] Prior CABG. [] Currently smoker. [] Cardiac Arrest outside of healthcare facility. [] Yes              [x] No                                           Witnessed                                [] Yes              [] No                                      Arrest after arrival of EMS      [] Yes              [] No        [] Cardiac Arrest at other Facility. [] Yes              [x] No     Pre-Procedure Information. Heart Failure                                                   [] Yes              [x] No                                                    Class               [] I          [] II              [] III                 [] IV.                            New Diagnosis                                    [] Yes             [] No                          HF Type                                              [] Systolic        [] Diastolic [] Unknown. Diagnostic Test:              EKG                                                                  [] Normal        [x] Abnormal               New antiarrhythmia medications:                    [] Yes              [] No              New onset atrial fibrillation / Flutter                  [] Yes              [] No              ECG Abnormalities:                                         [] V. Fib           [] Lashawn V. Tach                                                                                        [] NS V. T       [] New LBBB                                                                                        [] T.  Inv           []  ST dev > 0.5 mm                                                                                      [] PVC's freq   [] PVC's infrequent     Stress Test Performed:                                            [x] Yes              [] No       Type:                                        [] Stress Echo            [] Exercise Stress Test (no imaging)                                                  [x] Stress Nuclear        [] Stress Imaging                Results                                    [] Negative                  [x] Positive                                                    [] Indeterminate         [] Unavailable                 If Positive/ Risk / Extent of Ischemia:                                                  [] Low  [x] Intermediate                                                             [] High            [] Unavailable                 Cardiac CTA Performed:                                          [] Yes              [x] No                   Results                         [] CAD            [] Non obstructive CAD                                                  [] No CAD       [] Uncertain                                                  [] Unknown     [] Structural Disease. Pre Procedure Medications:                                    [x] Yes              [] No                                                       [x] ASA                                    [x] Beta Blockers                                                  [] Nitrate                                [x] Ca Channel Blockers                                                  [] Ranolazine              [x] Statin                                                   [] Plavix/Others antiplatelets    Electronically signed on 1/12/2023 at 10:49 AM by:    Ihsan Rowe MD  Fellow, [address]      Physician Statement  I have discussed the case of Darion Howell including pertinent history and exam findings with the resident. I have seen and examined the patient and the key elements of the encounter have been performed by me. I agree with the assessment, plan and orders as documented by the resident With changes made to the note. Procedure performed by me.     Electronically signed by Gallo Carlson MD on 1/15/2023 at 12:45 PM.    [facility]      [phone]

## 2023-01-12 NOTE — PLAN OF CARE
Problem: Pain  Goal: Verbalizes/displays adequate comfort level or baseline comfort level  1/12/2023 0559 by JULIETH Wright  Outcome: Not Progressing  1/11/2023 2205 by Bryce Mejia RN  Outcome: Progressing  Flowsheets (Taken 1/11/2023 1912)  Verbalizes/displays adequate comfort level or baseline comfort level:   Encourage patient to monitor pain and request assistance   Assess pain using appropriate pain scale     Problem: Chronic Conditions and Co-morbidities  Goal: Patient's chronic conditions and co-morbidity symptoms are monitored and maintained or improved  1/12/2023 0559 by JULIETH Wright  Outcome: Progressing  1/11/2023 2205 by Bryce Mejia RN  Outcome: Progressing  Flowsheets (Taken 1/11/2023 2000)  Care Plan - Patient's Chronic Conditions and Co-Morbidity Symptoms are Monitored and Maintained or Improved:   Monitor and assess patient's chronic conditions and comorbid symptoms for stability, deterioration, or improvement   Update acute care plan with appropriate goals if chronic or comorbid symptoms are exacerbated and prevent overall improvement and discharge     Problem: Discharge Planning  Goal: Discharge to home or other facility with appropriate resources  1/12/2023 0559 by JULIETH Wright  Outcome: Progressing  1/11/2023 2205 by Bryce Mejai RN  Outcome: Progressing  Flowsheets (Taken 1/11/2023 2000)  Discharge to home or other facility with appropriate resources:   Identify barriers to discharge with patient and caregiver   Identify discharge learning needs (meds, wound care, etc)     Problem: Safety - Adult  Goal: Free from fall injury  1/12/2023 0559 by Major Delaney  Outcome: Progressing  1/11/2023 2205 by Bryce Mejia RN  Outcome: Progressing  Flowsheets (Taken 1/11/2023 2000)  Free From Fall Injury:   Instruct family/caregiver on patient safety   Based on caregiver fall risk screen, instruct family/caregiver to ask for assistance with transferring infant if caregiver noted to have fall risk factors     Problem: Pain  Goal: Verbalizes/displays adequate comfort level or baseline comfort level  1/12/2023 0559 by JULIETH Schroeder  Outcome: Not Progressing  1/11/2023 2205 by Esteban Palmer RN  Outcome: Progressing  Flowsheets (Taken 1/11/2023 1912)  Verbalizes/displays adequate comfort level or baseline comfort level:   Encourage patient to monitor pain and request assistance   Assess pain using appropriate pain scale

## 2023-01-12 NOTE — PROGRESS NOTES
[facility]  Progress Note                   Date:   1/12/2023  Patient name: Vladimir Rodrigues  Date of admission:  1/10/2023  3:33 PM  MRN:   [id number]  YOB: 1945  PCP: Kenny Sampson MD    Reason for Admission: Abnormal stress ECG [R94.39]  CAD in native artery [I25.10]    Subjective:       Clinical Changes /Abnormalities: Patient seen and examined in room. She denies chest pain and SOB. Discussed POC in detail with patient. Labs/vitals/tele reviewed. AM labs pending. Discussed with RN, no acute CV issues/concerns overnight. On heparin gtt. Review of Systems    Medications:   Scheduled Meds:   naproxen  500 mg Oral BID WC    pantoprazole  40 mg Oral QAM AC    metoprolol tartrate  25 mg Oral BID    aspirin  81 mg Oral Daily    atorvastatin  40 mg Oral Nightly    sodium chloride flush  5-40 mL IntraVENous 2 times per day     Continuous Infusions:   heparin (PORCINE) Infusion 16 Units/kg/hr (01/12/23 0423)    sodium chloride       CBC:   Recent Labs     01/10/23  1605 01/11/23  0236   WBC 5.5 5.0   HGB 11.6* 11.0*    178       BMP:    Recent Labs     01/11/23  0236      K 3.9      CO2 24   BUN 17   CREATININE 0.81   GLUCOSE 151*       Hepatic:No results for input(s): AST, ALT, ALB, BILITOT, ALKPHOS in the last 72 hours. Troponin: No results for input(s): TROPHS in the last 72 hours. BNP: No results for input(s): BNP in the last 72 hours. Lipids: No results for input(s): CHOL, HDL in the last 72 hours. Invalid input(s): LDLCALCU  INR: No results for input(s): INR in the last 72 hours. Objective:   Vitals: BP (!) 153/54   Pulse 90   Temp 97.7 °F (36.5 °C) (Oral)   Resp 18   Ht 5' 4\" (1.626 m)   Wt 176 lb (79.8 kg)   SpO2 95%   BMI 30.21 kg/m²   General appearance: alert and cooperative with exam  HEENT: Head: Normocephalic, no lesions, without obvious abnormality.   Neck:no JVD, trachea midline, no adenopathy  Lungs: Clear to auscultation  Heart: Regular rate and rhythm, s1/s2 auscultated, no murmurs, SR, right radial ecchymosis noted, soft, CDI, no active bleeding, positive pulse. Abdomen: soft, non-tender, bowel sounds active  Extremities: no edema  Neurologic: not done        Assessment / Acute Cardiac Problems:   Abnormal stress test as above  HTN  HLD  Type II DM    Patient Active Problem List:     Closed fracture of neck of radius     Type 2 diabetes mellitus without complication, without long-term current use of insulin ([address]     Hypertension associated with diabetes ([address]     Major depressive disorder with single episode, in full remission ([address]     Age-related cognitive decline     Age-related osteoporosis without current pathological fracture     Nocturia     Right hip pain     Bilateral sacroiliitis (HCC)     Chronic pain disorder     Displacement of lumbar intervertebral disc without myelopathy     Osteoarthritis of knee     Postherpetic neuralgia     Post-laminectomy syndrome     Sacroiliitis, not elsewhere classified ([address]     Chronic renal disease, stage III ([address] [108074]     Alzheimer's disease, unspecified     CAD in native artery      Plan of Treatment:   Continue ASA, statin, and heparin gtt. Current NPO. I have discussed risks (including but not limited to vascular injury, infection, hematoma, contrast induced kidney dysfunction, CVA and MI), benefits, alternatives in detail. All questions answered. Patient agrees to proceed. AM labs pending. CTS consult for CABG. Pre op testing in progress. CTS requesting IVUS of left main. Case discussed with Dr. Aida Miranda. HTN. Elevated. Continue BB. Will add norvasc 5 mg PO QD.      Electronically signed by BETSEY Bianchi CNP on 1/12/2023 at 9:06 AM  [address]  [phone]

## 2023-01-12 NOTE — PROGRESS NOTES
Patient back to Car[facility] from Cath lab with light dressing at right groin,site clean, no hematoma, no bruising, no oozing of blood. Hooked to cardiac monitor. All vitals taken. No complaints of pain. Will continue to monitor.

## 2023-01-13 ENCOUNTER — ANESTHESIA (OUTPATIENT)
Dept: OPERATING ROOM | Age: 78
End: 2023-01-13
Payer: MEDICARE

## 2023-01-13 ENCOUNTER — APPOINTMENT (OUTPATIENT)
Dept: GENERAL RADIOLOGY | Age: 78
DRG: 233 | End: 2023-01-13
Attending: INTERNAL MEDICINE
Payer: MEDICARE

## 2023-01-13 DIAGNOSIS — Z01.818 PRE-OP EXAM: Primary | ICD-10-CM

## 2023-01-13 PROBLEM — M10.00 ACUTE IDIOPATHIC GOUT: Status: ACTIVE | Noted: 2023-01-13

## 2023-01-13 LAB
ALLEN TEST: ABNORMAL
ANGLE TEG: 69.2 DEG (ref 63–78)
ANGLE TEG: 79 DEG (ref 63–78)
ANION GAP SERPL CALCULATED.3IONS-SCNC: 11 MMOL/L (ref 9–17)
ANION GAP: 17 MMOL/L (ref 7–16)
BUN BLDV-MCNC: 14 MG/DL (ref 8–23)
CALCIUM IONIZED: 1.16 MMOL/L (ref 1.13–1.33)
CALCIUM SERPL-MCNC: 8.1 MG/DL (ref 8.6–10.4)
CHLORIDE BLD-SCNC: 111 MMOL/L (ref 98–107)
CO2: 20 MMOL/L (ref 20–31)
CREAT SERPL-MCNC: 0.78 MG/DL (ref 0.5–0.9)
EGFR, POC: >60 ML/MIN/1.73M2
EKG ATRIAL RATE: 85 BPM
EKG P AXIS: 59 DEGREES
EKG P-R INTERVAL: 180 MS
EKG Q-T INTERVAL: 404 MS
EKG QRS DURATION: 98 MS
EKG QTC CALCULATION (BAZETT): 480 MS
EKG R AXIS: -41 DEGREES
EKG T AXIS: 34 DEGREES
EKG VENTRICULAR RATE: 85 BPM
FIBRINOGEN, FUNCTIONAL TEG: 18.7 MM (ref 15–32)
FIBRINOGEN, FUNCTIONAL TEG: 41.4 MM (ref 15–32)
FIO2: 40
FIO2: 40
GFR SERPL CREATININE-BSD FRML MDRD: >60 ML/MIN/1.73M2
GLUCOSE BLD-MCNC: 125 MG/DL (ref 65–105)
GLUCOSE BLD-MCNC: 129 MG/DL (ref 74–100)
GLUCOSE BLD-MCNC: 134 MG/DL (ref 65–105)
GLUCOSE BLD-MCNC: 138 MG/DL (ref 65–105)
GLUCOSE BLD-MCNC: 143 MG/DL (ref 74–100)
GLUCOSE BLD-MCNC: 144 MG/DL (ref 74–100)
GLUCOSE BLD-MCNC: 146 MG/DL (ref 65–105)
GLUCOSE BLD-MCNC: 147 MG/DL (ref 74–100)
GLUCOSE BLD-MCNC: 155 MG/DL (ref 65–105)
GLUCOSE BLD-MCNC: 156 MG/DL (ref 74–100)
GLUCOSE BLD-MCNC: 160 MG/DL (ref 74–100)
GLUCOSE BLD-MCNC: 165 MG/DL (ref 74–100)
GLUCOSE BLD-MCNC: 168 MG/DL (ref 70–99)
GLUCOSE BLD-MCNC: 169 MG/DL (ref 74–100)
GLUCOSE BLD-MCNC: 182 MG/DL (ref 74–100)
HCT VFR BLD CALC: 33.1 % (ref 36.3–47.1)
HEMOGLOBIN: 10.9 G/DL (ref 11.9–15.1)
INR BLD: 1.4
KINETICS TEG: 0.8 MIN (ref 0.8–2.1)
KINETICS TEG: 1.9 MIN (ref 0.8–2.1)
MA (MAX CLOT) TEG: 57.2 MM (ref 52–69)
MA (MAX CLOT) TEG: 69 MM (ref 52–69)
MA(MAX CLOT) RAPID TEG: 56.7 MM (ref 52–70)
MA(MAX CLOT) RAPID TEG: 69.4 MM (ref 52–70)
MAGNESIUM: 2.7 MG/DL (ref 1.6–2.6)
MCH RBC QN AUTO: 30.7 PG (ref 25.2–33.5)
MCHC RBC AUTO-ENTMCNC: 32.9 G/DL (ref 28.4–34.8)
MCV RBC AUTO: 93.2 FL (ref 82.6–102.9)
MODE: 100
MODE: AC
MODE: AC
MRSA, DNA, NASAL: NEGATIVE
NEGATIVE BASE EXCESS, ART: 1 (ref 0–2)
NEGATIVE BASE EXCESS, ART: 2 (ref 0–2)
NEGATIVE BASE EXCESS, ART: 2 (ref 0–2)
NEGATIVE BASE EXCESS, ART: 3 (ref 0–2)
NEGATIVE BASE EXCESS, ART: 5 (ref 0–2)
NEGATIVE BASE EXCESS, ART: 5 (ref 0–2)
NRBC AUTOMATED: 0 PER 100 WBC
O2 DEVICE/FLOW/%: ABNORMAL
PARTIAL THROMBOPLASTIN TIME: 29.9 SEC (ref 20.5–30.5)
PDW BLD-RTO: 13.6 % (ref 11.8–14.4)
PERFORMING LOCATION: ABNORMAL
PERFORMING LOCATION: ABNORMAL
PLATELET # BLD: 105 K/UL (ref 138–453)
PMV BLD AUTO: 10.9 FL (ref 8.1–13.5)
POC BUN: 16 MG/DL (ref 8–26)
POC CHLORIDE: 111 MMOL/L (ref 98–107)
POC CREATININE: 0.89 MG/DL (ref 0.51–1.19)
POC HCO3: 19.7 MMOL/L (ref 21–28)
POC HCO3: 21.7 MMOL/L (ref 21–28)
POC HCO3: 22.4 MMOL/L (ref 21–28)
POC HCO3: 22.5 MMOL/L (ref 21–28)
POC HCO3: 23.8 MMOL/L (ref 21–28)
POC HCO3: 23.9 MMOL/L (ref 21–28)
POC HCO3: 24.9 MMOL/L (ref 21–28)
POC HCO3: 25 MMOL/L (ref 21–28)
POC HCO3: 25.5 MMOL/L (ref 21–28)
POC HEMATOCRIT: 19 % (ref 36–46)
POC HEMATOCRIT: 20 % (ref 36–46)
POC HEMATOCRIT: 21 % (ref 36–46)
POC HEMATOCRIT: 22 % (ref 36–46)
POC HEMATOCRIT: 28 % (ref 36–46)
POC HEMATOCRIT: 32 % (ref 36–46)
POC HEMATOCRIT: 33 % (ref 36–46)
POC HEMOGLOBIN: 10.8 G/DL (ref 12–16)
POC HEMOGLOBIN: 11.2 G/DL (ref 12–16)
POC HEMOGLOBIN: 6.6 G/DL (ref 12–16)
POC HEMOGLOBIN: 6.9 G/DL (ref 12–16)
POC HEMOGLOBIN: 7.1 G/DL (ref 12–16)
POC HEMOGLOBIN: 7.3 G/DL (ref 12–16)
POC HEMOGLOBIN: 9.5 G/DL (ref 12–16)
POC IONIZED CALCIUM: 1.03 MMOL/L (ref 1.15–1.33)
POC IONIZED CALCIUM: 1.14 MMOL/L (ref 1.15–1.33)
POC IONIZED CALCIUM: 1.15 MMOL/L (ref 1.15–1.33)
POC IONIZED CALCIUM: 1.19 MMOL/L (ref 1.15–1.33)
POC IONIZED CALCIUM: 1.2 MMOL/L (ref 1.15–1.33)
POC IONIZED CALCIUM: 1.22 MMOL/L (ref 1.15–1.33)
POC IONIZED CALCIUM: 1.3 MMOL/L (ref 1.15–1.33)
POC LACTIC ACID: 2.96 MMOL/L (ref 0.56–1.39)
POC O2 SATURATION: 100 % (ref 94–98)
POC O2 SATURATION: 95 % (ref 94–98)
POC O2 SATURATION: 96 % (ref 94–98)
POC O2 SATURATION: 99 % (ref 94–98)
POC PCO2: 36.1 MM HG (ref 35–48)
POC PCO2: 38.2 MM HG (ref 35–48)
POC PCO2: 38.4 MM HG (ref 35–48)
POC PCO2: 40.4 MM HG (ref 35–48)
POC PCO2: 42.1 MM HG (ref 35–48)
POC PCO2: 42.5 MM HG (ref 35–48)
POC PCO2: 42.7 MM HG (ref 35–48)
POC PCO2: 44.2 MM HG (ref 35–48)
POC PCO2: 48.4 MM HG (ref 35–48)
POC PH: 7.3 (ref 7.35–7.45)
POC PH: 7.3 (ref 7.35–7.45)
POC PH: 7.33 (ref 7.35–7.45)
POC PH: 7.35 (ref 7.35–7.45)
POC PH: 7.38 (ref 7.35–7.45)
POC PH: 7.38 (ref 7.35–7.45)
POC PH: 7.39 (ref 7.35–7.45)
POC PH: 7.4 (ref 7.35–7.45)
POC PH: 7.4 (ref 7.35–7.45)
POC PO2: 156.7 MM HG (ref 83–108)
POC PO2: 202.2 MM HG (ref 83–108)
POC PO2: 265.9 MM HG (ref 83–108)
POC PO2: 401.9 MM HG (ref 83–108)
POC PO2: 423.2 MM HG (ref 83–108)
POC PO2: 452.8 MM HG (ref 83–108)
POC PO2: 467.6 MM HG (ref 83–108)
POC PO2: 85.1 MM HG (ref 83–108)
POC PO2: 86.8 MM HG (ref 83–108)
POC POTASSIUM: 3.7 MMOL/L (ref 3.5–4.5)
POC POTASSIUM: 3.7 MMOL/L (ref 3.5–4.5)
POC POTASSIUM: 3.9 MMOL/L (ref 3.5–4.5)
POC POTASSIUM: 4.1 MMOL/L (ref 3.5–4.5)
POC POTASSIUM: 4.2 MMOL/L (ref 3.5–4.5)
POC POTASSIUM: 4.3 MMOL/L (ref 3.5–4.5)
POC POTASSIUM: 4.6 MMOL/L (ref 3.5–4.5)
POC SODIUM: 146 MMOL/L (ref 138–146)
POC TCO2: 19 MMOL/L (ref 22–30)
POSITIVE BASE EXCESS, ART: 0 (ref 0–3)
POTASSIUM SERPL-SCNC: 4.2 MMOL/L (ref 3.7–5.3)
PROTHROMBIN TIME: 14.3 SEC (ref 9.1–12.3)
RBC # BLD: 3.55 M/UL (ref 3.95–5.11)
REACTION TIME TEG W HEPARIN: 10.4 MIN (ref 4.3–8.3)
REACTION TIME TEG W HEPARIN: 6.2 MIN (ref 4.3–8.3)
REACTION TIME TEG: 10 MIN (ref 4.6–9.1)
REACTION TIME TEG: 6.3 MIN (ref 4.6–9.1)
SAMPLE SITE: ABNORMAL
SODIUM BLD-SCNC: 142 MMOL/L (ref 135–144)
SPECIMEN DESCRIPTION: NORMAL
WBC # BLD: 12.5 K/UL (ref 3.5–11.3)

## 2023-01-13 PROCEDURE — 2580000003 HC RX 258: Performed by: NURSE ANESTHETIST, CERTIFIED REGISTERED

## 2023-01-13 PROCEDURE — 80048 BASIC METABOLIC PNL TOTAL CA: CPT

## 2023-01-13 PROCEDURE — 3700000001 HC ADD 15 MINUTES (ANESTHESIA): Performed by: THORACIC SURGERY (CARDIOTHORACIC VASCULAR SURGERY)

## 2023-01-13 PROCEDURE — 06BP4ZZ EXCISION OF RIGHT SAPHENOUS VEIN, PERCUTANEOUS ENDOSCOPIC APPROACH: ICD-10-PCS | Performed by: THORACIC SURGERY (CARDIOTHORACIC VASCULAR SURGERY)

## 2023-01-13 PROCEDURE — 2709999900 HC NON-CHARGEABLE SUPPLY: Performed by: THORACIC SURGERY (CARDIOTHORACIC VASCULAR SURGERY)

## 2023-01-13 PROCEDURE — 6360000002 HC RX W HCPCS: Performed by: STUDENT IN AN ORGANIZED HEALTH CARE EDUCATION/TRAINING PROGRAM

## 2023-01-13 PROCEDURE — 6370000000 HC RX 637 (ALT 250 FOR IP)

## 2023-01-13 PROCEDURE — 2100000001 HC CVICU R&B

## 2023-01-13 PROCEDURE — 2720000010 HC SURG SUPPLY STERILE: Performed by: THORACIC SURGERY (CARDIOTHORACIC VASCULAR SURGERY)

## 2023-01-13 PROCEDURE — 021109W BYPASS CORONARY ARTERY, TWO ARTERIES FROM AORTA WITH AUTOLOGOUS VENOUS TISSUE, OPEN APPROACH: ICD-10-PCS | Performed by: THORACIC SURGERY (CARDIOTHORACIC VASCULAR SURGERY)

## 2023-01-13 PROCEDURE — A4217 STERILE WATER/SALINE, 500 ML: HCPCS | Performed by: THORACIC SURGERY (CARDIOTHORACIC VASCULAR SURGERY)

## 2023-01-13 PROCEDURE — 82947 ASSAY GLUCOSE BLOOD QUANT: CPT

## 2023-01-13 PROCEDURE — 85730 THROMBOPLASTIN TIME PARTIAL: CPT

## 2023-01-13 PROCEDURE — 85347 COAGULATION TIME ACTIVATED: CPT

## 2023-01-13 PROCEDURE — 83605 ASSAY OF LACTIC ACID: CPT

## 2023-01-13 PROCEDURE — 3700000000 HC ANESTHESIA ATTENDED CARE: Performed by: THORACIC SURGERY (CARDIOTHORACIC VASCULAR SURGERY)

## 2023-01-13 PROCEDURE — 86900 BLOOD TYPING SEROLOGIC ABO: CPT

## 2023-01-13 PROCEDURE — 31500 INSERT EMERGENCY AIRWAY: CPT

## 2023-01-13 PROCEDURE — 93005 ELECTROCARDIOGRAM TRACING: CPT | Performed by: THORACIC SURGERY (CARDIOTHORACIC VASCULAR SURGERY)

## 2023-01-13 PROCEDURE — 85027 COMPLETE CBC AUTOMATED: CPT

## 2023-01-13 PROCEDURE — 33533 CABG ARTERIAL SINGLE: CPT | Performed by: THORACIC SURGERY (CARDIOTHORACIC VASCULAR SURGERY)

## 2023-01-13 PROCEDURE — 6370000000 HC RX 637 (ALT 250 FOR IP): Performed by: NURSE ANESTHETIST, CERTIFIED REGISTERED

## 2023-01-13 PROCEDURE — 82803 BLOOD GASES ANY COMBINATION: CPT

## 2023-01-13 PROCEDURE — 2580000003 HC RX 258: Performed by: THORACIC SURGERY (CARDIOTHORACIC VASCULAR SURGERY)

## 2023-01-13 PROCEDURE — 6370000000 HC RX 637 (ALT 250 FOR IP): Performed by: STUDENT IN AN ORGANIZED HEALTH CARE EDUCATION/TRAINING PROGRAM

## 2023-01-13 PROCEDURE — P9045 ALBUMIN (HUMAN), 5%, 250 ML: HCPCS | Performed by: NURSE ANESTHETIST, CERTIFIED REGISTERED

## 2023-01-13 PROCEDURE — 6370000000 HC RX 637 (ALT 250 FOR IP): Performed by: THORACIC SURGERY (CARDIOTHORACIC VASCULAR SURGERY)

## 2023-01-13 PROCEDURE — 85014 HEMATOCRIT: CPT

## 2023-01-13 PROCEDURE — 82330 ASSAY OF CALCIUM: CPT

## 2023-01-13 PROCEDURE — 6370000000 HC RX 637 (ALT 250 FOR IP): Performed by: PHYSICIAN ASSISTANT

## 2023-01-13 PROCEDURE — P9016 RBC LEUKOCYTES REDUCED: HCPCS

## 2023-01-13 PROCEDURE — 37799 UNLISTED PX VASCULAR SURGERY: CPT

## 2023-01-13 PROCEDURE — 02100Z9 BYPASS CORONARY ARTERY, ONE ARTERY FROM LEFT INTERNAL MAMMARY, OPEN APPROACH: ICD-10-PCS | Performed by: THORACIC SURGERY (CARDIOTHORACIC VASCULAR SURGERY)

## 2023-01-13 PROCEDURE — 85384 FIBRINOGEN ACTIVITY: CPT

## 2023-01-13 PROCEDURE — 33508 ENDOSCOPIC VEIN HARVEST: CPT | Performed by: THORACIC SURGERY (CARDIOTHORACIC VASCULAR SURGERY)

## 2023-01-13 PROCEDURE — 7100000001 HC PACU RECOVERY - ADDTL 15 MIN

## 2023-01-13 PROCEDURE — 6360000002 HC RX W HCPCS: Performed by: THORACIC SURGERY (CARDIOTHORACIC VASCULAR SURGERY)

## 2023-01-13 PROCEDURE — C9113 INJ PANTOPRAZOLE SODIUM, VIA: HCPCS | Performed by: THORACIC SURGERY (CARDIOTHORACIC VASCULAR SURGERY)

## 2023-01-13 PROCEDURE — C1729 CATH, DRAINAGE: HCPCS | Performed by: THORACIC SURGERY (CARDIOTHORACIC VASCULAR SURGERY)

## 2023-01-13 PROCEDURE — 85610 PROTHROMBIN TIME: CPT

## 2023-01-13 PROCEDURE — 3600000008 HC SURGERY OHS BASE: Performed by: THORACIC SURGERY (CARDIOTHORACIC VASCULAR SURGERY)

## 2023-01-13 PROCEDURE — 2700000000 HC OXYGEN THERAPY PER DAY

## 2023-01-13 PROCEDURE — 84520 ASSAY OF UREA NITROGEN: CPT

## 2023-01-13 PROCEDURE — 7100000000 HC PACU RECOVERY - FIRST 15 MIN

## 2023-01-13 PROCEDURE — 3600000018 HC SURGERY OHS ADDTL 15MIN: Performed by: THORACIC SURGERY (CARDIOTHORACIC VASCULAR SURGERY)

## 2023-01-13 PROCEDURE — 5A1221Z PERFORMANCE OF CARDIAC OUTPUT, CONTINUOUS: ICD-10-PCS | Performed by: THORACIC SURGERY (CARDIOTHORACIC VASCULAR SURGERY)

## 2023-01-13 PROCEDURE — 82565 ASSAY OF CREATININE: CPT

## 2023-01-13 PROCEDURE — 2580000003 HC RX 258: Performed by: PHYSICIAN ASSISTANT

## 2023-01-13 PROCEDURE — 71045 X-RAY EXAM CHEST 1 VIEW: CPT

## 2023-01-13 PROCEDURE — 84132 ASSAY OF SERUM POTASSIUM: CPT

## 2023-01-13 PROCEDURE — 2500000003 HC RX 250 WO HCPCS: Performed by: THORACIC SURGERY (CARDIOTHORACIC VASCULAR SURGERY)

## 2023-01-13 PROCEDURE — 83735 ASSAY OF MAGNESIUM: CPT

## 2023-01-13 PROCEDURE — 6360000002 HC RX W HCPCS: Performed by: NURSE ANESTHETIST, CERTIFIED REGISTERED

## 2023-01-13 PROCEDURE — 2580000003 HC RX 258

## 2023-01-13 PROCEDURE — 2500000003 HC RX 250 WO HCPCS: Performed by: NURSE ANESTHETIST, CERTIFIED REGISTERED

## 2023-01-13 PROCEDURE — 2500000003 HC RX 250 WO HCPCS

## 2023-01-13 PROCEDURE — 76998 US GUIDE INTRAOP: CPT | Performed by: THORACIC SURGERY (CARDIOTHORACIC VASCULAR SURGERY)

## 2023-01-13 PROCEDURE — 6360000002 HC RX W HCPCS

## 2023-01-13 PROCEDURE — 99232 SBSQ HOSP IP/OBS MODERATE 35: CPT | Performed by: INTERNAL MEDICINE

## 2023-01-13 PROCEDURE — 80051 ELECTROLYTE PANEL: CPT

## 2023-01-13 PROCEDURE — P9045 ALBUMIN (HUMAN), 5%, 250 ML: HCPCS | Performed by: THORACIC SURGERY (CARDIOTHORACIC VASCULAR SURGERY)

## 2023-01-13 PROCEDURE — B24BZZ4 ULTRASONOGRAPHY OF HEART WITH AORTA, TRANSESOPHAGEAL: ICD-10-PCS | Performed by: ANESTHESIOLOGY

## 2023-01-13 PROCEDURE — 85576 BLOOD PLATELET AGGREGATION: CPT

## 2023-01-13 PROCEDURE — 94761 N-INVAS EAR/PLS OXIMETRY MLT: CPT

## 2023-01-13 PROCEDURE — 94002 VENT MGMT INPAT INIT DAY: CPT

## 2023-01-13 PROCEDURE — 33518 CABG ARTERY-VEIN TWO: CPT | Performed by: THORACIC SURGERY (CARDIOTHORACIC VASCULAR SURGERY)

## 2023-01-13 DEVICE — IMPLANTABLE DEVICE: Type: IMPLANTABLE DEVICE | Site: CHEST | Status: FUNCTIONAL

## 2023-01-13 RX ORDER — OXYCODONE HYDROCHLORIDE AND ACETAMINOPHEN 5; 325 MG/1; MG/1
2 TABLET ORAL EVERY 4 HOURS PRN
Status: DISCONTINUED | OUTPATIENT
Start: 2023-01-13 | End: 2023-01-14

## 2023-01-13 RX ORDER — SODIUM CHLORIDE 9 MG/ML
INJECTION, SOLUTION INTRAVENOUS PRN
Status: DISCONTINUED | OUTPATIENT
Start: 2023-01-13 | End: 2023-01-13

## 2023-01-13 RX ORDER — ASPIRIN 81 MG/1
81 TABLET ORAL DAILY
Status: DISCONTINUED | OUTPATIENT
Start: 2023-01-13 | End: 2023-01-16

## 2023-01-13 RX ORDER — HEPARIN SODIUM 1000 [USP'U]/ML
INJECTION, SOLUTION INTRAVENOUS; SUBCUTANEOUS
Status: COMPLETED
Start: 2023-01-13 | End: 2023-01-13

## 2023-01-13 RX ORDER — SODIUM CHLORIDE 9 MG/ML
INJECTION, SOLUTION INTRAVENOUS PRN
Status: DISCONTINUED | OUTPATIENT
Start: 2023-01-13 | End: 2023-01-17 | Stop reason: HOSPADM

## 2023-01-13 RX ORDER — SODIUM CHLORIDE, SODIUM LACTATE, POTASSIUM CHLORIDE, CALCIUM CHLORIDE 600; 310; 30; 20 MG/100ML; MG/100ML; MG/100ML; MG/100ML
INJECTION, SOLUTION INTRAVENOUS CONTINUOUS PRN
Status: DISCONTINUED | OUTPATIENT
Start: 2023-01-13 | End: 2023-01-13 | Stop reason: SDUPTHER

## 2023-01-13 RX ORDER — OXYCODONE HYDROCHLORIDE AND ACETAMINOPHEN 5; 325 MG/1; MG/1
1 TABLET ORAL EVERY 4 HOURS PRN
Status: DISCONTINUED | OUTPATIENT
Start: 2023-01-13 | End: 2023-01-15

## 2023-01-13 RX ORDER — DIPHENHYDRAMINE HCL 25 MG
25 TABLET ORAL NIGHTLY PRN
Status: DISCONTINUED | OUTPATIENT
Start: 2023-01-14 | End: 2023-01-17 | Stop reason: HOSPADM

## 2023-01-13 RX ORDER — DONEPEZIL HYDROCHLORIDE 5 MG/1
5 TABLET, FILM COATED ORAL DAILY
Status: DISCONTINUED | OUTPATIENT
Start: 2023-01-14 | End: 2023-01-17 | Stop reason: HOSPADM

## 2023-01-13 RX ORDER — GLYCOPYRROLATE 0.2 MG/ML
INJECTION INTRAMUSCULAR; INTRAVENOUS PRN
Status: DISCONTINUED | OUTPATIENT
Start: 2023-01-13 | End: 2023-01-13 | Stop reason: SDUPTHER

## 2023-01-13 RX ORDER — PAPAVERINE HYDROCHLORIDE 30 MG/ML
INJECTION INTRAMUSCULAR; INTRAVENOUS
Status: DISCONTINUED
Start: 2023-01-13 | End: 2023-01-13

## 2023-01-13 RX ORDER — PROPOFOL 10 MG/ML
INJECTION, EMULSION INTRAVENOUS PRN
Status: DISCONTINUED | OUTPATIENT
Start: 2023-01-13 | End: 2023-01-13 | Stop reason: SDUPTHER

## 2023-01-13 RX ORDER — TRANEXAMIC ACID 10 MG/ML
INJECTION, SOLUTION INTRAVENOUS
Status: COMPLETED
Start: 2023-01-13 | End: 2023-01-13

## 2023-01-13 RX ORDER — POTASSIUM CHLORIDE 29.8 MG/ML
20 INJECTION INTRAVENOUS PRN
Status: DISCONTINUED | OUTPATIENT
Start: 2023-01-13 | End: 2023-01-17 | Stop reason: HOSPADM

## 2023-01-13 RX ORDER — ASPIRIN 81 MG/1
81 TABLET ORAL
Status: COMPLETED | OUTPATIENT
Start: 2023-01-13 | End: 2023-01-13

## 2023-01-13 RX ORDER — MAGNESIUM SULFATE IN WATER 40 MG/ML
2000 INJECTION, SOLUTION INTRAVENOUS PRN
Status: DISCONTINUED | OUTPATIENT
Start: 2023-01-13 | End: 2023-01-17 | Stop reason: HOSPADM

## 2023-01-13 RX ORDER — POLYETHYLENE GLYCOL 3350 17 G/17G
17 POWDER, FOR SOLUTION ORAL DAILY
Status: DISCONTINUED | OUTPATIENT
Start: 2023-01-13 | End: 2023-01-17 | Stop reason: HOSPADM

## 2023-01-13 RX ORDER — ONDANSETRON 4 MG/1
4 TABLET, ORALLY DISINTEGRATING ORAL EVERY 8 HOURS PRN
Status: DISCONTINUED | OUTPATIENT
Start: 2023-01-13 | End: 2023-01-17 | Stop reason: HOSPADM

## 2023-01-13 RX ORDER — POTASSIUM CHLORIDE 29.8 MG/ML
INJECTION INTRAVENOUS
Status: COMPLETED
Start: 2023-01-13 | End: 2023-01-13

## 2023-01-13 RX ORDER — SODIUM CHLORIDE 0.9 % (FLUSH) 0.9 %
5-40 SYRINGE (ML) INJECTION EVERY 12 HOURS SCHEDULED
Status: DISCONTINUED | OUTPATIENT
Start: 2023-01-13 | End: 2023-01-17 | Stop reason: HOSPADM

## 2023-01-13 RX ORDER — PHENYLEPHRINE HCL IN 0.9% NACL 0.5 MG/5ML
SYRINGE (ML) INTRAVENOUS PRN
Status: DISCONTINUED | OUTPATIENT
Start: 2023-01-13 | End: 2023-01-13 | Stop reason: SDUPTHER

## 2023-01-13 RX ORDER — SODIUM CHLORIDE 9 MG/ML
INJECTION, SOLUTION INTRAVENOUS CONTINUOUS PRN
Status: DISCONTINUED | OUTPATIENT
Start: 2023-01-13 | End: 2023-01-13 | Stop reason: SDUPTHER

## 2023-01-13 RX ORDER — ALBUMIN, HUMAN INJ 5% 5 %
25 SOLUTION INTRAVENOUS PRN
Status: DISCONTINUED | OUTPATIENT
Start: 2023-01-13 | End: 2023-01-17 | Stop reason: HOSPADM

## 2023-01-13 RX ORDER — INSULIN GLARGINE 100 [IU]/ML
0.15 INJECTION, SOLUTION SUBCUTANEOUS NIGHTLY
Status: DISCONTINUED | OUTPATIENT
Start: 2023-01-14 | End: 2023-01-17 | Stop reason: HOSPADM

## 2023-01-13 RX ORDER — PANTOPRAZOLE SODIUM 40 MG/1
40 TABLET, DELAYED RELEASE ORAL DAILY
Status: DISCONTINUED | OUTPATIENT
Start: 2023-01-13 | End: 2023-01-17 | Stop reason: HOSPADM

## 2023-01-13 RX ORDER — METOPROLOL TARTRATE 5 MG/5ML
2.5 INJECTION INTRAVENOUS EVERY 10 MIN PRN
Status: COMPLETED | OUTPATIENT
Start: 2023-01-13 | End: 2023-01-13

## 2023-01-13 RX ORDER — DEXTROSE MONOHYDRATE 100 MG/ML
INJECTION, SOLUTION INTRAVENOUS CONTINUOUS PRN
Status: DISCONTINUED | OUTPATIENT
Start: 2023-01-13 | End: 2023-01-17 | Stop reason: HOSPADM

## 2023-01-13 RX ORDER — MIDAZOLAM HYDROCHLORIDE 1 MG/ML
INJECTION INTRAMUSCULAR; INTRAVENOUS PRN
Status: DISCONTINUED | OUTPATIENT
Start: 2023-01-13 | End: 2023-01-13 | Stop reason: SDUPTHER

## 2023-01-13 RX ORDER — POTASSIUM CHLORIDE 29.8 MG/ML
INJECTION INTRAVENOUS PRN
Status: DISCONTINUED | OUTPATIENT
Start: 2023-01-13 | End: 2023-01-13 | Stop reason: SDUPTHER

## 2023-01-13 RX ORDER — PROTAMINE SULFATE 10 MG/ML
50 INJECTION, SOLUTION INTRAVENOUS
Status: ACTIVE | OUTPATIENT
Start: 2023-01-13 | End: 2023-01-14

## 2023-01-13 RX ORDER — FENTANYL CITRATE 50 UG/ML
50 INJECTION, SOLUTION INTRAMUSCULAR; INTRAVENOUS
Status: DISCONTINUED | OUTPATIENT
Start: 2023-01-13 | End: 2023-01-14

## 2023-01-13 RX ORDER — CLOPIDOGREL BISULFATE 75 MG/1
75 TABLET ORAL DAILY
Status: DISCONTINUED | OUTPATIENT
Start: 2023-01-14 | End: 2023-01-17 | Stop reason: HOSPADM

## 2023-01-13 RX ORDER — POLYETHYLENE GLYCOL 3350 17 G/17G
17 POWDER, FOR SOLUTION ORAL DAILY PRN
Status: DISCONTINUED | OUTPATIENT
Start: 2023-01-13 | End: 2023-01-17 | Stop reason: HOSPADM

## 2023-01-13 RX ORDER — FENTANYL CITRATE 0.05 MG/ML
INJECTION, SOLUTION INTRAMUSCULAR; INTRAVENOUS PRN
Status: DISCONTINUED | OUTPATIENT
Start: 2023-01-13 | End: 2023-01-13 | Stop reason: SDUPTHER

## 2023-01-13 RX ORDER — PROTAMINE SULFATE 10 MG/ML
INJECTION, SOLUTION INTRAVENOUS PRN
Status: DISCONTINUED | OUTPATIENT
Start: 2023-01-13 | End: 2023-01-13 | Stop reason: SDUPTHER

## 2023-01-13 RX ORDER — ISOFLURANE 1 ML/ML
LIQUID RESPIRATORY (INHALATION)
Status: DISCONTINUED
Start: 2023-01-13 | End: 2023-01-13

## 2023-01-13 RX ORDER — TRANEXAMIC ACID 10 MG/ML
INJECTION, SOLUTION INTRAVENOUS PRN
Status: DISCONTINUED | OUTPATIENT
Start: 2023-01-13 | End: 2023-01-13 | Stop reason: SDUPTHER

## 2023-01-13 RX ORDER — HYDRALAZINE HYDROCHLORIDE 20 MG/ML
5 INJECTION INTRAMUSCULAR; INTRAVENOUS EVERY 5 MIN PRN
Status: DISCONTINUED | OUTPATIENT
Start: 2023-01-13 | End: 2023-01-17 | Stop reason: HOSPADM

## 2023-01-13 RX ORDER — SENNA AND DOCUSATE SODIUM 50; 8.6 MG/1; MG/1
1 TABLET, FILM COATED ORAL 2 TIMES DAILY
Status: DISCONTINUED | OUTPATIENT
Start: 2023-01-13 | End: 2023-01-17 | Stop reason: HOSPADM

## 2023-01-13 RX ORDER — LIDOCAINE HYDROCHLORIDE 10 MG/ML
INJECTION, SOLUTION INFILTRATION; PERINEURAL PRN
Status: DISCONTINUED | OUTPATIENT
Start: 2023-01-13 | End: 2023-01-13 | Stop reason: SDUPTHER

## 2023-01-13 RX ORDER — MAGNESIUM HYDROXIDE 1200 MG/15ML
LIQUID ORAL CONTINUOUS PRN
Status: COMPLETED | OUTPATIENT
Start: 2023-01-13 | End: 2023-01-13

## 2023-01-13 RX ORDER — INSULIN LISPRO 100 [IU]/ML
0-6 INJECTION, SOLUTION INTRAVENOUS; SUBCUTANEOUS
Status: DISCONTINUED | OUTPATIENT
Start: 2023-01-13 | End: 2023-01-15

## 2023-01-13 RX ORDER — ALBUMIN, HUMAN INJ 5% 5 %
SOLUTION INTRAVENOUS PRN
Status: DISCONTINUED | OUTPATIENT
Start: 2023-01-13 | End: 2023-01-13 | Stop reason: SDUPTHER

## 2023-01-13 RX ORDER — AMIODARONE HYDROCHLORIDE 200 MG/1
200 TABLET ORAL 3 TIMES DAILY
Status: DISCONTINUED | OUTPATIENT
Start: 2023-01-13 | End: 2023-01-17 | Stop reason: HOSPADM

## 2023-01-13 RX ORDER — HEPARIN SODIUM 1000 [USP'U]/ML
INJECTION, SOLUTION INTRAVENOUS; SUBCUTANEOUS PRN
Status: DISCONTINUED | OUTPATIENT
Start: 2023-01-13 | End: 2023-01-13 | Stop reason: SDUPTHER

## 2023-01-13 RX ORDER — PROPOFOL 10 MG/ML
10 INJECTION, EMULSION INTRAVENOUS CONTINUOUS
Status: DISCONTINUED | OUTPATIENT
Start: 2023-01-13 | End: 2023-01-13

## 2023-01-13 RX ORDER — PROPOFOL 10 MG/ML
INJECTION, EMULSION INTRAVENOUS CONTINUOUS PRN
Status: DISCONTINUED | OUTPATIENT
Start: 2023-01-13 | End: 2023-01-13 | Stop reason: SDUPTHER

## 2023-01-13 RX ORDER — ALBUMIN (HUMAN) 12.5 G/50ML
25 SOLUTION INTRAVENOUS PRN
Status: DISCONTINUED | OUTPATIENT
Start: 2023-01-13 | End: 2023-01-17 | Stop reason: HOSPADM

## 2023-01-13 RX ORDER — PROPOFOL 10 MG/ML
INJECTION, EMULSION INTRAVENOUS
Status: COMPLETED
Start: 2023-01-13 | End: 2023-01-13

## 2023-01-13 RX ORDER — ATORVASTATIN CALCIUM 20 MG/1
20 TABLET, FILM COATED ORAL NIGHTLY
Status: DISCONTINUED | OUTPATIENT
Start: 2023-01-14 | End: 2023-01-16

## 2023-01-13 RX ORDER — PROTAMINE SULFATE 10 MG/ML
INJECTION, SOLUTION INTRAVENOUS
Status: COMPLETED
Start: 2023-01-13 | End: 2023-01-13

## 2023-01-13 RX ORDER — IPRATROPIUM BROMIDE AND ALBUTEROL SULFATE 2.5; .5 MG/3ML; MG/3ML
1 SOLUTION RESPIRATORY (INHALATION) EVERY 4 HOURS PRN
Status: DISCONTINUED | OUTPATIENT
Start: 2023-01-13 | End: 2023-01-17 | Stop reason: HOSPADM

## 2023-01-13 RX ORDER — FENTANYL CITRATE 50 UG/ML
25 INJECTION, SOLUTION INTRAMUSCULAR; INTRAVENOUS
Status: DISCONTINUED | OUTPATIENT
Start: 2023-01-13 | End: 2023-01-14

## 2023-01-13 RX ORDER — ONDANSETRON 2 MG/ML
4 INJECTION INTRAMUSCULAR; INTRAVENOUS EVERY 6 HOURS PRN
Status: DISCONTINUED | OUTPATIENT
Start: 2023-01-13 | End: 2023-01-17 | Stop reason: HOSPADM

## 2023-01-13 RX ORDER — ROCURONIUM BROMIDE 10 MG/ML
INJECTION, SOLUTION INTRAVENOUS PRN
Status: DISCONTINUED | OUTPATIENT
Start: 2023-01-13 | End: 2023-01-13 | Stop reason: SDUPTHER

## 2023-01-13 RX ORDER — INSULIN LISPRO 100 [IU]/ML
0-3 INJECTION, SOLUTION INTRAVENOUS; SUBCUTANEOUS NIGHTLY
Status: DISCONTINUED | OUTPATIENT
Start: 2023-01-13 | End: 2023-01-15

## 2023-01-13 RX ORDER — CHLORHEXIDINE GLUCONATE 0.12 MG/ML
15 RINSE ORAL ONCE
Status: COMPLETED | OUTPATIENT
Start: 2023-01-13 | End: 2023-01-13

## 2023-01-13 RX ORDER — SODIUM CHLORIDE 0.9 % (FLUSH) 0.9 %
5-40 SYRINGE (ML) INJECTION PRN
Status: DISCONTINUED | OUTPATIENT
Start: 2023-01-13 | End: 2023-01-17 | Stop reason: HOSPADM

## 2023-01-13 RX ORDER — MEPERIDINE HYDROCHLORIDE 50 MG/ML
25 INJECTION INTRAMUSCULAR; INTRAVENOUS; SUBCUTANEOUS
Status: ACTIVE | OUTPATIENT
Start: 2023-01-13 | End: 2023-01-14

## 2023-01-13 RX ORDER — NOREPINEPHRINE BIT/0.9 % NACL 16MG/250ML
.01-.08 INFUSION BOTTLE (ML) INTRAVENOUS CONTINUOUS PRN
Status: DISCONTINUED | OUTPATIENT
Start: 2023-01-13 | End: 2023-01-17 | Stop reason: HOSPADM

## 2023-01-13 RX ADMIN — AMIODARONE HYDROCHLORIDE 200 MG: 200 TABLET ORAL at 16:30

## 2023-01-13 RX ADMIN — METOPROLOL TARTRATE 2.5 MG: 5 INJECTION, SOLUTION INTRAVENOUS at 17:11

## 2023-01-13 RX ADMIN — Medication 100 MCG: at 09:05

## 2023-01-13 RX ADMIN — Medication 100 MCG: at 09:41

## 2023-01-13 RX ADMIN — SODIUM CHLORIDE: 9 INJECTION, SOLUTION INTRAVENOUS at 14:35

## 2023-01-13 RX ADMIN — MIDAZOLAM 2 MG: 1 INJECTION INTRAMUSCULAR; INTRAVENOUS at 08:50

## 2023-01-13 RX ADMIN — Medication 200 MCG: at 14:16

## 2023-01-13 RX ADMIN — SODIUM CHLORIDE, POTASSIUM CHLORIDE, SODIUM LACTATE AND CALCIUM CHLORIDE: 600; 310; 30; 20 INJECTION, SOLUTION INTRAVENOUS at 08:46

## 2023-01-13 RX ADMIN — ALBUMIN (HUMAN) 25 G: 12.5 INJECTION, SOLUTION INTRAVENOUS at 18:40

## 2023-01-13 RX ADMIN — Medication 100 MCG: at 11:24

## 2023-01-13 RX ADMIN — MUPIROCIN: 20 OINTMENT TOPICAL at 07:43

## 2023-01-13 RX ADMIN — ASPIRIN 81 MG: 81 TABLET, COATED ORAL at 07:42

## 2023-01-13 RX ADMIN — EPINEPHRINE 0.04 MCG/KG/MIN: 1 INJECTION PARENTERAL at 12:27

## 2023-01-13 RX ADMIN — POLYETHYLENE GLYCOL 3350 17 G: 17 POWDER, FOR SOLUTION ORAL at 16:18

## 2023-01-13 RX ADMIN — SODIUM CHLORIDE, PRESERVATIVE FREE 10 ML: 5 INJECTION INTRAVENOUS at 20:32

## 2023-01-13 RX ADMIN — PROPOFOL 15 MCG/KG/MIN: 10 INJECTION, EMULSION INTRAVENOUS at 14:29

## 2023-01-13 RX ADMIN — FENTANYL CITRATE 150 MCG: 50 INJECTION INTRAVENOUS at 10:12

## 2023-01-13 RX ADMIN — Medication 100 MCG: at 11:30

## 2023-01-13 RX ADMIN — PROTAMINE SULFATE 50 MG: 10 INJECTION, SOLUTION INTRAVENOUS at 13:36

## 2023-01-13 RX ADMIN — HEPARIN SODIUM 5000 UNITS: 1000 INJECTION INTRAVENOUS; SUBCUTANEOUS at 10:48

## 2023-01-13 RX ADMIN — FENTANYL CITRATE 100 MCG: 50 INJECTION INTRAVENOUS at 13:55

## 2023-01-13 RX ADMIN — MUPIROCIN: 20 OINTMENT TOPICAL at 20:39

## 2023-01-13 RX ADMIN — Medication 1000 MG: at 09:53

## 2023-01-13 RX ADMIN — POTASSIUM CHLORIDE 20 MEQ: 29.8 INJECTION, SOLUTION INTRAVENOUS at 13:53

## 2023-01-13 RX ADMIN — METOPROLOL TARTRATE 2.5 MG: 5 INJECTION, SOLUTION INTRAVENOUS at 17:00

## 2023-01-13 RX ADMIN — ROCURONIUM BROMIDE 50 MG: 10 SOLUTION INTRAVENOUS at 08:54

## 2023-01-13 RX ADMIN — AMIODARONE HYDROCHLORIDE 200 MG: 200 TABLET ORAL at 20:32

## 2023-01-13 RX ADMIN — HYDRALAZINE HYDROCHLORIDE 5 MG: 20 INJECTION INTRAMUSCULAR; INTRAVENOUS at 16:51

## 2023-01-13 RX ADMIN — POTASSIUM CHLORIDE 20 MEQ: 29.8 INJECTION, SOLUTION INTRAVENOUS at 19:51

## 2023-01-13 RX ADMIN — SODIUM CHLORIDE: 9 INJECTION, SOLUTION INTRAVENOUS at 19:48

## 2023-01-13 RX ADMIN — Medication 200 MCG: at 13:26

## 2023-01-13 RX ADMIN — Medication 1000 MG: at 21:06

## 2023-01-13 RX ADMIN — SODIUM CHLORIDE, POTASSIUM CHLORIDE, SODIUM LACTATE AND CALCIUM CHLORIDE: 600; 310; 30; 20 INJECTION, SOLUTION INTRAVENOUS at 11:23

## 2023-01-13 RX ADMIN — Medication 100 MCG: at 13:50

## 2023-01-13 RX ADMIN — TRANEXAMIC ACID 1000 MG: 10 INJECTION, SOLUTION INTRAVENOUS at 09:51

## 2023-01-13 RX ADMIN — SODIUM CHLORIDE: 9 INJECTION, SOLUTION INTRAVENOUS at 19:00

## 2023-01-13 RX ADMIN — SODIUM BICARBONATE 50 MEQ: 84 INJECTION INTRAVENOUS at 18:39

## 2023-01-13 RX ADMIN — CHLORHEXIDINE GLUCONATE 15 ML: 1.2 SOLUTION ORAL at 07:42

## 2023-01-13 RX ADMIN — OXYCODONE HYDROCHLORIDE AND ACETAMINOPHEN 2 TABLET: 5; 325 TABLET ORAL at 16:36

## 2023-01-13 RX ADMIN — ROCURONIUM BROMIDE 30 MG: 10 SOLUTION INTRAVENOUS at 10:17

## 2023-01-13 RX ADMIN — FENTANYL CITRATE 100 MCG: 50 INJECTION INTRAVENOUS at 13:56

## 2023-01-13 RX ADMIN — OXYCODONE HYDROCHLORIDE AND ACETAMINOPHEN 2 TABLET: 5; 325 TABLET ORAL at 20:32

## 2023-01-13 RX ADMIN — SODIUM CHLORIDE 2 UNITS/HR: 9 INJECTION, SOLUTION INTRAVENOUS at 10:04

## 2023-01-13 RX ADMIN — ROCURONIUM BROMIDE 20 MG: 10 SOLUTION INTRAVENOUS at 13:56

## 2023-01-13 RX ADMIN — SODIUM CHLORIDE: 9 INJECTION, SOLUTION INTRAVENOUS at 00:19

## 2023-01-13 RX ADMIN — SODIUM CHLORIDE: 9 INJECTION, SOLUTION INTRAVENOUS at 09:40

## 2023-01-13 RX ADMIN — ROCURONIUM BROMIDE 30 MG: 10 SOLUTION INTRAVENOUS at 13:25

## 2023-01-13 RX ADMIN — Medication 200 MCG: at 14:07

## 2023-01-13 RX ADMIN — ONDANSETRON 4 MG: 2 INJECTION INTRAMUSCULAR; INTRAVENOUS at 20:38

## 2023-01-13 RX ADMIN — SODIUM CHLORIDE 5 MG/HR: 9 INJECTION, SOLUTION INTRAVENOUS at 19:36

## 2023-01-13 RX ADMIN — SODIUM CHLORIDE, POTASSIUM CHLORIDE, SODIUM LACTATE AND CALCIUM CHLORIDE: 600; 310; 30; 20 INJECTION, SOLUTION INTRAVENOUS at 09:48

## 2023-01-13 RX ADMIN — METOPROLOL TARTRATE 25 MG: 25 TABLET ORAL at 07:40

## 2023-01-13 RX ADMIN — Medication 100 MCG: at 14:03

## 2023-01-13 RX ADMIN — PROPOFOL 10 MCG/KG/MIN: 10 INJECTION, EMULSION INTRAVENOUS at 15:48

## 2023-01-13 RX ADMIN — FENTANYL CITRATE 100 MCG: 50 INJECTION INTRAVENOUS at 13:33

## 2023-01-13 RX ADMIN — GLYCOPYRROLATE 0.2 MG: 0.2 INJECTION INTRAMUSCULAR; INTRAVENOUS at 09:19

## 2023-01-13 RX ADMIN — FENTANYL CITRATE 100 MCG: 50 INJECTION INTRAVENOUS at 10:21

## 2023-01-13 RX ADMIN — HYDRALAZINE HYDROCHLORIDE 10 MG: 20 INJECTION INTRAMUSCULAR; INTRAVENOUS at 07:56

## 2023-01-13 RX ADMIN — HYDRALAZINE HYDROCHLORIDE 5 MG: 20 INJECTION INTRAMUSCULAR; INTRAVENOUS at 16:19

## 2023-01-13 RX ADMIN — HEPARIN SODIUM 25000 UNITS: 1000 INJECTION INTRAVENOUS; SUBCUTANEOUS at 11:14

## 2023-01-13 RX ADMIN — ALBUMIN (HUMAN) 12.5 G: 12.5 INJECTION, SOLUTION INTRAVENOUS at 14:18

## 2023-01-13 RX ADMIN — METOPROLOL TARTRATE 2.5 MG: 5 INJECTION, SOLUTION INTRAVENOUS at 17:45

## 2023-01-13 RX ADMIN — SODIUM CHLORIDE: 9 INJECTION, SOLUTION INTRAVENOUS at 15:09

## 2023-01-13 RX ADMIN — FENTANYL CITRATE 50 MCG: 50 INJECTION INTRAMUSCULAR; INTRAVENOUS at 17:16

## 2023-01-13 RX ADMIN — DOCUSATE SODIUM 50 MG AND SENNOSIDES 8.6 MG 1 TABLET: 8.6; 5 TABLET, FILM COATED ORAL at 20:32

## 2023-01-13 RX ADMIN — PANTOPRAZOLE SODIUM 40 MG: 40 TABLET, DELAYED RELEASE ORAL at 07:41

## 2023-01-13 RX ADMIN — FENTANYL CITRATE 250 MCG: 50 INJECTION INTRAVENOUS at 08:54

## 2023-01-13 RX ADMIN — Medication 2 G: at 13:49

## 2023-01-13 RX ADMIN — PROPOFOL 15 MCG/KG/MIN: 10 INJECTION, EMULSION INTRAVENOUS at 13:32

## 2023-01-13 RX ADMIN — FENTANYL CITRATE 50 MCG: 50 INJECTION INTRAMUSCULAR; INTRAVENOUS at 16:16

## 2023-01-13 RX ADMIN — HYDRALAZINE HYDROCHLORIDE 5 MG: 20 INJECTION INTRAMUSCULAR; INTRAVENOUS at 16:54

## 2023-01-13 RX ADMIN — ALBUMIN (HUMAN) 12.5 G: 12.5 INJECTION, SOLUTION INTRAVENOUS at 14:25

## 2023-01-13 RX ADMIN — FENTANYL CITRATE 25 MCG: 50 INJECTION INTRAMUSCULAR; INTRAVENOUS at 20:44

## 2023-01-13 RX ADMIN — FENTANYL CITRATE 150 MCG: 50 INJECTION INTRAVENOUS at 09:32

## 2023-01-13 RX ADMIN — PROTAMINE SULFATE 250 MG: 10 INJECTION, SOLUTION INTRAVENOUS at 13:17

## 2023-01-13 RX ADMIN — GLYCOPYRROLATE 0.2 MG: 0.2 INJECTION INTRAMUSCULAR; INTRAVENOUS at 09:08

## 2023-01-13 RX ADMIN — Medication 100 MCG: at 10:39

## 2023-01-13 RX ADMIN — ROCURONIUM BROMIDE 20 MG: 10 SOLUTION INTRAVENOUS at 11:32

## 2023-01-13 RX ADMIN — Medication 2 G: at 09:53

## 2023-01-13 RX ADMIN — SODIUM CHLORIDE, POTASSIUM CHLORIDE, SODIUM LACTATE AND CALCIUM CHLORIDE: 600; 310; 30; 20 INJECTION, SOLUTION INTRAVENOUS at 13:33

## 2023-01-13 RX ADMIN — TRANEXAMIC ACID 1000 MG: 10 INJECTION, SOLUTION INTRAVENOUS at 10:01

## 2023-01-13 RX ADMIN — ALBUMIN (HUMAN) 25 G: 12.5 INJECTION, SOLUTION INTRAVENOUS at 14:50

## 2023-01-13 RX ADMIN — LIDOCAINE HYDROCHLORIDE 50 MG: 10 INJECTION, SOLUTION INFILTRATION; PERINEURAL at 08:54

## 2023-01-13 RX ADMIN — HYDRALAZINE HYDROCHLORIDE 5 MG: 20 INJECTION INTRAMUSCULAR; INTRAVENOUS at 16:14

## 2023-01-13 RX ADMIN — PROPOFOL 50 MG: 10 INJECTION, EMULSION INTRAVENOUS at 08:54

## 2023-01-13 RX ADMIN — MIDAZOLAM 2 MG: 1 INJECTION INTRAMUSCULAR; INTRAVENOUS at 11:31

## 2023-01-13 RX ADMIN — FENTANYL CITRATE 50 MCG: 50 INJECTION INTRAVENOUS at 13:40

## 2023-01-13 RX ADMIN — SODIUM CHLORIDE, PRESERVATIVE FREE 40 MG: 5 INJECTION INTRAVENOUS at 17:38

## 2023-01-13 RX ADMIN — METOPROLOL TARTRATE 12.5 MG: 25 TABLET ORAL at 20:31

## 2023-01-13 RX ADMIN — INSULIN HUMAN 1 UNITS: 100 INJECTION, SOLUTION PARENTERAL at 12:58

## 2023-01-13 RX ADMIN — ALPRAZOLAM 0.5 MG: 0.5 TABLET ORAL at 20:32

## 2023-01-13 RX ADMIN — Medication 2000 MG: at 19:52

## 2023-01-13 ASSESSMENT — PULMONARY FUNCTION TESTS
PIF_VALUE: 13
PIF_VALUE: 26
PIF_VALUE: 23
PIF_VALUE: 14
PIF_VALUE: 14
PIF_VALUE: 27

## 2023-01-13 ASSESSMENT — ENCOUNTER SYMPTOMS
SHORTNESS OF BREATH: 0
COLOR CHANGE: 0
APNEA: 0
ABDOMINAL PAIN: 0
ABDOMINAL DISTENTION: 0

## 2023-01-13 NOTE — PROGRESS NOTES
[facility] Cardiothoracic [facility]  Pre-op Note    1/13/2023    Lisa Aldana is scheduled for surgery today. All of the pertinent studies have been reviewed and patient is NPO. I have discussed the procedure with the patient and family, and they have been given opportunity to ask questions. The attendant risks, benefits, and possible outcomes have been discussed with them. They understand and have signed informed consent.       Justin Ruffin MD   Ready at 8:29

## 2023-01-13 NOTE — PLAN OF CARE
Problem: Respiratory - Adult  Goal: Achieves optimal ventilation and oxygenation  Outcome: Progressing  Flowsheets (Taken 1/13/2023 1800)  Achieves optimal ventilation and oxygenation:   Assess for changes in respiratory status   Assess for changes in mentation and behavior   Position to facilitate oxygenation and minimize respiratory effort   Oxygen supplementation based on oxygen saturation or arterial blood gases   Assess the need for suctioning and aspirate as needed   Assess and instruct to report shortness of breath or any respiratory difficulty   Respiratory therapy support as indicated

## 2023-01-13 NOTE — FLOWSHEET NOTE
01/13/23 1700   Treatment Team Notification   Reason for Communication Abnormal vitals  (HTN)   Team Member Name [facility]   Treatment Team Role Advanced Practice Nurse   Method of Communication Secure Message   Response See orders   Notification Time [address], RN

## 2023-01-13 NOTE — PLAN OF CARE
Problem: Chronic Conditions and Co-morbidities  Goal: Patient's chronic conditions and co-morbidity symptoms are monitored and maintained or improved  1/13/2023 1846 by Angle Miguel RN  Outcome: Progressing  Flowsheets (Taken 1/13/2023 1430)  Care Plan - Patient's Chronic Conditions and Co-Morbidity Symptoms are Monitored and Maintained or Improved:   Monitor and assess patient's chronic conditions and comorbid symptoms for stability, deterioration, or improvement   Collaborate with multidisciplinary team to address chronic and comorbid conditions and prevent exacerbation or deterioration  1/13/2023 0856 by Juanita Myles RN  Outcome: Progressing  1/13/2023 0457 by Gabi Friend RN  Outcome: Progressing     Problem: Discharge Planning  Goal: Discharge to home or other facility with appropriate resources  1/13/2023 1846 by Angle Miguel RN  Outcome: Progressing  Flowsheets (Taken 1/13/2023 1430)  Discharge to home or other facility with appropriate resources:   Identify barriers to discharge with patient and caregiver   Arrange for needed discharge resources and transportation as appropriate   Identify discharge learning needs (meds, wound care, etc)  1/13/2023 0856 by Juanita Myles RN  Outcome: Progressing  1/13/2023 0457 by Gabi Friend RN  Outcome: Progressing     Problem: Pain  Goal: Verbalizes/displays adequate comfort level or baseline comfort level  1/13/2023 1846 by Angle Miguel RN  Outcome: Progressing  1/13/2023 0856 by Juanita Myles RN  Outcome: Progressing  1/13/2023 0457 by Gabi Friend RN  Outcome: Progressing     Problem: Safety - Adult  Goal: Free from fall injury  1/13/2023 1846 by Angle Miguel RN  Outcome: Telma Kwong (Taken 1/13/2023 1824)  Free From Fall Injury: Instruct family/caregiver on patient safety  1/13/2023 0856 by Juanita Myles RN  Outcome: Progressing  1/13/2023 0457 by Penny Foster Sana Lincoln RN  Outcome: Progressing     Problem: Respiratory - Adult  Goal: Achieves optimal ventilation and oxygenation  1/13/2023 1846 by Chaim Fernandes RN  Outcome: Progressing  1/13/2023 1800 by Zahraa Keane  Outcome: Progressing  Flowsheets (Taken 1/13/2023 1800)  Achieves optimal ventilation and oxygenation:   Assess for changes in respiratory status   Assess for changes in mentation and behavior   Position to facilitate oxygenation and minimize respiratory effort   Oxygen supplementation based on oxygen saturation or arterial blood gases   Assess the need for suctioning and aspirate as needed   Assess and instruct to report shortness of breath or any respiratory difficulty   Respiratory therapy support as indicated     Problem: Skin/Tissue Integrity  Goal: Absence of new skin breakdown  Description: 1. Monitor for areas of redness and/or skin breakdown  2. Assess vascular access sites hourly  3. Every 4-6 hours minimum:  Change oxygen saturation probe site  4. Every 4-6 hours:  If on nasal continuous positive airway pressure, respiratory therapy assess nares and determine need for appliance change or resting period.   Outcome: Progressing     Problem: ABCDS Injury Assessment  Goal: Absence of physical injury  Outcome: Vineet Jacobson RN

## 2023-01-13 NOTE — BRIEF OP NOTE
Brief Postoperative Note      Patient: Adron Lesch  YOB: 1945  MRN: [id number]    Date of Procedure: 1/13/2023    Pre-Op Diagnosis: MULTI-VESSEL CAD    Post-Op Diagnosis: Same       Procedure(s):  CORONARY ARTERY BYPASS X 3 ON PUMP,  ROBSON    Surgeon(s):  Asad Sanders MD    Assistant:  First Assistant: Canary Felty, RN; Julianne Almaguer RN    Anesthesia: General    Estimated Blood Loss (mL): 399    Complications: None    Specimens:   * No specimens in log *    Implants:  Implant Name Type Inv.  Item Serial No.  Lot No. LRB No. Used Action   CLIP INT M L GRN TI TRNSVRS GRV CHEVRON SHP W/ PRECIS TIP - [id number]  CLIP INT M L GRN TI TRNSVRS GRV [place]VRON [address] W/ PRECIS TIP  [employer]  N/A 1 Implanted         Drains:   Chest Tube Anterior Mediastinal 1 (Active)       Chest Tube Anterior Mediastinal 2 (Active)       Chest Tube Left Pleural 3 (Active)       NG/OG/NJ/NE Tube Orogastric 18 fr Center mouth (Active)       Urinary Catheter 01/13/23 [place]-Temperature (Active)       Findings: Good EF    Electronically signed by Barry Jones MD on 1/13/2023 at 2:59 PM

## 2023-01-13 NOTE — ANESTHESIA PROCEDURE NOTES
Procedure Performed: ROBSON       Start Time:  1/13/2023 9:35 AM       End Time:   1/13/2023 9:47 AM    Preanesthesia Checklist:  Patient identified, IV assessed, risks and benefits discussed, monitors and equipment assessed, procedure being performed at surgeon's request and anesthesia consent obtained. General Procedure Information  Diagnostic Indications for Echo:  assessment of ascending aorta, assessment of surgical repair, hemodynamic monitoring and assessment of valve function  Physician Requesting Echo: Sana Porter MD  Location performed:  OR  Intubated  Heart visualized  Probe Type:  Mulitplane  Modalities:  2D only    Echocardiographic and Doppler Measurements    Ventricles    Right Ventricle:  Cavity size normal.  Hypertrophy not present. Thrombus not present. Global function normal.  Ejection Fraction 55%. Left Ventricle:  Cavity size normal.  Hypertrophy not present. Thrombus not present. Global Function normal.  Ejection Fraction 55%. Ventricular Regional Function:  1- Basal Anteroseptal:  hypokinetic  2- Basal Anterior:  hypokinetic  3- Basal Anterolateral:  hypokinetic  4- Basal Inferolateral:  hypokinetic  5- Basal Inferior:  hypokinetic  6- Basal Inferoseptal:  hypokinetic  7- Mid Anteroseptal:  hypokinetic  8- Mid Anterior:  hypokinetic  9- Mid Anterolateral:  hypokinetic  10- Mid Inferolateral:  hypokinetic  11- Mid Inferior:  hypokinetic  12- Mid Inferoseptal:  hypokinetic  13- Apical Anterior:  hypokinetic  14- Apical Lateral:  hypokinetic  15- Apical Inferior:  hypokinetic  16- Apical Septal:  hypokinetic  17- [place]:  hypokinetic      Valves    Aortic Valve: Annulus normal.  Stenosis not present. Regurgitation none. Leaflet motions normal.      Mitral Valve: Annulus normal.  Stenosis moderate. Regurgitation moderate. Leaflet motions normal.      Tricuspid Valve:  Stenosis not present. Regurgitation none. Leaflet motions normal.    Pulmonic Valve:  Stenosis not present. Aorta    Ascending Aorta:  Size normal.  Dissection not present. Aortic Arch:  Size normal.  Dissection not present. Descending Aorta:  Size normal.  Dissection not present. Atria    Right Atrium:  Size normal.  Spontaneous echo contrast not present. Thrombus not present. Tumor not present. Device not present. Left Atrium:  Size normal.  Spontaneous echo contrast not present. Thrombus not present. Tumor not present. Device not present. Left atrial appendage normal.      Septa    Atrial Septum:  Intra-atrial septal morphology normal and lipomatous hypertrophy.       Ventricular Septum:  Intra-ventricular septum morphology normal.      Diastolic Function Measurements:  Diastolic Dysfunction Grade= II (Pseudonormal)  E=  ms  A=  ms  E/A Ratio=   DT=  ms  S/D=   IVRT=    Other Findings  Pericardium:  normal  Pleural Effusion:  none  Pulmonary Arteries:  normal  Pulmonary Venous Flow:  normal    Anesthesia Information  Performed Personally  Anesthesiologist:  Emil Unger MD    Post Intervention Follow-up Study  Aortic Function: unchangedMitral Function: unchanged2+Tricuspid Function: unchangedNone

## 2023-01-13 NOTE — PROGRESS NOTES
Writer attempted to call ANDERS Michel regarding pulmonary function tests not being completed and patient being the first case tomorrow morning. Call was not answered and voicemail was not set up. Will attempt again at a later time. 2000: Writer called PA again, he is aware PFT's are not completed. Patient still okay to go for CABG tomorrow morning as scheduled.

## 2023-01-13 NOTE — PLAN OF CARE
Problem: Chronic Conditions and Co-morbidities  Goal: Patient's chronic conditions and co-morbidity symptoms are monitored and maintained or improved  1/13/2023 0856 by Sandra Gibbs RN  Outcome: Progressing  1/13/2023 0457 by Champ Mirza RN  Outcome: Progressing     Problem: Discharge Planning  Goal: Discharge to home or other facility with appropriate resources  1/13/2023 0856 by Sandra Gibbs RN  Outcome: Progressing  1/13/2023 0457 by Champ Mirza RN  Outcome: Progressing     Problem: Pain  Goal: Verbalizes/displays adequate comfort level or baseline comfort level  1/13/2023 0856 by Sandra Gibbs RN  Outcome: Progressing  1/13/2023 0457 by Champ Mirza RN  Outcome: Progressing     Problem: Safety - Adult  Goal: Free from fall injury  1/13/2023 0856 by Sandra Gibbs RN  Outcome: Progressing  1/13/2023 0457 by Champ Mirza RN  Outcome: Progressing

## 2023-01-13 NOTE — ANESTHESIA PROCEDURE NOTES
Arterial Line:    An arterial line was placed using ultrasound guidance and surface landmarks, in the OR for the following indication(s): continuous blood pressure monitoring and blood sampling needed. A 20 gauge (size), 1 and 3/4 inch (length), Arrow (type) catheter was placed, Seldinger technique used, into the left radial artery, secured by Tegaderm and tape. Anesthesia type: General    Events:  patient tolerated procedure well with no complications and EBL < 5mL. 1/13/2023 9:00 AM1/13/2023 9:10 AM  Anesthesiologist: Jana Merino MD  Other anesthesia staff: Ariella Hung RN  Performed:  Other anesthesia staff   Preanesthetic Checklist  Completed: patient identified, IV checked, site marked, risks and benefits discussed, surgical/procedural consents, equipment checked, pre-op evaluation, timeout performed, anesthesia consent given, oxygen available and monitors applied/VS acknowledged

## 2023-01-13 NOTE — FLOWSHEET NOTE
01/13/23 1615   Treatment Team Notification   Reason for Communication Abnormal vitals  (HTN)   Team Member Name [facility]   Treatment Team Role Advanced Practice Nurse   Method of Communication Secure Message   Response No new orders   Notification Time [address] Demetris Morales RN

## 2023-01-13 NOTE — ANESTHESIA PRE PROCEDURE
Department of Anesthesiology  Preprocedure Note       Name:  Valerie Sarah   Age:  65 y.o.  :  1945                                          MRN:  [id number]         Date:  2023      Surgeon: Camryn Huber):  Myesha Mcdermott MD    Procedure: Procedure(s):  CABG CORONARY ARTERY BYPASS X 3 ON PUMP, SWAN, ROBSON    Medications prior to admission:   Prior to Admission medications    Medication Sig Start Date End Date Taking? Authorizing Provider   alendronate (FOSAMAX) 70 MG tablet TAKE 1 TABLET EVERY 7 DAYS ON AN EMPTY STOMACH, FIRST THING IN THE MORNING, WITH FULL GLASS OF WATER, STAY UPRIGHT FOR 30 MINUTES. 22   MD NATALEE MaldonadoSTYLE LITE strip  10/12/22   Historical Provider, MD   FreeStyle Lancets [address] [facility]  10/12/22   Historical Provider, MD   lisinopril (PRINIVIL;ZESTRIL) 10 MG tablet Take 1 tablet by mouth daily  Patient taking differently: Take 20 mg by mouth daily 22   Meryl Celestin MD   metoprolol tartrate (LOPRESSOR) 25 MG tablet Take 1 tablet by mouth 2 times daily 22   Meryl Celestin MD   oxyCODONE-acetaminophen (PERCOCET) 5-325 MG per tablet TAKE 1 TABLET BY MOUTH TWO TIMES A DAY AS NEEDED 22   Historical Provider, MD   blood glucose monitor kit and supplies Dispense sufficient amount for indicated testing frequency plus additional to accommodate PRN testing needs. Dispense all needed supplies to include: monitor, strips, lancing device, lancets, control solutions, alcohol swabs.  10/12/22   Meryl Celestin MD   ondansetron (ZOFRAN ODT) 4 MG disintegrating tablet Take 1 tablet by mouth every 8 hours as needed for Nausea or Vomiting 10/10/22   Mitali Powell MD   donepezil (ARICEPT) 5 MG tablet Take 1 tablet by mouth daily 22   Meryl Celestin MD   chlorhexidine (PERIDEX) 0.12 % solution Take 15 mLs by mouth as needed 22   Historical Provider, MD   ibuprofen (ADVIL;MOTRIN) 800 MG tablet Take 800 mg by mouth every 8 hours as needed for Pain  3/16/22 Historical Provider, MD   albuterol sulfate HFA (PROVENTIL;VENTOLIN;PROAIR) 108 (90 Base) MCG/ACT inhaler USE 2 INHALATIONS EVERY 6 HOURS AS NEEDED FOR WHEEZING 2/1/21   Historical Provider, MD       Current medications:    Current Facility-Administered Medications   Medication Dose Route Frequency Provider Last Rate Last Admin    ceFAZolin (ANCEF) 2000 mg in sterile water 20 mL IV syringe  2,000 mg IntraVENous On Call to [address], [place]        metoprolol tartrate (LOPRESSOR) tablet 12.5 mg  12.5 mg Oral Once TiANDERS Nuno        aspirin EC tablet 81 mg  81 mg Oral On Call to [address], [place]        chlorhexidine (PERIDEX) 0.12 % solution 15 mL  15 mL Mouth/Throat Once TiANDERS Nuno        pantoprazole (PROTONIX) tablet 40 mg  40 mg Oral QAM AC Inelfranki Willoughby MD   40 mg at 01/12/23 1028    amLODIPine (NORVASC) tablet 5 mg  5 mg Oral Daily BETSEY Mcmillan CNP   5 mg at 01/12/23 1219    ALPRAZolam (XANAX) tablet 0.5 mg  0.5 mg Oral BID PRN Tiaustin Patterson PA   0.5 mg at 01/12/23 1320    0.9 % sodium chloride infusion   IntraVENous Continuous Tiaustin Patterson PA 75 mL/hr at 01/13/23 0019 New Bag at 01/13/23 0019    sodium chloride flush 0.9 % injection 5-40 mL  5-40 mL IntraVENous 2 times per day Tivis Forts, PA   10 mL at 01/12/23 2137    sodium chloride flush 0.9 % injection 10 mL  10 mL IntraVENous PRN Tivis Forts, PA        0.9 % sodium chloride infusion   IntraVENous PRN Tivis Forts, PA        mupirocin [facility]) 2 % ointment   Nasal BID Tivis Leonardo PA   Given at 01/12/23 2137    chlorhexidine (HIBICLENS) 4 % liquid   Topical See Admin Instructions Tiaustin Forts, [place]        colchicine (COLCRYS) tablet 0.6 mg  0.6 mg Oral Daily Florentin Reilly MD   0.6 mg at 01/12/23 1846    metoprolol tartrate (LOPRESSOR) tablet 25 mg  25 mg Oral BID EbBETSEY Sandhu CNP   25 mg at 01/12/23 4775    hydrALAZINE (APRESOLINE) injection 10 mg 10 mg IntraVENous Q6H PRN Cuong Carrasco MD   10 mg at 01/12/23 0022    aspirin chewable tablet 81 mg  81 mg Oral Daily Cuong Carrasco MD   81 mg at 01/12/23 0830    atorvastatin (LIPITOR) tablet 40 mg  40 mg Oral Nightly Cuong Carrasco MD   40 mg at 01/12/23 2137    heparin (porcine) injection 4,000 Units  4,000 Units IntraVENous PRN Cuong Carrasco MD        heparin (porcine) injection 2,000 Units  2,000 Units IntraVENous PRN Cuong Carrasco MD   2,000 Units at 01/12/23 0423    heparin 25,000 units in dextrose 5 % 250 mL infusion (rate based)  5-30 Units/kg/hr IntraVENous Continuous Cuong Carrasco MD 12.8 mL/hr at 01/12/23 2343 16 Units/kg/hr at 01/12/23 2343    sodium chloride flush 0.9 % injection 5-40 mL  5-40 mL IntraVENous 2 times per day Cuong Carrasco MD   10 mL at 01/12/23 0837    sodium chloride flush 0.9 % injection 5-40 mL  5-40 mL IntraVENous PRN Cuong Carrasco MD        0.9 % sodium chloride infusion   IntraVENous PRN Cuong Carrasco MD        acetaminophen (TYLENOL) tablet 650 mg  650 mg Oral Q4H PRN Cuong Carrasco MD   650 mg at 01/12/23 0842       Allergies:     Allergies   Allergen Reactions    Wasp Venom Protein Other (See Comments)     Redness & swelling of area that was stung    Dust Mite Extract     Seasonal     Guaifenesin Other (See Comments)     unsure       Problem List:    Patient Active Problem List   Diagnosis Code    Closed fracture of neck of radius S52.133A    Type 2 diabetes mellitus without complication, without long-term current use of insulin ([address] E11.9    Hypertension associated with diabetes ([address] E11.59, I15.2    Major depressive disorder with single episode, in full remission ([address] F32.5    Age-related cognitive decline R41.81    Age-related osteoporosis without current pathological fracture M81.0    Nocturia R35.1    Right hip pain M25.551    Bilateral sacroiliitis (HCC) M46.1    Chronic pain disorder G89.4    Displacement of lumbar intervertebral disc without myelopathy M51.26    Osteoarthritis of knee M17.9    Postherpetic neuralgia B02.29    Post-laminectomy syndrome M96.1    Sacroiliitis, not elsewhere classified ([facility] M46.1    Chronic renal disease, stage III ([facility] [348363] N18.30    Alzheimer's disease, unspecified G30.9    CAD in native artery I25.10       Past Medical History:        Diagnosis Date    Abnormal EKG     Arthritis     Asthma     Noted per [facility] chart- patient denies    Colon polyps     Gout     History of fall     Hyperlipidemia     Hypertension     Incomplete RBBB     Osteoarthritis     Osteopetrosis     Right knee meniscal tear     Sciatica     Seasonal allergies     Snoring     Type II or unspecified type diabetes mellitus without mention of complication, not stated as uncontrolled     diet controlled    Urinary incontinence     Wears dentures     partial upper and full lower       Past Surgical History:        Procedure Laterality Date    COLONOSCOPY      x 3    ELBOW FRACTURE SURGERY Right     EYE SURGERY Bilateral     optic nerve surgery as a child    FINGER TRIGGER RELEASE Right 11/05/2018    Long middle and ring    FOOT SURGERY Left     2 surgeries    HIP FRACTURE SURGERY Right 06/18/2021    with hardware /  Procedure: [address]; Surgeon: Juvenal Solomon MD; Location: [address] [place] [address]; Service: Orthopedics;  Laterality: Right; fluro and ortho table    HYSTERECTOMY (CERVIX STATUS UNKNOWN)      vaginal    KNEE ARTHROSCOPY Right 6/21/2022    KNEE ARTHROSCOPY FOR PARTIAL MEDIAL MENISCECTOMY AND PARTIAL LATERAL MENISCECTOMY performed by Bhanu Grey MD at [address]      3 surgeries    OTHER SURGICAL HISTORY      excision cysts from ovaries    SPINAL CORD STIMULATOR IMPLANT      Patient states it does not work    TUBAL LIGATION         Social History:    Social History     Tobacco Use    Smoking status: Former Packs/day: 0.50     Years: 20.00     Pack years: 10.00     Types: Cigarettes     Quit date: 2001     Years since quittin.6    Smokeless tobacco: Never    Tobacco comments:     for 20 years   Substance Use Topics    Alcohol use: No                                Counseling given: Not Answered  Tobacco comments: for 20 years      Vital Signs (Current):   Vitals:    23 1910 23 2000 23 2355 23 0400   BP: (!) 127/54  (!) 146/54 (!) 163/39   Pulse: 73 74 62 55   Resp:    Temp: 97.8 °F (36.6 °C)  98.1 °F (36.7 °C) 98 °F (36.7 °C)   TempSrc: Oral  Oral Oral   SpO2: 96%  95% 96%   Weight:       Height:                                                  BP Readings from Last 3 Encounters:   23 (!) 163/39   22 (!) 147/55   22 128/78       NPO Status:                                                                                 BMI:   Wt Readings from Last 3 Encounters:   01/10/23 176 lb (79.8 kg)   22 171 lb 15.3 oz (78 kg)   22 172 lb (78 kg)     Body mass index is 30.21 kg/m².     CBC:   Lab Results   Component Value Date/Time    WBC 6.1 2023 10:01 AM    RBC 4.19 2023 10:01 AM    HGB 12.4 2023 10:01 AM    HCT 39.6 2023 10:01 AM    MCV 94.5 2023 10:01 AM    RDW 13.1 2023 10:01 AM     2023 10:01 AM       CMP:   Lab Results   Component Value Date/Time     2023 10:01 AM    K 4.1 2023 10:01 AM     2023 10:01 AM    CO2 25 2023 10:01 AM    BUN 13 2023 10:01 AM    CREATININE 0.82 2023 10:01 AM    GFRAA >60 2022 11:25 AM    LABGLOM >60 2023 10:01 AM    GLUCOSE 174 2023 10:01 AM    PROT 7.5 10/10/2022 12:00 PM    CALCIUM 9.3 2023 10:01 AM    BILITOT 0.5 10/10/2022 12:00 PM    ALKPHOS 75 10/10/2022 12:00 PM    AST 18 2022 09:50 AM    ALT 12 2022 09:50 AM       POC Tests: No results for input(s): POCGLU, POCNA, POCK, POCCL, POCBUN, Will Torres in the last 72 hours. Coags:   Lab Results   Component Value Date/Time    APTT 49.6 01/12/2023 10:19 PM       HCG (If Applicable): No results found for: PREGTESTUR, PREGSERUM, HCG, HCGQUANT     ABGs: No results found for: PHART, PO2ART, ETU2XTU, UEN8SSQ, BEART, B7YVSRLQ     Type & Screen (If Applicable):  No results found for: LABABO, LABRH    Drug/Infectious Status (If Applicable):  No results found for: HIV, HEPCAB    COVID-19 Screening (If Applicable):   Lab Results   Component Value Date/Time    COVID19 Not Detected 10/10/2022 11:57 AM           Anesthesia Evaluation  Patient summary reviewed and Nursing notes reviewed  Airway: Mallampati: III  TM distance: >3 FB   Neck ROM: full  Mouth opening: > = 3 FB   Dental:          Pulmonary:normal exam    (+) asthma:                            Cardiovascular:    (+) hypertension: moderate, CAD: obstructive,                   Neuro/Psych:   (+) neuromuscular disease:, psychiatric history: stable with treatment            GI/Hepatic/Renal:             Endo/Other:    (+) DiabetesType II DM, , : arthritis: OA., .                 Abdominal:   (+) obese,           Vascular: Other Findings:           Anesthesia Plan      general     ASA 4       Induction: intravenous. arterial line, CVP, central line and ROBSON  MIPS: Postoperative opioids intended and Postoperative ventilation. Anesthetic plan and risks discussed with patient. Use of blood products discussed with patient whom consented to blood products. Plan discussed with CRNA.                     Samantha Carias MD   1/13/2023

## 2023-01-13 NOTE — ANESTHESIA POSTPROCEDURE EVALUATION
Department of Anesthesiology  Postprocedure Note    Patient: Chava Doran  MRN: [id number]  YOB: 1945  Date of evaluation: 1/13/2023      Procedure Summary     Date: 01/13/23 Room / Location: [address]    Anesthesia Start: 0005 Anesthesia Stop: 4967    Procedure: CORONARY ARTERY BYPASS X 3 ON PUMP,  ROBSON (Chest) Diagnosis:       Multiple vessel coronary artery disease      (MULTI-VESSEL CAD)    Surgeons: Fariha Garcias MD Responsible Provider: Heladio Paez MD    Anesthesia Type: general ASA Status: 4          Anesthesia Type: No value filed.     Isela Phase I:      Isela Phase II:        Anesthesia Post Evaluation    Patient location during evaluation: ICU  Patient participation: complete - patient cannot participate  Level of consciousness: sedated and ventilated  Airway patency: patent  Nausea & Vomiting: no nausea and no vomiting  Complications: no  Cardiovascular status: hemodynamically stable and vasoactive/inotropes  Respiratory status: ventilator  Hydration status: stable

## 2023-01-13 NOTE — ANESTHESIA PROCEDURE NOTES
Central Venous Line:    A central venous line was placed using ultrasound guidance, in the OR for the following indication(s): central venous access and CVP monitoring. 1/13/2023 9:30 AM1/13/2023 9:40 AM    Sterility preparation included the following: hand hygiene performed prior to procedure, maximum sterile barriers used and sterile technique used to drape from head to toe. The patient was placed in Trendelenburg position. The right internal jugular vein was prepped. The site was prepped with Chloraprep. A 9 Fr (size), 10 (length), introducer SLIC was placed. During the procedure, the following specific steps were taken: target vein identified, needle advanced into vein and blood aspirated and guidewire advanced into vein. Intravenous verification was obtained by venous blood return and x-ray. Post insertion care included: all ports aspirated, all ports flushed easily, guidewire removed intact, Biopatch applied, line sutured in place and dressing applied. During the procedure the patient experienced: patient tolerated procedure well with no complications and EBL < 5mL.       Outcomes: uncomplicated and patient tolerated procedure well  Anesthesia type: general..  Expiration date: 8/29/2024No  Staffing  Performed: Resident/CRNA   Anesthesiologist: Sandra Kim MD  Resident/CRNA: BETSEY Nolan CRNA  Preanesthetic Checklist  Completed: patient identified, IV checked, site marked, risks and benefits discussed, surgical/procedural consents, equipment checked, pre-op evaluation, timeout performed, anesthesia consent given, oxygen available and monitors applied/VS acknowledged

## 2023-01-13 NOTE — FLOWSHEET NOTE
01/13/23 1430   Treatment Team Notification   Reason for Communication Evaluate; Review case   Team Member Name Dr. Coleman Montague Provider   Method of Communication Face to face   Response At bedside   Notification Time [address], RN

## 2023-01-13 NOTE — PROGRESS NOTES
[facility]  Internal Medicine Teaching Residency Program  Inpatient Daily Progress Note  ______________________________________________________________________________    Patient: Alfred Astudillo  YOB: 1945   EIJ:[id number]    Acct: [de-identified]     Room: 2011/2011-01  Admit date: 1/10/2023  Today's date: 01/13/23  Number of days in the hospital: 3    SUBJECTIVE   Admitting Diagnosis: <principal problem not specified>  CC: Acute gout flare    - Pt examined at bedside. Chart & results reviewed. No acute events overnight, patient is awaiting CABG today. Patient was complaining of bilateral gout flares and greater toes this a.m., patient states that since starting colchicine pain is getting better. Plan for today:  - CABG by CT surgery  - Continue colchicine for acute gout flare  - Awaiting morning labs  - Cardiology is primary    Review of Systems   Constitutional:  Negative for chills and fever. HENT:  Negative for congestion. Respiratory:  Negative for apnea and shortness of breath. Cardiovascular:  Negative for chest pain and leg swelling. Gastrointestinal:  Negative for abdominal distention and abdominal pain. Genitourinary:  Negative for dysuria. Musculoskeletal:  Positive for joint swelling. Bilateral take bilateral greater toe swelling, due to gout flare. Skin:  Negative for color change. Allergic/Immunologic: Negative for immunocompromised state. Neurological:  Negative for dizziness. Hematological:  Negative for adenopathy. Psychiatric/Behavioral:  Negative for agitation. BRIEF HISTORY        The patient is a pleasant 65 y.o. female with past medical history coronary artery disease, hypertension, diabetes, CKD stage III and arthritis. Patient is currently being followed primarily by cardiology due to 90% ostial stenosis seen in recent cardiac cath. Patient is scheduled for CABG tomorrow.   Cardiology consulted internal medicine for acute gout flare. After speaking with the patient she has had GERD symptoms for the last 5-1/2 months recently within the last few weeks it has gotten worse. Episodes in general have been intermittent. Pain is mostly associated with left great toe. Patient states that she is not on any Medications at home. Plan for CABG today:     OBJECTIVE     Vital Signs:  BP (!) 163/39   Pulse 77   Temp 97.7 °F (36.5 °C) (Oral)   Resp 16   Ht 5' 4\" (1.626 m)   Wt 176 lb (79.8 kg)   SpO2 97%   BMI 30.21 kg/m²     Temp (24hrs), Av.8 °F (36.6 °C), Min:97.7 °F (36.5 °C), Max:98.1 °F (36.7 °C)    In: 200   Out: -     Physical Exam:  Physical Exam  Constitutional:       General: She is not in acute distress. Appearance: Normal appearance. She is obese. HENT:      Head: Normocephalic and atraumatic. Right Ear: External ear normal.      Left Ear: External ear normal.      Nose: Nose normal. No congestion. Mouth/Throat:      Mouth: Mucous membranes are moist.      Pharynx: Oropharynx is clear. Eyes:      Extraocular Movements: Extraocular movements intact. Conjunctiva/sclera: Conjunctivae normal.   Cardiovascular:      Rate and Rhythm: Normal rate. Pulses: Normal pulses. Heart sounds: Normal heart sounds. Pulmonary:      Effort: Pulmonary effort is normal.      Breath sounds: Normal breath sounds. Abdominal:      General: Bowel sounds are normal. There is no distension. Palpations: Abdomen is soft. Tenderness: There is no abdominal tenderness. Musculoskeletal:         General: Swelling and tenderness present. Right lower leg: No edema. Left lower leg: No edema. Comments: Patient has bilateral greater toe gout flares. Skin:     General: Skin is warm and dry. Findings: Erythema (Erythema right greater toes) present. Neurological:      General: No focal deficit present.       Mental Status: She is alert and oriented to person, place, and time. Psychiatric:         Mood and Affect: Mood normal.         Medications:  Scheduled Medications:    ceFAZolin (ANCEF) IVPB  2,000 mg IntraVENous On Call to OR    metoprolol tartrate  12.5 mg Oral Once    aspirin  81 mg Oral On Call to OR    chlorhexidine  15 mL Mouth/Throat Once    propofol        protamine        tranexamic acid-NaCl        ceFAZolin 2000 mg in 20 mL SWFI IV Syringe        isoflurane        pantoprazole  40 mg Oral QAM AC    amLODIPine  5 mg Oral Daily    sodium chloride flush  5-40 mL IntraVENous 2 times per day    mupirocin   Nasal BID    chlorhexidine   Topical See Admin Instructions    colchicine  0.6 mg Oral Daily    metoprolol tartrate  25 mg Oral BID    aspirin  81 mg Oral Daily    atorvastatin  40 mg Oral Nightly    sodium chloride flush  5-40 mL IntraVENous 2 times per day     Continuous Infusions:    sodium chloride 75 mL/hr at 01/13/23 0019    sodium chloride      heparin (PORCINE) Infusion 16 Units/kg/hr (01/12/23 2343)    sodium chloride       PRN MedicationsALPRAZolam, 0.5 mg, BID PRN  sodium chloride flush, 10 mL, PRN  sodium chloride, , PRN  hydrALAZINE, 10 mg, Q6H PRN  heparin (porcine), 4,000 Units, PRN  heparin (porcine), 2,000 Units, PRN  sodium chloride flush, 5-40 mL, PRN  sodium chloride, , PRN  acetaminophen, 650 mg, Q4H PRN      Diagnostic Labs:  CBC:   Recent Labs     01/10/23  1605 01/11/23  0236 01/12/23  1001   WBC 5.5 5.0 6.1   RBC 3.85* 3.70* 4.19   HGB 11.6* 11.0* 12.4   HCT 36.0* 35.2* 39.6   MCV 93.5 95.1 94.5   RDW 13.2 13.2 13.1    178 212     BMP:   Recent Labs     01/11/23  0236 01/12/23  1001    140   K 3.9 4.1    104   CO2 24 25   BUN 17 13   CREATININE 0.81 0.82     BNP: No results for input(s): BNP in the last 72 hours. PT/INR: No results for input(s): PROTIME, INR in the last 72 hours.   APTT:   Recent Labs     01/12/23  0334 01/12/23  1001 01/12/23  2219   APTT 47.1* 56.0* 49.6*     CARDIAC ENZYMES: No results for input(s): CKMB, CKMBINDEX, TROPONINI in the last 72 hours. Invalid input(s): CKTOTAL;3  FASTING LIPID PANEL:  Lab Results   Component Value Date    CHOL 185 12/20/2021    HDL 36 (L) 12/09/2022    HDL 37 (L) 12/09/2022    TRIG 257 (H) 12/20/2021     LIVER PROFILE: No results for input(s): AST, ALT, ALB, BILIDIR, BILITOT, ALKPHOS in the last 72 hours. MICROBIOLOGY: No results found for: CULTURE    Imaging:    CT CHEST WO CONTRAST    Result Date: 1/11/2023  1. No acute intrathoracic process. 2.  Moderate atherosclerosis with incidental aberrant right subclavian artery. ASSESSMENT & PLAN     Assessment and Plan: Active Problems:    * No active hospital problems. *  Resolved Problems:    * No resolved hospital problems. *      Thank you for allowing us to see Ronan Adan in consultation for acute gout flare. Patient states that this has been an ongoing issue for the last 5-1/2 months, has had intermittent episodes. Over the last few weeks episodes are becoming more frequent. Acute gout flare:  - Uric acid: 6.6  - ESR: 54  - CRP: 34.3  - Colchicine initial dose 1.2, subsequent dose 0.6    Coronary artery disease: 90% stenosis of the left ostial  - Cardiac cath today showed 90% stenosis of left ostial.  - Patient scheduled for CABG 1/13  - Cardiology is primary and currently treating. Christian Connor M.D. Internal Medicine Resident PGY-1  [facility],  [facility].    7:19 AM 1/13/2023     Attending Physician Statement  I have discussed the care of Ronan Adan, including pertinent history and exam findings,  with the resident. I have seen and examined the patient and the key elements of all parts of the encounter have been performed by me. I agree with the assessment, plan and orders as documented by the resident with additions . Treatment plan Discussed with nursing staff in detail , all questions answered .    Electronically signed by Stephen Resendez MD on   1/13/23 at 5:18 PM EST    Please note that this chart was generated using voice recognition Dragon dictation software. Although every effort was made to ensure the accuracy of this automated transcription, some errors in transcription may have occurred. Please note that part of this chart was generated using voice recognition dictation software. Although every effort was made to ensure the accuracy of this automated transcription, some errors in transcription may have occurred.

## 2023-01-13 NOTE — PROGRESS NOTES
Patient placed on CPAP trial on pressure support of 8,tolerating well.       01/13/23 1748   NICU Vent Information   Vent Mode (S)  CPAP   Vt (Measured) 416 mL   Rate Measured 12 br/min   Minute Volume (L/min) 6 Liters   Pressure Support 8 cmH20   FiO2  40 %   Peak Inspiratory Pressure (cmH2O) 14 cmH2O   Sensitivity 4   PEEP/CPAP (cmH2O) 5   Mean Airway Pressure (cmH2O) 6.3 cmH20

## 2023-01-13 NOTE — FLOWSHEET NOTE
01/13/23 1545   Treatment Team Notification   Reason for Communication Review case  (review medication list)   [facility]   Treatment Team Role Advanced Practice Nurse   Method of Communication Secure Message   Response See orders   Notification Time [address] RN

## 2023-01-14 ENCOUNTER — APPOINTMENT (OUTPATIENT)
Dept: GENERAL RADIOLOGY | Age: 78
DRG: 233 | End: 2023-01-14
Attending: INTERNAL MEDICINE
Payer: MEDICARE

## 2023-01-14 LAB
ABO/RH: NORMAL
ANION GAP SERPL CALCULATED.3IONS-SCNC: 13 MMOL/L (ref 9–17)
ANTIBODY SCREEN: NEGATIVE
ARM BAND NUMBER: NORMAL
BLD PROD TYP BPU: NORMAL
BLOOD BANK BLOOD PRODUCT EXPIRATION DATE: NORMAL
BLOOD BANK BLOOD PRODUCT EXPIRATION DATE: NORMAL
BLOOD BANK ISBT PRODUCT BLOOD TYPE: 6200
BLOOD BANK ISBT PRODUCT BLOOD TYPE: 6200
BLOOD BANK PRODUCT CODE: NORMAL
BLOOD BANK PRODUCT CODE: NORMAL
BLOOD BANK UNIT TYPE AND RH: NORMAL
BLOOD BANK UNIT TYPE AND RH: NORMAL
BPU ID: NORMAL
BUN BLDV-MCNC: 14 MG/DL (ref 8–23)
CALCIUM IONIZED: 1.08 MMOL/L (ref 1.13–1.33)
CALCIUM SERPL-MCNC: 7.7 MG/DL (ref 8.6–10.4)
CHLORIDE BLD-SCNC: 115 MMOL/L (ref 98–107)
CO2: 19 MMOL/L (ref 20–31)
CREAT SERPL-MCNC: 0.98 MG/DL (ref 0.5–0.9)
CROSSMATCH RESULT: NORMAL
DISPENSE STATUS BLOOD BANK: NORMAL
EKG ATRIAL RATE: 69 BPM
EKG P AXIS: 59 DEGREES
EKG P-R INTERVAL: 214 MS
EKG Q-T INTERVAL: 454 MS
EKG QRS DURATION: 94 MS
EKG QTC CALCULATION (BAZETT): 486 MS
EKG R AXIS: -44 DEGREES
EKG T AXIS: -36 DEGREES
EKG VENTRICULAR RATE: 69 BPM
EXPIRATION DATE: NORMAL
GFR SERPL CREATININE-BSD FRML MDRD: 59 ML/MIN/1.73M2
GLUCOSE BLD-MCNC: 102 MG/DL (ref 65–105)
GLUCOSE BLD-MCNC: 120 MG/DL (ref 70–99)
GLUCOSE BLD-MCNC: 149 MG/DL (ref 65–105)
GLUCOSE BLD-MCNC: 168 MG/DL (ref 65–105)
GLUCOSE BLD-MCNC: 184 MG/DL (ref 65–105)
GLUCOSE BLD-MCNC: 80 MG/DL (ref 65–105)
GLUCOSE BLD-MCNC: 98 MG/DL (ref 65–105)
HCT VFR BLD CALC: 31.4 % (ref 36.3–47.1)
HEMOGLOBIN: 9.7 G/DL (ref 11.9–15.1)
INR BLD: 1.1
MAGNESIUM: 2.3 MG/DL (ref 1.6–2.6)
MCH RBC QN AUTO: 30.2 PG (ref 25.2–33.5)
MCHC RBC AUTO-ENTMCNC: 30.9 G/DL (ref 28.4–34.8)
MCV RBC AUTO: 97.8 FL (ref 82.6–102.9)
NRBC AUTOMATED: 0 PER 100 WBC
PDW BLD-RTO: 14.3 % (ref 11.8–14.4)
PLATELET # BLD: ABNORMAL K/UL (ref 138–453)
PLATELET, FLUORESCENCE: 96 K/UL (ref 138–453)
PLATELET, IMMATURE FRACTION: 6.9 % (ref 1.1–10.3)
POTASSIUM SERPL-SCNC: 5 MMOL/L (ref 3.7–5.3)
POTASSIUM SERPL-SCNC: 5.3 MMOL/L (ref 3.7–5.3)
PROTHROMBIN TIME: 12.1 SEC (ref 9.1–12.3)
RBC # BLD: 3.21 M/UL (ref 3.95–5.11)
SODIUM BLD-SCNC: 147 MMOL/L (ref 135–144)
TRANSFUSION STATUS: NORMAL
UNIT DIVISION: 0
UNIT ISSUE DATE/TIME: NORMAL
UNIT ISSUE DATE/TIME: NORMAL
WBC # BLD: 7.8 K/UL (ref 3.5–11.3)

## 2023-01-14 PROCEDURE — 2580000003 HC RX 258: Performed by: THORACIC SURGERY (CARDIOTHORACIC VASCULAR SURGERY)

## 2023-01-14 PROCEDURE — 99232 SBSQ HOSP IP/OBS MODERATE 35: CPT | Performed by: INTERNAL MEDICINE

## 2023-01-14 PROCEDURE — 84132 ASSAY OF SERUM POTASSIUM: CPT

## 2023-01-14 PROCEDURE — 97163 PT EVAL HIGH COMPLEX 45 MIN: CPT

## 2023-01-14 PROCEDURE — 80048 BASIC METABOLIC PNL TOTAL CA: CPT

## 2023-01-14 PROCEDURE — 6370000000 HC RX 637 (ALT 250 FOR IP): Performed by: THORACIC SURGERY (CARDIOTHORACIC VASCULAR SURGERY)

## 2023-01-14 PROCEDURE — P9047 ALBUMIN (HUMAN), 25%, 50ML: HCPCS | Performed by: THORACIC SURGERY (CARDIOTHORACIC VASCULAR SURGERY)

## 2023-01-14 PROCEDURE — 83735 ASSAY OF MAGNESIUM: CPT

## 2023-01-14 PROCEDURE — 97530 THERAPEUTIC ACTIVITIES: CPT

## 2023-01-14 PROCEDURE — 71045 X-RAY EXAM CHEST 1 VIEW: CPT

## 2023-01-14 PROCEDURE — 6370000000 HC RX 637 (ALT 250 FOR IP)

## 2023-01-14 PROCEDURE — 6360000002 HC RX W HCPCS

## 2023-01-14 PROCEDURE — 93010 ELECTROCARDIOGRAM REPORT: CPT | Performed by: INTERNAL MEDICINE

## 2023-01-14 PROCEDURE — 85610 PROTHROMBIN TIME: CPT

## 2023-01-14 PROCEDURE — 2100000001 HC CVICU R&B

## 2023-01-14 PROCEDURE — 6360000002 HC RX W HCPCS: Performed by: THORACIC SURGERY (CARDIOTHORACIC VASCULAR SURGERY)

## 2023-01-14 PROCEDURE — 2500000003 HC RX 250 WO HCPCS: Performed by: THORACIC SURGERY (CARDIOTHORACIC VASCULAR SURGERY)

## 2023-01-14 PROCEDURE — 85055 RETICULATED PLATELET ASSAY: CPT

## 2023-01-14 PROCEDURE — 85027 COMPLETE CBC AUTOMATED: CPT

## 2023-01-14 PROCEDURE — 2700000000 HC OXYGEN THERAPY PER DAY

## 2023-01-14 PROCEDURE — 94761 N-INVAS EAR/PLS OXIMETRY MLT: CPT

## 2023-01-14 PROCEDURE — 97167 OT EVAL HIGH COMPLEX 60 MIN: CPT

## 2023-01-14 PROCEDURE — 82330 ASSAY OF CALCIUM: CPT

## 2023-01-14 RX ORDER — FUROSEMIDE 10 MG/ML
20 INJECTION INTRAMUSCULAR; INTRAVENOUS ONCE
Status: COMPLETED | OUTPATIENT
Start: 2023-01-14 | End: 2023-01-14

## 2023-01-14 RX ORDER — TRAMADOL HYDROCHLORIDE 50 MG/1
50 TABLET ORAL EVERY 6 HOURS PRN
Status: DISCONTINUED | OUTPATIENT
Start: 2023-01-14 | End: 2023-01-17 | Stop reason: HOSPADM

## 2023-01-14 RX ORDER — ACETAMINOPHEN 325 MG/1
650 TABLET ORAL EVERY 4 HOURS PRN
Status: DISCONTINUED | OUTPATIENT
Start: 2023-01-14 | End: 2023-01-17 | Stop reason: HOSPADM

## 2023-01-14 RX ADMIN — ATORVASTATIN CALCIUM 20 MG: 20 TABLET, FILM COATED ORAL at 19:37

## 2023-01-14 RX ADMIN — DOCUSATE SODIUM 50 MG AND SENNOSIDES 8.6 MG 1 TABLET: 8.6; 5 TABLET, FILM COATED ORAL at 08:37

## 2023-01-14 RX ADMIN — DONEPEZIL HYDROCHLORIDE 5 MG: 5 TABLET, FILM COATED ORAL at 08:37

## 2023-01-14 RX ADMIN — MUPIROCIN: 20 OINTMENT TOPICAL at 19:37

## 2023-01-14 RX ADMIN — OXYCODONE HYDROCHLORIDE AND ACETAMINOPHEN 1 TABLET: 5; 325 TABLET ORAL at 17:30

## 2023-01-14 RX ADMIN — Medication 1000 MG: at 10:00

## 2023-01-14 RX ADMIN — SODIUM CHLORIDE 500 ML: 9 INJECTION, SOLUTION INTRAVENOUS at 21:30

## 2023-01-14 RX ADMIN — CALCIUM CHLORIDE 2000 MG: 100 INJECTION, SOLUTION INTRAVENOUS at 08:44

## 2023-01-14 RX ADMIN — AMIODARONE HYDROCHLORIDE 200 MG: 200 TABLET ORAL at 13:34

## 2023-01-14 RX ADMIN — AMIODARONE HYDROCHLORIDE 200 MG: 200 TABLET ORAL at 08:37

## 2023-01-14 RX ADMIN — IPRATROPIUM BROMIDE AND ALBUTEROL SULFATE 1 AMPULE: .5; 2.5 SOLUTION RESPIRATORY (INHALATION) at 01:00

## 2023-01-14 RX ADMIN — INSULIN LISPRO 1 UNITS: 100 INJECTION, SOLUTION INTRAVENOUS; SUBCUTANEOUS at 17:34

## 2023-01-14 RX ADMIN — OXYCODONE HYDROCHLORIDE AND ACETAMINOPHEN 2 TABLET: 5; 325 TABLET ORAL at 00:54

## 2023-01-14 RX ADMIN — MUPIROCIN: 20 OINTMENT TOPICAL at 08:39

## 2023-01-14 RX ADMIN — POLYETHYLENE GLYCOL 3350 17 G: 17 POWDER, FOR SOLUTION ORAL at 09:15

## 2023-01-14 RX ADMIN — INSULIN LISPRO 1 UNITS: 100 INJECTION, SOLUTION INTRAVENOUS; SUBCUTANEOUS at 12:45

## 2023-01-14 RX ADMIN — ALBUMIN (HUMAN) 25 G: 0.25 INJECTION, SOLUTION INTRAVENOUS at 02:00

## 2023-01-14 RX ADMIN — OXYCODONE HYDROCHLORIDE AND ACETAMINOPHEN 2 TABLET: 5; 325 TABLET ORAL at 06:42

## 2023-01-14 RX ADMIN — DOCUSATE SODIUM 50 MG AND SENNOSIDES 8.6 MG 1 TABLET: 8.6; 5 TABLET, FILM COATED ORAL at 19:37

## 2023-01-14 RX ADMIN — Medication 2000 MG: at 08:30

## 2023-01-14 RX ADMIN — PANTOPRAZOLE SODIUM 40 MG: 40 TABLET, DELAYED RELEASE ORAL at 08:38

## 2023-01-14 RX ADMIN — SODIUM CHLORIDE, PRESERVATIVE FREE 10 ML: 5 INJECTION INTRAVENOUS at 19:42

## 2023-01-14 RX ADMIN — Medication 81 MG: at 08:37

## 2023-01-14 RX ADMIN — Medication 2000 MG: at 13:51

## 2023-01-14 RX ADMIN — Medication 1000 MG: at 21:30

## 2023-01-14 RX ADMIN — METOPROLOL TARTRATE 25 MG: 25 TABLET ORAL at 08:37

## 2023-01-14 RX ADMIN — CLOPIDOGREL 75 MG: 75 TABLET, FILM COATED ORAL at 08:37

## 2023-01-14 RX ADMIN — FUROSEMIDE 20 MG: 10 INJECTION, SOLUTION INTRAMUSCULAR; INTRAVENOUS at 09:31

## 2023-01-14 RX ADMIN — AMIODARONE HYDROCHLORIDE 200 MG: 200 TABLET ORAL at 19:37

## 2023-01-14 RX ADMIN — METOPROLOL TARTRATE 25 MG: 25 TABLET ORAL at 19:37

## 2023-01-14 RX ADMIN — Medication 2000 MG: at 19:39

## 2023-01-14 RX ADMIN — SODIUM CHLORIDE, PRESERVATIVE FREE 10 ML: 5 INJECTION INTRAVENOUS at 08:40

## 2023-01-14 ASSESSMENT — PAIN SCALES - GENERAL
PAINLEVEL_OUTOF10: 7
PAINLEVEL_OUTOF10: 4
PAINLEVEL_OUTOF10: 10
PAINLEVEL_OUTOF10: 8
PAINLEVEL_OUTOF10: 2

## 2023-01-14 ASSESSMENT — ENCOUNTER SYMPTOMS
COLOR CHANGE: 0
APNEA: 0
ABDOMINAL PAIN: 0
ABDOMINAL DISTENTION: 0
SHORTNESS OF BREATH: 0

## 2023-01-14 NOTE — PROGRESS NOTES
[facility]  Daily Progress Note    Surgeon: Dr Charlie Michelle         POD# 1    S/P : CORONARY ARTERY BYPASS X 3 ON PUMP,  ROBSON      Subjective:  Ms. Jennifer Judd is a 65y.o. year-old female seen and examined at bedside. Up in the chair this morning in no acute distress       Vital Signs: BP (!) 130/51   Pulse 75   Temp 99.3 °F (37.4 °C)   Resp 12   Ht 5' 4\" (1.626 m)   Wt 191 lb 9.6 oz (86.9 kg)   SpO2 97%   BMI 32.89 kg/m²  O2 Flow Rate (L/min): (S) 5 L/min   Admit Weight: Weight: 176 lb (79.8 kg)   WEIGHTWeight: 191 lb 9.6 oz (86.9 kg)     I/O's:  I/O last 3 completed shifts: In: 5878.6 [I.V.:2978.7; Blood:1850; NG/GT:200; IV Piggyback:849.9]  Out: 2869 [Urine:1900; Blood:350; Chest Tube:619]    Data:    CBC:   Recent Labs     01/12/23  1001 01/13/23  1455 01/14/23  0510   WBC 6.1 12.5* 7.8   HGB 12.4 10.9* 9.7*   HCT 39.6 33.1* 31.4*   MCV 94.5 93.2 97.8    105* See Reflexed IPF Result     BMP:   Recent Labs     01/12/23  1001 01/13/23  1455 01/14/23  0014 01/14/23  0510    142  --  147*   K 4.1 4.2 5.0 5.3    111*  --  115*   CO2 25 20  --  19*   BUN 13 14  --  14   CREATININE 0.82 0.78  0.89  --  0.98*     PT/INR:   Recent Labs     01/13/23  1455 01/14/23  0510   PROTIME 14.3* 12.1   INR 1.4 1.1     APTT:   Recent Labs     01/12/23  1001 01/12/23  2219 01/13/23  1455   APTT 56.0* 49.6* 29.9       Chest X-Ray:   FINDINGS:   AP portable view of the chest time stamped at 529 hours demonstrates   overlying cardiac monitoring electrodes, right IJ catheter terminating at the   cavoatrial junction, prior median sternotomy, paired intraspinal electrodes   terminating in the midthoracic area, and bilateral chest tubes. Stable   cardiomegaly. Prior endotracheal and intestinal tubes have been removed. Pulmonary vasculature is slightly prominent. No extrapleural air. Small   bilateral effusions bibasilar atelectasis noted.            Impression   Slightly prominent pulmonary vascularity with bilateral effusions. Support   devices as above. No extrapleural air. Prior endotracheal and intestinal   tubes have been removed. Scheduled Meds:    metoprolol tartrate  25 mg Oral BID    sodium chloride flush  5-40 mL IntraVENous 2 times per day    aspirin  81 mg Oral Daily    clopidogrel  75 mg Oral Daily    amiodarone  200 mg Oral TID    mupirocin   Nasal BID    polyethylene glycol  17 g Oral Daily    sennosides-docusate sodium  1 tablet Oral BID    atorvastatin  20 mg Oral Nightly    pantoprazole  40 mg Oral Daily    pantoprazole (PROTONIX) 40 mg injection  40 mg IntraVENous Daily    ceFAZolin (ANCEF) IVPB  2,000 mg IntraVENous Q8H    vancomycin (VANCOCIN) IV  1,000 mg IntraVENous Q12H    insulin glargine  0.15 Units/kg SubCUTAneous Nightly    insulin lispro  0-6 Units SubCUTAneous TID WC    insulin lispro  0-3 Units SubCUTAneous Nightly    donepezil  5 mg Oral Daily    [MAR Hold] amLODIPine  5 mg Oral Daily     Continuous Infusions:    sodium chloride Stopped (01/13/23 2106)    sodium chloride Stopped (01/13/23 1937)    norepinephrine 0.04 mcg/kg/min (01/13/23 1429)    EPINEPHrine 0.04 mcg/kg/min (01/13/23 1429)    insulin Stopped (01/14/23 0209)    dextrose      niCARdipine Stopped (01/13/23 2228)           Physical Exam:    General: A&O x 3, NAD noted  Heart: Normal S1, S2, RRR, No murmurs noted   Sternum: Prevena in place   Lungs: Diminished BS bilaterally at the bases. CT's in place to -20. No air leak noted   Abdomen: Soft, non tender, non distended, Hypoactive BS x 4   : Gutierres in place   Extremities: + edema noted bilateral LE. EVH sites: CDI         Assessment & Plan:    Ms. Nick Naik is a 65y.o. year-old female status post CABGx3 on 1/13/2023. No issues or events noted with the patient overnight.     Neuro:  #Post-op pain   - Continue current Pain management   - hx dementia, limit narcotics use   - 1x dose of toradol ok   - switch to tylenol and tramadol for pain   CV:   #S/p CABG   - HDS, off gtts   - Continue current CV medications per cardiology recs   - started home BB   -gave 1x dose of 20mg lasix   Resp:   #Post-op respiratory insufficency   - Incentive Spirometry / Pulmonary hygiene  - Follow-up CXR and ABG  - CT drainage 502ml overnight   GI:  - Advance diet as tolerated to cardiac diet   - post-op PU prophylaxis   - Bowel regimen PRN   /Lytes:   - Remove joseph after lasix today   - Replace e-lytes per protocol, check BMP daily   Heme:   # post-surgical blood loss anemia   - CTM daily CBC   SCD and DVT prophylaxis with lovenox   ID:   - WBC 7.8 CTM daily CBC   D/C planning:  - OOB to chair - Ambulation with PT 3x daily  - Patient plan for home discharge       Beta- Blocker: Yes  Aspirin: Yes  Lovenox: No  GI: Yes  Plavix: Yes  Coumadin: No  Statin: Yes  ACE: No    - Further recommendations as per Dr. Sue Norris / Dr. Maximino Pereira      The above recommendations including medications and orders were discussed and agreed upon with Dr. Sue Norris / Dr. Ameena Su.        David Jones, APRN-CNP

## 2023-01-14 NOTE — PROGRESS NOTES
[facility]  Progress Note                   Date:   1/14/2023  Patient name: Jeanne Valle  Date of admission:  1/10/2023  3:33 PM  MRN:   [id number]  YOB: 1945  PCP: Mercedes Day MD    Reason for Admission: Abnormal stress ECG [R94.39]  CAD in native artery [I25.10]    Subjective:       Seen and examined. Postop day 1 of CABG X3, extubated overnight and on 40% FiO2 today. Vitally stable. No acute issues.         Intake/Output Summary (Last 24 hours) at 1/14/2023 0712  Last data filed at 1/14/2023 1594  Gross per 24 hour   Intake 5878.57 ml   Output 2869 ml   Net 3009.57 ml           Medications:   Scheduled Meds:   sodium chloride flush  5-40 mL IntraVENous 2 times per day    aspirin  81 mg Oral Daily    clopidogrel  75 mg Oral Daily    amiodarone  200 mg Oral TID    mupirocin   Nasal BID    polyethylene glycol  17 g Oral Daily    sennosides-docusate sodium  1 tablet Oral BID    metoprolol tartrate  12.5 mg Oral BID    atorvastatin  20 mg Oral Nightly    pantoprazole  40 mg Oral Daily    pantoprazole (PROTONIX) 40 mg injection  40 mg IntraVENous Daily    ceFAZolin (ANCEF) IVPB  2,000 mg IntraVENous Q8H    vancomycin (VANCOCIN) IV  1,000 mg IntraVENous Q12H    insulin glargine  0.15 Units/kg SubCUTAneous Nightly    insulin lispro  0-6 Units SubCUTAneous TID WC    insulin lispro  0-3 Units SubCUTAneous Nightly    donepezil  5 mg Oral Daily    [MAR Hold] pantoprazole  40 mg Oral QAM AC    [MAR Hold] amLODIPine  5 mg Oral Daily    [MAR Hold] metoprolol tartrate  25 mg Oral BID    [MAR Hold] aspirin  81 mg Oral Daily    [MAR Hold] atorvastatin  40 mg Oral Nightly     Continuous Infusions:   sodium chloride Stopped (01/13/23 2106)    sodium chloride Stopped (01/13/23 1937)    norepinephrine 0.04 mcg/kg/min (01/13/23 1429)    EPINEPHrine 0.04 mcg/kg/min (01/13/23 1429)    insulin Stopped (01/14/23 3612)    dextrose      niCARdipine Stopped (01/13/23 2228)     CBC:   Recent Labs     01/12/23  1001 01/13/23  1455 01/14/23  0510   WBC 6.1 12.5* 7.8   HGB 12.4 10.9* 9.7*    105* See Reflexed IPF Result       BMP:    Recent Labs     01/12/23  1001 01/13/23  1455 01/14/23  0014 01/14/23  0510    142  --  147*   K 4.1 4.2 5.0 5.3    111*  --  115*   CO2 25 20  --  19*   BUN 13 14  --  14   CREATININE 0.82 0.78  0.89  --  0.98*   GLUCOSE 174* 168*  --  120*       Hepatic:No results for input(s): AST, ALT, ALB, BILITOT, ALKPHOS in the last 72 hours. Troponin: No results for input(s): TROPHS in the last 72 hours. BNP: No results for input(s): BNP in the last 72 hours. Lipids: No results for input(s): CHOL, HDL in the last 72 hours. Invalid input(s): LDLCALCU  INR:   Recent Labs     01/13/23  1455 01/14/23  0510   INR 1.4 1.1       Objective:   Vitals: BP (!) 130/51   Pulse 74   Temp 99.3 °F (37.4 °C)   Resp 15   Ht 5' 4\" (1.626 m)   Wt 191 lb 9.6 oz (86.9 kg)   SpO2 98%   BMI 32.89 kg/m²   General appearance: alert and cooperative with exam  HEENT: Head: Normocephalic, no lesions, without obvious abnormality. Neck:no JVD, trachea midline, no adenopathy  Lungs: Clear to auscultation  Heart: Regular rate and rhythm, s1/s2 auscultated, no murmurs, SR, right radial ecchymosis noted, soft, CDI, no active bleeding, positive pulse. Abdomen: soft, non-tender, bowel sounds active  Extremities: no edema      Echo 12/2022  The Left ventricle appears normal in size. Normal wall thickness. No obvious wall motion abnormality noted. Normal LV systolic function, Ejection Fraction > 55%  Normal RV size and function. RV systolic pressure is normal  The LA and RA appears normal in size. No obvious significant structural valvular abnormality noted. No significant valvular stenosis or regurgitation noted. Normal aortic root dimensions. No significant pericardial infusion seen.   The IVC appears normal in size, normal respiratory variation suggestive of  normal right atrial filling pressure. Cardiac Cath 01/12/2023   Lesion on LMCA:       Lesion on LMCA: Ostial.90% stenosis. Coronary Tree      Dominance: Right     LV Analysis  LV function assessed as:Normal.  Ejection Fraction  +----------------------------------------------------------------------+---+  ! Method                                                                ! EF%! +----------------------------------------------------------------------+---+  ! LV gram                                                               !55 !  +----------------------------------------------------------------------+---+    Assessment / Acute Cardiac Problems:   Multivessel CAD as above with 90% ostial left main stenosis, status post CABGX3 01/13/2023. Op note pending. Preserved LVEF on echocardiogram.  HTN  HLD  Type II DM      Plan of Treatment:   Continue postop medical management. Patient extubated overnight. Continue aspirin, amiodarone, statin, Lopressor and Plavix. PT OT/incentive spirometry. Will continue to follow. Ihsan Mccain MD       Cardiovascular Fellow      Attending Physician Statement  I have discussed the case of Lencho Kimbrough including pertinent history and exam findings with the resident. I have seen and examined the patient and the key elements of the encounter have been performed by me. I agree with the assessment, plan and orders as documented by the resident With changes made to the note.      Electronically signed by Holly Stoll MD on 1/14/2023 at 4:45 PM.    [facility]      [phone]

## 2023-01-14 NOTE — PROGRESS NOTES
[facility]  Internal Medicine Teaching Residency Program  Inpatient Daily Progress Note  ______________________________________________________________________________    Patient: Chandrakant Corona  YOB: 1945   YAV:[id number]    Acct: [de-identified]     Room: [address] [place] [id number]  Admit date: 1/10/2023  Today's date: 01/14/23  Number of days in the hospital: 4    SUBJECTIVE   Admitting Diagnosis: <principal problem not specified>  CC: Acute gout flare    - Pt examined at bedside. Chart & results reviewed. Patient no acute events overnight, is up to chair this a.m. nurses at bedside. Patient is in good spirits, complains of no current gout pain. Patient is status post CABG yesterday. Cardiology primary. Plan for today:  - Status post CABG yesterday  - Colchicine was discontinued, will continue to follow  - Allopurinol on discharge  - Cardiology primary  - IM will continue to follow, reach out with any questions. Review of Systems   Constitutional:  Negative for chills and fever. HENT:  Negative for congestion. Respiratory:  Negative for apnea and shortness of breath. Cardiovascular:  Negative for chest pain and leg swelling. Gastrointestinal:  Negative for abdominal distention and abdominal pain. Genitourinary:  Negative for dysuria. Musculoskeletal:  Positive for joint swelling. Bilateral take bilateral greater toe swelling, due to gout flare. Skin:  Negative for color change. Allergic/Immunologic: Negative for immunocompromised state. Neurological:  Negative for dizziness. Hematological:  Negative for adenopathy. Psychiatric/Behavioral:  Negative for agitation. BRIEF HISTORY        The patient is a pleasant 65 y.o. female with past medical history coronary artery disease, hypertension, diabetes, CKD stage III and arthritis.   Patient is currently being followed primarily by cardiology due to 90% ostial stenosis seen in recent cardiac cath. Patient is scheduled for CABG tomorrow. Cardiology consulted internal medicine for acute gout flare. After speaking with the patient she has had GERD symptoms for the last 5-1/2 months recently within the last few weeks it has gotten worse. Episodes in general have been intermittent. Pain is mostly associated with left great toe. Patient states that she is not on any Medications at home. Plan for CABG today:     OBJECTIVE     Vital Signs:  BP (!) 130/51   Pulse 75   Temp 99.3 °F (37.4 °C)   Resp 12   Ht 5' 4\" (1.626 m)   Wt 191 lb 9.6 oz (86.9 kg)   SpO2 97%   BMI 32.89 kg/m²     Temp (24hrs), Av.4 °F (36.9 °C), Min:95.9 °F (35.5 °C), Max:99.9 °F (37.7 °C)    In: 5878.6   Out: 2869 [Urine:1900]    Physical Exam:  Physical Exam  Constitutional:       General: She is not in acute distress. Appearance: Normal appearance. She is obese. HENT:      Head: Normocephalic and atraumatic. Right Ear: External ear normal.      Left Ear: External ear normal.      Nose: Nose normal. No congestion. Mouth/Throat:      Mouth: Mucous membranes are moist.      Pharynx: Oropharynx is clear. Eyes:      Extraocular Movements: Extraocular movements intact. Conjunctiva/sclera: Conjunctivae normal.   Cardiovascular:      Rate and Rhythm: Normal rate. Pulses: Normal pulses. Heart sounds: Normal heart sounds. Pulmonary:      Effort: Pulmonary effort is normal.      Breath sounds: Normal breath sounds. Abdominal:      General: Bowel sounds are normal. There is no distension. Palpations: Abdomen is soft. Tenderness: There is no abdominal tenderness. Musculoskeletal:         General: Swelling present. No tenderness. Right lower leg: No edema. Left lower leg: No edema. Comments: Patient has bilateral greater toe gout flares. Skin:     General: Skin is warm and dry. Findings: Erythema (Erythema right greater toes) present. Neurological:      General: No focal deficit present. Mental Status: She is alert and oriented to person, place, and time.    Psychiatric:         Mood and Affect: Mood normal.         Medications:  Scheduled Medications:    metoprolol tartrate  25 mg Oral BID    sodium chloride flush  5-40 mL IntraVENous 2 times per day    aspirin  81 mg Oral Daily    clopidogrel  75 mg Oral Daily    amiodarone  200 mg Oral TID    mupirocin   Nasal BID    polyethylene glycol  17 g Oral Daily    sennosides-docusate sodium  1 tablet Oral BID    atorvastatin  20 mg Oral Nightly    pantoprazole  40 mg Oral Daily    pantoprazole (PROTONIX) 40 mg injection  40 mg IntraVENous Daily    ceFAZolin (ANCEF) IVPB  2,000 mg IntraVENous Q8H    vancomycin (VANCOCIN) IV  1,000 mg IntraVENous Q12H    insulin glargine  0.15 Units/kg SubCUTAneous Nightly    insulin lispro  0-6 Units SubCUTAneous TID WC    insulin lispro  0-3 Units SubCUTAneous Nightly    donepezil  5 mg Oral Daily    [MAR Hold] amLODIPine  5 mg Oral Daily     Continuous Infusions:    sodium chloride Stopped (01/13/23 2106)    sodium chloride Stopped (01/13/23 1937)    norepinephrine 0.04 mcg/kg/min (01/13/23 1429)    EPINEPHrine 0.04 mcg/kg/min (01/13/23 1429)    insulin Stopped (01/14/23 0209)    dextrose      niCARdipine Stopped (01/13/23 2228)     PRN Medicationsacetaminophen, 650 mg, Q4H PRN  traMADol, 50 mg, Q6H PRN  sodium chloride, , PRN  sodium chloride flush, 5-40 mL, PRN  sodium chloride, , PRN  ondansetron, 4 mg, Q8H PRN   Or  ondansetron, 4 mg, Q6H PRN  oxyCODONE-acetaminophen, 1 tablet, Q4H PRN  meperidine, 25 mg, Once PRN  hydrALAZINE, 5 mg, Q5 Min PRN  sodium bicarbonate, 50 mEq, Q30 Min PRN  diphenhydrAMINE, 25 mg, Nightly PRN  potassium chloride, 20 mEq, PRN  magnesium sulfate, 2,000 mg, PRN  calcium chloride IVPB, 1,000 mg, PRN   Or  calcium chloride IVPB, 2,000 mg, PRN  ipratropium-albuterol, 1 ampule, Q4H PRN  albumin human, 25 g, PRN  albumin human, 25 g, PRN  norepinephrine, 0.01-0.08 mcg/kg/min, Continuous PRN  EPINEPHrine, 0.01-0.08 mcg/kg/min, Continuous PRN  glucose, 4 tablet, PRN  dextrose bolus, 125 mL, PRN   Or  dextrose bolus, 250 mL, PRN  glucagon (rDNA), 1 mg, PRN  dextrose, , Continuous PRN  protamine, 50 mg, Once PRN  bisacodyl, 5 mg, Daily PRN  polyethylene glycol, 17 g, Daily PRN  [MAR Hold] ALPRAZolam, 0.5 mg, BID PRN  [MAR Hold] hydrALAZINE, 10 mg, Q6H PRN      Diagnostic Labs:  CBC:   Recent Labs     01/12/23  1001 01/13/23  1455 01/14/23  0510   WBC 6.1 12.5* 7.8   RBC 4.19 3.55* 3.21*   HGB 12.4 10.9* 9.7*   HCT 39.6 33.1* 31.4*   MCV 94.5 93.2 97.8   RDW 13.1 13.6 14.3    105* See Reflexed IPF Result       BMP:   Recent Labs     01/12/23  1001 01/13/23  1455 01/14/23  0014 01/14/23  0510    142  --  147*   K 4.1 4.2 5.0 5.3    111*  --  115*   CO2 25 20  --  19*   BUN 13 14  --  14   CREATININE 0.82 0.78  0.89  --  0.98*       BNP: No results for input(s): BNP in the last 72 hours. PT/INR:   Recent Labs     01/13/23  1455 01/14/23  0510   PROTIME 14.3* 12.1   INR 1.4 1.1     APTT:   Recent Labs     01/12/23  1001 01/12/23  2219 01/13/23 1455   APTT 56.0* 49.6* 29.9       CARDIAC ENZYMES: No results for input(s): CKMB, CKMBINDEX, TROPONINI in the last 72 hours. Invalid input(s): CKTOTAL;3  FASTING LIPID PANEL:  Lab Results   Component Value Date    CHOL 185 12/20/2021    HDL 36 (L) 12/09/2022    HDL 37 (L) 12/09/2022    TRIG 257 (H) 12/20/2021     LIVER PROFILE: No results for input(s): AST, ALT, ALB, BILIDIR, BILITOT, ALKPHOS in the last 72 hours. MICROBIOLOGY: No results found for: CULTURE    Imaging:    CT CHEST WO CONTRAST    Result Date: 1/11/2023  1. No acute intrathoracic process. 2.  Moderate atherosclerosis with incidental aberrant right subclavian artery. ASSESSMENT & PLAN     Assessment and Plan:     Active Problems:    Coronary artery disease involving native coronary artery of native heart without angina pectoris    Acute idiopathic gout  Resolved Problems:    * No resolved hospital problems. *      Thank you for allowing us to see Fady Sotelo in consultation for acute gout flare. Patient states that this has been an ongoing issue for the last 5-1/2 months, has had intermittent episodes. Over the last few weeks episodes are becoming more frequent. Acute gout flare:  - Uric acid: 6.6  - ESR: 54  - CRP: 34.3  - Colchicine initial dose 1.2, subsequent dose 0.6, discontinued due to CABG  - Patient will need allopurinol on discharge    Coronary artery disease: 90% stenosis of the left ostial  - Cardiac cath today showed 90% stenosis of left ostial.  - Status post CABG 1/13  - Cardiology is primary and currently treating. Alex Guzman M.D. Internal Medicine Resident PGY-1  [address]    11:22 AM 1/14/2023   Attending Physician Statement  I have discussed the care of Fady Sotelo, including pertinent history and exam findings,  with the resident. I have seen and examined the patient and the key elements of all parts of the encounter have been performed by me. I agree with the assessment, plan and orders as documented by the resident with additions . Has been weaned of the vent. Treatment plan Discussed with nursing staff in detail , all questions answered . Electronically signed by Nena White MD on   1/14/23 at 2:07 PM EST    Please note that this chart was generated using voice recognition Dragon dictation software. Although every effort was made to ensure the accuracy of this automated transcription, some errors in transcription may have occurred.

## 2023-01-14 NOTE — PLAN OF CARE
Problem: Discharge Planning  Goal: Discharge to home or other facility with appropriate resources  Outcome: Not Progressing     Problem: Chronic Conditions and Co-morbidities  Goal: Patient's chronic conditions and co-morbidity symptoms are monitored and maintained or improved  Outcome: Progressing     Problem: Pain  Goal: Verbalizes/displays adequate comfort level or baseline comfort level  Outcome: Progressing     Problem: Safety - Adult  Goal: Free from fall injury  Outcome: Progressing     Problem: Respiratory - Adult  Goal: Achieves optimal ventilation and oxygenation  Outcome: Progressing     Problem: Skin/Tissue Integrity  Goal: Absence of new skin breakdown  Description: 1. Monitor for areas of redness and/or skin breakdown  2. Assess vascular access sites hourly  3. Every 4-6 hours minimum:  Change oxygen saturation probe site  4. Every 4-6 hours:  If on nasal continuous positive airway pressure, respiratory therapy assess nares and determine need for appliance change or resting period. Outcome: Progressing     Problem: ABCDS Injury Assessment  Goal: Absence of physical injury  Outcome: Progressing     Problem: Chronic Conditions and Co-morbidities  Goal: Patient's chronic conditions and co-morbidity symptoms are monitored and maintained or improved  Outcome: Progressing     Problem: Pain  Goal: Verbalizes/displays adequate comfort level or baseline comfort level  Outcome: Progressing     Problem: Safety - Adult  Goal: Free from fall injury  Outcome: Progressing     Problem: Respiratory - Adult  Goal: Achieves optimal ventilation and oxygenation  Outcome: Progressing     Problem: Skin/Tissue Integrity  Goal: Absence of new skin breakdown  Description: 1. Monitor for areas of redness and/or skin breakdown  2. Assess vascular access sites hourly  3. Every 4-6 hours minimum:  Change oxygen saturation probe site  4.   Every 4-6 hours:  If on nasal continuous positive airway pressure, respiratory therapy assess nares and determine need for appliance change or resting period.   Outcome: Progressing     Problem: ABCDS Injury Assessment  Goal: Absence of physical injury  Outcome: Progressing     Problem: Discharge Planning  Goal: Discharge to home or other facility with appropriate resources  Outcome: Not Progressing

## 2023-01-14 NOTE — PROGRESS NOTES
SPIRITUAL CARE DEPARTMENT - [facility]  PROGRESS NOTE    Shift date: 01/14/23    Shift day: Saturday   Shift # 1    Room # 1010/1010-01   Name: Alfred Astudillo                  Referral:  nurse    Admit Date & Time: 1/10/2023  3:33 PM    Assessment:  Alfred Astudillo is a 65 y.o. female in the hospital recovering from surgery.  responded to patient's room per Spiritual Consult for 'post-surgical support'.  was welcomed into the room by patient and observed her to be sitting up in a chair; patient appeared to be experiencing significant physical discomfort, was calm, and seemed to be coping well. Intervention:  Writer introduced self and title as  Writer offered space for the patient  to express feelings, needs, and concerns and provided a ministry presence. Patient seemed receptive to  visit, but appeared to experience difficulty engaging due to condition/discomfort.  provided a non-anxious presence, stayed for a time, and offered a blessing. Outcome:  Patient expressed gratitude for  visit. Plan:  Chaplains will remain available to offer spiritual and emotional support as needed. 01/14/23 0943   Encounter Summary   Service Provided For: Patient   Referral/Consult From: Nurse   Support System Unknown   Last Encounter  01/14/23   Complexity of Encounter Low   Begin Time 0925   End Time  0930   Total Time Calculated 5 min   Spiritual/Emotional needs   Type Spiritual Support   Assessment/Intervention/Outcome   Assessment Calm; Impaired social interaction;Coping   Intervention Sustaining Presence/Ministry of presence;Prayer (assurance of)/[place]   Outcome Expressed Gratitude   Plan and Referrals   Plan/Referrals Continue Support (comment)     Electronically signed by Neto Julian, on 1/14/2023 at 9:44 AM.  [facility]  [phone]

## 2023-01-14 NOTE — PROGRESS NOTES
Order obtained for extubation. SpO2 of 96 on 40% FiO2. Patient extubated and placed on 4 liters/min via nasal cannula. Post extubation SpO2 is 94% with HR  101 bpm and RR 16 breaths/min.       Breath Sounds:clear/lower rhonchi    Scott Palin   7:11 PM

## 2023-01-14 NOTE — PROGRESS NOTES
Physical Therapy  Facility/Department: [facility] CAR 1- SICU  Physical Therapy Initial Assessment    Name: Renaldo Tavares  : 1945  MRN: [id number]  Date of Service: 2023  S/P CABG x3 (22)    Discharge Recommendations:  Patient would benefit from continued therapy after discharge   PT Equipment Recommendations  Equipment Needed: No      Patient Diagnosis(es): There were no encounter diagnoses. Past Medical History:  has a past medical history of Abnormal EKG, Arthritis, Asthma, Colon polyps, Gout, History of fall, Hyperlipidemia, Hypertension, Incomplete RBBB, Osteoarthritis, Osteopetrosis, Right knee meniscal tear, Sciatica, Seasonal allergies, Snoring, Type II or unspecified type diabetes mellitus without mention of complication, not stated as uncontrolled, Urinary incontinence, and Wears dentures. Past Surgical History:  has a past surgical history that includes Foot surgery (Left); Nose surgery; Tubal ligation; Hysterectomy; Elbow fracture surgery (Right); Colonoscopy; eye surgery (Bilateral); other surgical history; Hip fracture surgery (Right, 2021); Spinal cord stimulator implant; Finger trigger release (Right, 2018); and Knee arthroscopy (Right, 2022). Assessment   Body Structures, Functions, Activity Limitations Requiring Skilled Therapeutic Intervention: Decreased functional mobility ; Decreased strength; Increased pain;Decreased posture;Decreased safe awareness;Decreased cognition;Decreased ROM; Decreased endurance;Decreased balance  Assessment: Pt ambulated 15ft x 2 with RW Sky, pt requiring modA to perform sit to stand from bedside chair. Pt is fall risk and currently unsafe to perform functional mobility unassisted. Recommending continued skilled physical therapy to address these deficits and maximize pt to baseline function.   Therapy Prognosis: Good  Decision Making: High Complexity  Barriers to Learning: pt's cognition  Requires PT Follow-Up: Yes  Activity Tolerance  Activity Tolerance: Treatment limited secondary to decreased cognition;Patient limited by endurance     Plan   Physcial Therapy Plan  General Plan: 6-7 times per week  Current Treatment Recommendations: Strengthening, ROM, Balance training, Endurance training, Functional mobility training, Transfer training, Safety education & training, Therapeutic activities, Patient/Caregiver education & training, Equipment evaluation, education, & procurement, Stair training, Gait training  Safety Devices  Type of Devices: Call light within reach, Nurse notified, Gait belt, Left in chair, Patient at risk for falls  Restraints  Restraints Initially in Place: No     Restrictions  Restrictions/Precautions  Restrictions/Precautions: Up as Tolerated  Required Braces or Orthoses?: Yes  Required Braces or Orthoses  Other: Heart Hugger Brace  Position Activity Restriction  Sternal Precautions: No Pushing, No Pulling, 5# Lifting Restrictions  Other position/activity restrictions: s/p CABG x3 (1/13/23); ambulate pt, up in chair, up for meals     Subjective   General  Patient assessed for rehabilitation services?: Yes  Response To Previous Treatment: Not applicable  Family / Caregiver Present: No  Follows Commands: Within Functional Limits (pt follows commands with increased time)  General Comment  Comments: pt reporting 4/10 acute incisional chest pain. RN notified and aware. Subjective  Subjective: RN and pt in agreement for PT eval. Pt seated in bedside chair upon writer's arrival, pt on 5L NC. Pt very lethragic throughout session requiring max verbal cueing to remain on task.          Social/Functional History  Social/Functional History  Lives With: Spouse  Type of Home: House  Home Layout: Two level, Able to Live on Main level with bedroom/bathroom  Home Access: Stairs to enter without rails  Entrance Stairs - Number of Steps: 4  Bathroom Shower/Tub: Tub/Shower unit  Bathroom Toilet: Standard  Home Equipment: Román Alvarez sukumar, Cane  Has the patient had two or more falls in the past year or any fall with injury in the past year?: Yes  Receives Help From: Family  ADL Assistance: [address]: Independent  Homemaking Responsibilities: Yes  Ambulation Assistance: Independent  Transfer Assistance: Independent  Active : Yes  Mode of Transportation: Car  Occupation: Retired  Additional Comments: Pt lethargic throughout requiring Max cues and tactile stimulation to participate and answer questions. Pt unable to answer questions regarding bathroom set up. Vision/Hearing  Vision  Vision: Impaired  Vision Exceptions: Wears glasses for reading  Hearing  Hearing: Within functional limits    Cognition   Orientation  Overall Orientation Status: Impaired  Orientation Level: Oriented to place;Oriented to situation;Oriented to person;Disoriented to time  Cognition  Overall Cognitive Status: Exceptions  Arousal/Alertness: Inconsistent responses to stimuli  Following Commands: Follows one step commands with increased time; Follows one step commands with repetition  Attention Span: Difficulty attending to directions; Unable to maintain attention  Problem Solving: Decreased awareness of errors;Assistance required to correct errors made;Assistance required to identify errors made;Assistance required to generate solutions  Insights: Decreased awareness of deficits  Initiation: Requires cues for all  Sequencing: Requires cues for all  Cognition Comment: Pt lethargic throughout session requiring tactile and verbal cuing/stimulation to remain on task throughout.      Objective   Gross Assessment  Sensation: Intact     Joint Mobility  ROM RLE: WFL  ROM LLE: WFL  ROM RUE: WFL within sternal precautions  ROM LUE: WFL within sternal precautions  Strength RLE  Strength RLE: WFL  Strength LLE  Strength LLE: WFL  Strength RUE  Comment: Did not formally assess due to sternal precautions- AROM against gravity noted at elbow, wrist, hand  Strength LUE  Comment: Did not formally assess due to sternal precautions- AROM against gravity noted at elbow, wrist, hand        Bed Mobility Training  Bed Mobility Training: No (up in chair at start/end of session)  Balance  Sitting: With support (supported in recliner)  Standing: With support (Min A w/RW ~2 mins prior to sitting back)  Transfer Training  Transfer Training: Yes  Overall Level of Assistance: Moderate assistance; Additional time  Sit to Stand: Moderate assistance; Additional time  Stand to Sit: Moderate assistance; Additional time  Gait  Overall Level of Assistance:  (attempted steps at chair side however pt unable to complete at this time)  Bed mobility  Bed Mobility Comments: Did not assess- pt seated in bedside chair, retired in bedside chair upon writer's exit  Transfers  Sit to Stand: Moderate Assistance  Stand to Sit: Moderate Assistance  Comment: STS performed with use of RW. Pt educated on use of heart hugger with proper breathing technique with poor return demo  Ambulation  Surface: Level tile  Device: Rolling Walker  Other Apparatus: O2 (5L)  Assistance: Minimal assistance (Assist provided for RW progression)  Quality of Gait: Significant shuffling of gait, difficulty with LLE progression  Gait Deviations: Slow Victorina  Distance: 15ft x2  Comments: Pt requiring frequent assistance with progression of RW and maintenance of balance throughout session. Mod verbal cueing required for progression of LLE. Pt attempts to sit prior to reaching bedside chair despite cueing from writer to maintain standing balance.   More Ambulation?: No  Stairs/Curb  Stairs?: No     Balance  Posture: Fair  Sitting - Static: Fair;+  Sitting - Dynamic: Fair;-  Standing - Static: Fair;+  Standing - Dynamic: Fair;-  Comments: standing balance assessed w/ RW; pt able to sit unsupported CGA      AM-PAC Score  AM-PAC Inpatient Mobility Raw Score : 12 (01/14/23 6485)  AM-PAC Inpatient T-Scale Score : 35.33 (01/14/23 1345)  Mobility Inpatient CMS 0-100% Score: 68.66 (01/14/23 1345)  Mobility Inpatient CMS G-Code Modifier : CL (01/14/23 1345)    Goals  Short Term Goals  Time Frame for Short Term Goals: 14  Short Term Goal 1: Pt to perform bed mobility SBA  Short Term Goal 2: Demonstrate functional transfers SBA  Short Term Goal 3: Ambulate 300ft w/ least restrictive AD SBA  Short Term Goal 4: Ascend/descend 4 stairs with least restrictive AD SBA  Patient Goals   Patient Goals :  To go home       Education  Patient Education  Education Given To: Patient  Education Provided: Role of Therapy;Plan of Care;Transfer Training  Education Provided Comments: Use of heart hugger; sternal precautions  Education Method: Demonstration  Barriers to Learning: Cognition  Education Outcome: Verbalized understanding;Continued education needed      Therapy Time   Individual Concurrent Group Co-treatment   Time In 1038         Time Out 1105         Minutes 27         Timed Code Treatment Minutes: 24 Minutes       Mckenzie Guthrie, PT

## 2023-01-14 NOTE — PROGRESS NOTES
Occupational Therapy  Facility/Department: [facility] CAR 1- SIC  Occupational Therapy Initial Assessment    Name: Anel Baez  : 1945  MRN: [id number]  Date of Service: 2023    HPI: Anel Baez is a 65 y.o.  female who presented to cardiothoracic surgery after multivessel CAD specifically the left main was diagnosed after cardiac catheterization. Patient was in [facility] today after abnormal EKG. Patient is in the process of getting a spinal pain stimulator exchanged requiring cardiac clearance. She does not have any significant comorbidities. Considers himself very healthy. Is able to ambulate at home but has pain due to ingrown toenails. Currently patient is resting in bed with no chest pain or shortness of breath. Patient is planned to be admitted per cardiology for further work-up. She does not take any AC or AP medications at home. S/p CABG x3 23    Discharge Recommendations:  Patient would benefit from continued therapy after discharge  OT Equipment Recommendations  Equipment Needed:  (CTA)       Patient Diagnosis(es): There were no encounter diagnoses. Past Medical History:  has a past medical history of Abnormal EKG, Arthritis, Asthma, Colon polyps, Gout, History of fall, Hyperlipidemia, Hypertension, Incomplete RBBB, Osteoarthritis, Osteopetrosis, Right knee meniscal tear, Sciatica, Seasonal allergies, Snoring, Type II or unspecified type diabetes mellitus without mention of complication, not stated as uncontrolled, Urinary incontinence, and Wears dentures. Past Surgical History:  has a past surgical history that includes Foot surgery (Left); Nose surgery; Tubal ligation; Hysterectomy; Elbow fracture surgery (Right); Colonoscopy; eye surgery (Bilateral); other surgical history; Hip fracture surgery (Right, 2021); Spinal cord stimulator implant; Finger trigger release (Right, 2018); and Knee arthroscopy (Right, 2022).            Assessment Performance deficits / Impairments: Decreased functional mobility ; Decreased ADL status; Decreased ROM; Decreased strength;Decreased safe awareness;Decreased cognition;Decreased endurance;Decreased fine motor control;Decreased high-level IADLs;Decreased coordination;Decreased posture;Decreased balance  Assessment: Pt letharic throughout session requiring Max cues/tactile stimulation to stay awake and maintain attention to task. Pt requiring cues throughout session for initation/sequencing of all tasks this date. Pt unable to take steps despite Max cuing. Pt able to tolerate standing ~2 minutes this date prior to attempting to sit back into recliner. Pt would benefit from continued therapy to increase IND/safety for participation in ADL's prior to returning home  Prognosis: Good  Decision Making: High Complexity  REQUIRES OT FOLLOW-UP: Yes  Activity Tolerance  Activity Tolerance: Patient Tolerated treatment well;Patient limited by fatigue        Plan   Occupational Therapy Plan  Times Per Week: 4-6x/week (CABG)  Current Treatment Recommendations: Balance training, Functional mobility training, Endurance training, Cognitive reorientation, Pain management, Safety education & training, Patient/Caregiver education & training, Equipment evaluation, education, & procurement, Self-Care / ADL, Cognitive/Perceptual training, Coordination training     Restrictions  Restrictions/Precautions  Restrictions/Precautions: Up as Tolerated  Required Braces or Orthoses?: Yes  Required Braces or Orthoses  Other: Heart Hugger Brace  Position Activity Restriction  Other position/activity restrictions: s/p CABG x3 1/13; ambulate pt, up to chair    Subjective   General  Patient assessed for rehabilitation services?: Yes  Family / Caregiver Present: No  General Comment  Comments: RN okayed for therapy. Pt agreeable to therapy evaluation however lethargic throughout requiring Max cues and tactile stimulation to remain awake to participate.  Pt initally denies pain however says \"owe\" when sitting back intot he chair. Pt does not rate pain     Social/Functional History  Social/Functional History  Lives With: Spouse  Type of Home: House  Home Layout: Two level, Able to Live on Main level with bedroom/bathroom  Home Access: Stairs to enter without rails  Entrance Stairs - Number of Steps: 4  Home Equipment: Charna Obdulia, rolling, Cane  Has the patient had two or more falls in the past year or any fall with injury in the past year?: Yes  Receives Help From: Family  ADL Assistance: [address]: Independent  Homemaking Responsibilities: Yes  Ambulation Assistance: Independent  Transfer Assistance: Independent  Active : Yes  Mode of Transportation: Car  Additional Comments: Pt lethargic throughout requiring Max cues and tactile stimulation to participate and answer questions. Pt unable to answer questions regarding bathroom set up. Objective     Safety Devices  Type of Devices: Call light within reach;Nurse notified;Gait belt;Left in chair  Restraints  Restraints Initially in Place: No    Bed Mobility Training  Bed Mobility Training: No (up in chair at start/end of session)    Balance  Sitting: With support (supported in recliner)  Standing: With support (Min A w/RW ~2 mins prior to sitting back)    Transfer Training  Transfer Training: Yes  Overall Level of Assistance: Moderate assistance; Additional time  Sit to Stand: Moderate assistance; Additional time  Stand to Sit: Moderate assistance; Additional time    Gait  Overall Level of Assistance:  (attempted steps at chair side however pt unable to complete at this time)     AROM: Generally decreased, functional (LUE: Shoulder ~45 degrees flexion, elbow/hand WFL: RUE: shoulder ~90 degrees flexion, elbow/hand WFL)  PROM: Within functional limits  Strength: Generally decreased, functional (BUE 3/5 grossly)  Coordination: Generally decreased, functional (decreased BUE FMC this date; decreased AROM B shoulder flexion)  Tone: Normal  Sensation: Intact    ADL  Feeding: Increased time to complete;Bringing food to mouth assist;Moderate assistance  Feeding Skilled Clinical Factors: Assist to bring drink to mouth; unable to stay awake to take bites of food/chew at this time  Grooming: Increased time to complete; Moderate assistance;Verbal cueing  Grooming Skilled Clinical Factors: Assist to bring washcloth to face to wipe eyes  UE Bathing: Maximum assistance; Increased time to complete;Verbal cueing  LE Bathing: Maximum assistance; Increased time to complete;Verbal cueing  UE Dressing: Maximum assistance; Increased time to complete;Verbal cueing  LE Dressing: Maximum assistance; Increased time to complete;Verbal cueing  Toileting: Maximum assistance; Adaptive equipment; Increased time to complete  Additional Comments: Pt lethargic this date requiring Max cues/stimulation to maintain attention to tasks and keep eyes open throughout session. Vision  Vision: Impaired  Vision Exceptions: Wears glasses for reading    Hearing  Hearing: Within functional limits    Cognition  Overall Cognitive Status: Exceptions  Arousal/Alertness: Inconsistent responses to stimuli  Following Commands: Follows one step commands with increased time; Follows one step commands with repetition  Attention Span: Difficulty attending to directions; Unable to maintain attention  Problem Solving: Decreased awareness of errors;Assistance required to correct errors made;Assistance required to identify errors made;Assistance required to generate solutions  Insights: Decreased awareness of deficits  Initiation: Requires cues for all  Sequencing: Requires cues for all  Cognition Comment: Pt lethargic throughout session requiring tactile and verbal cuing/stimulation to stay awake.  Falling asleep within every few seconds-minute  Orientation  Overall Orientation Status: Impaired  Orientation Level: Oriented to place;Oriented to situation;Oriented to person;Disoriented to time        Education Given To: Patient  Education Provided: Role of Therapy;Plan of Care;Precautions;Orientation;Transfer Training  Education Provided Comments: sternal percuations, use of heart huger, transfer training, orientation, OT role; Poor return  Education Method: Demonstration;Verbal  Barriers to Learning: Cognition  Education Outcome: Continued education needed    AM-PAC Score        AM-PAC Inpatient Daily Activity Raw Score: 12 (01/14/23 0931)  AM-PAC Inpatient ADL T-Scale Score : 30.6 (01/14/23 0931)  ADL Inpatient CMS 0-100% Score: 66.57 (01/14/23 0931)  ADL Inpatient CMS G-Code Modifier : CL (01/14/23 0931)    Goals  Short Term Goals  Time Frame for Short Term Goals: By discharge  Short Term Goal 1: Pt will complete UB ADL's CGA with AE/DME/modified techniques as needed  Short Term Goal 2: Pt will complete LB ADL's with Min A AE/DME/modified techniques as needed  Short Term Goal 3: Pt will complete functional mobility/transfers Min A with LRD  Short Term Goal 4: Pt will demo Good follow through of sternal percautions including use of heart hugger with Min VC's  Short Term Goal 5: Pt will don/doff heart hugger Min A  Short Term Goal 6: Pt will tolerate 5+ mins functional activity to increase engagement in ADL's  Long Term Goals  Long Term Goal 1: NOTIFY OTR TO UPDATE GOALS AS PT PROGRESSESS       Therapy Time   Individual Concurrent Group Co-treatment   Time In 0834         Time Out 0904         Minutes 30         Timed Code Treatment Minutes: [address], OTR/L

## 2023-01-15 ENCOUNTER — APPOINTMENT (OUTPATIENT)
Dept: GENERAL RADIOLOGY | Age: 78
DRG: 233 | End: 2023-01-15
Attending: INTERNAL MEDICINE
Payer: MEDICARE

## 2023-01-15 LAB
ANION GAP SERPL CALCULATED.3IONS-SCNC: 10 MMOL/L (ref 9–17)
ANION GAP SERPL CALCULATED.3IONS-SCNC: 15 MMOL/L (ref 9–17)
BUN BLDV-MCNC: 16 MG/DL (ref 8–23)
BUN BLDV-MCNC: 17 MG/DL (ref 8–23)
CALCIUM IONIZED: 1.26 MMOL/L (ref 1.13–1.33)
CALCIUM SERPL-MCNC: 9.1 MG/DL (ref 8.6–10.4)
CALCIUM SERPL-MCNC: 9.7 MG/DL (ref 8.6–10.4)
CHLORIDE BLD-SCNC: 106 MMOL/L (ref 98–107)
CHLORIDE BLD-SCNC: 106 MMOL/L (ref 98–107)
CO2: 19 MMOL/L (ref 20–31)
CO2: 22 MMOL/L (ref 20–31)
CREAT SERPL-MCNC: 0.97 MG/DL (ref 0.5–0.9)
CREAT SERPL-MCNC: 1.16 MG/DL (ref 0.5–0.9)
GFR SERPL CREATININE-BSD FRML MDRD: 48 ML/MIN/1.73M2
GFR SERPL CREATININE-BSD FRML MDRD: 60 ML/MIN/1.73M2
GLUCOSE BLD-MCNC: 150 MG/DL (ref 65–105)
GLUCOSE BLD-MCNC: 152 MG/DL (ref 65–105)
GLUCOSE BLD-MCNC: 162 MG/DL (ref 70–99)
GLUCOSE BLD-MCNC: 164 MG/DL (ref 65–105)
GLUCOSE BLD-MCNC: 170 MG/DL (ref 70–99)
HCT VFR BLD CALC: 35.6 % (ref 36.3–47.1)
HEMOGLOBIN: 11 G/DL (ref 11.9–15.1)
INR BLD: 1.3
MAGNESIUM: 2.1 MG/DL (ref 1.6–2.6)
MCH RBC QN AUTO: 29.9 PG (ref 25.2–33.5)
MCHC RBC AUTO-ENTMCNC: 30.9 G/DL (ref 28.4–34.8)
MCV RBC AUTO: 96.7 FL (ref 82.6–102.9)
NRBC AUTOMATED: 0 PER 100 WBC
PDW BLD-RTO: 14.1 % (ref 11.8–14.4)
PLATELET # BLD: ABNORMAL K/UL (ref 138–453)
PLATELET, FLUORESCENCE: 108 K/UL (ref 138–453)
PLATELET, IMMATURE FRACTION: 7.7 % (ref 1.1–10.3)
POTASSIUM SERPL-SCNC: 4.3 MMOL/L (ref 3.7–5.3)
POTASSIUM SERPL-SCNC: 5.6 MMOL/L (ref 3.7–5.3)
PROTHROMBIN TIME: 13.5 SEC (ref 9.1–12.3)
RBC # BLD: 3.68 M/UL (ref 3.95–5.11)
SODIUM BLD-SCNC: 138 MMOL/L (ref 135–144)
SODIUM BLD-SCNC: 140 MMOL/L (ref 135–144)
WBC # BLD: 9.7 K/UL (ref 3.5–11.3)

## 2023-01-15 PROCEDURE — 6360000002 HC RX W HCPCS

## 2023-01-15 PROCEDURE — 6370000000 HC RX 637 (ALT 250 FOR IP): Performed by: THORACIC SURGERY (CARDIOTHORACIC VASCULAR SURGERY)

## 2023-01-15 PROCEDURE — 2580000003 HC RX 258: Performed by: THORACIC SURGERY (CARDIOTHORACIC VASCULAR SURGERY)

## 2023-01-15 PROCEDURE — 94761 N-INVAS EAR/PLS OXIMETRY MLT: CPT

## 2023-01-15 PROCEDURE — 99291 CRITICAL CARE FIRST HOUR: CPT | Performed by: INTERNAL MEDICINE

## 2023-01-15 PROCEDURE — 36415 COLL VENOUS BLD VENIPUNCTURE: CPT

## 2023-01-15 PROCEDURE — 6360000002 HC RX W HCPCS: Performed by: THORACIC SURGERY (CARDIOTHORACIC VASCULAR SURGERY)

## 2023-01-15 PROCEDURE — 85027 COMPLETE CBC AUTOMATED: CPT

## 2023-01-15 PROCEDURE — 97535 SELF CARE MNGMENT TRAINING: CPT

## 2023-01-15 PROCEDURE — 6370000000 HC RX 637 (ALT 250 FOR IP): Performed by: PHYSICIAN ASSISTANT

## 2023-01-15 PROCEDURE — 6370000000 HC RX 637 (ALT 250 FOR IP)

## 2023-01-15 PROCEDURE — 71045 X-RAY EXAM CHEST 1 VIEW: CPT

## 2023-01-15 PROCEDURE — 85610 PROTHROMBIN TIME: CPT

## 2023-01-15 PROCEDURE — 2500000003 HC RX 250 WO HCPCS

## 2023-01-15 PROCEDURE — 2100000001 HC CVICU R&B

## 2023-01-15 PROCEDURE — 80048 BASIC METABOLIC PNL TOTAL CA: CPT

## 2023-01-15 PROCEDURE — 85055 RETICULATED PLATELET ASSAY: CPT

## 2023-01-15 PROCEDURE — 97116 GAIT TRAINING THERAPY: CPT

## 2023-01-15 PROCEDURE — 83735 ASSAY OF MAGNESIUM: CPT

## 2023-01-15 PROCEDURE — 97110 THERAPEUTIC EXERCISES: CPT

## 2023-01-15 PROCEDURE — 82330 ASSAY OF CALCIUM: CPT

## 2023-01-15 PROCEDURE — 82947 ASSAY GLUCOSE BLOOD QUANT: CPT

## 2023-01-15 RX ORDER — CALCIUM GLUCONATE 20 MG/ML
1000 INJECTION, SOLUTION INTRAVENOUS ONCE
Status: COMPLETED | OUTPATIENT
Start: 2023-01-15 | End: 2023-01-15

## 2023-01-15 RX ORDER — INSULIN LISPRO 100 [IU]/ML
0-4 INJECTION, SOLUTION INTRAVENOUS; SUBCUTANEOUS NIGHTLY
Status: DISCONTINUED | OUTPATIENT
Start: 2023-01-15 | End: 2023-01-17 | Stop reason: HOSPADM

## 2023-01-15 RX ORDER — FUROSEMIDE 10 MG/ML
20 INJECTION INTRAMUSCULAR; INTRAVENOUS DAILY
Status: DISCONTINUED | OUTPATIENT
Start: 2023-01-15 | End: 2023-01-15

## 2023-01-15 RX ORDER — FUROSEMIDE 10 MG/ML
40 INJECTION INTRAMUSCULAR; INTRAVENOUS DAILY
Status: DISCONTINUED | OUTPATIENT
Start: 2023-01-16 | End: 2023-01-17 | Stop reason: HOSPADM

## 2023-01-15 RX ORDER — INSULIN LISPRO 100 [IU]/ML
0-16 INJECTION, SOLUTION INTRAVENOUS; SUBCUTANEOUS
Status: DISCONTINUED | OUTPATIENT
Start: 2023-01-15 | End: 2023-01-17 | Stop reason: HOSPADM

## 2023-01-15 RX ORDER — AMLODIPINE BESYLATE 5 MG/1
5 TABLET ORAL DAILY
Status: DISCONTINUED | OUTPATIENT
Start: 2023-01-15 | End: 2023-01-16

## 2023-01-15 RX ADMIN — ALPRAZOLAM 0.5 MG: 0.5 TABLET ORAL at 00:09

## 2023-01-15 RX ADMIN — Medication 81 MG: at 10:57

## 2023-01-15 RX ADMIN — OXYCODONE HYDROCHLORIDE AND ACETAMINOPHEN 1 TABLET: 5; 325 TABLET ORAL at 03:51

## 2023-01-15 RX ADMIN — TRAMADOL HYDROCHLORIDE 50 MG: 50 TABLET, COATED ORAL at 18:51

## 2023-01-15 RX ADMIN — TRAMADOL HYDROCHLORIDE 50 MG: 50 TABLET, COATED ORAL at 12:14

## 2023-01-15 RX ADMIN — POLYETHYLENE GLYCOL 3350 17 G: 17 POWDER, FOR SOLUTION ORAL at 11:01

## 2023-01-15 RX ADMIN — INSULIN LISPRO 1 UNITS: 100 INJECTION, SOLUTION INTRAVENOUS; SUBCUTANEOUS at 12:16

## 2023-01-15 RX ADMIN — DOCUSATE SODIUM 50 MG AND SENNOSIDES 8.6 MG 1 TABLET: 8.6; 5 TABLET, FILM COATED ORAL at 20:09

## 2023-01-15 RX ADMIN — SODIUM CHLORIDE, PRESERVATIVE FREE 10 ML: 5 INJECTION INTRAVENOUS at 20:09

## 2023-01-15 RX ADMIN — METOPROLOL TARTRATE 37.5 MG: 25 TABLET ORAL at 20:09

## 2023-01-15 RX ADMIN — MUPIROCIN: 20 OINTMENT TOPICAL at 20:09

## 2023-01-15 RX ADMIN — Medication 2000 MG: at 06:48

## 2023-01-15 RX ADMIN — CALCIUM GLUCONATE 1000 MG: 20 INJECTION, SOLUTION INTRAVENOUS at 14:09

## 2023-01-15 RX ADMIN — ATORVASTATIN CALCIUM 20 MG: 20 TABLET, FILM COATED ORAL at 20:09

## 2023-01-15 RX ADMIN — AMLODIPINE BESYLATE 5 MG: 5 TABLET ORAL at 14:22

## 2023-01-15 RX ADMIN — PANTOPRAZOLE SODIUM 40 MG: 40 TABLET, DELAYED RELEASE ORAL at 11:00

## 2023-01-15 RX ADMIN — AMIODARONE HYDROCHLORIDE 200 MG: 200 TABLET ORAL at 14:22

## 2023-01-15 RX ADMIN — SODIUM CHLORIDE, PRESERVATIVE FREE 10 ML: 5 INJECTION INTRAVENOUS at 10:56

## 2023-01-15 RX ADMIN — MUPIROCIN: 20 OINTMENT TOPICAL at 11:02

## 2023-01-15 RX ADMIN — INSULIN LISPRO 1 UNITS: 100 INJECTION, SOLUTION INTRAVENOUS; SUBCUTANEOUS at 11:03

## 2023-01-15 RX ADMIN — DONEPEZIL HYDROCHLORIDE 5 MG: 5 TABLET, FILM COATED ORAL at 11:01

## 2023-01-15 RX ADMIN — AMIODARONE HYDROCHLORIDE 200 MG: 200 TABLET ORAL at 10:59

## 2023-01-15 RX ADMIN — DOCUSATE SODIUM 50 MG AND SENNOSIDES 8.6 MG 1 TABLET: 8.6; 5 TABLET, FILM COATED ORAL at 11:00

## 2023-01-15 RX ADMIN — CLOPIDOGREL 75 MG: 75 TABLET, FILM COATED ORAL at 11:00

## 2023-01-15 RX ADMIN — FUROSEMIDE 40 MG: 10 INJECTION, SOLUTION INTRAMUSCULAR; INTRAVENOUS at 10:55

## 2023-01-15 RX ADMIN — AMIODARONE HYDROCHLORIDE 200 MG: 200 TABLET ORAL at 20:09

## 2023-01-15 ASSESSMENT — ENCOUNTER SYMPTOMS
ABDOMINAL DISTENTION: 0
ABDOMINAL PAIN: 0
APNEA: 0
SHORTNESS OF BREATH: 0
COLOR CHANGE: 0

## 2023-01-15 ASSESSMENT — PAIN SCALES - GENERAL
PAINLEVEL_OUTOF10: 4
PAINLEVEL_OUTOF10: 4
PAINLEVEL_OUTOF10: 0
PAINLEVEL_OUTOF10: 3
PAINLEVEL_OUTOF10: 4
PAINLEVEL_OUTOF10: 10
PAINLEVEL_OUTOF10: 3

## 2023-01-15 NOTE — PROGRESS NOTES
[facility]  Progress Note                   Date:   1/15/2023  Patient name: Adron Lesch  Date of admission:  1/10/2023  3:33 PM  MRN:   [id number]  YOB: 1945  PCP: Alyssa Beckford MD    Reason for Admission: Abnormal stress ECG [R94.39]  CAD in native artery [I25.10]    Subjective:       Seen and examined. Postop day 2 of CABG X3, extubated. Vitally stable. No acute issues.         Intake/Output Summary (Last 24 hours) at 1/15/2023 0714  Last data filed at 1/15/2023 0656  Gross per 24 hour   Intake 2275.7 ml   Output 1820 ml   Net 455.7 ml             Medications:   Scheduled Meds:   metoprolol tartrate  25 mg Oral BID    sodium chloride flush  5-40 mL IntraVENous 2 times per day    aspirin  81 mg Oral Daily    clopidogrel  75 mg Oral Daily    amiodarone  200 mg Oral TID    mupirocin   Nasal BID    polyethylene glycol  17 g Oral Daily    sennosides-docusate sodium  1 tablet Oral BID    atorvastatin  20 mg Oral Nightly    pantoprazole  40 mg Oral Daily    pantoprazole (PROTONIX) 40 mg injection  40 mg IntraVENous Daily    insulin glargine  0.15 Units/kg SubCUTAneous Nightly    insulin lispro  0-6 Units SubCUTAneous TID WC    insulin lispro  0-3 Units SubCUTAneous Nightly    donepezil  5 mg Oral Daily    [MAR Hold] amLODIPine  5 mg Oral Daily     Continuous Infusions:   sodium chloride Stopped (01/13/23 2106)    sodium chloride 50 mL/hr at 01/15/23 0656    norepinephrine 0.04 mcg/kg/min (01/13/23 1429)    EPINEPHrine 0.04 mcg/kg/min (01/13/23 1429)    dextrose      niCARdipine Stopped (01/13/23 2228)     CBC:   Recent Labs     01/12/23  1001 01/13/23  1455 01/14/23  0510   WBC 6.1 12.5* 7.8   HGB 12.4 10.9* 9.7*    105* See Reflexed IPF Result       BMP:    Recent Labs     01/13/23  1455 01/14/23  0014 01/14/23  0510 01/15/23  0536     --  147* 138   K 4.2 5.0 5.3 5.6*   *  --  115* 106   CO2 20  --  19* 22   BUN 14  --  14 17   CREATININE 0.78  0.89 --  0.98* 1.16*   GLUCOSE 168*  --  120* 162*       Hepatic:No results for input(s): AST, ALT, ALB, BILITOT, ALKPHOS in the last 72 hours. Troponin: No results for input(s): TROPHS in the last 72 hours. BNP: No results for input(s): BNP in the last 72 hours. Lipids: No results for input(s): CHOL, HDL in the last 72 hours. Invalid input(s): LDLCALCU  INR:   Recent Labs     01/13/23  1455 01/14/23  0510   INR 1.4 1.1         Objective:   Vitals: BP (!) 163/61   Pulse 76   Temp 99 °F (37.2 °C)   Resp 14   Ht 5' 4\" (1.626 m)   Wt 184 lb 12.8 oz (83.8 kg)   SpO2 97%   BMI 31.72 kg/m²   General appearance: alert and cooperative with exam  HEENT: Head: Normocephalic, no lesions, without obvious abnormality. Neck:no JVD, trachea midline, no adenopathy  Lungs: Clear to auscultation  Heart: Regular rate and rhythm, s1/s2 auscultated, no murmurs, SR, right radial ecchymosis noted, soft, CDI, no active bleeding, positive pulse. Abdomen: soft, non-tender, bowel sounds active  Extremities: no edema      Echo 12/2022  The Left ventricle appears normal in size. Normal wall thickness. No obvious wall motion abnormality noted. Normal LV systolic function, Ejection Fraction > 55%  Normal RV size and function. RV systolic pressure is normal  The LA and RA appears normal in size. No obvious significant structural valvular abnormality noted. No significant valvular stenosis or regurgitation noted. Normal aortic root dimensions. No significant pericardial infusion seen. The IVC appears normal in size, normal respiratory variation suggestive of  normal right atrial filling pressure. Cardiac Cath 01/12/2023   Lesion on LMCA:       Lesion on LMCA: Ostial.90% stenosis. Coronary Tree      Dominance: Right     LV Analysis  LV function assessed as:Normal.  Ejection Fraction  +----------------------------------------------------------------------+---+  ! Method !EF%!  +----------------------------------------------------------------------+---+  ! LV gram                                                               !55 !  +----------------------------------------------------------------------+---+    Assessment / Acute Cardiac Problems:   Multivessel CAD as above with 90% ostial left main stenosis, status post CABGX3 01/13/2023. Op note pending. Preserved LVEF on echocardiogram.  HTN  HLD  Type II DM      Plan of Treatment:   Continue postop medical management. Patient extubated overnight. Continue aspirin, amiodarone, statin, Lopressor and Plavix. Start Lasix 20 mg once daily. PT OT/incentive spirometry. Will continue to follow. Valerie Marroquin MD       Cardiovascular Fellow        Attending Physician Statement  I have discussed the case of Kimberly Conrad including pertinent history and exam findings with the resident. I have seen and examined the patient and the key elements of the encounter have been performed by me. I agree with the assessment, plan and orders as documented by the resident With changes made to the note.      Electronically signed by Parveen Godoy MD on 1/15/2023 at 12:34 PM.    [facility]      [phone]

## 2023-01-15 NOTE — CARE COORDINATION
Transitional planning    Spoke to patient and her  at bedside about plan for discharge. Patient wants rehab. Has been to [facility] before. CM explained ARU vs SNF. Patient thinks she can do 3 hours of therapy a day. First choice for SNf is [facility]. First choice for ARU is [facility]. CM explained patient will need a PM&R consult to be sure she is appropriate for ARU. Second choice for [employer] is [facility] and #3 is [facility]. Referrals sent to [facility].

## 2023-01-15 NOTE — PROGRESS NOTES
[facility] Cardiothoracic Surgery  Daily Progress Note    Surgeon: Dr Jerry Paredes         POD# 2  S/P : CORONARY ARTERY BYPASS X 3 ON PUMP,  ROBSON      Subjective:  Ms. Amanda Aguila is a 65y.o. year-old female seen and examined at bedside. Up in the bed this morning, more tired today       Vital Signs: BP (!) 158/54   Pulse 82   Temp 97.7 °F (36.5 °C) (Oral)   Resp 12   Ht 5' 4\" (1.626 m)   Wt 184 lb 12.8 oz (83.8 kg)   SpO2 92%   BMI 31.72 kg/m²  O2 Flow Rate (L/min): 5 L/min   Admit Weight: Weight: 176 lb (79.8 kg)   WEIGHTWeight: 184 lb 12.8 oz (83.8 kg)     I/O's:  I/O last 3 completed shifts: In: 2902.9 [P.O.:580; I.V.:1338.8; IV Piggyback:984.1]  Out: 2927 [Urine:2035; Chest Tube:892]    Data:    CBC:   Recent Labs     01/13/23  1455 01/14/23  0510   WBC 12.5* 7.8   HGB 10.9* 9.7*   HCT 33.1* 31.4*   MCV 93.2 97.8   * See Reflexed IPF Result       BMP:   Recent Labs     01/13/23  1455 01/14/23  0014 01/14/23  0510 01/15/23  0536     --  147* 138   K 4.2 5.0 5.3 5.6*   *  --  115* 106   CO2 20  --  19* 22   BUN 14  --  14 17   CREATININE 0.78  0.89  --  0.98* 1.16*       PT/INR:   Recent Labs     01/13/23  1455 01/14/23  0510   PROTIME 14.3* 12.1   INR 1.4 1.1       APTT:   Recent Labs     01/12/23  2219 01/13/23  1455   APTT 49.6* 29.9         Chest X-Ray:   FINDINGS:   AP portable view of the chest time stamped at 512 hours demonstrates   overlying cardiac monitoring electrodes, prior median sternotomy, mediastinal   drain, left-sided chest tube, right IJ catheter terminating at the cavoatrial   junction and paired intraspinal electrodes, stable. Heart size is unchanged. Mild vascular congestion is noted. Small bilateral effusions are noted with   bibasilar opacities appearance favoring atelectasis. No extrapleural air. Significant calcification of the left carotid artery is noted.            Impression   Continue postsurgical changes with bilateral effusions and bibasilar   opacities. Pulmonary vascularity is mildly congested. Appliances as above       Scheduled Meds:    [START ON 1/16/2023] furosemide  40 mg IntraVENous Daily    metoprolol tartrate  37.5 mg Oral BID    sodium chloride flush  5-40 mL IntraVENous 2 times per day    aspirin  81 mg Oral Daily    clopidogrel  75 mg Oral Daily    amiodarone  200 mg Oral TID    mupirocin   Nasal BID    polyethylene glycol  17 g Oral Daily    sennosides-docusate sodium  1 tablet Oral BID    atorvastatin  20 mg Oral Nightly    pantoprazole  40 mg Oral Daily    pantoprazole (PROTONIX) 40 mg injection  40 mg IntraVENous Daily    insulin glargine  0.15 Units/kg SubCUTAneous Nightly    insulin lispro  0-6 Units SubCUTAneous TID WC    insulin lispro  0-3 Units SubCUTAneous Nightly    donepezil  5 mg Oral Daily    [MAR Hold] amLODIPine  5 mg Oral Daily     Continuous Infusions:    sodium chloride Stopped (01/13/23 2106)    sodium chloride 50 mL/hr at 01/15/23 1057    norepinephrine 0.04 mcg/kg/min (01/13/23 1429)    EPINEPHrine 0.04 mcg/kg/min (01/13/23 1429)    dextrose      niCARdipine Stopped (01/13/23 2228)           Physical Exam:    General: A&O x 3, NAD noted  Heart: Normal S1, S2, RRR, No murmurs noted   Sternum: Prevena in place   Lungs: Diminished BS bilaterally at the bases. CT's in place to -20. No air leak noted   Abdomen: Soft, non tender, non distended, Hypoactive BS x 4   : Joseph in place   Extremities: + edema noted bilateral LE. EV sites: CDI         Assessment & Plan:    Ms. Ammy Darden is a 65y.o. year-old female status post CABGx3 on 1/13/2023. No issues or events noted with the patient overnight. 1/15/2023:  Hx of dementia, d/c'd percocet, please use tramadol and tylenol for pain. Hypertensive overnight increased metoprolol to 37. 5BID added 5mg norvasc, K is 5.6, increased lasix to 40mg, increased insulin dosing to high dose sliding scale and gave 1gram of calcium gluconate. , joseph out.  Will leave Cts for today.     Neuro:  #Post-op pain   - Continue current Pain management   - hx dementia, limit narcotics use   - switch to tylenol and tramadol for pain   CV:   #S/p CABG   - HDS, off gtts   - Continue current CV medications per cardiology recs   Resp:   #Post-op respiratory insufficency   - Incentive Spirometry / Pulmonary hygiene  - Follow-up CXR and ABG  GI:  - Advance diet as tolerated to cardiac diet   - post-op PU prophylaxis   - Bowel regimen PRN   /Lytes:   - Remove joseph after lasix today   - Replace e-lytes per protocol, check BMP daily   Heme:   # post-surgical blood loss anemia   - CTM daily CBC   SCD and DVT prophylaxis with lovenox   ID:   - CTM daily CBC   D/C planning:  - OOB to chair - Ambulation with PT 3x daily  - Patient plan for home discharge       Beta- Blocker: Yes  Aspirin: Yes  Lovenox: No  GI: Yes  Plavix: Yes  Coumadin: No  Statin: Yes  ACE: No    - Further recommendations as per Dr. Chan Braxton / Dr. Melodie Daly      The above recommendations including medications and orders were discussed and agreed upon with Dr. Chan Braxton / Dr. Weller November.        Margo Todd, APRN-CNP

## 2023-01-15 NOTE — PROGRESS NOTES
Physical Therapy  Facility/Department: [facility] CAR 1- SICU  Daily Treatment Note    Name: Chio Rios  : 1945  MRN: [id number]  Date of Service: 1/15/2023    Discharge Recommendations:  Patient would benefit from continued therapy after discharge   PT Equipment Recommendations  Equipment Needed: No (Pt currently requires skilled assistance to ambulate)      Patient Diagnosis(es): There were no encounter diagnoses. Past Medical History:  has a past medical history of Abnormal EKG, Arthritis, Asthma, Colon polyps, Gout, History of fall, Hyperlipidemia, Hypertension, Incomplete RBBB, Osteoarthritis, Osteopetrosis, Right knee meniscal tear, Sciatica, Seasonal allergies, Snoring, Type II or unspecified type diabetes mellitus without mention of complication, not stated as uncontrolled, Urinary incontinence, and Wears dentures. Past Surgical History:  has a past surgical history that includes Foot surgery (Left); Nose surgery; Tubal ligation; Hysterectomy; Elbow fracture surgery (Right); Colonoscopy; eye surgery (Bilateral); other surgical history; Hip fracture surgery (Right, 2021); Spinal cord stimulator implant; Finger trigger release (Right, 2018); and Knee arthroscopy (Right, 2022). Assessment   Body Structures, Functions, Activity Limitations Requiring Skilled Therapeutic Intervention: Decreased functional mobility ; Decreased strength; Increased pain;Decreased posture;Decreased safe awareness;Decreased cognition;Decreased ROM; Decreased endurance;Decreased balance  Assessment: Pt declined with progress today from previous session. Pt ambulated ~3 ft today with RW Sky x 2. Pt required mod-maxA for supine->sit transfers and Sky x 2 for sit<>stand transfers with dependent for use of heart hugger. Previoulsy, pt ambulated 15ft x 2 with RW Sky and requiring modA to perform sit to stand from bedside chair. Pt is fall risk and currently unsafe to perform functional mobility unassisted. Recommending continued skilled physical therapy to address these deficits and maximize pt to baseline function. Therapy Prognosis: Good  Barriers to Learning: pt's cognition  Activity Tolerance  Activity Tolerance: Treatment limited secondary to decreased cognition;Patient limited by endurance; Patient limited by fatigue;Patient limited by pain     Plan   Physcial Therapy Plan  General Plan: 6-7 times per week  Current Treatment Recommendations: Strengthening, ROM, Balance training, Endurance training, Functional mobility training, Transfer training, Safety education & training, Therapeutic activities, Patient/Caregiver education & training, Equipment evaluation, education, & procurement, Stair training, Gait training  Safety Devices  Type of Devices: Call light within reach, Nurse notified, Gait belt, Left in chair, Patient at risk for falls  Restraints  Restraints Initially in Place: No     Restrictions  Restrictions/Precautions  Restrictions/Precautions: Up as Tolerated  Required Braces or Orthoses?: Yes  Required Braces or Orthoses  Other: Heart Hugger Brace  Position Activity Restriction  Sternal Precautions: No Pushing, No Pulling, 5# Lifting Restrictions  Other position/activity restrictions: s/p CABG x3 (1/13/23); ambulate pt, up in chair, up for meals, joseph catheter, 2 atriums     Subjective   General  Chart Reviewed: Yes  Response To Previous Treatment: Patient with no complaints from previous session. Family / Caregiver Present: No  Subjective  Subjective: RN and pt in agreement for PT. Pt supine upon arrival.  Pt on room air. Pt lethargic with difficulty keeping eyes open. RN present with some of pt's mobility.   Pt yelling, \"ouch\" when transitioning from supine->sit and sit<>stand but didn't rate        Cognition   Orientation  Overall Orientation Status: Impaired  Orientation Level: Oriented to place;Oriented to person;Disoriented to situation;Disoriented to time (\"I am at the hospital, but I don't know why I am here. \")  Cognition  Overall Cognitive Status: Exceptions  Arousal/Alertness: Inconsistent responses to stimuli  Following Commands: Follows one step commands with increased time; Follows one step commands with repetition  Attention Span: Difficulty attending to directions; Unable to maintain attention  Safety Judgement: Decreased awareness of need for assistance  Problem Solving: Decreased awareness of errors;Assistance required to correct errors made;Assistance required to identify errors made;Assistance required to generate solutions  Insights: Decreased awareness of deficits  Initiation: Requires cues for all  Cognition Comment: Pt lethargic throughout session requiring tactile and verbal cuing/stimulation to remain on task and keep eyes open throughout     Objective   Bed mobility  Supine to Sit: Moderate assistance;Maximum assistance  Sit to Supine: Unable to assess (Pt retired to recliner)  Scooting: Maximal assistance  Bed Mobility Comments: Assessed with HOB elevated. Dependent with use of heart hugger. Pt maintained sitting EOB with modA x 5 minutes. Transfers  Sit to Stand: 2 Person Assistance;Minimal Assistance  Stand to Sit: Minimal Assistance;2 Person Assistance  Bed to Chair: Minimal assistance;2 Person Assistance  Comment: STS performed with use of RW. Pt dependent with use of heart hugger; verbal cues to keep eyes open and verbal cues for UE placement. Eryn x 2 for safety as pt lethargic  Ambulation  Surface: Level tile  Device: Rolling Walker  Other Apparatus: chair follow  Assistance: Minimal assistance;2 Person assistance  Gait Deviations: Slow Victorina;Decreased step length;Decreased step height  Distance: 3 ft  Comments: Pt requiring  assistance with progression of RW with chair follow. Constant verbal cues for B LE placement and to keep eyes open. Pt panting throughout; SpO2 >90% on room air.      Balance  Posture: Fair  Sitting - Static: Fair;-  Sitting - Dynamic: Poor  Standing - Static: Fair;-  Standing - Dynamic: Fair;-  Comments: standing balance assessed w/ RW; pt able to sit unsupported modA  Exercise Treatment: B ankle pumps, B quad sets and B heel slides x 10 reps with tactile and verbal cues to complete and stay on task        OutComes Score  AM-PAC Score  AM-PAC Inpatient Mobility Raw Score : 11 (01/15/23 1146)  AM-PAC Inpatient T-Scale Score : 33.86 (01/15/23 1146)  Mobility Inpatient CMS 0-100% Score: 72.57 (01/15/23 1146)  Mobility Inpatient CMS G-Code Modifier : CL (01/15/23 1146)     Goals  Short Term Goals  Time Frame for Short Term Goals: 14  Short Term Goal 1: Pt to perform bed mobility SBA  Short Term Goal 2: Demonstrate functional transfers SBA  Short Term Goal 3: Ambulate 300ft w/ least restrictive AD SBA  Short Term Goal 4: Ascend/descend 4 stairs with least restrictive AD SBA  Patient Goals   Patient Goals :  To go home       Education  Patient Education  Education Given To: Patient  Education Provided: Role of Therapy;Plan of Care;Transfer Training  Education Provided Comments: constant verbal cues throughout for all tasks  Education Method: Verbal  Barriers to Learning: Cognition  Education Outcome: Continued education needed      Therapy Time   Individual Concurrent Group Co-treatment   Time In 1105         Time Out 1158         Minutes 53         Timed Code Treatment Minutes: 26 Minutes (partial co treatment with [address])       hSabbir Resendez, Chen Ckoer PTA

## 2023-01-15 NOTE — PROGRESS NOTES
Occupational Therapy  Facility/Department: [facility] CAR 1- SICU  Occupational Therapy Daily Treatment Note    Name: Adriana Mckee  : 1945  MRN: [id number]  Date of Service: 1/15/2023    Discharge Recommendations:  Patient would benefit from continued therapy after discharge    Patient Diagnosis(es): There were no encounter diagnoses. Past Medical History:  has a past medical history of Abnormal EKG, Arthritis, Asthma, Colon polyps, Gout, History of fall, Hyperlipidemia, Hypertension, Incomplete RBBB, Osteoarthritis, Osteopetrosis, Right knee meniscal tear, Sciatica, Seasonal allergies, Snoring, Type II or unspecified type diabetes mellitus without mention of complication, not stated as uncontrolled, Urinary incontinence, and Wears dentures. Past Surgical History:  has a past surgical history that includes Foot surgery (Left); Nose surgery; Tubal ligation; Hysterectomy; Elbow fracture surgery (Right); Colonoscopy; eye surgery (Bilateral); other surgical history; Hip fracture surgery (Right, 2021); Spinal cord stimulator implant; Finger trigger release (Right, 2018); and Knee arthroscopy (Right, 2022). Assessment   Performance deficits / Impairments: Decreased functional mobility ; Decreased ADL status; Decreased ROM; Decreased strength;Decreased safe awareness;Decreased cognition;Decreased endurance;Decreased fine motor control;Decreased high-level IADLs;Decreased coordination;Decreased posture;Decreased balance  Assessment: Pt letharic throughout session requiring Max cues/tactile stimulation to stay awake and maintain attention to task. Pt requiring cues throughout session for initation/sequencing of all tasks this date. Pt would benefit from continued therapy to increase IND/safety for participation in ADL's prior to returning home  Prognosis: Good  Activity Tolerance  Activity Tolerance: Patient limited by pain; Patient limited by fatigue;Treatment limited secondary to decreased cognition        Plan   Occupational Therapy Plan  Times Per Week: 4-6x/week (CABG)  Current Treatment Recommendations: Balance training, Functional mobility training, Endurance training, Cognitive reorientation, Pain management, Safety education & training, Patient/Caregiver education & training, Equipment evaluation, education, & procurement, Self-Care / ADL, Cognitive/Perceptual training, Coordination training     Restrictions  Restrictions/Precautions  Restrictions/Precautions: Up as Tolerated, General Precautions, Surgical Protocols, Cardiac  Required Braces or Orthoses?: Yes  Required Braces or Orthoses  Other: Heart Hugger Brace  Position Activity Restriction  Sternal Precautions: No Pushing, No Pulling, 5# Lifting Restrictions  Other position/activity restrictions: s/p CABG x3 (1/13/23); ambulate pt, up in chair, up for meals, joseph catheter, 2 atriums    Subjective   General  Patient assessed for rehabilitation services?: Yes  Response to previous treatment: Patient with no complaints from previous session  Family / Caregiver Present: No  General Comment  Comments: RN ok'd patient for OT treatment this morning. Pt at EOB with PTA and RN upon LOBO arrival.  Pt agreeable to therapy but is moderately lethargic and required frequent cues for participation and keeping eyes open. Pt reports 14/10 incisional/surgical pain. RN aware and present with medications        Objective   Safety Devices  Type of Devices: Call light within reach;Nurse notified;Gait belt;Left in chair;Patient at risk for falls  Restraints  Restraints Initially in Place: No  Balance  Sitting: With support (patient sits with slight right lateral lean.  CGA>MIN A when unsupported and SBA supported in recliner)  Standing: With support (RW MIN Ax2 ~2 minutes each for 3 stands with mod encouragement and cues for posture.)  Transfer Training  Transfer Training: Yes  Overall Level of Assistance: Minimum assistance;Assist X2;Additional time  Interventions: Safety awareness training; Tactile cues; Verbal cues (assist for use of heart hugger throughout session. Patient unable to follow or attend to task)  Sit to Stand: Assist X2;Minimum assistance  Stand to Sit: Assist X2;Minimum assistance  Bed to Chair: Assist X2;Minimum assistance  Gait  Overall Level of Assistance: Assist X2;Minimum assistance  Interventions: Verbal cues; Tactile cues; Safety awareness training  Distance (ft):  (Transfers to recliner and few steps forward and back only in prep for room mobility.)  Assistive Device: Walker, rolling        ADL  Feeding: Increased time to complete;Bringing food to mouth assist;Moderate assistance  Feeding Skilled Clinical Factors: Difficulty maintaining attention and manipulation of utensil for self feeding this session  Grooming: Increased time to complete;Verbal cueing;Contact guard assistance  Grooming Skilled Clinical Factors: Pt able to wash face with CGA and mod cues for follow through with task. Pt too lethargic to attempt oral care and had difficulty with self feeding as noted above  Additional Comments: Pt lethargic this date requiring Max cues/stimulation to maintain attention to tasks and keep eyes open throughout session. Activity Tolerance  Activity Tolerance: Treatment limited secondary to decreased cognition;Patient limited by endurance; Patient limited by fatigue;Patient limited by pain  Bed mobility  Supine to Sit: Unable to assess  Sit to Supine: Unable to assess  Scooting: Maximal assistance  Bed Mobility Comments: Pt already to EOB with PTA and RN upon LOBO arrival and concluded session seated in recliner        Cognition  Overall Cognitive Status: Exceptions  Arousal/Alertness: Inconsistent responses to stimuli  Following Commands: Follows one step commands with increased time; Follows one step commands with repetition  Attention Span: Difficulty attending to directions; Unable to maintain attention  Safety Judgement: Decreased awareness of need for assistance  Problem Solving: Decreased awareness of errors;Assistance required to correct errors made;Assistance required to identify errors made;Assistance required to generate solutions  Insights: Decreased awareness of deficits  Initiation: Requires cues for all  Sequencing: Requires cues for all  Cognition Comment: Pt lethargic throughout session requiring tactile and verbal cuing/stimulation to remain on task and keep eyes open throughout  Orientation  Overall Orientation Status: Impaired  Orientation Level: Oriented to place;Oriented to person;Disoriented to situation;Disoriented to time         Education Given To: Patient  Education Provided: Role of Therapy;Precautions; ADL Adaptive Strategies;Transfer Training;Equipment  Education Provided Comments: sternal percuations, use of heart huger, transfer training, orientation, importance of activity  Education Method: Demonstration;Verbal  Barriers to Learning: Cognition  Education Outcome: Continued education needed    AM-PAC Score  AM-[facility] Inpatient Daily Activity Raw Score: 12 (01/15/23 1350)  AM-PAC Inpatient ADL T-Scale Score : 30.6 (01/15/23 [facility])  ADL Inpatient CMS 0-100% Score: 66.57 (01/15/23 [facility])  ADL Inpatient CMS G-Code Modifier : CL (01/15/23 1350)      Goals  Short Term Goals  Time Frame for Short Term Goals: By discharge  Short Term Goal 1: Pt will complete UB ADL's CGA with AE/DME/modified techniques as needed  Short Term Goal 2: Pt will complete LB ADL's with Min A AE/DME/modified techniques as needed  Short Term Goal 3: Pt will complete functional mobility/transfers Min A with LRD  Short Term Goal 4: Pt will demo Good follow through of sternal percautions including use of heart hugger with Min VC's  Short Term Goal 5: Pt will don/doff heart hugger Min A  Short Term Goal 6: Pt will tolerate 5+ mins functional activity to increase engagement in ADL's  Long Term Goals  Long Term Goal 1: NOTIFY OTR TO UPDATE GOALS AS PT PROGRESSESS       Therapy Time   Individual Concurrent Group Co-treatment   Time In 1120         Time Out 1158         Minutes 38         Timed Code Treatment Minutes: 30 Minutes (Overlap co-tx with PTA for safety and goal progression)       LASHELL Almonte/BRANDT

## 2023-01-15 NOTE — PLAN OF CARE
Problem: Chronic Conditions and Co-morbidities  Goal: Patient's chronic conditions and co-morbidity symptoms are monitored and maintained or improved  Outcome: Progressing  Flowsheets (Taken 1/15/2023 0808)  Care Plan - Patient's Chronic Conditions and Co-Morbidity Symptoms are Monitored and Maintained or Improved:   Monitor and assess patient's chronic conditions and comorbid symptoms for stability, deterioration, or improvement   Collaborate with multidisciplinary team to address chronic and comorbid conditions and prevent exacerbation or deterioration   Update acute care plan with appropriate goals if chronic or comorbid symptoms are exacerbated and prevent overall improvement and discharge     Problem: Discharge Planning  Goal: Discharge to home or other facility with appropriate resources  Outcome: Progressing  Flowsheets (Taken 1/15/2023 0808)  Discharge to home or other facility with appropriate resources:   Identify barriers to discharge with patient and caregiver   Arrange for needed discharge resources and transportation as appropriate   Identify discharge learning needs (meds, wound care, etc)     Problem: Pain  Goal: Verbalizes/displays adequate comfort level or baseline comfort level  Outcome: Progressing     Problem: Safety - Adult  Goal: Free from fall injury  Outcome: Progressing  Flowsheets (Taken 1/15/2023 1545)  Free From Fall Injury: Instruct family/caregiver on patient safety     Problem: Respiratory - Adult  Goal: Achieves optimal ventilation and oxygenation  Outcome: Progressing  Flowsheets (Taken 1/15/2023 0808)  Achieves optimal ventilation and oxygenation:   Assess for changes in respiratory status   Assess for changes in mentation and behavior     Problem: Skin/Tissue Integrity  Goal: Absence of new skin breakdown  Description: 1. Monitor for areas of redness and/or skin breakdown  2. Assess vascular access sites hourly  3.   Every 4-6 hours minimum:  Change oxygen saturation probe site  4. Every 4-6 hours:  If on nasal continuous positive airway pressure, respiratory therapy assess nares and determine need for appliance change or resting period.   Outcome: Progressing     Problem: ABCDS Injury Assessment  Goal: Absence of physical injury  Outcome: Progressing  Flowsheets (Taken 1/15/2023 1529)  Absence of Physical Injury: Implement safety measures based on patient assessment

## 2023-01-15 NOTE — PROGRESS NOTES
[facility]  Internal Medicine Teaching Residency Program  Inpatient Daily Progress Note  ______________________________________________________________________________    Patient: Darion Howell  YOB: 1945   TBO:[id number]    Acct: [de-identified]     Room: [address] [place] [address]  Admit date: 1/10/2023  Today's date: 01/15/23  Number of days in the hospital: 5    SUBJECTIVE   Admitting Diagnosis: <principal problem not specified>  CC: Acute gout flare    - Pt examined at bedside. Chart & results reviewed. Patient no acute events overnight, seems mildly drowsy this a.m. possibly due to pain medications. PICC team was at bedside on evaluation. Patient not complaining of any new symptoms or pain currently. Overall patient's status is improving. Plan for today:  - Recommend following up outpatient with PCP for uric acid level. - Recommend allopurinol if uric acid level is elevated, will leave final decision up to PCP. - Internal medicine will continue to follow. Review of Systems   Constitutional:  Negative for chills and fever. HENT:  Negative for congestion. Respiratory:  Negative for apnea and shortness of breath. Cardiovascular:  Negative for chest pain and leg swelling. Gastrointestinal:  Negative for abdominal distention and abdominal pain. Genitourinary:  Negative for dysuria. Musculoskeletal:  Positive for joint swelling. Bilateral take bilateral greater toe swelling, due to gout flare. Skin:  Negative for color change. Allergic/Immunologic: Negative for immunocompromised state. Neurological:  Negative for dizziness. Hematological:  Negative for adenopathy. Psychiatric/Behavioral:  Negative for agitation. BRIEF HISTORY        The patient is a pleasant 65 y.o. female with past medical history coronary artery disease, hypertension, diabetes, CKD stage III and arthritis.   Patient is currently being followed primarily by cardiology due to 90% ostial stenosis seen in recent cardiac cath. Patient is scheduled for CABG tomorrow. Cardiology consulted internal medicine for acute gout flare. After speaking with the patient she has had GERD symptoms for the last 5-1/2 months recently within the last few weeks it has gotten worse. Episodes in general have been intermittent. Pain is mostly associated with left great toe. Patient states that she is not on any Medications at home. Plan for CABG today:     OBJECTIVE     Vital Signs:  BP (!) 166/53   Pulse 75   Temp 99.1 °F (37.3 °C) (Bladder)   Resp 12   Ht 5' 4\" (1.626 m)   Wt 184 lb 12.8 oz (83.8 kg)   SpO2 96%   BMI 31.72 kg/m²     Temp (24hrs), Av.3 °F (37.4 °C), Min:98.8 °F (37.1 °C), Max:100 °F (37.8 °C)    In: 2275.7   Out: 6219 [Urine:1430]    Physical Exam:  Physical Exam  Constitutional:       General: She is not in acute distress. Appearance: Normal appearance. She is obese. HENT:      Head: Normocephalic and atraumatic. Right Ear: External ear normal.      Left Ear: External ear normal.      Nose: Nose normal. No congestion. Mouth/Throat:      Mouth: Mucous membranes are moist.      Pharynx: Oropharynx is clear. Eyes:      Extraocular Movements: Extraocular movements intact. Conjunctiva/sclera: Conjunctivae normal.   Cardiovascular:      Rate and Rhythm: Normal rate. Pulses: Normal pulses. Heart sounds: Normal heart sounds. Pulmonary:      Effort: Pulmonary effort is normal.      Breath sounds: Normal breath sounds. Abdominal:      General: Bowel sounds are normal. There is no distension. Palpations: Abdomen is soft. Tenderness: There is no abdominal tenderness. Musculoskeletal:         General: Swelling present. No tenderness. Right lower leg: No edema. Left lower leg: No edema. Comments: Patient has bilateral greater toe gout flares. Skin:     General: Skin is warm and dry. Findings: Erythema (Erythema right greater toes) present. Neurological:      General: No focal deficit present. Mental Status: She is alert and oriented to person, place, and time.    Psychiatric:         Mood and Affect: Mood normal.         Medications:  Scheduled Medications:    [START ON 1/16/2023] furosemide  40 mg IntraVENous Daily    metoprolol tartrate  37.5 mg Oral BID    sodium chloride flush  5-40 mL IntraVENous 2 times per day    aspirin  81 mg Oral Daily    clopidogrel  75 mg Oral Daily    amiodarone  200 mg Oral TID    mupirocin   Nasal BID    polyethylene glycol  17 g Oral Daily    sennosides-docusate sodium  1 tablet Oral BID    atorvastatin  20 mg Oral Nightly    pantoprazole  40 mg Oral Daily    pantoprazole (PROTONIX) 40 mg injection  40 mg IntraVENous Daily    insulin glargine  0.15 Units/kg SubCUTAneous Nightly    insulin lispro  0-6 Units SubCUTAneous TID WC    insulin lispro  0-3 Units SubCUTAneous Nightly    donepezil  5 mg Oral Daily    [MAR Hold] amLODIPine  5 mg Oral Daily     Continuous Infusions:    sodium chloride Stopped (01/13/23 2106)    sodium chloride 50 mL/hr at 01/15/23 0656    norepinephrine 0.04 mcg/kg/min (01/13/23 1429)    EPINEPHrine 0.04 mcg/kg/min (01/13/23 1429)    dextrose      niCARdipine Stopped (01/13/23 2228)     PRN Medicationsacetaminophen, 650 mg, Q4H PRN  traMADol, 50 mg, Q6H PRN  sodium chloride, , PRN  sodium chloride flush, 5-40 mL, PRN  sodium chloride, , PRN  ondansetron, 4 mg, Q8H PRN   Or  ondansetron, 4 mg, Q6H PRN  oxyCODONE-acetaminophen, 1 tablet, Q4H PRN  hydrALAZINE, 5 mg, Q5 Min PRN  sodium bicarbonate, 50 mEq, Q30 Min PRN  diphenhydrAMINE, 25 mg, Nightly PRN  potassium chloride, 20 mEq, PRN  magnesium sulfate, 2,000 mg, PRN  calcium chloride IVPB, 1,000 mg, PRN   Or  calcium chloride IVPB, 2,000 mg, PRN  ipratropium-albuterol, 1 ampule, Q4H PRN  albumin human, 25 g, PRN  albumin human, 25 g, PRN  norepinephrine, 0.01-0.08 mcg/kg/min, Continuous PRN  EPINEPHrine, 0.01-0.08 mcg/kg/min, Continuous PRN  glucose, 4 tablet, PRN  dextrose bolus, 125 mL, PRN   Or  dextrose bolus, 250 mL, PRN  glucagon (rDNA), 1 mg, PRN  dextrose, , Continuous PRN  bisacodyl, 5 mg, Daily PRN  polyethylene glycol, 17 g, Daily PRN  [MAR Hold] ALPRAZolam, 0.5 mg, BID PRN  [MAR Hold] hydrALAZINE, 10 mg, Q6H PRN      Diagnostic Labs:  CBC:   Recent Labs     01/13/23  1455 01/14/23  0510   WBC 12.5* 7.8   RBC 3.55* 3.21*   HGB 10.9* 9.7*   HCT 33.1* 31.4*   MCV 93.2 97.8   RDW 13.6 14.3   * See Reflexed IPF Result       BMP:   Recent Labs     01/13/23  1455 01/14/23  0014 01/14/23  0510 01/15/23  0536     --  147* 138   K 4.2 5.0 5.3 5.6*   *  --  115* 106   CO2 20  --  19* 22   BUN 14  --  14 17   CREATININE 0.78  0.89  --  0.98* 1.16*       BNP: No results for input(s): BNP in the last 72 hours. PT/INR:   Recent Labs     01/13/23  1455 01/14/23  0510   PROTIME 14.3* 12.1   INR 1.4 1.1       APTT:   Recent Labs     01/12/23  2219 01/13/23 1455   APTT 49.6* 29.9       CARDIAC ENZYMES: No results for input(s): CKMB, CKMBINDEX, TROPONINI in the last 72 hours. Invalid input(s): CKTOTAL;3  FASTING LIPID PANEL:  Lab Results   Component Value Date    CHOL 185 12/20/2021    HDL 36 (L) 12/09/2022    HDL 37 (L) 12/09/2022    TRIG 257 (H) 12/20/2021     LIVER PROFILE: No results for input(s): AST, ALT, ALB, BILIDIR, BILITOT, ALKPHOS in the last 72 hours. MICROBIOLOGY: No results found for: CULTURE    Imaging:    CT CHEST WO CONTRAST    Result Date: 1/11/2023  1. No acute intrathoracic process. 2.  Moderate atherosclerosis with incidental aberrant right subclavian artery. ASSESSMENT & PLAN     Assessment and Plan: Active Problems:    Coronary artery disease involving native coronary artery of native heart without angina pectoris    Acute idiopathic gout  Resolved Problems:    * No resolved hospital problems.  *      Thank you for allowing us to see Edith Mcmahan in consultation for acute gout flare. Patient states that this has been an ongoing issue for the last 5-1/2 months, has had intermittent episodes. Over the last few weeks episodes are becoming more frequent. Acute gout flare:  - Uric acid: 6.6  - ESR: 54  - CRP: 34.3  - Colchicine initial dose 1.2, subsequent dose 0.6, discontinued due to CABG  - Patient will need to follow-up with PCP outpatient for uric acid level. - Recommend allopurinol if uric acid level is elevated. We will leave final decision to primary care provider. - Internal medicine will continue to follow. Coronary artery disease: 90% stenosis of the left ostial  - Cardiac cath showed 90% stenosis of left ostial.  - Status post CABG 1/13  - Cardiology is primary and currently treating. Bree Robles M.D. Internal Medicine Resident PGY-1  [facility]. Attending Physician Statement  I have discussed the care of Edith Mcmahan, including pertinent history and exam findings,  with the resident. I have seen and examined the patient and the key elements of all parts of the encounter have been performed by me. I agree with the assessment, plan and orders as documented by the resident with additions . Clincally and hem. stable. Dementia. No fever. Will et xray chest.gas exchange is good,  No stridor  Treatment plan Discussed with nursing staff in detail , all questions answered . Electronically signed by Jacklyn Hurt MD on   1/15/23 at 4:00 PM EST  CC time 30 minutes  S. Please note that this chart was generated using voice recognition Dragon dictation software. Although every effort was made to ensure the accuracy of this automated transcription, some errors in transcription may have occurred.      10:24 AM 1/15/2023

## 2023-01-16 ENCOUNTER — APPOINTMENT (OUTPATIENT)
Dept: GENERAL RADIOLOGY | Age: 78
DRG: 233 | End: 2023-01-16
Attending: INTERNAL MEDICINE
Payer: MEDICARE

## 2023-01-16 LAB
ANION GAP SERPL CALCULATED.3IONS-SCNC: 10 MMOL/L (ref 9–17)
BUN BLDV-MCNC: 15 MG/DL (ref 8–23)
CALCIUM IONIZED: 1.17 MMOL/L (ref 1.13–1.33)
CALCIUM SERPL-MCNC: 8.9 MG/DL (ref 8.6–10.4)
CHLORIDE BLD-SCNC: 101 MMOL/L (ref 98–107)
CO2: 22 MMOL/L (ref 20–31)
CREAT SERPL-MCNC: 0.8 MG/DL (ref 0.5–0.9)
GFR SERPL CREATININE-BSD FRML MDRD: >60 ML/MIN/1.73M2
GLUCOSE BLD-MCNC: 134 MG/DL (ref 70–99)
GLUCOSE BLD-MCNC: 147 MG/DL (ref 65–105)
GLUCOSE BLD-MCNC: 175 MG/DL (ref 65–105)
GLUCOSE BLD-MCNC: 175 MG/DL (ref 65–105)
HCT VFR BLD CALC: 34.4 % (ref 36.3–47.1)
HEMOGLOBIN: 10.6 G/DL (ref 11.9–15.1)
MAGNESIUM: 1.7 MG/DL (ref 1.6–2.6)
MCH RBC QN AUTO: 30 PG (ref 25.2–33.5)
MCHC RBC AUTO-ENTMCNC: 30.8 G/DL (ref 28.4–34.8)
MCV RBC AUTO: 97.5 FL (ref 82.6–102.9)
NRBC AUTOMATED: 0 PER 100 WBC
PDW BLD-RTO: 13.8 % (ref 11.8–14.4)
PLATELET # BLD: ABNORMAL K/UL (ref 138–453)
PLATELET, FLUORESCENCE: 122 K/UL (ref 138–453)
PLATELET, IMMATURE FRACTION: 8.5 % (ref 1.1–10.3)
POTASSIUM SERPL-SCNC: 4.2 MMOL/L (ref 3.7–5.3)
RBC # BLD: 3.53 M/UL (ref 3.95–5.11)
SODIUM BLD-SCNC: 133 MMOL/L (ref 135–144)
WBC # BLD: 7.9 K/UL (ref 3.5–11.3)

## 2023-01-16 PROCEDURE — 85027 COMPLETE CBC AUTOMATED: CPT

## 2023-01-16 PROCEDURE — 99024 POSTOP FOLLOW-UP VISIT: CPT | Performed by: PHYSICIAN ASSISTANT

## 2023-01-16 PROCEDURE — 6360000002 HC RX W HCPCS: Performed by: THORACIC SURGERY (CARDIOTHORACIC VASCULAR SURGERY)

## 2023-01-16 PROCEDURE — 82330 ASSAY OF CALCIUM: CPT

## 2023-01-16 PROCEDURE — 2100000001 HC CVICU R&B

## 2023-01-16 PROCEDURE — 71045 X-RAY EXAM CHEST 1 VIEW: CPT

## 2023-01-16 PROCEDURE — 85055 RETICULATED PLATELET ASSAY: CPT

## 2023-01-16 PROCEDURE — 36415 COLL VENOUS BLD VENIPUNCTURE: CPT

## 2023-01-16 PROCEDURE — 6370000000 HC RX 637 (ALT 250 FOR IP): Performed by: THORACIC SURGERY (CARDIOTHORACIC VASCULAR SURGERY)

## 2023-01-16 PROCEDURE — 2580000003 HC RX 258: Performed by: THORACIC SURGERY (CARDIOTHORACIC VASCULAR SURGERY)

## 2023-01-16 PROCEDURE — 6360000002 HC RX W HCPCS

## 2023-01-16 PROCEDURE — 80048 BASIC METABOLIC PNL TOTAL CA: CPT

## 2023-01-16 PROCEDURE — 82947 ASSAY GLUCOSE BLOOD QUANT: CPT

## 2023-01-16 PROCEDURE — 6370000000 HC RX 637 (ALT 250 FOR IP): Performed by: STUDENT IN AN ORGANIZED HEALTH CARE EDUCATION/TRAINING PROGRAM

## 2023-01-16 PROCEDURE — 99232 SBSQ HOSP IP/OBS MODERATE 35: CPT | Performed by: INTERNAL MEDICINE

## 2023-01-16 PROCEDURE — 97530 THERAPEUTIC ACTIVITIES: CPT

## 2023-01-16 PROCEDURE — 6370000000 HC RX 637 (ALT 250 FOR IP)

## 2023-01-16 PROCEDURE — 97116 GAIT TRAINING THERAPY: CPT

## 2023-01-16 PROCEDURE — 83735 ASSAY OF MAGNESIUM: CPT

## 2023-01-16 RX ORDER — ATORVASTATIN CALCIUM 80 MG/1
80 TABLET, FILM COATED ORAL NIGHTLY
Status: DISCONTINUED | OUTPATIENT
Start: 2023-01-16 | End: 2023-01-17 | Stop reason: HOSPADM

## 2023-01-16 RX ORDER — AMLODIPINE BESYLATE 10 MG/1
10 TABLET ORAL DAILY
Status: DISCONTINUED | OUTPATIENT
Start: 2023-01-16 | End: 2023-01-17 | Stop reason: HOSPADM

## 2023-01-16 RX ORDER — LOSARTAN POTASSIUM 25 MG/1
25 TABLET ORAL DAILY
Status: DISCONTINUED | OUTPATIENT
Start: 2023-01-16 | End: 2023-01-17

## 2023-01-16 RX ORDER — ASPIRIN 81 MG/1
81 TABLET, CHEWABLE ORAL DAILY
Status: DISCONTINUED | OUTPATIENT
Start: 2023-01-16 | End: 2023-01-17 | Stop reason: HOSPADM

## 2023-01-16 RX ADMIN — MUPIROCIN: 20 OINTMENT TOPICAL at 13:13

## 2023-01-16 RX ADMIN — AMLODIPINE BESYLATE 10 MG: 10 TABLET ORAL at 08:47

## 2023-01-16 RX ADMIN — AMIODARONE HYDROCHLORIDE 200 MG: 200 TABLET ORAL at 08:23

## 2023-01-16 RX ADMIN — ASPIRIN 81 MG CHEWABLE TABLET 81 MG: 81 TABLET CHEWABLE at 08:27

## 2023-01-16 RX ADMIN — POLYETHYLENE GLYCOL 3350 17 G: 17 POWDER, FOR SOLUTION ORAL at 08:47

## 2023-01-16 RX ADMIN — MAGNESIUM SULFATE HEPTAHYDRATE 2000 MG: 40 INJECTION, SOLUTION INTRAVENOUS at 06:46

## 2023-01-16 RX ADMIN — CLOPIDOGREL 75 MG: 75 TABLET, FILM COATED ORAL at 08:26

## 2023-01-16 RX ADMIN — POLYETHYLENE GLYCOL 3350 17 G: 17 POWDER, FOR SOLUTION ORAL at 10:10

## 2023-01-16 RX ADMIN — DONEPEZIL HYDROCHLORIDE 5 MG: 5 TABLET, FILM COATED ORAL at 08:28

## 2023-01-16 RX ADMIN — PANTOPRAZOLE SODIUM 40 MG: 40 TABLET, DELAYED RELEASE ORAL at 08:25

## 2023-01-16 RX ADMIN — METOPROLOL TARTRATE 37.5 MG: 25 TABLET ORAL at 08:25

## 2023-01-16 RX ADMIN — SODIUM CHLORIDE 400 ML: 9 INJECTION, SOLUTION INTRAVENOUS at 06:45

## 2023-01-16 RX ADMIN — INSULIN GLARGINE 12 UNITS: 100 INJECTION, SOLUTION SUBCUTANEOUS at 21:04

## 2023-01-16 RX ADMIN — SODIUM CHLORIDE, PRESERVATIVE FREE 10 ML: 5 INJECTION INTRAVENOUS at 08:36

## 2023-01-16 RX ADMIN — TRAMADOL HYDROCHLORIDE 50 MG: 50 TABLET, COATED ORAL at 12:57

## 2023-01-16 RX ADMIN — ATORVASTATIN CALCIUM 80 MG: 80 TABLET, FILM COATED ORAL at 21:18

## 2023-01-16 RX ADMIN — MUPIROCIN: 20 OINTMENT TOPICAL at 21:04

## 2023-01-16 RX ADMIN — DOCUSATE SODIUM 50 MG AND SENNOSIDES 8.6 MG 1 TABLET: 8.6; 5 TABLET, FILM COATED ORAL at 21:03

## 2023-01-16 RX ADMIN — METOPROLOL TARTRATE 37.5 MG: 25 TABLET ORAL at 21:03

## 2023-01-16 RX ADMIN — DIPHENHYDRAMINE HCL 25 MG: 25 TABLET ORAL at 00:39

## 2023-01-16 RX ADMIN — AMIODARONE HYDROCHLORIDE 200 MG: 200 TABLET ORAL at 21:03

## 2023-01-16 RX ADMIN — DOCUSATE SODIUM 50 MG AND SENNOSIDES 8.6 MG 1 TABLET: 8.6; 5 TABLET, FILM COATED ORAL at 08:24

## 2023-01-16 RX ADMIN — LOSARTAN POTASSIUM 25 MG: 25 TABLET, FILM COATED ORAL at 08:47

## 2023-01-16 RX ADMIN — FUROSEMIDE 40 MG: 10 INJECTION, SOLUTION INTRAMUSCULAR; INTRAVENOUS at 08:29

## 2023-01-16 RX ADMIN — TRAMADOL HYDROCHLORIDE 50 MG: 50 TABLET, COATED ORAL at 00:38

## 2023-01-16 RX ADMIN — TRAMADOL HYDROCHLORIDE 50 MG: 50 TABLET, COATED ORAL at 06:49

## 2023-01-16 RX ADMIN — AMIODARONE HYDROCHLORIDE 200 MG: 200 TABLET ORAL at 13:19

## 2023-01-16 ASSESSMENT — PAIN SCALES - GENERAL
PAINLEVEL_OUTOF10: 2
PAINLEVEL_OUTOF10: 5
PAINLEVEL_OUTOF10: 5

## 2023-01-16 ASSESSMENT — ENCOUNTER SYMPTOMS
COLOR CHANGE: 0
SHORTNESS OF BREATH: 0
ABDOMINAL PAIN: 0
APNEA: 0
ABDOMINAL DISTENTION: 0

## 2023-01-16 NOTE — PLAN OF CARE
Problem: Chronic Conditions and Co-morbidities  Goal: Patient's chronic conditions and co-morbidity symptoms are monitored and maintained or improved  Outcome: Not Progressing     Problem: Discharge Planning  Goal: Discharge to home or other facility with appropriate resources  Outcome: Not Progressing     Problem: Pain  Goal: Verbalizes/displays adequate comfort level or baseline comfort level  Outcome: Not Progressing     Problem: ABCDS Injury Assessment  Goal: Absence of physical injury  Outcome: Not Progressing     Problem: Chronic Conditions and Co-morbidities  Goal: Patient's chronic conditions and co-morbidity symptoms are monitored and maintained or improved  Outcome: Not Progressing     Problem: Discharge Planning  Goal: Discharge to home or other facility with appropriate resources  Outcome: Not Progressing     Problem: Pain  Goal: Verbalizes/displays adequate comfort level or baseline comfort level  Outcome: Not Progressing     Problem: ABCDS Injury Assessment  Goal: Absence of physical injury  Outcome: Not Progressing

## 2023-01-16 NOTE — PROGRESS NOTES
Physical Therapy  Facility/Department: [facility]- S[facility]  Physical Therapy Daily Treatment Note    Name: Carissa Wall  : 1945  MRN: [id number]  Date of Service: 2023    Discharge Recommendations:  Patient would benefit from continued therapy after discharge   PT Equipment Recommendations  Equipment Needed: No      Patient Diagnosis(es): There were no encounter diagnoses. Past Medical History:  has a past medical history of Abnormal EKG, Arthritis, Asthma, Colon polyps, Gout, History of fall, Hyperlipidemia, Hypertension, Incomplete RBBB, Osteoarthritis, Osteopetrosis, Right knee meniscal tear, Sciatica, Seasonal allergies, Snoring, Type II or unspecified type diabetes mellitus without mention of complication, not stated as uncontrolled, Urinary incontinence, and Wears dentures. Past Surgical History:  has a past surgical history that includes Foot surgery (Left); Nose surgery; Tubal ligation; Hysterectomy; Elbow fracture surgery (Right); Colonoscopy; eye surgery (Bilateral); other surgical history; Hip fracture surgery (Right, 2021); Spinal cord stimulator implant; Finger trigger release (Right, 2018); Knee arthroscopy (Right, 2022); and Coronary artery bypass graft (N/A, 2023). Assessment   Body Structures, Functions, Activity Limitations Requiring Skilled Therapeutic Intervention: Decreased functional mobility ; Decreased strength; Increased pain;Decreased posture;Decreased safe awareness;Decreased cognition;Decreased ROM; Decreased endurance;Decreased balance  Assessment: Pt ambulated 85ft total with RW and Sky, requiring 2 seated rest breaks throughout. Pt fatigues quickly with mobility, and required cueing throughout for safety. Pt currently unsafe to return to prior living arrangements, recommending continued PT to address deficits and maximize safety and independence.   Therapy Prognosis: Good  Requires PT Follow-Up: Yes  Activity Tolerance  Activity Tolerance: Treatment limited secondary to decreased cognition;Patient limited by endurance; Patient limited by fatigue     Plan   Physcial Therapy Plan  General Plan: 6-7 times per week  Current Treatment Recommendations: Strengthening, ROM, Balance training, Endurance training, Functional mobility training, Transfer training, Safety education & training, Therapeutic activities, Patient/Caregiver education & training, Equipment evaluation, education, & procurement, Stair training, Gait training  Safety Devices  Type of Devices: Call light within reach, Nurse notified, Gait belt, Left in chair, Patient at risk for falls  Restraints  Restraints Initially in Place: No     Restrictions  Restrictions/Precautions  Restrictions/Precautions: Up as Tolerated, General Precautions, Surgical Protocols, Cardiac  Required Braces or Orthoses?: Yes  Required Braces or Orthoses  Other: Heart Hugger Brace  Position Activity Restriction  Sternal Precautions: No Pushing, No Pulling, 5# Lifting Restrictions  Other position/activity restrictions: s/p CABG x3 (1/13/23); ambulate pt, up in chair, up for meals     Subjective   General  Chart Reviewed: Yes  Patient assessed for rehabilitation services?: Yes  Response To Previous Treatment: Patient with no complaints from previous session. Family / Caregiver Present: Yes ()  Follows Commands: Impaired  Other (Comment): slow to respond, requring some repetition of cues. General Comment  Comments: Pt returned to seated in chair at end of session with call light in reach. Subjective  Subjective: Pt and RN agreeable to PT this afternoon. Pt seated in chair upon arrival with no c/o pain. Pt pleasant and cooperative throughout session.        Cognition   Orientation  Overall Orientation Status: Impaired  Orientation Level: Oriented to place;Oriented to person;Disoriented to time;Oriented to situation  Cognition  Overall Cognitive Status: Exceptions  Arousal/Alertness: Delayed responses to stimuli  Following Commands: Follows one step commands with increased time; Follows one step commands with repetition  Attention Span: Difficulty attending to directions; Unable to maintain attention  Safety Judgement: Decreased awareness of need for assistance  Problem Solving: Decreased awareness of errors;Assistance required to correct errors made;Assistance required to identify errors made;Assistance required to generate solutions  Insights: Decreased awareness of deficits  Initiation: Requires cues for some  Sequencing: Requires cues for some  Cognition Comment: Pt becoming very fatigued following ambulation, requiring cueing to keep eyes open. Objective   Bed mobility  Supine to Sit: Unable to assess  Sit to Supine: Unable to assess  Scooting: Contact guard assistance  Bed Mobility Comments: Pt up in chair upon entry/exit this date. Transfers  Sit to Stand: Minimal Assistance  Stand to Sit: Contact guard assistance  Comment: RW used for transfers, max cueing for hand placement and use of heart hugger with poor return demo. Multiple transfers performed throughout session. Ambulation  Surface: Level tile  Device: Rolling Walker  Other Apparatus: Wheelchair follow  Assistance: Minimal assistance  Quality of Gait: Significant shuffling of gait, difficulty with LLE progression, veering to L with fatigue, decreased step length on L  Gait Deviations: Slow Victorina;Decreased step length;Decreased step height  Distance: 85ft total  Comments: Pt ambulated ~85ft total with RW and Sky, requiring 2 seated rest breaks throughout. Pt tends to veer L with faituge needing cueing to correct. After visual demonstration of ambulation, pt was able to self correct. No LOB noted during ambulation although pt tends to fatigue quickly so recommend a chair to follow.   More Ambulation?: No  Stairs/Curb  Stairs?: No     Balance  Posture: Fair  Sitting - Static: Fair  Sitting - Dynamic: Fair  Standing - Static: Fair;+  Standing - Dynamic: Fair;-  Comments: standing balance assessed w/ RW; pt able to sit unsupported at edge of chair with SBA. Exercise Treatment:   Seated LE exercises: ankle pumps, LAQ. Reps: x10 BLE  Comments: Pt very fatigued and lethargic following ambulation deferring further exercises. AM-PAC Score  AM-PAC Inpatient Mobility Raw Score : 16 (01/16/23 1551)  AM-PAC Inpatient T-Scale Score : 40.78 (01/16/23 1551)  Mobility Inpatient CMS 0-100% Score: 54.16 (01/16/23 1551)  Mobility Inpatient CMS G-Code Modifier : CK (01/16/23 1551)      Goals  Short Term Goals  Time Frame for Short Term Goals: 14  Short Term Goal 1: Pt to perform bed mobility SBA  Short Term Goal 2: Demonstrate functional transfers SBA  Short Term Goal 3: Ambulate 300ft w/ least restrictive AD SBA  Short Term Goal 4: Ascend/descend 4 stairs with least restrictive AD SBA  Patient Goals   Patient Goals :  To go home       Education  Patient Education  Education Given To: Patient  Education Provided: Role of Therapy;Plan of Care;Transfer Training  Education Method: Verbal  Barriers to Learning: Cognition  Education Outcome: Continued education needed;Verbalized understanding      Therapy Time   Individual Concurrent Group Co-treatment   Time In 1409         Time Out 1510         Minutes 33         Timed Code Treatment Minutes: [address], PTA

## 2023-01-16 NOTE — PROGRESS NOTES
[facility]  Daily Progress Note    Surgeon: Dr Tigist Barajas         POD# 3  S/P : CORONARY ARTERY BYPASS X 3 ON PUMP,  ROBSON      Subjective:  Ms. Brice Meek is a 65y.o. year-old female seen and examined at bedside. Up in the bed this morning, more tired today       Vital Signs: BP (!) 154/55   Pulse 75   Temp 98.2 °F (36.8 °C) (Oral)   Resp 18   Ht 5' 4\" (1.626 m)   Wt 179 lb 3.7 oz (81.3 kg)   SpO2 95%   BMI 30.77 kg/m²  O2 Flow Rate (L/min): 5 L/min   Admit Weight: Weight: 176 lb (79.8 kg)   WEIGHTWeight: 179 lb 3.7 oz (81.3 kg)     I/O's:  I/O last 3 completed shifts: In: 2274.1 [P.O.:150; I.V.:1489.9; IV Piggyback:634.3]  Out: 4877 [Urine:3300; Chest Tube:640]    Data:    CBC:   Recent Labs     01/14/23  0510 01/15/23  1414 01/16/23  0310   WBC 7.8 9.7 7.9   HGB 9.7* 11.0* 10.6*   HCT 31.4* 35.6* 34.4*   MCV 97.8 96.7 97.5   PLT See Reflexed IPF Result See Reflexed IPF Result See Reflexed IPF Result     BMP:   Recent Labs     01/15/23  0536 01/15/23  1605 01/16/23  0310    140 133*   K 5.6* 4.3 4.2    106 101   CO2 22 19* 22   BUN 17 16 15   CREATININE 1.16* 0.97* 0.80     PT/INR:   Recent Labs     01/13/23  1455 01/14/23  0510 01/15/23  1414   PROTIME 14.3* 12.1 13.5*   INR 1.4 1.1 1.3     APTT:   Recent Labs     01/13/23  1455   APTT 29.9       Chest X-Ray:   FINDINGS:   AP portable view of the chest time stamped at 512 hours demonstrates   overlying cardiac monitoring electrodes, prior median sternotomy, mediastinal   drain, left-sided chest tube, right IJ catheter terminating at the cavoatrial   junction and paired intraspinal electrodes, stable. Heart size is unchanged. Mild vascular congestion is noted. Small bilateral effusions are noted with   bibasilar opacities appearance favoring atelectasis. No extrapleural air. Significant calcification of the left carotid artery is noted.            Impression   Continue postsurgical changes with bilateral effusions and bibasilar   opacities. Pulmonary vascularity is mildly congested. Appliances as above       Scheduled Meds:    atorvastatin  80 mg Oral Nightly    aspirin  81 mg Oral Daily    losartan  25 mg Oral Daily    amLODIPine  10 mg Oral Daily    furosemide  40 mg IntraVENous Daily    metoprolol tartrate  37.5 mg Oral BID    insulin lispro  0-16 Units SubCUTAneous TID WC    insulin lispro  0-4 Units SubCUTAneous Nightly    sodium chloride flush  5-40 mL IntraVENous 2 times per day    clopidogrel  75 mg Oral Daily    amiodarone  200 mg Oral TID    mupirocin   Nasal BID    polyethylene glycol  17 g Oral Daily    sennosides-docusate sodium  1 tablet Oral BID    pantoprazole  40 mg Oral Daily    pantoprazole (PROTONIX) 40 mg injection  40 mg IntraVENous Daily    insulin glargine  0.15 Units/kg SubCUTAneous Nightly    donepezil  5 mg Oral Daily     Continuous Infusions:    sodium chloride Stopped (01/13/23 2106)    sodium chloride 400 mL (01/16/23 0645)    norepinephrine 0.04 mcg/kg/min (01/13/23 1429)    EPINEPHrine 0.04 mcg/kg/min (01/13/23 1429)    dextrose             Physical Exam:    General: A&O x 3, NAD noted  Heart: Normal S1, S2, RRR, No murmurs noted   Sternum: Prevena in place   Lungs: Diminished BS bilaterally at the bases. CT's in place to -20. No air leak noted   Abdomen: Soft, non tender, non distended, Hypoactive BS x 4   : Gutierres in place   Extremities: + edema noted bilateral LE. EVH sites: CDI         Assessment & Plan:    Ms. Garret Aldana is a 65y.o. year-old female status post CABGx3 on 1/13/2023. No issues or events noted with the patient overnight. 1/16/2023:  Neuro intact. HD stable. D/C chest tubes and lines if good peripheral  PMR consult  D/C tomorrow    1/15/2023:  Hx of dementia, d/c'd percocet, please use tramadol and tylenol for pain. Hypertensive overnight increased metoprolol to 37. 5BID added 5mg norvasc, K is 5.6, increased lasix to 40mg, increased insulin dosing to high dose sliding scale and gave 1gram of calcium gluconate. , joseph out. Will leave Cts for today. Neuro:  #Post-op pain   - Continue current Pain management   - hx dementia, limit narcotics use   - switch to tylenol and tramadol for pain   CV:   #S/p CABG   - HDS, off gtts   - Continue current CV medications per cardiology recs   Resp:   #Post-op respiratory insufficency   - Incentive Spirometry / Pulmonary hygiene  - Follow-up CXR and ABG  GI:  - Advance diet as tolerated to cardiac diet   - post-op PU prophylaxis   - Bowel regimen PRN   /Lytes:   - Remove joseph after lasix today   - Replace e-lytes per protocol, check BMP daily   Heme:   # post-surgical blood loss anemia   - CTM daily CBC   SCD and DVT prophylaxis with lovenox   ID:   - CTM daily CBC   D/C planning:  - OOB to chair - Ambulation with PT 3x daily  - Patient plan for home discharge       Beta- Blocker: Yes  Aspirin: Yes  Lovenox: No  GI: Yes  Plavix: Yes  Coumadin: No  Statin: Yes  ACE: No    - Further recommendations as per Dr. Raissa Chicas / Dr. Florencia Mitchell      The above recommendations including medications and orders were discussed and agreed upon with Dr. Raissa Chicas / Dr. Nereida Chen.        Maria Elena Lab, APRN-CNP

## 2023-01-16 NOTE — CARE COORDINATION
PMR consult placed today, noted in prior CM note that ARU choice is [facility], referral sent. SNF choices per prior note are 1) [address] [place] [address], 2) [facility], and 3) [facility].

## 2023-01-16 NOTE — OP NOTE
[address] [facility] 30                                OPERATIVE REPORT    PATIENT NAME: Gerardo Lubin              :        1945  MED REC NO:   [id number]                             ROOM:       12  ACCOUNT NO:   [de-identified]                           ADMIT DATE: 01/10/2023  PROVIDER:     Kaylee Steinberg MD    DATE OF PROCEDURE:  2023    PREOPERATIVE DIAGNOSES:  Multivessel coronary artery disease, left main  coronary artery disease. POSTOPERATIVE DIAGNOSES:  Multivessel coronary artery disease, left main  coronary artery disease. PROCEDURES:  1. CABG x3, LIMA to the LAD, saphenous vein to the OM1, saphenous vein  to the RCA. 2.  Epiaortic ultrasound. 3.  Right leg endo vein harvest.  4.  Platelet gel. 5.  Cardiopulmonary bypass. SURGEON:  Kaylee Steinberg MD    ASSISTANT:  Kristen Fierro. Mo Santos RN, RFA    EBL:  500 mL. SPECIMEN: None. DRAINS:  Three mediastinal chest tubes. OPERATIVE PROCEDURE:  The patient had risks, benefits and intentions  fully discussed. Risks include but not limited to bleeding, infection,  stroke, renal failure, damage to heart and lungs, possible death. Signed consent, was taken back to the operating room, placed in supine  position. General anesthesia was induced. The patient had central  line, A-line, Gutierres and ROBSON probe placed. Washed, prepped and draped in  a normal sterile fashion. An incision was made in the midsternal line,  continued in depth with electrocautery. Sternum was opened using an  oscillating sternal saw. The pericardium was opened, the heart was  inspected. A Rultract retractor was placed in the left hemithorax. Endothoracic fascia was opened. LIMA was taken down off the chest wall.   LIMA was treated with intraarterial papaverine placed in  papaverine-soaked Ray-Julia in the left chest.    At this point, the patient had the LIMA taken down off the chest wall. Side branches were doubly clipped and divided using small hemoclips. LIMA was treated with intraarterial papaverine placed in a  papaverine-soaked Ray-Julia in the left chest.    At this juncture, the patient was given a total dose of heparin. The  epiaortic ultrasound was done. No calcification was noted. Double  gabrielle 2-0 Ethibond was placed on the high point of the aorta, 2-0  Ethibond was placed on the right atrial appendage and a Prolene was  placed for antegrade needle. The cannulas were placed, they were  de-aired and hooked up to the pump. The patient was placed on  cardiopulmonary bypass. The targets were identified including the main  right coronary artery, the OM1 and mid-LAD. Simultaneous to the above, the right greater saphenous vein was  harvested in the following fashion. An 1-inch incision was made at the  knee. Endoscope was passed proximally and distally. The branches were  divided. The side branches were then clipped and tied using 4-0 silk  ties. At this juncture, the patient had the vein prepped and the first target,  the right coronary artery was opened. The right coronary artery was  opened using a 15-blade and a [place] blade. Moore scissors were used to  open the artery further in each direction. The patient had the  saphenous vein anastomosed to the right coronary artery using a 7-0  Prolene running suture. At this point, the patient had the vein  measured back to appropriate length and orientation. The vein was cut. Next, the OM1 was identified and opened using a 15-blade and a [place]  blade. Moore scissors were used to open the artery further in each  direction. The patient at this juncture had no leaks noted from the vein. They  were measured back to appropriate length and orientation. The vein was  cut. Antegrade blood cardioplegia was given. The aorta was opened  using a 4-mm punch.   The proximal anastomosis was done using 6-0 Prolene  running sutures. Once this was completed, the cardioplegia was  completed, the LIMA was retrieved from the left chest, cleaned of  fibrofatty tissue, spatulated. The mid-LAD was opened using a 15-blade  and a [place] blade. Moore scissors were used to open the artery further  in each direction. The LIMA to LAD anastomosis was done using 7-0  Prolene running suture. Pedicles tacked to the surface of the heart  using 6-0 Prolene figure-of-eight suture. At this point, the patient had the LIMA probed well proximally and  distally and the patient had hot shot blood given in an antegrade  fashion. Heart was beating spontaneously in deep Trendelenburg  position, crossclamp was removed. The patient was slowly weaned from  cardiopulmonary bypass, the medications were given. The patient was  from cardiopulmonary bypass. Protamine was  given. The venous cannula was removed given back to the pump. The  patient then had the antegrade needle removed. The site was oversewn  using 5-0 Prolene figure-of-eight suture. Additional protamine was  given. The aortic cannula was removed. The site was oversewn using 4-0  pledgeted Prolene suture. Ventricular pacing wires had already been  placed. Three mediastinal chest tubes were placed and sewn in place  using 0 silk suture. At this point, the sternum was closed using eight sternal wires. Suprasternal fascia was closed using #1 running Vicryl suture, 2-0, 3-0  and 4-0 Vicryl for the superficial layers. The patient had the leg  closed using 2-0 and a 4-0 Vicryl suture. Platelet gel was applied to  the cut surface of the sternum and the chest wall. The patient had 4x4s and tape applied for dressing. The patient's chest  tubes were hooked to Pleur-evac suction, was taken in critical condition  to intensive care unit.     This procedure could not have performed without the assistance of  Sue Alvarenga, who was invaluable assisting at the chest and also  closing and harvesting vein.         Teo Chaudhari MD    D: 01/15/2023 12:25:14       T: 01/15/2023 12:28:16     [email or web address]  Job#: [id number]     Doc#: [id number]    CC:

## 2023-01-16 NOTE — PROGRESS NOTES
[facility]  Internal Medicine Teaching Residency Program  Inpatient Daily Progress Note  ______________________________________________________________________________    Patient: Viktoriya Cox  YOB: 1945   LWK:[id number]    Acct: [de-identified]     Room: [address] [place] [address]  Admit date: 1/10/2023  Today's date: 01/16/23  Number of days in the hospital: 6    SUBJECTIVE   Admitting Diagnosis: <principal problem not specified>  CC: Acute gout flare    - Pt examined at bedside. Chart & results reviewed. The patient had no acute events overnight. Patient is awake and alert in chair, not complaining of any pain, patient states she is doing better overall. Plan for today:  - Recommend following up outpatient with PCP for uric acid level. - Recommend allopurinol if uric acid level is elevated, will leave final decision up to PCP. - Internal medicine will continue to follow. - Per primary team plan to discharge tomorrow      Review of Systems   Constitutional:  Negative for chills and fever. HENT:  Negative for congestion. Respiratory:  Negative for apnea and shortness of breath. Cardiovascular:  Negative for chest pain and leg swelling. Gastrointestinal:  Negative for abdominal distention and abdominal pain. Genitourinary:  Negative for dysuria. Musculoskeletal:  Positive for joint swelling. Bilateral greater toe swelling, due to gout flare.,  Overall decreased from presentation. Skin:  Negative for color change. Allergic/Immunologic: Negative for immunocompromised state. Neurological:  Negative for dizziness. Hematological:  Negative for adenopathy. Psychiatric/Behavioral:  Negative for agitation. BRIEF HISTORY        The patient is a pleasant 65 y.o. female with past medical history coronary artery disease, hypertension, diabetes, CKD stage III and arthritis.   Patient is currently being followed primarily by cardiology due to 90% ostial stenosis seen in recent cardiac cath. Patient is scheduled for CABG tomorrow. Cardiology consulted internal medicine for acute gout flare. After speaking with the patient she has had GERD symptoms for the last 5-1/2 months recently within the last few weeks it has gotten worse. Episodes in general have been intermittent. Pain is mostly associated with left great toe. Patient states that she is not on any Medications at home. Plan for CABG today:     OBJECTIVE     Vital Signs:  BP (!) 154/55   Pulse 75   Temp 98.2 °F (36.8 °C) (Oral)   Resp 18   Ht 5' 4\" (1.626 m)   Wt 179 lb 3.7 oz (81.3 kg)   SpO2 95%   BMI 30.77 kg/m²     Temp (24hrs), Av.8 °F (37.1 °C), Min:97.7 °F (36.5 °C), Max:99.5 °F (37.5 °C)    In: 1218.4   Out: 3210 [Urine:2750]    Physical Exam:  Physical Exam  Constitutional:       General: She is not in acute distress. Appearance: Normal appearance. She is obese. HENT:      Head: Normocephalic and atraumatic. Right Ear: External ear normal.      Left Ear: External ear normal.      Nose: Nose normal. No congestion. Mouth/Throat:      Mouth: Mucous membranes are moist.      Pharynx: Oropharynx is clear. Eyes:      Extraocular Movements: Extraocular movements intact. Conjunctiva/sclera: Conjunctivae normal.   Cardiovascular:      Rate and Rhythm: Normal rate. Pulses: Normal pulses. Heart sounds: Normal heart sounds. Pulmonary:      Effort: Pulmonary effort is normal.      Breath sounds: Normal breath sounds. Abdominal:      General: Bowel sounds are normal. There is no distension. Palpations: Abdomen is soft. Tenderness: There is no abdominal tenderness. Musculoskeletal:         General: Swelling present. No tenderness. Right lower leg: No edema. Left lower leg: No edema. Comments: Patient has bilateral greater toe gout flares. Skin:     General: Skin is warm and dry.       Findings: Erythema (Erythema right greater toes) present. Neurological:      General: No focal deficit present. Mental Status: She is alert and oriented to person, place, and time.    Psychiatric:         Mood and Affect: Mood normal.         Medications:  Scheduled Medications:    atorvastatin  80 mg Oral Nightly    aspirin  81 mg Oral Daily    losartan  25 mg Oral Daily    amLODIPine  10 mg Oral Daily    furosemide  40 mg IntraVENous Daily    metoprolol tartrate  37.5 mg Oral BID    insulin lispro  0-16 Units SubCUTAneous TID WC    insulin lispro  0-4 Units SubCUTAneous Nightly    sodium chloride flush  5-40 mL IntraVENous 2 times per day    clopidogrel  75 mg Oral Daily    amiodarone  200 mg Oral TID    mupirocin   Nasal BID    polyethylene glycol  17 g Oral Daily    sennosides-docusate sodium  1 tablet Oral BID    pantoprazole  40 mg Oral Daily    pantoprazole (PROTONIX) 40 mg injection  40 mg IntraVENous Daily    insulin glargine  0.15 Units/kg SubCUTAneous Nightly    donepezil  5 mg Oral Daily     Continuous Infusions:    sodium chloride Stopped (01/13/23 2106)    sodium chloride 400 mL (01/16/23 0645)    norepinephrine 0.04 mcg/kg/min (01/13/23 1429)    EPINEPHrine 0.04 mcg/kg/min (01/13/23 1429)    dextrose       PRN Medicationsacetaminophen, 650 mg, Q4H PRN  traMADol, 50 mg, Q6H PRN  sodium chloride, , PRN  sodium chloride flush, 5-40 mL, PRN  sodium chloride, , PRN  ondansetron, 4 mg, Q8H PRN   Or  ondansetron, 4 mg, Q6H PRN  hydrALAZINE, 5 mg, Q5 Min PRN  sodium bicarbonate, 50 mEq, Q30 Min PRN  diphenhydrAMINE, 25 mg, Nightly PRN  potassium chloride, 20 mEq, PRN  magnesium sulfate, 2,000 mg, PRN  calcium chloride IVPB, 1,000 mg, PRN   Or  calcium chloride IVPB, 2,000 mg, PRN  ipratropium-albuterol, 1 ampule, Q4H PRN  albumin human, 25 g, PRN  albumin human, 25 g, PRN  norepinephrine, 0.01-0.08 mcg/kg/min, Continuous PRN  EPINEPHrine, 0.01-0.08 mcg/kg/min, Continuous PRN  glucose, 4 tablet, PRN  dextrose bolus, 125 mL, PRN   Or  dextrose bolus, 250 mL, PRN  glucagon (rDNA), 1 mg, PRN  dextrose, , Continuous PRN  bisacodyl, 5 mg, Daily PRN  polyethylene glycol, 17 g, Daily PRN  [MAR Hold] ALPRAZolam, 0.5 mg, BID PRN  [MAR Hold] hydrALAZINE, 10 mg, Q6H PRN      Diagnostic Labs:  CBC:   Recent Labs     01/14/23  0510 01/15/23  1414 01/16/23  0310   WBC 7.8 9.7 7.9   RBC 3.21* 3.68* 3.53*   HGB 9.7* 11.0* 10.6*   HCT 31.4* 35.6* 34.4*   MCV 97.8 96.7 97.5   RDW 14.3 14.1 13.8   PLT See Reflexed IPF Result See Reflexed IPF Result See Reflexed IPF Result       BMP:   Recent Labs     01/15/23  0536 01/15/23  1605 01/16/23  0310    140 133*   K 5.6* 4.3 4.2    106 101   CO2 22 19* 22   BUN 17 16 15   CREATININE 1.16* 0.97* 0.80       BNP: No results for input(s): BNP in the last 72 hours. PT/INR:   Recent Labs     01/13/23  1455 01/14/23  0510 01/15/23  1414   PROTIME 14.3* 12.1 13.5*   INR 1.4 1.1 1.3       APTT:   Recent Labs     01/13/23  1455   APTT 29.9       CARDIAC ENZYMES: No results for input(s): CKMB, CKMBINDEX, TROPONINI in the last 72 hours. Invalid input(s): CKTOTAL;3  FASTING LIPID PANEL:  Lab Results   Component Value Date    CHOL 185 12/20/2021    HDL 36 (L) 12/09/2022    HDL 37 (L) 12/09/2022    TRIG 257 (H) 12/20/2021     LIVER PROFILE: No results for input(s): AST, ALT, ALB, BILIDIR, BILITOT, ALKPHOS in the last 72 hours. MICROBIOLOGY: No results found for: CULTURE    Imaging:    CT CHEST WO CONTRAST    Result Date: 1/11/2023  1. No acute intrathoracic process. 2.  Moderate atherosclerosis with incidental aberrant right subclavian artery. ASSESSMENT & PLAN     Assessment and Plan: Active Problems:    Coronary artery disease involving native coronary artery of native heart without angina pectoris    Acute idiopathic gout  Resolved Problems:    * No resolved hospital problems. *      Thank you for allowing us to see Janeth Ferrara in consultation for acute gout flare.   Patient states that this has been an ongoing issue for the last 5-1/2 months, has had intermittent episodes. Over the last few weeks episodes are becoming more frequent. Acute gout flare:  - Uric acid: 6.6  - ESR: 54  - CRP: 34.3  - Colchicine initial dose 1.2, subsequent dose 0.6, discontinued due to CABG  - Patient will need to follow-up with PCP outpatient for uric acid level. - Recommend allopurinol if uric acid level is elevated. We will leave final decision to primary care provider. - Internal medicine will continue to follow. Coronary artery disease: 90% stenosis of the left ostial  - Cardiac cath showed 90% stenosis of left ostial.  - Status post CABG 1/13  - Cardiology is primary and currently treating. Christian Connor M.D. Internal Medicine Resident PGY-1  [facility]. Attending Physician Statement  I have discussed the care of Ronan Adan, including pertinent history and exam findings,  with the resident. I have seen and examined the patient and the key elements of all parts of the encounter have been performed by me. I agree with the assessment, plan and orders as documented by the resident with additions . Post CABG resp. Failure,hypoxic. No stridor. Treatment plan Discussed with nursing staff in detail , all questions answered . Electronically signed by Steven Farfan MD on   1/16/23 at 8:23 PM EST    Please note that this chart was generated using voice recognition Dragon dictation software. Although every effort was made to ensure the accuracy of this automated transcription, some errors in transcription may have occurred.

## 2023-01-16 NOTE — PROGRESS NOTES
[facility]  Progress Note                   Date:   1/16/2023  Patient name: Acosta Elias  Date of admission:  1/10/2023  3:33 PM  MRN:   [id number]  YOB: 1945  PCP: Samantha Lozano MD    Reason for Admission: Abnormal stress ECG [R94.39]  CAD in native artery [I25.10]    Subjective:       Seen and examined. ARI. blood pressure elevated overnight  Postop day 3 of CABG X3, extubated on 1/14  Vitally stable. No acute issues.         Intake/Output Summary (Last 24 hours) at 1/16/2023 0750  Last data filed at 1/16/2023 0554  Gross per 24 hour   Intake 1218.42 ml   Output 3210 ml   Net -1991.58 ml           Medications:   Scheduled Meds:   furosemide  40 mg IntraVENous Daily    metoprolol tartrate  37.5 mg Oral BID    amLODIPine  5 mg Oral Daily    insulin lispro  0-16 Units SubCUTAneous TID WC    insulin lispro  0-4 Units SubCUTAneous Nightly    sodium chloride flush  5-40 mL IntraVENous 2 times per day    aspirin  81 mg Oral Daily    clopidogrel  75 mg Oral Daily    amiodarone  200 mg Oral TID    mupirocin   Nasal BID    polyethylene glycol  17 g Oral Daily    sennosides-docusate sodium  1 tablet Oral BID    atorvastatin  20 mg Oral Nightly    pantoprazole  40 mg Oral Daily    pantoprazole (PROTONIX) 40 mg injection  40 mg IntraVENous Daily    insulin glargine  0.15 Units/kg SubCUTAneous Nightly    donepezil  5 mg Oral Daily     Continuous Infusions:   sodium chloride Stopped (01/13/23 2106)    sodium chloride 400 mL (01/16/23 0645)    norepinephrine 0.04 mcg/kg/min (01/13/23 1429)    EPINEPHrine 0.04 mcg/kg/min (01/13/23 1429)    dextrose       CBC:   Recent Labs     01/14/23  0510 01/15/23  1414 01/16/23  0310   WBC 7.8 9.7 7.9   HGB 9.7* 11.0* 10.6*   PLT See Reflexed IPF Result See Reflexed IPF Result See Reflexed IPF Result     BMP:    Recent Labs     01/15/23  0536 01/15/23  1605 01/16/23  0310    140 133*   K 5.6* 4.3 4.2    106 101   CO2 22 19* 22   BUN 17 16 15   CREATININE 1.16* 0.97* 0.80   GLUCOSE 162* 170* 134*     Hepatic:No results for input(s): AST, ALT, ALB, BILITOT, ALKPHOS in the last 72 hours. Troponin: No results for input(s): TROPHS in the last 72 hours. BNP: No results for input(s): BNP in the last 72 hours. Lipids: No results for input(s): CHOL, HDL in the last 72 hours. Invalid input(s): LDLCALCU  INR:   Recent Labs     01/13/23  1455 01/14/23  0510 01/15/23  1414   INR 1.4 1.1 1.3       Objective:   Vitals: BP (!) 152/59   Pulse 67   Temp 98.2 °F (36.8 °C) (Oral)   Resp 18   Ht 5' 4\" (1.626 m)   Wt 179 lb 3.7 oz (81.3 kg)   SpO2 95%   BMI 30.77 kg/m²   General appearance: alert and cooperative with exam  HEENT: Head: Normocephalic, no lesions, without obvious abnormality. Neck:no JVD, trachea midline, no adenopathy  Lungs: Clear to auscultation  Heart: Regular rate and rhythm, s1/s2 auscultated, no murmurs, SR, right radial ecchymosis noted, soft, CDI, no active bleeding, positive pulse. Abdomen: soft, non-tender, bowel sounds active  Extremities: no edema      Echo 12/2022  The Left ventricle appears normal in size. Normal wall thickness. No obvious wall motion abnormality noted. Normal LV systolic function, Ejection Fraction > 55%  Normal RV size and function. RV systolic pressure is normal  The LA and RA appears normal in size. No obvious significant structural valvular abnormality noted. No significant valvular stenosis or regurgitation noted. Normal aortic root dimensions. No significant pericardial infusion seen. The IVC appears normal in size, normal respiratory variation suggestive of  normal right atrial filling pressure. Cardiac Cath 01/12/2023   Lesion on LMCA:       Lesion on LMCA: Ostial.90% stenosis. Coronary Tree      Dominance: Right     LV Analysis  LV function assessed as:Normal.  Ejection Fraction  +----------------------------------------------------------------------+---+  ! Method !EF%!  +----------------------------------------------------------------------+---+  ! LV gram                                                               !55 !  +----------------------------------------------------------------------+---+    Assessment / Acute Cardiac Problems:   Multivessel CAD as above with 90% ostial left main stenosis, status post CABGX3 01/13/2023. Op note pending. Preserved LVEF on echocardiogram, EF 55%  HTN  HLD  Type II DM  Extubated 1/14/2023      Plan of Treatment:   Continue postop medical management. Continue aspirin, amiodarone, statin, and Plavix. Continue Lopressor to 37.5 mg po qd. Increase amlodipine to 10 mg po qd/  On Lasix 40 mg once daily  PT OT/incentive spirometry. Plan for Rehab on discharge  Will continue to follow. Electronically signed by Gini Laboy MD on 1/16/2023 at 7:50 AM.  Attending Physician Statement  I have discussed the care of the patient, including pertinent history and exam findings, with the resident. I have seen and examined the patient and the key elements of all parts of the encounter have been performed by me. I agree with the assessment, plan and orders as documented by the resident.     Amber Gonzalez MD

## 2023-01-16 NOTE — CONSULTS
Physical Medicine & Rehabilitation  Consult Note      Admitting Physician: Johanna Deng MD    Primary Care Provider: Lana Michelle MD     Reason for Consult:  Acute Inpatient Rehabilitation    Chief Complaint: Abnormal stress test    History of Present Illness:  Referring Provider is requesting an evaluation for appropriate placement upon discharge from acute care. Ms. Renaldo Tavares is a 65 y.o.  female who was admitted to [facility] [address] on 1/10/2023 with No chief complaint on file. 71-year-old female presents CT surgery after multivessel coronary artery disease-left main diagnosed after cardiac cath. Abnormal EKG. She was in the process of getting a spinal cord stimulator and was obtained cardiac clearance. She ambulates independent buttocks pain due to ingrown toenails. Cardiology multivessel coronary artery disease with 90% ostial left main stenosis status post CABG times 31/13/2023 preserved ejection fraction 55% continue aspirin, amiodarone, statin and Plavix as well as Lopressor and Lasix    CT surgery noted history of dementia, limited narcotic use, doing status from a tree, on aspirin    Internal medicine gout flareup colchicine discontinued due to CABG follow-up as outpatient recommend allopurinol if uric acid elevated    Radiology:  CT CHEST WO CONTRAST    Result Date: 1/11/2023  1. No acute intrathoracic process. 2.  Moderate atherosclerosis with incidental aberrant right subclavian artery. XR CHEST PORTABLE    Result Date: 1/16/2023  Interval removal of central venous catheter. Trace left hydropneumothorax. Otherwise stable chest.     XR CHEST PORTABLE    Result Date: 1/15/2023  Continue postsurgical changes with bilateral effusions and bibasilar opacities. Pulmonary vascularity is mildly congested. Appliances as above. XR CHEST PORTABLE    Result Date: 1/14/2023  Slightly prominent pulmonary vascularity with bilateral effusions. Support devices as above.   No extrapleural air. Prior endotracheal and intestinal tubes have been removed. XR CHEST PORTABLE    Result Date: 1/13/2023  NG tube with tip in the distal esophagus and side hole in the mid esophagus. Recommend advancing 12 cm. Mild pulmonary vascular congestion. Postsurgical chest.       Review of Systems:  Constitutional: negative for anorexia, chills, fatigue, fevers, sweats and weight loss  Eyes: negative for redness and visual disturbance  Ears, nose, mouth, throat, and face: negative for earaches, sore throat and tinnitus  Respiratory: negative for cough and shortness of breath  Cardiovascular: negative for chest pain, dyspnea and palpitations  Gastrointestinal: negative for abdominal pain, change in bowel habits, constipation, nausea and vomiting  Genitourinary:negative for dysuria, frequency, hesitancy and urinary incontinence  Integument/breast: negative for pruritus and rash  Musculoskeletal:negative for muscle weakness and stiff joints  Neurological: negative for dizziness, headaches and weakness  Behavioral/Psych: negative for decreased appetite, depression and fatigue    Functional History:  PTA: Independent with all activities. Current:  PT:    Bed Mobility Training  Bed Mobility Training: No (up in chair at start/end of session)  Restrictions/Precautions  Restrictions/Precautions: Up as Tolerated, General Precautions, Surgical Protocols, Cardiac  Required Braces or Orthoses?: Yes  Position Activity Restriction  Sternal Precautions: No Pushing, No Pulling, 5# Lifting Restrictions  Other position/activity restrictions: s/p CABG x3 (1/13/23); ambulate pt, up in chair, up for meals, joseph catheter, 2 atriums    Transfer Training  Transfer Training: Yes  Overall Level of Assistance: Minimum assistance, Assist X2, Additional time  Interventions: Safety awareness training, Tactile cues, Verbal cues (assist for use of heart hugger throughout session.  Patient unable to follow or attend to task)  Sit to Stand: Assist X2, Minimum assistance  Stand to Sit: Assist X2, Minimum assistance  Bed to Chair: Assist X2, Minimum assistance  Bed mobility  Supine to Sit: Unable to assess  Sit to Supine: Unable to assess  Scooting: Maximal assistance  Bed Mobility Comments: Pt already to EOB with PTA and RN upon LOBO arrival and concluded session seated in recliner    Gait  Overall Level of Assistance: Assist X2, Minimum assistance  Interventions: Verbal cues, Tactile cues, Safety awareness training  Distance (ft):  (Transfers to recliner and few steps forward and back only in prep for room mobility.)  Assistive Device: Walker, rolling  Transfers  Sit to Stand: 2 Person Assistance, Minimal Assistance  Stand to Sit: Minimal Assistance, 2 Person Assistance  Bed to Chair: Minimal assistance, 2 Person Assistance  Comment: STS performed with use of RW. Pt dependent with use of heart hugger; verbal cues to keep eyes open and verbal cues for UE placement. Eryn x 2 for safety as pt lethargic     Bed mobility  Supine to Sit: Moderate assistance;Maximum assistance  Sit to Supine: Unable to assess (Pt retired to recliner)  Scooting: Maximal assistance  Bed Mobility Comments: Assessed with HOB elevated. Dependent with use of heart hugger. Pt maintained sitting EOB with modA x 5 minutes. Transfers  Sit to Stand: 2 Person Assistance;Minimal Assistance  Stand to Sit: Minimal Assistance;2 Person Assistance  Bed to Chair: Minimal assistance;2 Person Assistance  Comment: STS performed with use of RW. Pt dependent with use of heart hugger; verbal cues to keep eyes open and verbal cues for UE placement. Eryn x 2 for safety as pt lethargic  Ambulation  Surface: Level tile  Device: Rolling Walker  Other Apparatus: chair follow  Assistance: Minimal assistance;2 Person assistance  Gait Deviations: Slow Victorina;Decreased step length;Decreased step height  Distance: 3 ft  Comments: Pt requiring  assistance with progression of RW with chair follow. Constant verbal cues for B LE placement and to keep eyes open. Pt panting throughout; SpO2 >90% on room air. OT:   ADL  Feeding: Increased time to complete, Bringing food to mouth assist, Moderate assistance  Feeding Skilled Clinical Factors: Difficulty maintaining attention and manipulation of utensil for self feeding this session  Grooming: Increased time to complete, Verbal cueing, Contact guard assistance  Grooming Skilled Clinical Factors: Pt able to wash face with CGA and mod cues for follow through with task. Pt too lethargic to attempt oral care and had difficulty with self feeding as noted above  UE Bathing: Maximum assistance, Increased time to complete, Verbal cueing  LE Bathing: Maximum assistance, Increased time to complete, Verbal cueing  UE Dressing: Maximum assistance, Increased time to complete, Verbal cueing  LE Dressing: Maximum assistance, Increased time to complete, Verbal cueing  Toileting: Maximum assistance, Adaptive equipment, Increased time to complete  Additional Comments: Pt lethargic this date requiring Max cues/stimulation to maintain attention to tasks and keep eyes open throughout session. Feeding: Increased time to complete;Bringing food to mouth assist;Moderate assistance  Feeding Skilled Clinical Factors: Difficulty maintaining attention and manipulation of utensil for self feeding this session  Grooming: Increased time to complete;Verbal cueing;Contact guard assistance  Grooming Skilled Clinical Factors: Pt able to wash face with CGA and mod cues for follow through with task. Pt too lethargic to attempt oral care and had difficulty with self feeding as noted above  Additional Comments: Pt lethargic this date requiring Max cues/stimulation to maintain attention to tasks and keep eyes open throughout session. Activity Tolerance  Activity Tolerance: Treatment limited secondary to decreased cognition;Patient limited by endurance; Patient limited by fatigue;Patient limited by pain    ST:      Past Medical History:        Diagnosis Date    Abnormal EKG     Arthritis     Asthma     Noted per [facility] chart- patient denies    Colon polyps     Gout     History of fall     Hyperlipidemia     Hypertension     Incomplete RBBB     Osteoarthritis     Osteopetrosis     Right knee meniscal tear     Sciatica     Seasonal allergies     Snoring     Type II or unspecified type diabetes mellitus without mention of complication, not stated as uncontrolled     diet controlled    Urinary incontinence     Wears dentures     partial upper and full lower       Past Surgical History:        Procedure Laterality Date    COLONOSCOPY      x 3    ELBOW FRACTURE SURGERY Right     EYE SURGERY Bilateral     optic nerve surgery as a child    FINGER TRIGGER RELEASE Right 11/05/2018    Long middle and ring    FOOT SURGERY Left     2 surgeries    HIP FRACTURE SURGERY Right 06/18/2021    with hardware /  Procedure: [address]; Surgeon: Av Keane MD; Location: [address] [place] [address]; Service: Orthopedics; Laterality: Right; fluro and ortho table    HYSTERECTOMY (CERVIX STATUS UNKNOWN)      vaginal    KNEE ARTHROSCOPY Right 6/21/2022    KNEE ARTHROSCOPY FOR PARTIAL MEDIAL MENISCECTOMY AND PARTIAL LATERAL MENISCECTOMY performed by Jayy Moore MD at [address]      3 surgeries    OTHER SURGICAL HISTORY      excision cysts from ovaries    SPINAL CORD STIMULATOR IMPLANT      Patient states it does not work    TUBAL LIGATION         Allergies:     Allergies   Allergen Reactions    Wasp Venom Protein Other (See Comments)     Redness & swelling of area that was stung    Dust Mite Extract     Seasonal     Guaifenesin Other (See Comments)     unsure        Current Medications:   Current Facility-Administered Medications: atorvastatin (LIPITOR) tablet 80 mg, 80 mg, Oral, Nightly  aspirin chewable tablet 81 mg, 81 mg, Oral, Daily  losartan (COZAAR) tablet 25 mg, 25 mg, Oral, Daily  amLODIPine (NORVASC) tablet 10 mg, 10 mg, Oral, Daily  furosemide (LASIX) injection 40 mg, 40 mg, IntraVENous, Daily  metoprolol tartrate (LOPRESSOR) tablet 37.5 mg, 37.5 mg, Oral, BID  insulin lispro (HUMALOG) injection vial 0-16 Units, 0-16 Units, SubCUTAneous, TID WC  insulin lispro (HUMALOG) injection vial 0-4 Units, 0-4 Units, SubCUTAneous, Nightly  acetaminophen (TYLENOL) tablet 650 mg, 650 mg, Oral, Q4H PRN  traMADol (ULTRAM) tablet 50 mg, 50 mg, Oral, Q6H PRN  0.9 % sodium chloride infusion, , IntraVENous, PRN  sodium chloride flush 0.9 % injection 5-40 mL, 5-40 mL, IntraVENous, 2 times per day  sodium chloride flush 0.9 % injection 5-40 mL, 5-40 mL, IntraVENous, PRN  0.9 % sodium chloride infusion, , IntraVENous, PRN  ondansetron (ZOFRAN-ODT) disintegrating tablet 4 mg, 4 mg, Oral, Q8H PRN **OR** ondansetron (ZOFRAN) injection 4 mg, 4 mg, IntraVENous, Q6H PRN  clopidogrel (PLAVIX) tablet 75 mg, 75 mg, Oral, Daily  amiodarone (CORDARONE) tablet 200 mg, 200 mg, Oral, TID  hydrALAZINE (APRESOLINE) injection 5 mg, 5 mg, IntraVENous, Q5 Min PRN  mupirocin (BACTROBAN) 2 % ointment, , Nasal, BID  sodium bicarbonate 8.4 % injection 50 mEq, 50 mEq, IntraVENous, Q30 Min PRN  diphenhydrAMINE (BENADRYL) tablet 25 mg, 25 mg, Oral, Nightly PRN  polyethylene glycol (GLYCOLAX) packet 17 g, 17 g, Oral, Daily  sennosides-docusate sodium (SENOKOT-S) 8.6-50 MG tablet 1 tablet, 1 tablet, Oral, BID  pantoprazole (PROTONIX) tablet 40 mg, 40 mg, Oral, Daily  pantoprazole (PROTONIX) 40 mg in sodium chloride (PF) 0.9 % 10 mL injection, 40 mg, IntraVENous, Daily  potassium chloride 20 mEq/50 mL IVPB (Central Line), 20 mEq, IntraVENous, PRN  magnesium sulfate 2000 mg in 50 mL IVPB premix, 2,000 mg, IntraVENous, PRN  calcium chloride 1,000 mg in sodium chloride 0.9 % 100 mL IVPB, 1,000 mg, IntraVENous, PRN **OR** calcium chloride 2,000 mg in sodium chloride 0.9 % 100 mL IVPB, 2,000 mg, IntraVENous, PRN  ipratropium-albuterol (DUONEB) nebulizer solution 1 ampule, 1 ampule, Inhalation, Q4H PRN  albumin human 5 % IV solution 25 g, 25 g, IntraVENous, PRN  albumin human 25 % IV solution 25 g, 25 g, IntraVENous, PRN  norepinephrine (LEVOPHED) 16 mg in sodium chloride 0.9 % 250 mL infusion, 0.01-0.08 mcg/kg/min, IntraVENous, Continuous PRN  EPINEPHrine (EPINEPHrine HCL) 5 mg in dextrose 5 % 250 mL infusion, 0.01-0.08 mcg/kg/min, IntraVENous, Continuous PRN  insulin glargine (LANTUS) injection vial 12 Units, 0.15 Units/kg, SubCUTAneous, Nightly  glucose chewable tablet 16 g, 4 tablet, Oral, PRN  dextrose bolus 10% 125 mL, 125 mL, IntraVENous, PRN **OR** dextrose bolus 10% 250 mL, 250 mL, IntraVENous, PRN  glucagon (rDNA) injection 1 mg, 1 mg, IntraMUSCular, PRN  dextrose 10 % infusion, , IntraVENous, Continuous PRN  bisacodyl (DULCOLAX) EC tablet 5 mg, 5 mg, Oral, Daily PRN  polyethylene glycol (GLYCOLAX) packet 17 g, 17 g, Oral, Daily PRN  donepezil (ARICEPT) tablet 5 mg, 5 mg, Oral, Daily  [MAR Hold] ALPRAZolam (XANAX) tablet 0.5 mg, 0.5 mg, Oral, BID PRN  [MAR Hold] hydrALAZINE (APRESOLINE) injection 10 mg, 10 mg, IntraVENous, Q6H PRN    Social History:  Social History     Socioeconomic History    Marital status:      Spouse name: Not on file    Number of children: Not on file    Years of education: Not on file    Highest education level: Not on file   Occupational History    Not on file   Tobacco Use    Smoking status: Former     Packs/day: 0.50     Years: 20.00     Pack years: 10.00     Types: Cigarettes     Quit date: 2001     Years since quittin.6    Smokeless tobacco: Never    Tobacco comments:     for 20 years   Vaping Use    Vaping Use: Never used   Substance and Sexual Activity    Alcohol use: No    Drug use: Not Currently     Types: Marijuana Veryl Donahue)    Sexual activity: Not on file   Other Topics Concern    Not on file   Social History Narrative    Not on file     Social Determinants of Health     Financial Resource Strain: Not on file   Food Insecurity: Not on file   Transportation Needs: Not on file   Physical Activity: Not on file   Stress: Not on file   Social Connections: Not on file   Intimate Partner Violence: Not on file   Housing Stability: Not on file     Social/Functional History  Lives With: Spouse  Type of Home: House  Home Layout: Two level, Able to Live on Main level with bedroom/bathroom  Home Access: Stairs to enter without rails  Entrance Stairs - Number of Steps: 4  Bathroom Shower/Tub: Tub/Shower unit  Bathroom Toilet: Standard  Home Equipment: Walker, rolling, Cane  Has the patient had two or more falls in the past year or any fall with injury in the past year?: Yes  Receives Help From: Family  ADL Assistance: Independent  Homemaking Assistance: Independent  Homemaking Responsibilities: Yes  Ambulation Assistance: Independent  Transfer Assistance: Independent  Active : Yes  Mode of Transportation: Car  Occupation: Retired  Additional Comments: Pt lethargic throughout requiring Max cues and tactile stimulation to participate and answer questions. Pt unable to answer questions regarding bathroom set up    Family History:       Problem Relation Age of Onset    Stroke Mother     Diabetes Maternal Grandmother            Physical Exam:    BP (!) 154/55   Pulse 75   Temp 98.2 °F (36.8 °C) (Oral)   Resp 18   Ht 5' 4\" (1.626 m)   Wt 179 lb 3.7 oz (81.3 kg)   SpO2 95%   BMI 30.77 kg/m²     General appearance: alert, appears stated age, cooperative, and no distress  HEENT: Normocephalic, without obvious abnormality, atraumatic    Pulm: clear to auscultation bilaterally.-Has bilateral chest tubes in place currently  Cardiac: regular rate and rhythm, no murmur. Abdomen: soft, non-tender; bowel sounds normal.  MSK: extremities normal, atraumatic, no edema, normal tone.    ROM: Functional range of motion of upper and distal lower extremities  Mental status/Psych: Alert, orientedX3, thought content appropriate. Currently knew year, president location  Skin:     Neuro:     Sensory: Intact in BUE and BLE to soft and pin sensation. Motor: Muscle tone and bulk are normal bilaterally. No pronator drift. Peers have at least 4/5 upper and distal lower extremities able to straight leg raise-seen in seated position          Diagnostics:  CBC   Lab Results   Component Value Date/Time    WBC 7.9 01/16/2023 03:10 AM    RBC 3.53 01/16/2023 03:10 AM    HGB 10.6 01/16/2023 03:10 AM    HCT 34.4 01/16/2023 03:10 AM    MCV 97.5 01/16/2023 03:10 AM    RDW 13.8 01/16/2023 03:10 AM    PLT See Reflexed IPF Result 01/16/2023 03:10 AM     BMP    Lab Results   Component Value Date/Time     01/16/2023 03:10 AM    K 4.2 01/16/2023 03:10 AM     01/16/2023 03:10 AM    CO2 22 01/16/2023 03:10 AM    BUN 15 01/16/2023 03:10 AM     Uric Acid  No components found for: URIC  VITAMIN B12 No components found for: B12  PT/INR  No results found for: PTINR      Impression: Ms. Chandrakant Corona is a 65 y.o. female with a history of <principal problem not specified>    CABG x3-aspirin, tramadol, amiodarone, Plavix  Hypertension/hyperlipidemia-Norvasc, Lipitor, Lasix, losartan, metoprolol  Gout  dementia-Aricept  Asthma  BMI 30.77    Recommendations:  1. Diagnosis: Deconditioning due to CABG  2. Therapy: Min assist 2 person sit to stand only 3 feet min assist 2 person max assist ADLs  3. Medical  Necessity: As above  4. Support: Clarify, spouse  5. Rehab recommendation: Would benefit from acute inpatient rehabilitation when medically ready  6. DVT proph: No DVT prophylaxis recommend Doppler screen 24 to 40 hours prior to transfer    It was my pleasure to evaluate Chandrakant Corona today. Please call with questions. Joao Chatman. Alan Heller MD          This note is created with the assistance of a speech recognition program.  While intending to generate a document that actually reflects the content of the visit, the document can still have some errors including those of syntax and sound a like substitutions which may escape proof reading.   In such instances, actual meaning can be extrapolated by contextual diversion

## 2023-01-17 ENCOUNTER — APPOINTMENT (OUTPATIENT)
Dept: GENERAL RADIOLOGY | Age: 78
DRG: 233 | End: 2023-01-17
Attending: INTERNAL MEDICINE
Payer: MEDICARE

## 2023-01-17 VITALS
DIASTOLIC BLOOD PRESSURE: 56 MMHG | TEMPERATURE: 97.6 F | OXYGEN SATURATION: 96 % | BODY MASS INDEX: 30.52 KG/M2 | SYSTOLIC BLOOD PRESSURE: 137 MMHG | HEART RATE: 70 BPM | HEIGHT: 64 IN | RESPIRATION RATE: 16 BRPM | WEIGHT: 178.79 LBS

## 2023-01-17 PROBLEM — Z95.1 S/P CABG (CORONARY ARTERY BYPASS GRAFT): Status: ACTIVE | Noted: 2023-01-17

## 2023-01-17 LAB
ANION GAP SERPL CALCULATED.3IONS-SCNC: 11 MMOL/L (ref 9–17)
BUN BLDV-MCNC: 23 MG/DL (ref 8–23)
CALCIUM IONIZED: 1.17 MMOL/L (ref 1.13–1.33)
CALCIUM SERPL-MCNC: 8.8 MG/DL (ref 8.6–10.4)
CHLORIDE BLD-SCNC: 99 MMOL/L (ref 98–107)
CO2: 27 MMOL/L (ref 20–31)
CREAT SERPL-MCNC: 0.81 MG/DL (ref 0.5–0.9)
GFR SERPL CREATININE-BSD FRML MDRD: >60 ML/MIN/1.73M2
GLUCOSE BLD-MCNC: 155 MG/DL (ref 70–99)
GLUCOSE BLD-MCNC: 160 MG/DL (ref 65–105)
GLUCOSE BLD-MCNC: 228 MG/DL (ref 65–105)
HCT VFR BLD CALC: 33.1 % (ref 36.3–47.1)
HEMOGLOBIN: 10.7 G/DL (ref 11.9–15.1)
MAGNESIUM: 1.8 MG/DL (ref 1.6–2.6)
MCH RBC QN AUTO: 30.1 PG (ref 25.2–33.5)
MCHC RBC AUTO-ENTMCNC: 32.3 G/DL (ref 28.4–34.8)
MCV RBC AUTO: 93.2 FL (ref 82.6–102.9)
NRBC AUTOMATED: 0 PER 100 WBC
PDW BLD-RTO: 13.4 % (ref 11.8–14.4)
PLATELET # BLD: 169 K/UL (ref 138–453)
PMV BLD AUTO: 11.3 FL (ref 8.1–13.5)
POTASSIUM SERPL-SCNC: 3.9 MMOL/L (ref 3.7–5.3)
RBC # BLD: 3.55 M/UL (ref 3.95–5.11)
SODIUM BLD-SCNC: 137 MMOL/L (ref 135–144)
WBC # BLD: 6.1 K/UL (ref 3.5–11.3)

## 2023-01-17 PROCEDURE — 80048 BASIC METABOLIC PNL TOTAL CA: CPT

## 2023-01-17 PROCEDURE — 94761 N-INVAS EAR/PLS OXIMETRY MLT: CPT

## 2023-01-17 PROCEDURE — 82330 ASSAY OF CALCIUM: CPT

## 2023-01-17 PROCEDURE — 36415 COLL VENOUS BLD VENIPUNCTURE: CPT

## 2023-01-17 PROCEDURE — 6360000002 HC RX W HCPCS

## 2023-01-17 PROCEDURE — 99291 CRITICAL CARE FIRST HOUR: CPT | Performed by: INTERNAL MEDICINE

## 2023-01-17 PROCEDURE — 2580000003 HC RX 258: Performed by: THORACIC SURGERY (CARDIOTHORACIC VASCULAR SURGERY)

## 2023-01-17 PROCEDURE — 82947 ASSAY GLUCOSE BLOOD QUANT: CPT

## 2023-01-17 PROCEDURE — 97110 THERAPEUTIC EXERCISES: CPT

## 2023-01-17 PROCEDURE — 6370000000 HC RX 637 (ALT 250 FOR IP): Performed by: NURSE PRACTITIONER

## 2023-01-17 PROCEDURE — 99024 POSTOP FOLLOW-UP VISIT: CPT | Performed by: PHYSICIAN ASSISTANT

## 2023-01-17 PROCEDURE — 6360000002 HC RX W HCPCS: Performed by: THORACIC SURGERY (CARDIOTHORACIC VASCULAR SURGERY)

## 2023-01-17 PROCEDURE — 83735 ASSAY OF MAGNESIUM: CPT

## 2023-01-17 PROCEDURE — 6370000000 HC RX 637 (ALT 250 FOR IP): Performed by: THORACIC SURGERY (CARDIOTHORACIC VASCULAR SURGERY)

## 2023-01-17 PROCEDURE — 85027 COMPLETE CBC AUTOMATED: CPT

## 2023-01-17 PROCEDURE — 97116 GAIT TRAINING THERAPY: CPT

## 2023-01-17 PROCEDURE — 99238 HOSP IP/OBS DSCHRG MGMT 30/<: CPT | Performed by: PHYSICIAN ASSISTANT

## 2023-01-17 PROCEDURE — 6370000000 HC RX 637 (ALT 250 FOR IP)

## 2023-01-17 PROCEDURE — 71045 X-RAY EXAM CHEST 1 VIEW: CPT

## 2023-01-17 PROCEDURE — 6370000000 HC RX 637 (ALT 250 FOR IP): Performed by: STUDENT IN AN ORGANIZED HEALTH CARE EDUCATION/TRAINING PROGRAM

## 2023-01-17 RX ORDER — FUROSEMIDE 20 MG/1
20 TABLET ORAL 2 TIMES DAILY PRN
Qty: 60 TABLET | Refills: 3 | Status: SHIPPED | OUTPATIENT
Start: 2023-01-17 | End: 2023-01-17 | Stop reason: SDUPTHER

## 2023-01-17 RX ORDER — ALENDRONATE SODIUM 70 MG/1
TABLET ORAL
Qty: 12 TABLET | Refills: 3 | Status: SHIPPED | OUTPATIENT
Start: 2023-01-17

## 2023-01-17 RX ORDER — ASPIRIN 81 MG/1
81 TABLET, CHEWABLE ORAL DAILY
Qty: 30 TABLET | Refills: 1 | Status: SHIPPED | OUTPATIENT
Start: 2023-01-18

## 2023-01-17 RX ORDER — AMLODIPINE BESYLATE 10 MG/1
10 TABLET ORAL DAILY
Qty: 30 TABLET | Refills: 1 | Status: SHIPPED | OUTPATIENT
Start: 2023-01-18

## 2023-01-17 RX ORDER — ATORVASTATIN CALCIUM 80 MG/1
80 TABLET, FILM COATED ORAL NIGHTLY
Qty: 30 TABLET | Refills: 3 | Status: SHIPPED | OUTPATIENT
Start: 2023-01-17 | End: 2023-01-17 | Stop reason: SDUPTHER

## 2023-01-17 RX ORDER — FUROSEMIDE 20 MG/1
20 TABLET ORAL 2 TIMES DAILY PRN
Qty: 60 TABLET | Refills: 1 | Status: SHIPPED | OUTPATIENT
Start: 2023-01-17

## 2023-01-17 RX ORDER — AMLODIPINE BESYLATE 10 MG/1
10 TABLET ORAL DAILY
Qty: 30 TABLET | Refills: 3 | Status: SHIPPED | OUTPATIENT
Start: 2023-01-18 | End: 2023-01-17 | Stop reason: SDUPTHER

## 2023-01-17 RX ORDER — POTASSIUM CHLORIDE 20 MEQ/1
20 TABLET, EXTENDED RELEASE ORAL DAILY PRN
Qty: 30 TABLET | Refills: 5 | Status: SHIPPED | OUTPATIENT
Start: 2023-01-17 | End: 2023-01-17 | Stop reason: SDUPTHER

## 2023-01-17 RX ORDER — METOPROLOL TARTRATE 37.5 MG/1
37.5 TABLET, FILM COATED ORAL 2 TIMES DAILY
Qty: 60 TABLET | Refills: 3 | Status: SHIPPED | OUTPATIENT
Start: 2023-01-17 | End: 2023-01-17 | Stop reason: SDUPTHER

## 2023-01-17 RX ORDER — LOSARTAN POTASSIUM 50 MG/1
50 TABLET ORAL DAILY
Qty: 30 TABLET | Refills: 3 | Status: SHIPPED | OUTPATIENT
Start: 2023-01-18 | End: 2023-01-17 | Stop reason: SDUPTHER

## 2023-01-17 RX ORDER — LOSARTAN POTASSIUM 50 MG/1
50 TABLET ORAL DAILY
Status: DISCONTINUED | OUTPATIENT
Start: 2023-01-17 | End: 2023-01-17 | Stop reason: HOSPADM

## 2023-01-17 RX ORDER — PANTOPRAZOLE SODIUM 40 MG/1
40 TABLET, DELAYED RELEASE ORAL DAILY
Qty: 30 TABLET | Refills: 3 | Status: SHIPPED | OUTPATIENT
Start: 2023-01-18 | End: 2023-01-17 | Stop reason: SDUPTHER

## 2023-01-17 RX ORDER — POTASSIUM CHLORIDE 20 MEQ/1
40 TABLET, EXTENDED RELEASE ORAL ONCE
Status: COMPLETED | OUTPATIENT
Start: 2023-01-17 | End: 2023-01-17

## 2023-01-17 RX ORDER — CLOPIDOGREL BISULFATE 75 MG/1
75 TABLET ORAL DAILY
Qty: 30 TABLET | Refills: 1 | Status: SHIPPED | OUTPATIENT
Start: 2023-01-18

## 2023-01-17 RX ORDER — AMIODARONE HYDROCHLORIDE 200 MG/1
200 TABLET ORAL 3 TIMES DAILY
Qty: 90 TABLET | Refills: 1 | Status: SHIPPED | OUTPATIENT
Start: 2023-01-17 | End: 2023-01-17 | Stop reason: SDUPTHER

## 2023-01-17 RX ORDER — AMIODARONE HYDROCHLORIDE 200 MG/1
200 TABLET ORAL 3 TIMES DAILY
Qty: 90 TABLET | Refills: 0 | Status: SHIPPED | OUTPATIENT
Start: 2023-01-17

## 2023-01-17 RX ORDER — ASPIRIN 81 MG/1
81 TABLET, CHEWABLE ORAL DAILY
Qty: 30 TABLET | Refills: 3 | Status: SHIPPED | OUTPATIENT
Start: 2023-01-18 | End: 2023-01-17 | Stop reason: SDUPTHER

## 2023-01-17 RX ORDER — CLOPIDOGREL BISULFATE 75 MG/1
75 TABLET ORAL DAILY
Qty: 30 TABLET | Refills: 3 | Status: SHIPPED | OUTPATIENT
Start: 2023-01-18 | End: 2023-01-17 | Stop reason: SDUPTHER

## 2023-01-17 RX ORDER — PANTOPRAZOLE SODIUM 40 MG/1
40 TABLET, DELAYED RELEASE ORAL DAILY
Qty: 30 TABLET | Refills: 1 | Status: SHIPPED | OUTPATIENT
Start: 2023-01-18

## 2023-01-17 RX ORDER — POTASSIUM CHLORIDE 20 MEQ/1
20 TABLET, EXTENDED RELEASE ORAL DAILY PRN
Qty: 30 TABLET | Refills: 1 | Status: SHIPPED | OUTPATIENT
Start: 2023-01-17

## 2023-01-17 RX ORDER — ATORVASTATIN CALCIUM 80 MG/1
80 TABLET, FILM COATED ORAL NIGHTLY
Qty: 30 TABLET | Refills: 1 | Status: SHIPPED | OUTPATIENT
Start: 2023-01-17

## 2023-01-17 RX ORDER — LOSARTAN POTASSIUM 50 MG/1
50 TABLET ORAL DAILY
Qty: 30 TABLET | Refills: 1 | Status: SHIPPED | OUTPATIENT
Start: 2023-01-18

## 2023-01-17 RX ORDER — METOPROLOL TARTRATE 37.5 MG/1
37.5 TABLET, FILM COATED ORAL 2 TIMES DAILY
Qty: 60 TABLET | Refills: 1 | Status: SHIPPED | OUTPATIENT
Start: 2023-01-17

## 2023-01-17 RX ADMIN — FUROSEMIDE 40 MG: 10 INJECTION, SOLUTION INTRAMUSCULAR; INTRAVENOUS at 08:36

## 2023-01-17 RX ADMIN — POLYETHYLENE GLYCOL 3350 17 G: 17 POWDER, FOR SOLUTION ORAL at 08:36

## 2023-01-17 RX ADMIN — INSULIN LISPRO 4 UNITS: 100 INJECTION, SOLUTION INTRAVENOUS; SUBCUTANEOUS at 12:08

## 2023-01-17 RX ADMIN — AMLODIPINE BESYLATE 10 MG: 10 TABLET ORAL at 08:36

## 2023-01-17 RX ADMIN — ASPIRIN 81 MG CHEWABLE TABLET 81 MG: 81 TABLET CHEWABLE at 08:36

## 2023-01-17 RX ADMIN — AMIODARONE HYDROCHLORIDE 200 MG: 200 TABLET ORAL at 08:36

## 2023-01-17 RX ADMIN — LOSARTAN POTASSIUM 50 MG: 50 TABLET, FILM COATED ORAL at 08:59

## 2023-01-17 RX ADMIN — DONEPEZIL HYDROCHLORIDE 5 MG: 5 TABLET, FILM COATED ORAL at 08:36

## 2023-01-17 RX ADMIN — MAGNESIUM SULFATE HEPTAHYDRATE 2000 MG: 40 INJECTION, SOLUTION INTRAVENOUS at 06:39

## 2023-01-17 RX ADMIN — CLOPIDOGREL 75 MG: 75 TABLET, FILM COATED ORAL at 08:36

## 2023-01-17 RX ADMIN — MUPIROCIN: 20 OINTMENT TOPICAL at 08:45

## 2023-01-17 RX ADMIN — SODIUM CHLORIDE: 9 INJECTION, SOLUTION INTRAVENOUS at 06:38

## 2023-01-17 RX ADMIN — DOCUSATE SODIUM 50 MG AND SENNOSIDES 8.6 MG 1 TABLET: 8.6; 5 TABLET, FILM COATED ORAL at 08:36

## 2023-01-17 RX ADMIN — PANTOPRAZOLE SODIUM 40 MG: 40 TABLET, DELAYED RELEASE ORAL at 08:35

## 2023-01-17 RX ADMIN — METOPROLOL TARTRATE 37.5 MG: 25 TABLET ORAL at 08:35

## 2023-01-17 RX ADMIN — SODIUM CHLORIDE, PRESERVATIVE FREE 10 ML: 5 INJECTION INTRAVENOUS at 08:46

## 2023-01-17 RX ADMIN — POTASSIUM CHLORIDE 40 MEQ: 1500 TABLET, EXTENDED RELEASE ORAL at 12:08

## 2023-01-17 RX ADMIN — AMIODARONE HYDROCHLORIDE 200 MG: 200 TABLET ORAL at 14:33

## 2023-01-17 ASSESSMENT — ENCOUNTER SYMPTOMS
COLOR CHANGE: 0
ABDOMINAL PAIN: 0
ABDOMINAL DISTENTION: 0
APNEA: 0
SHORTNESS OF BREATH: 0

## 2023-01-17 NOTE — PLAN OF CARE

## 2023-01-17 NOTE — PROGRESS NOTES
[facility]  Daily Progress Note    Surgeon: Dr Divine Govea         POD# 3  S/P : CORONARY ARTERY BYPASS X 3 ON PUMP,  ROBSON      Subjective:  Ms. Garret Aldana is a 65y.o. year-old female seen and examined at bedside. Up in the bed this morning, more tired today       Vital Signs: BP (!) 112/47   Pulse 60   Temp 97.6 °F (36.4 °C) (Oral)   Resp 18   Ht 5' 4\" (1.626 m)   Wt 178 lb 12.7 oz (81.1 kg)   SpO2 94%   BMI 30.69 kg/m²  O2 Flow Rate (L/min): 2 L/min   Admit Weight: Weight: 176 lb (79.8 kg)   WEIGHTWeight: 178 lb 12.7 oz (81.1 kg)     I/O's:  I/O last 3 completed shifts: In: 1768.3 [P.O.:1000; I.V.:768.3]  Out: 5055 [Urine:3155; Chest Tube:200]    Data:    CBC:   Recent Labs     01/15/23  1414 01/16/23  0310 01/17/23  0437   WBC 9.7 7.9 6.1   HGB 11.0* 10.6* 10.7*   HCT 35.6* 34.4* 33.1*   MCV 96.7 97.5 93.2   PLT See Reflexed IPF Result See Reflexed IPF Result 169     BMP:   Recent Labs     01/15/23  1605 01/16/23  0310 01/17/23  0437    133* 137   K 4.3 4.2 3.9    101 99   CO2 19* 22 27   BUN 16 15 23   CREATININE 0.97* 0.80 0.81     PT/INR:   Recent Labs     01/15/23  1414   PROTIME 13.5*   INR 1.3     APTT:   No results for input(s): APTT in the last 72 hours. Chest X-Ray:   FINDINGS:   AP portable view of the chest time stamped at 512 hours demonstrates   overlying cardiac monitoring electrodes, prior median sternotomy, mediastinal   drain, left-sided chest tube, right IJ catheter terminating at the cavoatrial   junction and paired intraspinal electrodes, stable. Heart size is unchanged. Mild vascular congestion is noted. Small bilateral effusions are noted with   bibasilar opacities appearance favoring atelectasis. No extrapleural air. Significant calcification of the left carotid artery is noted. Impression   Continue postsurgical changes with bilateral effusions and bibasilar   opacities.   Pulmonary vascularity is mildly congested. Appliances as above       Scheduled Meds:    losartan  50 mg Oral Daily    atorvastatin  80 mg Oral Nightly    aspirin  81 mg Oral Daily    amLODIPine  10 mg Oral Daily    furosemide  40 mg IntraVENous Daily    metoprolol tartrate  37.5 mg Oral BID    insulin lispro  0-16 Units SubCUTAneous TID WC    insulin lispro  0-4 Units SubCUTAneous Nightly    sodium chloride flush  5-40 mL IntraVENous 2 times per day    clopidogrel  75 mg Oral Daily    amiodarone  200 mg Oral TID    mupirocin   Nasal BID    polyethylene glycol  17 g Oral Daily    sennosides-docusate sodium  1 tablet Oral BID    pantoprazole  40 mg Oral Daily    insulin glargine  0.15 Units/kg SubCUTAneous Nightly    donepezil  5 mg Oral Daily     Continuous Infusions:    sodium chloride Stopped (01/13/23 2106)    sodium chloride 10 mL/hr at 01/17/23 0638    norepinephrine 0.04 mcg/kg/min (01/13/23 1429)    EPINEPHrine 0.04 mcg/kg/min (01/13/23 1429)    dextrose             Physical Exam:    General: A&O x 3, NAD noted  Heart: Normal S1, S2, RRR, No murmurs noted   Sternum: Prevena in place   Lungs: Diminished BS bilaterally at the bases. CT's in place to -20. No air leak noted   Abdomen: Soft, non tender, non distended, Hypoactive BS x 4   : Gutierres in place   Extremities: + edema noted bilateral LE. EVH sites: CDI         Assessment & Plan:    Ms. Garret Aldana is a 65y.o. year-old female status post CABGx3 on 1/13/2023. No issues or events noted with the patient overnight. 1/17/2026:  Cut pacing wires   D/C to [facility] when ready. Ultrasound rule out DVT per protocol    1/16/2023:  Neuro intact. HD stable. D/C chest tubes and lines if good peripheral  PMR consult  D/C tomorrow    1/15/2023:  Hx of dementia, d/c'd percocet, please use tramadol and tylenol for pain. Hypertensive overnight increased metoprolol to 37. 5BID added 5mg norvasc, K is 5.6, increased lasix to 40mg, increased insulin dosing to high dose sliding scale and gave 1gram of calcium gluconate. , joseph out. Will leave Cts for today. Neuro:  #Post-op pain   - Continue current Pain management   - hx dementia, limit narcotics use   - switch to tylenol and tramadol for pain   CV:   #S/p CABG   - HDS, off gtts   - Continue current CV medications per cardiology recs   Resp:   #Post-op respiratory insufficency   - Incentive Spirometry / Pulmonary hygiene  - Follow-up CXR and ABG  GI:  - Advance diet as tolerated to cardiac diet   - post-op PU prophylaxis   - Bowel regimen PRN   /Lytes:   - Remove joseph after lasix today   - Replace e-lytes per protocol, check BMP daily   Heme:   # post-surgical blood loss anemia   - CTM daily CBC   SCD and DVT prophylaxis with lovenox   ID:   - CTM daily CBC   D/C planning:  - OOB to chair - Ambulation with PT 3x daily  - Patient plan for home discharge       Beta- Blocker: Yes  Aspirin: Yes  Lovenox: No  GI: Yes  Plavix: Yes  Coumadin: No  Statin: Yes  ACE: No    - Further recommendations as per Dr. Otis Murrieta / Dr. Danielle Eubanks      The above recommendations including medications and orders were discussed and agreed upon with Dr. Otis Murrieta / Dr. Yuridia Martin.        Bossman Werner, APRN-CNP

## 2023-01-17 NOTE — DISCHARGE INSTR - COC
Continuity of Care Form    Patient Name: Magdalena Martin   :    MRN:  [id number]    Admit date:  1/10/2023  Discharge date:  2023    Code Status Order: Full Code   Advance Directives:     Admitting Physician:  April Pugh MD  PCP: Abel Bonner MD    Discharging Nurse: [facility] Unit/Room#: 1010/1010-01  Discharging Unit Phone Number: [phone]    Emergency Contact:   Extended Emergency Contact Information  Primary Emergency Contact: Rebel De La Paz  Address: [address] [place] [address], [address] [place] [address] Phone: [phone]  Relation: Spouse  Secondary Emergency Contact: Rashmi Magallon  Home Phone: [phone]  Relation: Step Child    Past Surgical History:  Past Surgical History:   Procedure Laterality Date    COLONOSCOPY      x 3    CORONARY ARTERY BYPASS GRAFT N/A 2023    CORONARY ARTERY BYPASS X 3 ON PUMP,  ROBSON performed by Jatin Sawant MD at [facility] Right     EYE SURGERY Bilateral     optic nerve surgery as a child    FINGER TRIGGER RELEASE Right 2018    Long middle and ring    FOOT SURGERY Left     2 surgeries    HIP FRACTURE SURGERY Right 2021    with hardware /  Procedure: Kendra Oliva Dr; Surgeon: Josy Cardona MD; Location: [address] [place] [address]; Service: Orthopedics;  Laterality: Right; fluro and ortho table    HYSTERECTOMY (CERVIX STATUS UNKNOWN)      vaginal    KNEE ARTHROSCOPY Right 2022    KNEE ARTHROSCOPY FOR PARTIAL MEDIAL MENISCECTOMY AND PARTIAL LATERAL MENISCECTOMY performed by Flako Allen MD at [address] [place] [address]      3 surgeries    OTHER SURGICAL HISTORY      excision cysts from ovaries    SPINAL CORD STIMULATOR IMPLANT      Patient states it does not work    TUBAL LIGATION         Immunization History:   Immunization History   Administered Date(s) Administered    COVID-19, PFIZER PURPLE top, DILUTE for use, (age 15 y+), 30mcg/0.3mL 02/22/2021, 03/15/2021, 10/05/2021    Influenza Virus Vaccine 10/23/2013, 10/29/2014, 09/23/2015    Influenza, Dena Landeros (age 72 y+), High Dose, 0.7mL 11/04/2020, 10/20/2021    Influenza, High Dose (Fluzone 65 yrs and older) 09/27/2016, 09/14/2018, 09/16/2019, 10/02/2019    Pneumococcal Conjugate 13-valent (Yurekep88) 06/12/2017    Pneumococcal Polysaccharide (Ygykfyncp46) 08/09/2012    Zoster Recombinant (Shingrix) 08/02/2019, 12/30/2020       Active Problems:  Patient Active Problem List   Diagnosis Code    Closed fracture of neck of radius S52.133A    Type 2 diabetes mellitus without complication, without long-term current use of insulin ([facility] E11.9    Hypertension associated with diabetes ([facility] E11.59, I15.2    Major depressive disorder with single episode, in full remission ([facility]) F32.5    Age-related cognitive decline R41.81    Age-related osteoporosis without current pathological fracture M81.0    Nocturia R35.1    Right hip pain M25.551    Bilateral sacroiliitis ([facility]) M46.1    Chronic pain disorder G89.4    Displacement of lumbar intervertebral disc without myelopathy M51.26    Osteoarthritis of knee M17.9    Postherpetic neuralgia B02.29    Post-laminectomy syndrome M96.1    Sacroiliitis, not elsewhere classified ([facility] M46.1    Chronic renal disease, stage III ([facility] [215866] N18.30    Alzheimer's disease, unspecified G30.9    Coronary artery disease involving native coronary artery of native heart without angina pectoris I25.10    Acute idiopathic gout M10.00       Isolation/Infection:   Isolation            No Isolation          Patient Infection Status       Infection Onset Added Last Indicated Last Indicated By Review Planned Expiration Resolved Resolved By    None active    Resolved    COVID-19 (Rule Out) 10/10/22 10/10/22 10/10/22 COVID-19 & Influenza Combo (Ordered)   10/10/22 Rule-Out Test Resulted            Nurse Assessment:  Last Vital Signs: BP (!) 139/54   Pulse 68 Temp 97.6 °F (36.4 °C) (Axillary)   Resp 16   Ht 5' 4\" (1.626 m)   Wt 178 lb 12.7 oz (81.1 kg)   SpO2 95%   BMI 30.69 kg/m²     Last documented pain score (0-10 scale): Pain Level: 2  Last Weight:   Wt Readings from Last 1 Encounters:   01/17/23 178 lb 12.7 oz (81.1 kg)     Mental Status:  {IP PT MENTAL STATUS:03758}    IV Access:  {[facility] [place]:[id number]}    Nursing Mobility/ADLs:  Walking   Assisted  Transfer  Assisted  Bathing  Assisted  Dressing  Assisted  Toileting  Assisted  Feeding  Independent  Med Admin  Assisted  Med Delivery   whole    Wound Care Documentation and Therapy:  Incision 06/21/22 Knee Anterior;Right (Active)   Number of days: 210       Incision 01/13/23 Knee Anterior;Distal;Right (Active)   Dressing Status Clean;Dry; Intact 01/17/23 1200   Dressing Change Due 01/15/23 01/15/23 0400   Incision Cleansed Betadine/povidone iodine 01/14/23 1600   Dressing/Treatment Open to air 01/17/23 1200   Closure Surgical glue 01/17/23 1200   Margins Approximated 01/17/23 1200   Incision Assessment Dry 01/17/23 1200   Drainage Amount None 01/17/23 1200   Odor None 01/17/23 1200   Anitra-incision Assessment Intact;Edematous 01/17/23 1200   Number of days: 4       Incision 01/13/23 Sternum (Active)   Dressing Status Clean;Dry; Intact 01/17/23 1200   Dressing Change Due 01/16/23 01/16/23 1600   Incision Cleansed Betadine/povidone iodine 01/16/23 0545   Dressing/Treatment Dry dressing 01/17/23 1200   Closure Surgical glue 01/17/23 0000   Margins Approximated 01/17/23 0000   Incision Assessment Other (Comment) 01/17/23 1200   Drainage Amount Scant 01/17/23 0000   Drainage Description Serosanguinous 01/17/23 0000   Odor None 01/17/23 1200   Anitra-incision Assessment Other (Comment) 01/17/23 1200   Number of days: 4        Elimination:  Continence:    Bowel: Yes  Bladder: Yes  Urinary Catheter: None   Colostomy/Ileostomy/Ileal Conduit: No       Date of Last BM: 1/17/2023    Intake/Output Summary (Last 24 hours) at 1/17/2023 1340  Last data filed at 1/17/2023 3272  Gross per 24 hour   Intake 960 ml   Output 1405 ml   Net -445 ml     I/O last 3 completed shifts: In: 1768.3 [P.O.:1000; I.V.:768.3]  Out: 5036 [Urine:3155; Chest Tube:200]    Safety Concerns: At Risk for Falls    Impairments/Disabilities:      None    Nutrition Therapy:  Current Nutrition Therapy:   - Oral Diet:  General    Routes of Feeding: Oral  Liquids: No Restrictions  Daily Fluid Restriction: no  Last Modified Barium Swallow with Video (Video Swallowing Test): not done    Treatments at the Time of Hospital Discharge:   Respiratory Treatments: ***  Oxygen Therapy:  is not on home oxygen therapy. Ventilator:    - No ventilator support    Rehab Therapies: Physical Therapy  Weight Bearing Status/Restrictions: No weight bearing restrictions  Other Medical Equipment (for information only, NOT a DME order):  wheelchair  Other Treatments: Sternal precautions (support bra)     Patient's personal belongings (please select all that are sent with patient):  Glasses  Phone   Pants   Jacket  Shoes     RN SIGNATURE:  Electronically signed by Chinmay Preston RN on 1/17/23 at 1:58 PM EST    CASE MANAGEMENT/SOCIAL WORK SECTION    Inpatient Status Date: 1/10/23    Readmission Risk Assessment Score:  Readmission Risk              Risk of Unplanned Readmission:  11           Discharging to Facility/ Agency   Name:   Address: [address] [place] [address] home care  Phone:  Fax:    Dialysis Facility (if applicable)   Name:  Address:  Dialysis Schedule:  Phone:  Fax:    / signature: Electronically signed by Marco A Palmer RN on 1/17/23 at 2:01 PM EST    PHYSICIAN SECTION    Prognosis: Good    Condition at Discharge: Stable    Rehab Potential (if transferring to Rehab): {Prognosis:3313050717}    Recommended Labs or Other Treatments After Discharge: 1/26/2023 @ 12:54CX    Physician Certification: I certify the above information and transfer of Ruby Toussaint is necessary for the continuing treatment of the diagnosis listed and that she requires Home Care for less 30 days.      Update Admission H&P: No change in H&P    PHYSICIAN SIGNATURE:  Electronically signed by ANDERS Hammond on 1/17/23 at 1:40 PM EST

## 2023-01-17 NOTE — DISCHARGE SUMMARY
Physician Discharge Summary     Patient ID:  Destini Desouza  [id number]  52 y.o.  1945    Admit date: 1/10/2023    Discharge date and time: 1/17/23      Admitting Physician: Blue Kern MD     Discharge Physician: Thelma Downs    Admission Diagnoses: CAD    Discharge Diagnoses: Same    Admission Condition: good    Discharged Condition: good    Indication for Admission: Left Main CAD    Hospital Course: Taken to the OR on 1/13/2023 for CABG x 3. Post operative course unremarkable. On 1/17/2023 AVSS eating, walking and voiding without difficulty. Incsisions intact no signs of infection. Pacing wires personally cut. Consults: cardiology    Treatments: CABG x3, LIMA to the LAD, saphenous vein to the OM1, saphenous vein  to the RCA.     Discharge Exam:  BP (!) 139/54   Pulse 68   Temp 97.6 °F (36.4 °C) (Axillary)   Resp 16   Ht 5' 4\" (1.626 m)   Wt 178 lb 12.7 oz (81.1 kg)   SpO2 95%   BMI 30.69 kg/m²     General Appearance:    Alert, cooperative, no distress, appears stated age   Head:    Normocephalic, without obvious abnormality, atraumatic   Eyes:    PERRL, conjunctiva/corneas clear, EOM's intact, fundi     benign, both eyes   Ears:    Normal TM's and external ear canals, both ears   Nose:   Nares normal, septum midline, mucosa normal, no drainage    or sinus tenderness   Throat:   Lips, mucosa, and tongue normal; teeth and gums normal   Neck:   Supple, symmetrical, trachea midline, no adenopathy;     thyroid:  no enlargement/tenderness/nodules; no carotid    bruit or JVD   Back:     Symmetric, no curvature, ROM normal, no CVA tenderness   Lungs:     Clear to auscultation bilaterally, respirations unlabored   Chest Wall:    No tenderness or deformity    Heart:    Regular rate and rhythm, S1 and S2 normal, no murmur, rub   or gallop   Breast Exam:    No tenderness, masses, or nipple abnormality   Abdomen:     Soft, non-tender, bowel sounds active all four quadrants,     no masses, no organomegaly Genitalia:    Normal female without lesion, discharge or tenderness   Rectal:    Normal tone ;guaiac negative stool   Extremities:   Extremities normal, atraumatic, no cyanosis or edema   Pulses:   2+ and symmetric all extremities   Skin:   Skin color, texture, turgor normal, no rashes or lesions   Lymph nodes:   Cervical, supraclavicular, and axillary nodes normal   Neurologic:   CNII-XII intact, normal strength, sensation and reflexes     throughout       Disposition: home    In process/preliminary results:  Outstanding Order Results       Date and Time Order Name Status Description    1/15/2023  2:14 PM PREVIOUS SPECIMEN In process     1/12/2023  4:47 PM BLOOD BANK SPECIMEN In process             Patient Instructions:   Current Discharge Medication List        START taking these medications    Details   aspirin 81 MG chewable tablet Take 1 tablet by mouth daily  Qty: 30 tablet, Refills: 1      amiodarone (CORDARONE) 200 MG tablet Take 1 tablet by mouth 3 times daily  Qty: 90 tablet, Refills: 0      atorvastatin (LIPITOR) 80 MG tablet Take 1 tablet by mouth nightly  Qty: 30 tablet, Refills: 1      losartan (COZAAR) 50 MG tablet Take 1 tablet by mouth daily  Qty: 30 tablet, Refills: 1      potassium chloride (KLOR-CON M) 20 MEQ extended release tablet Take 1 tablet by mouth daily as needed (only take if taking lasix)  Qty: 30 tablet, Refills: 1      furosemide (LASIX) 20 MG tablet Take 1 tablet by mouth 2 times daily as needed (as needed)  Qty: 60 tablet, Refills: 1      clopidogrel (PLAVIX) 75 MG tablet Take 1 tablet by mouth daily  Qty: 30 tablet, Refills: 1      pantoprazole (PROTONIX) 40 MG tablet Take 1 tablet by mouth daily  Qty: 30 tablet, Refills: 1      amLODIPine (NORVASC) 10 MG tablet Take 1 tablet by mouth daily  Qty: 30 tablet, Refills: 1           CONTINUE these medications which have CHANGED    Details   alendronate (FOSAMAX) 70 MG tablet TAKE 1 TABLET EVERY 7 DAYS ON AN EMPTY STOMACH, FIRST THING IN THE MORNING, WITH FULL GLASS OF WATER, STAY UPRIGHT FOR 30 MINUTES. Qty: 12 tablet, Refills: 3    Associated Diagnoses: Age-related osteoporosis without current pathological fracture      Metoprolol Tartrate 37.5 MG TABS Take 37.5 mg by mouth 2 times daily  Qty: 60 tablet, Refills: 1           CONTINUE these medications which have NOT CHANGED    Details   FREESTYLE LITE strip       FreeStyle Lancets MISC       oxyCODONE-acetaminophen (PERCOCET) 5-325 MG per tablet TAKE 1 TABLET BY MOUTH TWO TIMES A DAY AS NEEDED      blood glucose monitor kit and supplies Dispense sufficient amount for indicated testing frequency plus additional to accommodate PRN testing needs. Dispense all needed supplies to include: monitor, strips, lancing device, lancets, control solutions, alcohol swabs. Qty: 1 kit, Refills: 0    Comments: Brand per patient preference. May round up to next available package size. Associated Diagnoses: Type 2 diabetes mellitus without complication, without long-term current use of insulin ([facility])      ondansetron (ZOFRAN ODT) 4 MG disintegrating tablet Take 1 tablet by mouth every 8 hours as needed for Nausea or Vomiting  Qty: 10 tablet, Refills: 0      donepezil (ARICEPT) 5 MG tablet Take 1 tablet by mouth daily  Qty: 90 tablet, Refills: 3    Associated Diagnoses: Age-related cognitive decline      albuterol sulfate HFA (PROVENTIL;VENTOLIN;PROAIR) 108 (90 Base) MCG/ACT inhaler USE 2 INHALATIONS EVERY 6 HOURS AS NEEDED FOR WHEEZING           STOP taking these medications       lisinopril (PRINIVIL;ZESTRIL) 10 MG tablet Comments:   Reason for Stopping:         chlorhexidine (PERIDEX) 0.12 % solution Comments:   Reason for Stopping:         ibuprofen (ADVIL;MOTRIN) 800 MG tablet Comments:   Reason for Stopping:             Activity: activity as tolerated and no driving for 3 weeks  Diet: cardiac diet  Wound Care: keep wound clean and dry    Follow-up in clinic on 1/26/2023 @ 10:00AM    Signed:   Samra Napoles Tony vale [facility]   1/17/2023  2:59 PM

## 2023-01-17 NOTE — PLAN OF CARE
Problem: Chronic Conditions and Co-morbidities  Goal: Patient's chronic conditions and co-morbidity symptoms are monitored and maintained or improved  Outcome: Completed     Problem: Discharge Planning  Goal: Discharge to home or other facility with appropriate resources  Outcome: Completed     Problem: Pain  Goal: Verbalizes/displays adequate comfort level or baseline comfort level  Outcome: Completed     Problem: Safety - Adult  Goal: Free from fall injury  Outcome: Completed     Problem: Respiratory - Adult  Goal: Achieves optimal ventilation and oxygenation  Outcome: Completed     Problem: Skin/Tissue Integrity  Goal: Absence of new skin breakdown  Description: 1. Monitor for areas of redness and/or skin breakdown  2. Assess vascular access sites hourly  3. Every 4-6 hours minimum:  Change oxygen saturation probe site  4. Every 4-6 hours:  If on nasal continuous positive airway pressure, respiratory therapy assess nares and determine need for appliance change or resting period.   Outcome: Completed     Problem: ABCDS Injury Assessment  Goal: Absence of physical injury  Outcome: Completed

## 2023-01-17 NOTE — PROGRESS NOTES
Nutrition Assessment     Type and Reason for Visit: RD Nutrition Re-Screen/LOS    Nutrition Recommendations/Plan:   Continue current diet and ONS  Monitor weight, labs and intake. Malnutrition Assessment:  Malnutrition Status: No malnutrition    Nutrition Assessment:  Patient seen for LOS. s/p CABG 1/13. She reports she is eating well, figuring out what she likes and dislikes on the menu. Per EHR, intake is % of meals. Patient receiving Raghu Angelica for wound healing per physician order. Estimated Daily Nutrient Needs:  Energy (kcal):  1800 kcal/day Weight Used for Energy Requirements: Current     Protein (g):  80-85 gm/day Weight Used for Protein Requirements: Adjusted        Fluid (ml/day):  1800 mL or per MD Method Used for Fluid Requirements: 1 ml/kcal    Nutrition Related Findings:   Labs/meds reviewed Wound Type: Surgical Incision (sternum)    Current Nutrition Therapies:    ADULT DIET; Regular; 4 carb choices (60 gm/meal); Low Fat/Low Chol/High Fiber/TORI; No Added Salt (3-4 gm); High Fiber  ADULT ORAL NUTRITION SUPPLEMENT; Breakfast, Lunch, Dinner;  Wound Healing Oral Supplement    Anthropometric Measures:  Height: 5' 4\" (162.6 cm)  Current Body Wt: 178 lb (80.7 kg)   BMI: 30.5    Nutrition Diagnosis:   No nutrition diagnosis at this time related to   as evidenced by      Nutrition Interventions:   Food and/or Nutrient Delivery: Continue Current Diet  Nutrition Education/Counseling: No recommendation at this time  Coordination of Nutrition Care: Continue to monitor while inpatient  Plan of Care discussed with: Patient    Goals:  Previous Goal Met: Goal(s) Achieved  Goals: Meet at least 75% of estimated needs, prior to discharge       Nutrition Monitoring and Evaluation:   Behavioral-Environmental Outcomes: None Identified  Food/Nutrient Intake Outcomes: Food and Nutrient Intake  Physical Signs/Symptoms Outcomes: Biochemical Data, GI Status, Fluid Status or Edema, Skin, Weight    Discharge Planning: No discharge needs at this time     Doyal So, [address]: [id number]

## 2023-01-17 NOTE — PROGRESS NOTES
[facility]  Internal Medicine Teaching Residency Program  Inpatient Daily Progress Note  ______________________________________________________________________________    Patient: Fady Sotelo  YOB: 1945   GT:[id number]    Acct: [de-identified]     Room: [address] [place] [address]  Admit date: 1/10/2023  Today's date: 01/17/23  Number of days in the hospital: 7    SUBJECTIVE   Admitting Diagnosis: <principal problem not specified>  CC: Acute gout flare    - Pt examined at bedside. Chart & results reviewed. The patient had no acute events overnight. Patient was resting in her chair today, no complaints of any pain. Patient states she is feeling good. Plan is for discharge to SNF per  notes. Plan for today:  - Recommend following up outpatient with PCP for uric acid level. - Recommend allopurinol if uric acid level is elevated, will leave final decision up to PCP. - Internal medicine will continue to follow. - Per primary team plan to discharge today      Review of Systems   Constitutional:  Negative for chills and fever. HENT:  Negative for congestion. Respiratory:  Negative for apnea and shortness of breath. Cardiovascular:  Negative for chest pain and leg swelling. Gastrointestinal:  Negative for abdominal distention and abdominal pain. Genitourinary:  Negative for dysuria. Musculoskeletal:  Positive for joint swelling. Bilateral greater toe swelling, due to gout flare.,  Overall decreased from presentation. Skin:  Negative for color change. Allergic/Immunologic: Negative for immunocompromised state. Neurological:  Negative for dizziness. Hematological:  Negative for adenopathy. Psychiatric/Behavioral:  Negative for agitation. BRIEF HISTORY        The patient is a pleasant 65 y.o. female with past medical history coronary artery disease, hypertension, diabetes, CKD stage III and arthritis.   Patient is currently being followed primarily by cardiology due to 90% ostial stenosis seen in recent cardiac cath. Patient is scheduled for CABG tomorrow. Cardiology consulted internal medicine for acute gout flare. After speaking with the patient she has had GERD symptoms for the last 5-1/2 months recently within the last few weeks it has gotten worse. Episodes in general have been intermittent. Pain is mostly associated with left great toe. Patient states that she is not on any Medications at home. Plan for CABG today:     OBJECTIVE     Vital Signs:  BP (!) 145/43   Pulse 65   Temp 97.9 °F (36.6 °C) (Oral)   Resp 18   Ht 5' 4\" (1.626 m)   Wt 178 lb 12.7 oz (81.1 kg)   SpO2 92%   BMI 30.69 kg/m²     Temp (24hrs), Av.3 °F (36.8 °C), Min:97.1 °F (36.2 °C), Max:98.9 °F (37.2 °C)    In: 960   Out: 2675 [Urine:2655]    Physical Exam:  Physical Exam  Constitutional:       General: She is not in acute distress. Appearance: Normal appearance. She is obese. HENT:      Head: Normocephalic and atraumatic. Right Ear: External ear normal.      Left Ear: External ear normal.      Nose: Nose normal. No congestion. Mouth/Throat:      Mouth: Mucous membranes are moist.      Pharynx: Oropharynx is clear. Eyes:      Extraocular Movements: Extraocular movements intact. Conjunctiva/sclera: Conjunctivae normal.   Cardiovascular:      Rate and Rhythm: Normal rate. Pulses: Normal pulses. Heart sounds: Normal heart sounds. Pulmonary:      Effort: Pulmonary effort is normal.      Breath sounds: Normal breath sounds. Abdominal:      General: Bowel sounds are normal. There is no distension. Palpations: Abdomen is soft. Tenderness: There is no abdominal tenderness. Musculoskeletal:         General: Swelling present. No tenderness. Right lower leg: No edema. Left lower leg: No edema. Comments: Patient has bilateral greater toe gout flares.    Skin:     General: Skin is warm and dry. Findings: Erythema (Erythema right greater toes) present. Neurological:      General: No focal deficit present. Mental Status: She is alert and oriented to person, place, and time.    Psychiatric:         Mood and Affect: Mood normal.         Medications:  Scheduled Medications:    atorvastatin  80 mg Oral Nightly    aspirin  81 mg Oral Daily    losartan  25 mg Oral Daily    amLODIPine  10 mg Oral Daily    furosemide  40 mg IntraVENous Daily    metoprolol tartrate  37.5 mg Oral BID    insulin lispro  0-16 Units SubCUTAneous TID     insulin lispro  0-4 Units SubCUTAneous Nightly    sodium chloride flush  5-40 mL IntraVENous 2 times per day    clopidogrel  75 mg Oral Daily    amiodarone  200 mg Oral TID    mupirocin   Nasal BID    polyethylene glycol  17 g Oral Daily    sennosides-docusate sodium  1 tablet Oral BID    pantoprazole  40 mg Oral Daily    pantoprazole (PROTONIX) 40 mg injection  40 mg IntraVENous Daily    insulin glargine  0.15 Units/kg SubCUTAneous Nightly    donepezil  5 mg Oral Daily     Continuous Infusions:    sodium chloride Stopped (01/13/23 2106)    sodium chloride 10 mL/hr at 01/17/23 0638    norepinephrine 0.04 mcg/kg/min (01/13/23 1429)    EPINEPHrine 0.04 mcg/kg/min (01/13/23 1429)    dextrose       PRN Medicationsacetaminophen, 650 mg, Q4H PRN  traMADol, 50 mg, Q6H PRN  sodium chloride, , PRN  sodium chloride flush, 5-40 mL, PRN  sodium chloride, , PRN  ondansetron, 4 mg, Q8H PRN   Or  ondansetron, 4 mg, Q6H PRN  hydrALAZINE, 5 mg, Q5 Min PRN  diphenhydrAMINE, 25 mg, Nightly PRN  potassium chloride, 20 mEq, PRN  magnesium sulfate, 2,000 mg, PRN  calcium chloride IVPB, 1,000 mg, PRN   Or  calcium chloride IVPB, 2,000 mg, PRN  ipratropium-albuterol, 1 ampule, Q4H PRN  albumin human, 25 g, PRN  albumin human, 25 g, PRN  norepinephrine, 0.01-0.08 mcg/kg/min, Continuous PRN  EPINEPHrine, 0.01-0.08 mcg/kg/min, Continuous PRN  glucose, 4 tablet, PRN  dextrose bolus, 125 mL, PRN   Or  dextrose bolus, 250 mL, PRN  glucagon (rDNA), 1 mg, PRN  dextrose, , Continuous PRN  bisacodyl, 5 mg, Daily PRN  polyethylene glycol, 17 g, Daily PRN  [MAR Hold] ALPRAZolam, 0.5 mg, BID PRN  [MAR Hold] hydrALAZINE, 10 mg, Q6H PRN      Diagnostic Labs:  CBC:   Recent Labs     01/15/23  1414 01/16/23  0310 01/17/23  0437   WBC 9.7 7.9 6.1   RBC 3.68* 3.53* 3.55*   HGB 11.0* 10.6* 10.7*   HCT 35.6* 34.4* 33.1*   MCV 96.7 97.5 93.2   RDW 14.1 13.8 13.4   PLT See Reflexed IPF Result See Reflexed IPF Result 169       BMP:   Recent Labs     01/15/23  1605 01/16/23  0310 01/17/23  0437    133* 137   K 4.3 4.2 3.9    101 99   CO2 19* 22 27   BUN 16 15 23   CREATININE 0.97* 0.80 0.81       BNP: No results for input(s): BNP in the last 72 hours. PT/INR:   Recent Labs     01/15/23  1414   PROTIME 13.5*   INR 1.3       APTT:   No results for input(s): APTT in the last 72 hours. CARDIAC ENZYMES: No results for input(s): CKMB, CKMBINDEX, TROPONINI in the last 72 hours. Invalid input(s): CKTOTAL;3  FASTING LIPID PANEL:  Lab Results   Component Value Date    CHOL 185 12/20/2021    HDL 36 (L) 12/09/2022    HDL 37 (L) 12/09/2022    TRIG 257 (H) 12/20/2021     LIVER PROFILE: No results for input(s): AST, ALT, ALB, BILIDIR, BILITOT, ALKPHOS in the last 72 hours. MICROBIOLOGY: No results found for: CULTURE    Imaging:    CT CHEST WO CONTRAST    Result Date: 1/11/2023  1. No acute intrathoracic process. 2.  Moderate atherosclerosis with incidental aberrant right subclavian artery. ASSESSMENT & PLAN     Assessment and Plan: Active Problems:    Coronary artery disease involving native coronary artery of native heart without angina pectoris    Acute idiopathic gout  Resolved Problems:    * No resolved hospital problems. *      Thank you for allowing us to see [facility] Client in consultation for acute gout flare.   Patient states that this has been an ongoing issue for the last 5-1/2 months, has had intermittent episodes. Over the last few weeks episodes are becoming more frequent. Acute gout flare:  - Uric acid: 6.6  - ESR: 54  - CRP: 34.3  - Colchicine initial dose 1.2, subsequent dose 0.6, discontinued due to CABG  - Patient will need to follow-up with PCP outpatient for uric acid level. - Recommend allopurinol if uric acid level is elevated. We will leave final decision to primary care provider. - Internal medicine will continue to follow. Coronary artery disease: 90% stenosis of the left ostial  - Cardiac cath showed 90% stenosis of left ostial.  - Status post CABG 1/13  - Cardiology is primary and currently treating. Plan to discharge to SNF, per  notes. Alex Guzman M.D. Internal Medicine Resident PGY-1  [facility],  [facility]. Attending Physician Statement  Attending Physician Statement  I have discussed the care of [facility], including pertinent history and exam findings,  with the resident. I have seen and examined the patient and the key elements of all parts of the encounter have been performed by me. I agree with the assessment, plan and orders as documented by the resident with additions . Improving progressively post CABG. Lungs are clear chest x-ray unremarkable. Tubes are out. Gastric changes GERD hemodynamically stable    Treatment plan Discussed with nursing staff in detail , all questions answered . Electronically signed by Nena White MD on   1/17/23 at 12:46 PM EST  CC 30 minutes  Please note that this chart was generated using voice recognition Dragon dictation software. Although every effort was made to ensure the accuracy of this automated transcription, some errors in transcription may have occurred.

## 2023-01-17 NOTE — CARE COORDINATION
TRansitional planning    [place] [id number] and spoke to Jose. He relates CM are in meeting until 1130. He took CM phone # and relates Trell or Liat Strickland will return phone call. 1019 spoke to patient about plan for discharge. CM told her she is appropriate for [facility] and referral sent to [address] [place] [address] Avenue. CM explained the difference between the two. She prefers [address]. Cm asked if ok to call her . She said yes. Cm spoke to Aggie Redman and he relates [address] is his choice also (over ARU). Referral sent to [facility]. 1031 Returned phone call to Trell at [facility] [place]. CM told her patient wants [address] over [facility]. If patient changes mind and wants to go to [facility] she will need doppler of legs. [address] of [facility] [phone] and she relates she received referral and will review. CM requested call back ASAP as anticipate discharge today. 1215 Spoke to patient about plan to go home as she walked around the entire unit. Patient feels comfortable going home with . She has a walker at home. CM asked about home care. She would like home care. Given freedom of choice. [address] is choice. Called and spoke to her  about plan to discharge home with home care and he is agreeable also. Referral to [address]    [phone] Returned phone call to Carmelo at the [facility] [phone] and told her plan is for patient to go home. [address] from [address] relates they are able to accept. Let them know when patient is discharged. [phone] Spoke to patient and her  at bedside. Told them [address] has accepted them and they will contact patient within 24 hours of discharge.  will provide transportation. They have walker at home    [address] [facility] and spoke to Haile Nation and told her that patient has d/c order for today. She can get needed information from Epic.

## 2023-01-17 NOTE — CARE COORDINATION
Voicemail left for CM at [phone]. Per Dr. Bradley Rodriguez, patient appears to be appropriate for IPR. Also noted, \"DVT proph: No DVT prophylaxis recommend Doppler screen 24 to 48 hours prior to transfer. \" Requesting return call from  to discuss discharge planning. Electronically signed by Kyle Caceres PTA on 1/17/2023 at 9:11 AM    Received message from [address] [place] [address] [facility] call from Admissions Coordinator. Returned call to [phone] and left voicemail with writers contact information ([phone]). Will await return call.      Electronically signed by Kyle Caceres PTA on 1/17/2023 at 10:24 AM

## 2023-01-17 NOTE — PROGRESS NOTES
Physical Therapy  Facility/Department: [facility] CAR 1- SICU  Physical Therapy Daily Treatment Note    Name: Yen Gong  : 1945  MRN: [id number]  Date of Service: 2023    Discharge Recommendations:  Patient would benefit from continued therapy after discharge   PT Equipment Recommendations  Equipment Needed: No      Patient Diagnosis(es): The encounter diagnosis was Age-related osteoporosis without current pathological fracture. Past Medical History:  has a past medical history of Abnormal EKG, Arthritis, Asthma, Colon polyps, Gout, History of fall, Hyperlipidemia, Hypertension, Incomplete RBBB, Osteoarthritis, Osteopetrosis, Right knee meniscal tear, Sciatica, Seasonal allergies, Snoring, Type II or unspecified type diabetes mellitus without mention of complication, not stated as uncontrolled, Urinary incontinence, and Wears dentures. Past Surgical History:  has a past surgical history that includes Foot surgery (Left); Nose surgery; Tubal ligation; Hysterectomy; Elbow fracture surgery (Right); Colonoscopy; eye surgery (Bilateral); other surgical history; Hip fracture surgery (Right, 2021); Spinal cord stimulator implant; Finger trigger release (Right, 2018); Knee arthroscopy (Right, 2022); and Coronary artery bypass graft (N/A, 2023). Assessment   Body Structures, Functions, Activity Limitations Requiring Skilled Therapeutic Intervention: Decreased functional mobility ; Decreased strength; Increased pain;Decreased posture;Decreased safe awareness;Decreased cognition;Decreased ROM; Decreased endurance;Decreased balance  Assessment: Pt ambulated 300ft with RW and CGA for safety, slow and steady with no true LOB. Pt limited most by endurance and balance deficits, but should be safe to return to prior living arrangements with assistance. Recommending continued PT to address deficits and maximize safety and independence with mobility.   Therapy Prognosis: Good  Requires PT Follow-Up: Yes  Activity Tolerance  Activity Tolerance: Patient limited by endurance; Patient tolerated treatment well     Plan   Physcial Therapy Plan  General Plan: 6-7 times per week  Current Treatment Recommendations: Strengthening, ROM, Balance training, Endurance training, Functional mobility training, Transfer training, Safety education & training, Therapeutic activities, Patient/Caregiver education & training, Equipment evaluation, education, & procurement, Stair training, Gait training  Safety Devices  Type of Devices: Call light within reach, Nurse notified, Gait belt, Left in chair, Patient at risk for falls  Restraints  Restraints Initially in Place: No     Restrictions  Restrictions/Precautions  Restrictions/Precautions: Up as Tolerated, General Precautions, Surgical Protocols, Cardiac  Required Braces or Orthoses?: Yes  Required Braces or Orthoses  Other: Heart Hugger Brace  Position Activity Restriction  Sternal Precautions: No Pushing, No Pulling, 5# Lifting Restrictions  Other position/activity restrictions: s/p CABG x3 (1/13/23); ambulate pt, up in chair, up for meals     Subjective   General  Chart Reviewed: Yes  Patient assessed for rehabilitation services?: Yes  Response To Previous Treatment: Patient with no complaints from previous session. Family / Caregiver Present: No  Follows Commands: Within Functional Limits  General Comment  Comments: Pt returned to seated in chair at end of session with call light in reach. Subjective  Subjective: Pt and RN agreeable to PT this morning. Pt seated in chair upon arrival with no c/o pain. Pt pleasant and cooperative throughout session.        Cognition   Orientation  Overall Orientation Status: Impaired  Orientation Level: Oriented to place;Oriented to person;Disoriented to time;Oriented to situation  Cognition  Overall Cognitive Status: WFL     Objective   Bed mobility  Supine to Sit: Unable to assess  Sit to Supine: Unable to assess  Scooting: Contact guard assistance  Bed Mobility Comments: Pt up in chair upon entry/exit this date. Transfers  Sit to Stand: Contact guard assistance  Stand to Sit: Contact guard assistance  Comment: RW used for transfers, cueing for hand placement and use of heart hugger brace with good return demo, multiple transfers performed throughout session. Ambulation  Surface: Level tile  Device: Rolling Walker  Assistance: Contact guard assistance  Quality of Gait: slight difficulty with LLE progression  Gait Deviations: Slow Victorina;Decreased step length;Decreased step height  Distance: 300ft  Comments: Pt overall slow and steady with no true LOB. More Ambulation?: No  Stairs/Curb  Stairs?: No     Balance  Posture: Fair  Sitting - Static: Fair  Sitting - Dynamic: Fair  Standing - Static: Fair;+  Standing - Dynamic: Fair;-  Comments: standing balance assessed w/ RW; pt able to sit unsupported at edge of chair with SBA. Exercise Treatment:  Seated LE exercise program: [employer], heel/toe raises, and marches. Reps: x10  Comments: Pt performed while seated in chair. AM-PAC Score  -PAC Inpatient Mobility Raw Score : 17 (01/17/23 [facility])  AM-PAC Inpatient T-Scale Score : 42.13 (01/17/23 [facility])  Mobility Inpatient CMS 0-100% Score: 50.57 (01/17/23 [facility])  Mobility Inpatient CMS G-Code Modifier : CK (01/17/23 1356)      Goals  Short Term Goals  Time Frame for Short Term Goals: 14  Short Term Goal 1: Pt to perform bed mobility SBA  Short Term Goal 2: Demonstrate functional transfers SBA  Short Term Goal 3: Ambulate 300ft w/ least restrictive AD SBA  Short Term Goal 4: Ascend/descend 4 stairs with least restrictive AD SBA  Patient Goals   Patient Goals : To go home       Education  Patient Education  Education Given To: Patient  Education Provided: Role of Therapy;Plan of Care;Transfer Training  Education Provided Comments: cardiac precautions and use of heart hugger brace.   Education Method: Verbal;Demonstration  Barriers to Learning: Cognition  Education Outcome: Verbalized understanding;Demonstrated understanding      Therapy Time   Individual Concurrent Group Co-treatment   Time In 1143         Time Out 1210         Minutes 27         Timed Code Treatment Minutes: [phone] Reagan Gannon PTA

## 2023-01-17 NOTE — PROGRESS NOTES
[facility]  Progress Note                   Date:   1/17/2023  Patient name: Renita Meckel  Date of admission:  1/10/2023  3:33 PM  MRN:   [id number]  YOB: 1945  PCP: Francine Biswas MD    Reason for Admission: Abnormal stress ECG [R94.39]  CAD in native artery [I25.10]    Subjective:       Seen and examined. ARI. blood pressure elevated overnight  Postop day 4 of CABG X3, extubated on 1/14  Vitals stable. Pneumothorax on CXR, trace.        Intake/Output Summary (Last 24 hours) at 1/17/2023 0759  Last data filed at 1/17/2023 0500  Gross per 24 hour   Intake 960 ml   Output 2675 ml   Net -1715 ml             Medications:   Scheduled Meds:   atorvastatin  80 mg Oral Nightly    aspirin  81 mg Oral Daily    losartan  25 mg Oral Daily    amLODIPine  10 mg Oral Daily    furosemide  40 mg IntraVENous Daily    metoprolol tartrate  37.5 mg Oral BID    insulin lispro  0-16 Units SubCUTAneous TID WC    insulin lispro  0-4 Units SubCUTAneous Nightly    sodium chloride flush  5-40 mL IntraVENous 2 times per day    clopidogrel  75 mg Oral Daily    amiodarone  200 mg Oral TID    mupirocin   Nasal BID    polyethylene glycol  17 g Oral Daily    sennosides-docusate sodium  1 tablet Oral BID    pantoprazole  40 mg Oral Daily    pantoprazole (PROTONIX) 40 mg injection  40 mg IntraVENous Daily    insulin glargine  0.15 Units/kg SubCUTAneous Nightly    donepezil  5 mg Oral Daily     Continuous Infusions:   sodium chloride Stopped (01/13/23 2106)    sodium chloride 10 mL/hr at 01/17/23 0638    norepinephrine 0.04 mcg/kg/min (01/13/23 1429)    EPINEPHrine 0.04 mcg/kg/min (01/13/23 1429)    dextrose       CBC:   Recent Labs     01/15/23  1414 01/16/23  0310 01/17/23  0437   WBC 9.7 7.9 6.1   HGB 11.0* 10.6* 10.7*   PLT See Reflexed IPF Result See Reflexed IPF Result 169       BMP:    Recent Labs     01/15/23  1605 01/16/23  0310 01/17/23  0437    133* 137   K 4.3 4.2 3.9    101 99   CO2 19* 22 27   BUN 16 15 23   CREATININE 0.97* 0.80 0.81   GLUCOSE 170* 134* 155*       Hepatic:No results for input(s): AST, ALT, ALB, BILITOT, ALKPHOS in the last 72 hours. Troponin: No results for input(s): TROPHS in the last 72 hours. BNP: No results for input(s): BNP in the last 72 hours. Lipids: No results for input(s): CHOL, HDL in the last 72 hours. Invalid input(s): LDLCALCU  INR:   Recent Labs     01/15/23  1414   INR 1.3         Objective:   Vitals: BP (!) 145/43   Pulse 65   Temp 97.9 °F (36.6 °C) (Oral)   Resp 18   Ht 5' 4\" (1.626 m)   Wt 178 lb 12.7 oz (81.1 kg)   SpO2 92%   BMI 30.69 kg/m²   General appearance: alert and cooperative with exam  HEENT: Head: Normocephalic, no lesions, without obvious abnormality. Neck:no JVD, trachea midline, no adenopathy  Lungs: Clear to auscultation  Heart: Regular rate and rhythm, s1/s2 auscultated, no murmurs, SR, right radial ecchymosis noted, soft, CDI, no active bleeding, positive pulse. Abdomen: soft, non-tender, bowel sounds active  Extremities: no edema      Echo 12/2022  The Left ventricle appears normal in size. Normal wall thickness. No obvious wall motion abnormality noted. Normal LV systolic function, Ejection Fraction > 55%  Normal RV size and function. RV systolic pressure is normal  The LA and RA appears normal in size. No obvious significant structural valvular abnormality noted. No significant valvular stenosis or regurgitation noted. Normal aortic root dimensions. No significant pericardial infusion seen. The IVC appears normal in size, normal respiratory variation suggestive of  normal right atrial filling pressure. Cardiac Cath 01/12/2023   Lesion on LMCA:       Lesion on LMCA: Ostial.90% stenosis. Coronary Tree      Dominance: Right     LV Analysis  LV function assessed as:Normal.  Ejection Fraction  +----------------------------------------------------------------------+---+  ! Method !EF%!  +----------------------------------------------------------------------+---+  ! LV gram                                                               !55 !  +----------------------------------------------------------------------+---+    Assessment / Acute Cardiac Problems:   Multivessel CAD as above with 90% ostial left main stenosis, status post CABGX3 01/13/2023. Op note pending. Preserved LVEF on echocardiogram, EF 55%  HTN  HLD  Type II DM  Extubated 1/14/2023      Plan of Treatment:   Continue aspirin, amiodarone, statin, and Plavix. Continue Lopressor to 37.5 mg po qd. Increase amlodipine to 10 mg po qd  Increase losartan to 50 mg po qd for better bp control  On Lasix 40 mg once daily  PT OT/incentive spirometry. Plan for Rehab on discharge  Will continue to follow. Electronically signed by Mayelin Vargas MD on 1/17/2023 at 7:59 AM.    Attending Cardiologist Addendum: I have reviewed and performed the history, physical, subjective, objective, assessment, and plan with the student/resident/fellow/APN and agree with the note. I performed the history and physical personally. I have made changes to the note above as needed. Routine post op care per CTS    Discussed with patient in detail. All questions answered. Agrees with plan as outlined above. Thank you for allowing me to participate in the care of this patient, please do not hesitate to call if you have any questions. Saranya Frankel DO, [facility], [address], [address], [place] [address] [facility]  [facility] [place]  [phone]

## 2023-01-19 ENCOUNTER — INITIAL CONSULT (OUTPATIENT)
Dept: VASCULAR SURGERY | Age: 78
End: 2023-01-19
Payer: MEDICARE

## 2023-01-19 VITALS
BODY MASS INDEX: 29.88 KG/M2 | DIASTOLIC BLOOD PRESSURE: 60 MMHG | HEIGHT: 64 IN | OXYGEN SATURATION: 96 % | SYSTOLIC BLOOD PRESSURE: 128 MMHG | WEIGHT: 175 LBS | HEART RATE: 77 BPM | TEMPERATURE: 97 F | RESPIRATION RATE: 16 BRPM

## 2023-01-19 DIAGNOSIS — I65.23 CAROTID STENOSIS, ASYMPTOMATIC, BILATERAL: Primary | ICD-10-CM

## 2023-01-19 PROCEDURE — 99204 OFFICE O/P NEW MOD 45 MIN: CPT | Performed by: SURGERY

## 2023-01-19 PROCEDURE — 1123F ACP DISCUSS/DSCN MKR DOCD: CPT | Performed by: SURGERY

## 2023-01-19 PROCEDURE — G8427 DOCREV CUR MEDS BY ELIG CLIN: HCPCS | Performed by: SURGERY

## 2023-01-19 PROCEDURE — 1111F DSCHRG MED/CURRENT MED MERGE: CPT | Performed by: SURGERY

## 2023-01-19 PROCEDURE — G8417 CALC BMI ABV UP PARAM F/U: HCPCS | Performed by: SURGERY

## 2023-01-19 PROCEDURE — 3078F DIAST BP <80 MM HG: CPT | Performed by: SURGERY

## 2023-01-19 PROCEDURE — G8484 FLU IMMUNIZE NO ADMIN: HCPCS | Performed by: SURGERY

## 2023-01-19 PROCEDURE — 1090F PRES/ABSN URINE INCON ASSESS: CPT | Performed by: SURGERY

## 2023-01-19 PROCEDURE — 1036F TOBACCO NON-USER: CPT | Performed by: SURGERY

## 2023-01-19 PROCEDURE — G8399 PT W/DXA RESULTS DOCUMENT: HCPCS | Performed by: SURGERY

## 2023-01-19 PROCEDURE — 3074F SYST BP LT 130 MM HG: CPT | Performed by: SURGERY
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
## 2023-01-26 ENCOUNTER — OFFICE VISIT (OUTPATIENT)
Dept: CARDIOTHORACIC SURGERY | Age: 78
End: 2023-01-26

## 2023-01-26 VITALS
HEART RATE: 72 BPM | DIASTOLIC BLOOD PRESSURE: 57 MMHG | OXYGEN SATURATION: 98 % | SYSTOLIC BLOOD PRESSURE: 136 MMHG | HEIGHT: 64 IN | RESPIRATION RATE: 17 BRPM | WEIGHT: 175 LBS | BODY MASS INDEX: 29.88 KG/M2

## 2023-01-26 DIAGNOSIS — Z95.1 S/P CABG X 3: Primary | ICD-10-CM

## 2023-01-26 PROCEDURE — 99024 POSTOP FOLLOW-UP VISIT: CPT | Performed by: NURSE PRACTITIONER

## 2023-01-26 NOTE — PROGRESS NOTES
[facility]  Office Visit      Subjective:  Ms. Melanie Arnett is a 65 y.o. CABG surgery. Patient discharged home. No nausea vomiting diarrhea fever chills. Patient is recovering at home with home care nurse. All questions and concerns answered. Family bedside with patient. Vitals:  Vitals:    01/26/23 0942   BP: (!) 136/57   Pulse: 72   Resp: 17   SpO2: 98%       General: Alert and Oriented x3. Ambulatory. No apparent distress. Chest:  No abnormality. Equal and symmetric expansion with respiration. Lungs:  Clear to auscultation. Cardiac:  Regular rate and rhythm without murmurs, rubs or gallops. Abdomen:  Soft, non-tender, normoactive bowel sounds. Extremities:  No edema. Intact pulses in all four extremities. Psychiatric: Mood and affect are appropriate.     S sternal incision clean dry intact no discharge or bleeding harvest site clean dry intact no discharge or bleeding    Current Medications:    Current Outpatient Medications:     alendronate (FOSAMAX) 70 MG tablet, TAKE 1 TABLET EVERY 7 DAYS ON AN EMPTY STOMACH, FIRST THING IN THE MORNING, WITH FULL GLASS OF WATER, STAY UPRIGHT FOR 30 MINUTES., Disp: 12 tablet, Rfl: 3    aspirin 81 MG chewable tablet, Take 1 tablet by mouth daily, Disp: 30 tablet, Rfl: 1    amiodarone (CORDARONE) 200 MG tablet, Take 1 tablet by mouth 3 times daily, Disp: 90 tablet, Rfl: 0    atorvastatin (LIPITOR) 80 MG tablet, Take 1 tablet by mouth nightly, Disp: 30 tablet, Rfl: 1    Metoprolol Tartrate 37.5 MG TABS, Take 37.5 mg by mouth 2 times daily, Disp: 60 tablet, Rfl: 1    losartan (COZAAR) 50 MG tablet, Take 1 tablet by mouth daily, Disp: 30 tablet, Rfl: 1    potassium chloride (KLOR-CON M) 20 MEQ extended release tablet, Take 1 tablet by mouth daily as needed (only take if taking lasix), Disp: 30 tablet, Rfl: 1    furosemide (LASIX) 20 MG tablet, Take 1 tablet by mouth 2 times daily as needed (as needed), Disp: 60 tablet, Rfl: 1 clopidogrel (PLAVIX) 75 MG tablet, Take 1 tablet by mouth daily, Disp: 30 tablet, Rfl: 1    pantoprazole (PROTONIX) 40 MG tablet, Take 1 tablet by mouth daily, Disp: 30 tablet, Rfl: 1    amLODIPine (NORVASC) 10 MG tablet, Take 1 tablet by mouth daily, Disp: 30 tablet, Rfl: 1    FREESTYLE LITE strip, , Disp: , Rfl:     FreeStyle Lancets MISC, , Disp: , Rfl:     oxyCODONE-acetaminophen (PERCOCET) 5-325 MG per tablet, TAKE 1 TABLET BY MOUTH TWO TIMES A DAY AS NEEDED, Disp: , Rfl:     blood glucose monitor kit and supplies, Dispense sufficient amount for indicated testing frequency plus additional to accommodate PRN testing needs. Dispense all needed supplies to include: monitor, strips, lancing device, lancets, control solutions, alcohol swabs., Disp: 1 kit, Rfl: 0    ondansetron (ZOFRAN ODT) 4 MG disintegrating tablet, Take 1 tablet by mouth every 8 hours as needed for Nausea or Vomiting, Disp: 10 tablet, Rfl: 0    donepezil (ARICEPT) 5 MG tablet, Take 1 tablet by mouth daily, Disp: 90 tablet, Rfl: 3    albuterol sulfate HFA (PROVENTIL;VENTOLIN;PROAIR) 108 (90 Base) MCG/ACT inhaler, USE 2 INHALATIONS EVERY 6 HOURS AS NEEDED FOR WHEEZING, Disp: , Rfl:     Past Surgical History:   Procedure Laterality Date    COLONOSCOPY      x 3    CORONARY ARTERY BYPASS GRAFT N/A 1/13/2023    CORONARY ARTERY BYPASS X 3 ON PUMP,  ROBSON performed by Calos Chen MD at [address] Right     EYE SURGERY Bilateral     optic nerve surgery as a child    FINGER TRIGGER RELEASE Right 11/05/2018    Long middle and ring    FOOT SURGERY Left     2 surgeries    HIP FRACTURE SURGERY Right 06/18/2021    with hardware /  Procedure: Kendra [address] Dr; Surgeon: Gulshan Martinez MD; Location: [address] [place] [address]; Service: Orthopedics;  Laterality: Right; fluro and ortho table    HYSTERECTOMY (CERVIX STATUS UNKNOWN)      vaginal    KNEE ARTHROSCOPY Right 6/21/2022    KNEE ARTHROSCOPY FOR PARTIAL MEDIAL MENISCECTOMY AND PARTIAL LATERAL MENISCECTOMY performed by Wallace Krueger MD at [address] [facility]      3 surgeries    OTHER SURGICAL HISTORY      excision cysts from ovaries    SPINAL CORD STIMULATOR IMPLANT      Patient states it does not work    TUBAL LIGATION         Social Hx: reports that she quit smoking about 21 years ago. Her smoking use included cigarettes. She has a 10.00 pack-year smoking history. She has never used smokeless tobacco.    Assessment & Plan:   Follow-up with cardiothoracic surgery as needed.   Cardiac rehab will reach out to you for appointment      [facility], APRN - NP,APRN CNP

## 2023-01-31 NOTE — H&P
Attestation signed by      Attending Physician Statement:    I have discussed the care of  Isha Celaya , including pertinent history and exam findings, with the Cardiology fellow/resident. I have seen and examined the patient and the key elements of all parts of the encounter have been performed by me. I agree with the assessment, plan and orders as documented by the fellow/resident, after I modified exam findings and plan of treatments, and the final version is my approved version of the assessment. Additional Comments: proceed with cardiac cath    Cas Oliva, [facility] Cardiology Cardiology    Consult / H&P               Today's Date: 1/31/2023  Patient Name: Isha Celaya  Date of admission: 1/10/2023  3:33 PM  Patient's age: 65 y. o., 1945  Admission Dx: Abnormal stress ECG [R94.39]  CAD in native artery [I25.10]    Reason for Consult:  Cardiac evaluation    Requesting Physician: Cas Oliva MD    CHIEF COMPLAINT:  Chest pain     History Obtained From:  patient, electronic medical record    HISTORY OF PRESENT ILLNESS:      The patient is a 65 y.o.  female who is admitted to the hospital for elective cardiac cath due to abnormal stress test as below. Past Medical History:   has a past medical history of Abnormal EKG, Arthritis, Asthma, Colon polyps, Gout, History of fall, Hyperlipidemia, Hypertension, Incomplete RBBB, Osteoarthritis, Osteopetrosis, Right knee meniscal tear, Sciatica, Seasonal allergies, Snoring, Type II or unspecified type diabetes mellitus without mention of complication, not stated as uncontrolled, Urinary incontinence, and Wears dentures. Past Surgical History:   has a past surgical history that includes Foot surgery (Left); Nose surgery; Tubal ligation; Hysterectomy; Elbow fracture surgery (Right); Colonoscopy; eye surgery (Bilateral); other surgical history; Hip fracture surgery (Right, 06/18/2021);  Spinal cord stimulator implant; Finger trigger release (Right, 11/05/2018); Knee arthroscopy (Right, 6/21/2022); and Coronary artery bypass graft (N/A, 1/13/2023). Home Medications:    Prior to Admission medications    Medication Sig Start Date End Date Taking? Authorizing Provider   alendronate (FOSAMAX) 70 MG tablet TAKE 1 TABLET EVERY 7 DAYS ON AN EMPTY STOMACH, FIRST THING IN THE MORNING, WITH FULL GLASS OF WATER, STAY UPRIGHT FOR 30 MINUTES. 1/17/23  Yes ANDERS Aguilar   aspirin 81 MG chewable tablet Take 1 tablet by mouth daily 1/18/23  Yes Abbi Holden MD   amiodarone (CORDARONE) 200 MG tablet Take 1 tablet by mouth 3 times daily 1/17/23  Yes Abbi Holden MD   atorvastatin (LIPITOR) 80 MG tablet Take 1 tablet by mouth nightly 1/17/23  Yes Abbi Holden MD   Metoprolol Tartrate 37.5 MG TABS Take 37.5 mg by mouth 2 times daily 1/17/23  Yes Abbi Holden MD   losartan (COZAAR) 50 MG tablet Take 1 tablet by mouth daily 1/18/23  Yes Abbi Holden MD   potassium chloride (KLOR-CON M) 20 MEQ extended release tablet Take 1 tablet by mouth daily as needed (only take if taking lasix) 1/17/23  Yes Abbi Holden MD   furosemide (LASIX) 20 MG tablet Take 1 tablet by mouth 2 times daily as needed (as needed) 1/17/23  Yes Abbi Holden MD   clopidogrel (PLAVIX) 75 MG tablet Take 1 tablet by mouth daily 1/18/23  Yes Abbi Holden MD   pantoprazole (PROTONIX) 40 MG tablet Take 1 tablet by mouth daily 1/18/23  Yes Abbi Holden MD   amLODIPine (NORVASC) 10 MG tablet Take 1 tablet by mouth daily 1/18/23  Yes Abbi Holden MD   FREESTYLE LITE strip  10/12/22   Historical Provider, MD   FreeStyle Lancets [facility]  10/12/22   Historical Provider, MD   oxyCODONE-acetaminophen (PERCOCET) 5-325 MG per tablet TAKE 1 TABLET BY MOUTH TWO TIMES A DAY AS NEEDED 9/16/22   Historical Provider, MD   blood glucose monitor kit and supplies Dispense sufficient amount for indicated testing frequency plus additional to accommodate PRN testing needs. Dispense all needed supplies to include: monitor, strips, lancing device, lancets, control solutions, alcohol swabs. 10/12/22   Eden Aquino MD   ondansetron (ZOFRAN ODT) 4 MG disintegrating tablet Take 1 tablet by mouth every 8 hours as needed for Nausea or Vomiting 10/10/22   Diego Mujica MD   donepezil (ARICEPT) 5 MG tablet Take 1 tablet by mouth daily 8/26/22   Eden Aquino MD   albuterol sulfate HFA (PROVENTIL;VENTOLIN;PROAIR) 108 (90 Base) MCG/ACT inhaler USE 2 INHALATIONS EVERY 6 HOURS AS NEEDED FOR WHEEZING 2/1/21   Historical Provider, MD      No current facility-administered medications for this encounter. Allergies:  Wasp venom protein, Dust mite extract, Seasonal, and Guaifenesin    Social History:   reports that she quit smoking about 21 years ago. Her smoking use included cigarettes. She has a 10.00 pack-year smoking history. She has never used smokeless tobacco. She reports that she does not currently use drugs after having used the following drugs: Marijuana Thuy Chavez). She reports that she does not drink alcohol. Family History: family history includes Diabetes in her maternal grandmother; Stroke in her mother. No h/o sudden cardiac death. No for premature CAD    REVIEW OF SYSTEMS:    Constitutional: there has been no unanticipated weight loss. There's been No change in energy level, No change in activity level. Eyes: No visual changes or diplopia. No scleral icterus. ENT: No Headaches  Cardiovascular: No cardiac history  Respiratory: No previous pulmonary problems, No cough  Gastrointestinal: No abdominal pain. No change in bowel or bladder habits. Genitourinary: No dysuria, trouble voiding, or hematuria. Musculoskeletal:  No gait disturbance, No weakness or joint complaints. Integumentary: No rash or pruritis. Neurological: No headache, diplopia, change in muscle strength, numbness or tingling.  No change in gait, balance, coordination, mood, affect, memory, mentation, behavior. Psychiatric: No anxiety, or depression. Endocrine: No temperature intolerance. No excessive thirst, fluid intake, or urination. No tremor. Hematologic/Lymphatic: No abnormal bruising or bleeding, blood clots or swollen lymph nodes. Allergic/Immunologic: No nasal congestion or hives. PHYSICAL EXAM:      BP (!) 137/56   Pulse 70   Temp 97.6 °F (36.4 °C) (Axillary)   Resp 16   Ht 5' 4\" (1.626 m)   Wt 178 lb 12.7 oz (81.1 kg)   SpO2 96%   BMI 30.69 kg/m²    Constitutional and General Appearance: alert, cooperative, no distress and appears stated age  HEENT: PERRL, no cervical lymphadenopathy. No masses palpable. Normal oral mucosa  Respiratory:  Normal excursion and expansion without use of accessory muscles  Resp Auscultation: Good respiratory effort. No for increased work of breathing. On auscultation: clear to auscultation bilaterally  Cardiovascular: The apical impulse is not displaced  Heart tones are crisp and normal. regular S1 and S2.  Jugular venous pulsation Normal  The carotid upstroke is normal in amplitude and contour without delay or bruit  Peripheral pulses are symmetrical and full   Abdomen:   No masses or tenderness  Bowel sounds present  Extremities:   No Cyanosis or Clubbing   Lower extremity edema: No   Skin: Warm and dry  Neurological:  Alert and oriented. Moves all extremities well  No abnormalities of mood, affect, memory, mentation, or behavior are noted    DATA:       Data:   Stress test 12/15/2022     1. Infarct of the inferior wall. Inferoapical wall minimal reversible   ischemia is suspected. 2. Left ventricular ejection fraction of 58%   3. Please see report for EKG portion of the examination which will be   performed separately by physician from cardiology. Risk stratification:  Intermediate risk      EKG IMPRESSION: ELECTROCARDIOGRAPHICALLY NON-DIAGNOSTIC LEXISCAN STRESS  TEST. RADIOISOTOPE RESULTS TO FOLLOW FROM THE DEPARTMENT OF NUCLEAR  MEDICINE. COMMENTS: ECG ABNORMALITIES (T-WAVE), NORMALIZED 1 MINUTE AFTER LEXISCAN  BOLUSING     ECHO 12/15/2022  The Left ventricle appears normal in size. Normal wall thickness. No obvious wall motion abnormality noted. Normal LV systolic function, Ejection Fraction > 55%  Normal RV size and function. RV systolic pressure is normal  The LA and RA appears normal in size. No obvious significant structural valvular abnormality noted. No significant valvular stenosis or regurgitation noted. Normal aortic root dimensions. No significant pericardial infusion seen. The IVC appears normal in size, normal respiratory variation suggestive of  normal right atrial filling pressure. Labs:     CBC: No results for input(s): WBC, HGB, HCT, PLT in the last 72 hours. BMP: No results for input(s): NA, K, CO2, BUN, CREATININE, LABGLOM, GLUCOSE in the last 72 hours. BNP: No results for input(s): BNP in the last 72 hours. PT/INR: No results for input(s): PROTIME, INR in the last 72 hours. APTT:No results for input(s): APTT in the last 72 hours. CARDIAC ENZYMES:No results for input(s): CKTOTAL, CKMB, CKMBINDEX, TROPONINI in the last 72 hours. FASTING LIPID PANEL:  Lab Results   Component Value Date/Time    HDL 36 12/09/2022 09:50 AM    HDL 37 12/09/2022 09:50 AM    TRIG 257 12/20/2021 02:43 PM     LIVER PROFILE:No results for input(s): AST, ALT, LABALBU in the last 72 hours.     IMPRESSION:    Patient Active Problem List   Diagnosis    Closed fracture of neck of radius    Type 2 diabetes mellitus without complication, without long-term current use of insulin ([address]    Hypertension associated with diabetes ([address]    Major depressive disorder with single episode, in full remission ([address]    Age-related cognitive decline    Age-related osteoporosis without current pathological fracture    Nocturia    Right hip pain    Bilateral sacroiliitis (HCC)    Chronic pain disorder    Displacement of lumbar intervertebral disc without myelopathy Osteoarthritis of knee    Postherpetic neuralgia    Post-laminectomy syndrome    Sacroiliitis, not elsewhere classified ([facility]    Chronic renal disease, stage III ([address] [[id number]]    Alzheimer's disease, unspecified    Coronary artery disease involving native coronary artery of native heart without angina pectoris    Acute idiopathic gout    S/P CABG (coronary artery bypass graft)     Impression:   1. Abnormal stress test as above  2. HTN  3. HLD  4. Type II DM     Plan:  Proceed with planned procedure. Further orders to follow. Risks, benefits, and alternatives of cardiac catheterization were discussed, in detail, with patient. Risks include, but not limited to, bleeding, requiring blood transfusion, vascular complication requiring surgery, renal failure with need of dialysis, CVA, MI, death and anesthesia complications including intubation were discussed. Patient verbalized understanding and agreed to proceed with the procedure understanding the above risks and alternatives to the procedure.     Cheryl Restrepo MD  Fellow, [address] [facility]

## 2023-02-10 NOTE — PROGRESS NOTES
Physician Progress Note      Nick Miranda  CSN #:                  [id number]  :                       1945  ADMIT DATE:       1/10/2023 3:33 PM  [address] [place] DATE:        2023 3:21 PM  RESPONDING  PROVIDER #:        Trino Elias MD          QUERY TEXT:    Pt admitted for Cardiac Cath. Noted documentation of post-operative   respiratory insufficiency in Cardiothoracic Surgery's note on  and all   progress notes thereafter. ? Also noted Post CABG Respiratory failure, hypoxic,   in IM PN on . Please document in progress notes and discharge summary if   you are evaluating/treating any of the following: The medical record reflects the following:  Risk Factors: Asthma, Sleep Apnea  Clinical Indicators: on 40% O2 for a short period of time and then 4-5L of O2   then RA. Treatment: Supplemental O2, Frequent monitoring of respiratory status and   vital signs. Thank you for your time, Gilda Parham MSN, RN CDS. Please call/text/PS with any   questions; [phone]. Options provided:  -- Respiratory failure ruled out as patient has pulmonary insufficiency due to   asthma/TITI  -- Respiratory failure and pulmonary insufficiency have been ruled out  -- Other - I will add my own diagnosis  -- Disagree - Not applicable / Not valid  -- Disagree - Clinically unable to determine / Unknown  -- Refer to Clinical Documentation Reviewer    PROVIDER RESPONSE TEXT:    Provider disagreed with this query.   NA    Query created by: Reggie Arias on 2023 12:01 PM      Electronically signed by:  Trino Elias MD 2/10/2023 10:05 AM

## 2023-02-11 NOTE — PROGRESS NOTES
Physician Progress Note      José Miguel Sky  [facility] #:                  [id number]  :                       1945  ADMIT DATE:       1/10/2023 3:33 PM  Magdiel Barraza DATE:        2023 3:21 PM  RESPONDING  PROVIDER #:        Stefani Parks MD          QUERY TEXT:    Pt admitted for Cardiac Cath. Noted documentation of post-operative   respiratory insufficiency in Cardiothoracic Surgery's note on  and all   progress notes thereafter. ? Also noted Post CABG Respiratory failure, hypoxic,   in IM PN on . Please document in progress notes and discharge summary if   you are evaluating/treating any of the following: The medical record reflects the following:  Risk Factors: Asthma, Sleep Apnea  Clinical Indicators: on 40% O2 for a short period of time and then 4-5L of O2   then RA. Treatment: Supplemental O2, Frequent monitoring of respiratory status and   vital signs. Thank you for your time, Elham [address] MSN, RN CDS. Please call/text/PS with any   questions; [phone]. Options provided:  -- Respiratory failure ruled out as patient has pulmonary insufficiency due to   asthma/TIIT  -- Respiratory failure and pulmonary insufficiency have been ruled out  -- I defer to Internal Medicine team for response to respiratory diagnosis  -- Other - I will add my own diagnosis  -- Disagree - Not applicable / Not valid  -- Disagree - Clinically unable to determine / Unknown  -- Refer to Clinical Documentation Reviewer    PROVIDER RESPONSE TEXT:    Respiratory failure and pulmonary insufficiency have been ruled out.     Query created by: Alex Case on 2/10/2023 12:03 PM      Electronically signed by:  Stefani Parks MD 2023 11:51 AM

## 2023-02-27 ENCOUNTER — HOSPITAL ENCOUNTER (OUTPATIENT)
Dept: CARDIAC REHAB | Age: 78
Setting detail: THERAPIES SERIES
Discharge: HOME | End: 2023-02-27
Payer: MEDICARE

## 2023-02-27 VITALS — BODY MASS INDEX: 28.34 KG/M2 | RESPIRATION RATE: 16 BRPM | HEIGHT: 64 IN | WEIGHT: 166 LBS | OXYGEN SATURATION: 99 %

## 2023-02-27 PROCEDURE — 93797 PHYS/QHP OP CAR RHAB WO ECG: CPT

## 2023-02-27 PROCEDURE — 93798 PHYS/QHP OP CAR RHAB W/ECG: CPT

## 2023-02-27 ASSESSMENT — EXERCISE STRESS TEST
PEAK_BP: 136/74
PEAK_HR: 66
PEAK_RPE: 11
PEAK_METS: 2.5
PEAK_BP: 136/74

## 2023-02-27 ASSESSMENT — EJECTION FRACTION: EF_VALUE: 55

## 2023-02-27 ASSESSMENT — PATIENT HEALTH QUESTIONNAIRE - PHQ9
SUM OF ALL RESPONSES TO PHQ9 QUESTIONS 1 & 2: 2
SUM OF ALL RESPONSES TO PHQ QUESTIONS 1-9: 2
2. FEELING DOWN, DEPRESSED OR HOPELESS: 0
SUM OF ALL RESPONSES TO PHQ QUESTIONS 1-9: 2
1. LITTLE INTEREST OR PLEASURE IN DOING THINGS: 2

## 2023-02-27 NOTE — DISCHARGE INSTRUCTIONS
Cardiac Rehabilitation: After Your Visit  Your Care Instructions  Cardiac rehabilitation is a program for people who have had a heart attack or bypass surgery, or who have other heart problems. The program includes exercise, lifestyle changes, education, and emotional support. Cardiac rehab can help you improve the quality of your life through better overall health. It can help you lose weight and feel better about yourself. On your cardiac rehab team, you may have your doctor, a nurse specialist, a dietitian, and a physical therapist. They will design your cardiac rehab program specifically for you. You will learn how to reduce your risk for heart problems, how to manage stress, and how to eat a heart-healthy diet. By the end of the program, you will be ready to maintain a healthier lifestyle on your own. Follow-up care is a key part of your treatment and safety. Be sure to make and go to all appointments, and call your doctor if you are having problems. Its also a good idea to know your test results and keep a list of the medicines you take. How can you care for yourself at home? Take your medicines exactly as prescribed. Call your doctor if you think you are having a problem with your medicine. You will get more details on the specific medicines your doctor prescribes. Monitor your weight by weighing yourself at least once a week on the same scale with the same amount of clothing at the same time of day. Plan your meals so that you are eating heart-healthy foods. Eat a variety of foods daily. Fresh fruits and vegetables and whole-grains are good choices. Limit your fat intake, especially saturated and trans fat. Limit salt (sodium). Increase fiber in your diet. Limit alcohol. Learn how to take your pulse so that you can track your heart rate during exercise. Always check with your doctor before you begin a new exercise program.  Warm up before you exercise and cool down afterward.  This will help your heart gradually prepare for and recover from exercise and avoid pushing your heart too hard. Stop exercising if you have any unusual discomfort, such as chest pain. Do not smoke. Smoking can make heart problems worse. If you need help quitting, talk to your doctor about stop-smoking programs and medicines. These can increase your chances of quitting for good. When should you call for help? Call 911 anytime you think you may need emergency care. For example, call if:  You have severe trouble breathing. You cough up pink, foamy mucus and you have trouble breathing. You have symptoms of a heart attack. These may include:  Chest pain or pressure, or a strange feeling in the chest.  Sweating. Shortness of breath. Nausea or vomiting. Pain, pressure, or a strange feeling in the back, neck, jaw, or upper belly or in one or both shoulders or arms. Lightheadedness or sudden weakness. A fast or irregular heartbeat. After you call 911, the  may tell you to chew 1 adult-strength or 2 to 4 low-dose aspirin. Wait for an ambulance. Do not try to drive yourself. You have signs of a stroke such as:  Sudden numbness, paralysis, or weakness in your face, arm, or leg, especially on only one side of your body. New problems with walking or balance. Sudden vision changes. Drooling or slurred speech. New problems speaking or understanding simple statements, or feeling confused. A sudden, severe headache that is different from past headaches. You passed out (lost consciousness). Call your doctor now or seek immediate medical care if:  You have new or increased shortness of breath. You are dizzy or lightheaded, or you feel like you may faint. You gain 2 to 3 pounds or more over 2 days. You have increased swelling in your legs, ankles, or feet. Watch closely for changes in your health, and be sure to contact your doctor if you have any problems.     EXERCISE GUIDELINES    If you experience any unusual feelings during exercise, please inform the RN immediately. Take your prescribed medications before class or at the normal time. Wear comfortable clothes and shoes. Please arrive on time for your class. If you arrive too early, you may have to wait. Park in Cardiac Rehab/ Heart Failure Parking spots or use our free [address] parking. Please call us if you need to cancel. This is very important and will prevent you from being discharged from the program. Our direct number is [phone]. Please advise us if you have had any chest pain, shortness of breath, or other problems since your last session. We are here to help you return to a more active and healthy lifestyle. In addition to the RN's who will work with you at all times during your sessions, we have a dietician and a psychologist to assist as needed. Please don't hesitate to ask any questions or discuss any concerns you may have. NITROGLYCERIN TABLET INSTRUCTIONS  Nitroglycerin belongs to the group of medications called nitrates. It is used to improve blood flow to the heart. It also makes it easier for your heart to pump effectively. Nitroglycerin does this by relaxing and opening your veins and small arteries. Nitroglycerin's main purpose is to relive the pain of angina attacks. It is available by prescription only. PROPER USE OF NITROGLYCERIN:  1. Place the tablet under your tongue only (sublingual.) Nitroglycerin tablets are ineffective if chewed or swallowed. Hold saliva in your mouth as long as possible to permit best absorption from the lining of your mouth. Do not eat, drink, chew, or smoke while a tablet is dissolving.   2. Signs of angina (inadequate blood supply to the heart) include: pain, discomfort, heaviness, squeezing or tightness in the chest, arms, jaw, throat, or upper back; numbness and tingling of one or both arms and hands; sudden onset of extreme weakness or fatigue; nausea, extreme sweating, indigestion or a choking sensation. If you experience any of these signs you should:     A. Immediately rest. A sitting position is best for your heart. However if you are dizzy, lie down. B. Take a nitroglycerin tablet. You may take one tablet every 5 minutes for a total of three tablets. C. Relax. Concentrate on relaxing all the muscles in your body and breathing slowly and easily. D. If the pain lasts longer than 15 minutes, CALL 911. E. After the symptoms subside, rest at least 30 minutes while sitting or lying. Nitroglycerin works within 1-3 minutes and is out of your system with 30-60 minutes. You may experience a headache, dizziness, light-headedness, or a faint feeling, especially when getting up from a sitting or lying position. STORAGE OF NITROGLYCERIN:  1. Keep the tablets in their original brown bottle. (They are sensitive to heat, moisture, and light.)  2. Remove the cotton plug that comes in the bottle and do not put it back in.  3. Put the cap on the bottle quickly and tightly after each use. 4. To select a tablet for use, pour several into the bottle cap, take one, and pour the others back into the bottle. Do not hold them in the palm of your hand because they will  moisture and lose effect. 5. Do not keep other medications in the same bottle with the nitroglycerin since they will weaken the nitroglycerin effect. 6. Keep the medicine handy at all times but do not carry the bottle close to your body. Nitroglycerin may lose strength because of body warmth. Instead carry the bottle in your purse, jacket pocket, or loose fitting clothing. 7. Store additional nitroglycerin tablets in a cool, dry place. Average room temperature away form direct heat or sunlight is best. Do not sore in the bathroom medicine cabinet or refrigerator because the moisture usually present in these areas will spoil the tablets. 8. Be sure to check the expiration date on the bottle.  Nitroglycerin tablets lose their effectiveness (potency) after the expiration date. Generally, tablets are good for 6 months. Ask for new tablets at least 2 weeks before they are due to . SIDE EFFECTS:  The most common side effects of nitroglycerin are headache, dizziness, and flushing of the face and neck. These side effects are caused by increased blood flow through the relaxed blood vessels. If you have any concerns, let your health care provider know. If you develop a skin rash let your healthcare provider know. Nitroglycerin is not habit forming or addicting. Do not be afraid to use it. Record all angina attacks relieved by taking nitroglycerin and show this record to your healthcare provider at your regular check - up. If you have an increasing number of controlled attacks (symptoms go away after taking two or fewer nitroglycerin tablets and resting), notify your healthcare provider. He or she may need to adjust your daily medication or want to see you. Additionally, report any change in the type of angina to your healthcare provider within 24 hours of its occurrence. Always report angina experienced at night while sleeping winthin 24 hours of its occurrence. Where can you learn more? Go to https://[employer]. org and sign in to your [employer] account. Enter J245 in the [employer] box to learn more about Cardiac Rehabilitation: After Your Visit.     If you do not have an account, please click on the Sign Up Now link. © 1004-3477 Healthwise, Incorporated. Care instructions adapted under license by [facility]. This care instruction is for use with your licensed healthcare professional. If you have questions about a medical condition or this instruction, always ask your healthcare professional. [place] [address] any warranty or liability for your use of this information.   Content Version: 0.1.431875; Last Revised: 2013

## 2023-03-01 ENCOUNTER — HOSPITAL ENCOUNTER (OUTPATIENT)
Dept: CARDIAC REHAB | Age: 78
Setting detail: THERAPIES SERIES
Discharge: HOME | End: 2023-03-01
Payer: MEDICARE

## 2023-03-01 VITALS — BODY MASS INDEX: 28.49 KG/M2 | WEIGHT: 166 LBS

## 2023-03-01 PROCEDURE — 93798 PHYS/QHP OP CAR RHAB W/ECG: CPT

## 2023-03-01 RX ORDER — LISINOPRIL 20 MG/1
20 TABLET ORAL DAILY
COMMUNITY

## 2023-03-01 ASSESSMENT — EXERCISE STRESS TEST
PEAK_METS: 2.8
PEAK_HR: 67
PEAK_RPE: 12
PEAK_BP: 112/60

## 2023-03-03 ENCOUNTER — HOSPITAL ENCOUNTER (OUTPATIENT)
Dept: CARDIAC REHAB | Age: 78
Setting detail: THERAPIES SERIES
Discharge: HOME | End: 2023-03-03
Payer: MEDICARE

## 2023-03-03 VITALS — WEIGHT: 162.7 LBS | BODY MASS INDEX: 27.93 KG/M2

## 2023-03-03 PROCEDURE — 93798 PHYS/QHP OP CAR RHAB W/ECG: CPT

## 2023-03-03 ASSESSMENT — EXERCISE STRESS TEST
PEAK_RPE: 12
PEAK_METS: 2.6
PEAK_HR: 77
PEAK_BP: 134/58

## 2023-03-06 ENCOUNTER — HOSPITAL ENCOUNTER (OUTPATIENT)
Dept: CARDIAC REHAB | Age: 78
Setting detail: THERAPIES SERIES
Discharge: HOME | End: 2023-03-06
Payer: MEDICARE

## 2023-03-06 RX ORDER — LOSARTAN POTASSIUM 50 MG/1
TABLET ORAL
Qty: 30 TABLET | Refills: 0 | OUTPATIENT
Start: 2023-03-06

## 2023-03-06 RX ORDER — ATORVASTATIN CALCIUM 80 MG/1
80 TABLET, FILM COATED ORAL NIGHTLY
Qty: 30 TABLET | Refills: 0 | OUTPATIENT
Start: 2023-03-06

## 2023-03-06 RX ORDER — METOPROLOL TARTRATE 37.5 MG/1
TABLET, FILM COATED ORAL
Qty: 60 TABLET | Refills: 0 | OUTPATIENT
Start: 2023-03-06

## 2023-03-06 RX ORDER — PANTOPRAZOLE SODIUM 40 MG/1
TABLET, DELAYED RELEASE ORAL
Qty: 30 TABLET | Refills: 0 | OUTPATIENT
Start: 2023-03-06

## 2023-03-06 RX ORDER — CLOPIDOGREL BISULFATE 75 MG/1
TABLET ORAL
Qty: 30 TABLET | Refills: 0 | OUTPATIENT
Start: 2023-03-06

## 2023-03-06 RX ORDER — AMLODIPINE BESYLATE 10 MG/1
TABLET ORAL
Qty: 30 TABLET | Refills: 0 | OUTPATIENT
Start: 2023-03-06

## 2023-03-06 NOTE — PROGRESS NOTES
CARDIAC REHAB    PATIENT CALLED STATING SLEPT WRONG AND ACHING TODAY. STATED SHE WILL NOT BE COMING TO HER SESSION OF CARDIAC REHAB. WRITER RESCHEDULED SESSION.     Electronically signed by [place] RN on 3/6/2023 at 9:14 AM

## 2023-03-08 ENCOUNTER — HOSPITAL ENCOUNTER (OUTPATIENT)
Dept: CARDIAC REHAB | Age: 78
Setting detail: THERAPIES SERIES
Discharge: HOME | End: 2023-03-08
Payer: MEDICARE

## 2023-03-08 VITALS — BODY MASS INDEX: 27.96 KG/M2 | WEIGHT: 162.9 LBS

## 2023-03-08 PROCEDURE — 93798 PHYS/QHP OP CAR RHAB W/ECG: CPT

## 2023-03-08 ASSESSMENT — EXERCISE STRESS TEST
PEAK_BP: 122/70
PEAK_HR: 66
PEAK_RPE: 12
PEAK_METS: 2.6

## 2023-03-10 ENCOUNTER — HOSPITAL ENCOUNTER (OUTPATIENT)
Dept: CARDIAC REHAB | Age: 78
Setting detail: THERAPIES SERIES
Discharge: HOME | End: 2023-03-10
Payer: MEDICARE

## 2023-03-13 ENCOUNTER — HOSPITAL ENCOUNTER (OUTPATIENT)
Dept: CARDIAC REHAB | Age: 78
Setting detail: THERAPIES SERIES
Discharge: HOME | End: 2023-03-13
Payer: MEDICARE

## 2023-03-13 VITALS — BODY MASS INDEX: 28.27 KG/M2 | WEIGHT: 164.7 LBS

## 2023-03-13 PROCEDURE — 93798 PHYS/QHP OP CAR RHAB W/ECG: CPT

## 2023-03-13 ASSESSMENT — EXERCISE STRESS TEST
PEAK_METS: 2.6
PEAK_HR: 82
PEAK_BP: 118/62
PEAK_RPE: 13

## 2023-03-15 ENCOUNTER — HOSPITAL ENCOUNTER (OUTPATIENT)
Dept: CARDIAC REHAB | Age: 78
Setting detail: THERAPIES SERIES
Discharge: HOME | End: 2023-03-15
Payer: MEDICARE

## 2023-03-15 VITALS — WEIGHT: 161.8 LBS | BODY MASS INDEX: 27.77 KG/M2

## 2023-03-15 PROCEDURE — 93798 PHYS/QHP OP CAR RHAB W/ECG: CPT

## 2023-03-15 ASSESSMENT — EXERCISE STRESS TEST
PEAK_RPE: 12
PEAK_METS: 3
PEAK_HR: 64
PEAK_BP: 136/62

## 2023-03-17 ENCOUNTER — HOSPITAL ENCOUNTER (OUTPATIENT)
Dept: CARDIAC REHAB | Age: 78
Setting detail: THERAPIES SERIES
Discharge: HOME | End: 2023-03-17
Payer: MEDICARE

## 2023-03-17 NOTE — PROGRESS NOTES
P/c from pt to let us know she will not be able to attend cardiac rehab today due to a death in the family.  Appt r/s

## 2023-03-19 ASSESSMENT — ENCOUNTER SYMPTOMS
COLOR CHANGE: 0
SHORTNESS OF BREATH: 1
ALLERGIC/IMMUNOLOGIC NEGATIVE: 1
CHEST TIGHTNESS: 0
ABDOMINAL PAIN: 0

## 2023-03-20 ENCOUNTER — HOSPITAL ENCOUNTER (OUTPATIENT)
Dept: CARDIAC REHAB | Age: 78
Setting detail: THERAPIES SERIES
Discharge: HOME | End: 2023-03-20
Payer: MEDICARE

## 2023-03-20 VITALS — BODY MASS INDEX: 27.98 KG/M2 | WEIGHT: 163 LBS

## 2023-03-20 PROCEDURE — 93798 PHYS/QHP OP CAR RHAB W/ECG: CPT

## 2023-03-20 ASSESSMENT — EXERCISE STRESS TEST
PEAK_BP: 156/60
PEAK_HR: 92
PEAK_METS: 3
PEAK_RPE: 12

## 2023-03-22 ENCOUNTER — HOSPITAL ENCOUNTER (OUTPATIENT)
Dept: CARDIAC REHAB | Age: 78
Setting detail: THERAPIES SERIES
Discharge: HOME | End: 2023-03-22
Payer: MEDICARE

## 2023-03-22 VITALS — BODY MASS INDEX: 28.12 KG/M2 | WEIGHT: 163.8 LBS

## 2023-03-22 PROCEDURE — 93797 PHYS/QHP OP CAR RHAB WO ECG: CPT

## 2023-03-22 PROCEDURE — 93798 PHYS/QHP OP CAR RHAB W/ECG: CPT

## 2023-03-22 ASSESSMENT — EXERCISE STRESS TEST
PEAK_METS: 3
PEAK_HR: 76
PEAK_RPE: 12
PEAK_BP: 140/74

## 2023-03-23 ENCOUNTER — OFFICE VISIT (OUTPATIENT)
Dept: INTERNAL MEDICINE CLINIC | Age: 78
End: 2023-03-23

## 2023-03-23 ENCOUNTER — HOSPITAL ENCOUNTER (OUTPATIENT)
Dept: GENERAL RADIOLOGY | Facility: CLINIC | Age: 78
Discharge: HOME | End: 2023-03-25
Payer: MEDICARE

## 2023-03-23 ENCOUNTER — HOSPITAL ENCOUNTER (OUTPATIENT)
Facility: CLINIC | Age: 78
Discharge: HOME | End: 2023-03-25
Payer: MEDICARE

## 2023-03-23 VITALS
BODY MASS INDEX: 28.12 KG/M2 | DIASTOLIC BLOOD PRESSURE: 60 MMHG | SYSTOLIC BLOOD PRESSURE: 122 MMHG | WEIGHT: 163.8 LBS | OXYGEN SATURATION: 98 % | HEART RATE: 70 BPM

## 2023-03-23 DIAGNOSIS — S62.101A CLOSED FRACTURE OF RIGHT WRIST, INITIAL ENCOUNTER: ICD-10-CM

## 2023-03-23 DIAGNOSIS — Z91.81 AT HIGH RISK FOR FALLS: ICD-10-CM

## 2023-03-23 DIAGNOSIS — S62.101A CLOSED FRACTURE OF RIGHT WRIST, INITIAL ENCOUNTER: Primary | ICD-10-CM

## 2023-03-23 PROCEDURE — 73110 X-RAY EXAM OF WRIST: CPT

## 2023-03-23 SDOH — ECONOMIC STABILITY: HOUSING INSECURITY
IN THE LAST 12 MONTHS, WAS THERE A TIME WHEN YOU DID NOT HAVE A STEADY PLACE TO SLEEP OR SLEPT IN A SHELTER (INCLUDING NOW)?: NO

## 2023-03-23 SDOH — ECONOMIC STABILITY: FOOD INSECURITY: WITHIN THE PAST 12 MONTHS, THE FOOD YOU BOUGHT JUST DIDN'T LAST AND YOU DIDN'T HAVE MONEY TO GET MORE.: NEVER TRUE

## 2023-03-23 SDOH — ECONOMIC STABILITY: FOOD INSECURITY: WITHIN THE PAST 12 MONTHS, YOU WORRIED THAT YOUR FOOD WOULD RUN OUT BEFORE YOU GOT MONEY TO BUY MORE.: NEVER TRUE

## 2023-03-23 SDOH — ECONOMIC STABILITY: INCOME INSECURITY: HOW HARD IS IT FOR YOU TO PAY FOR THE VERY BASICS LIKE FOOD, HOUSING, MEDICAL CARE, AND HEATING?: NOT HARD AT ALL

## 2023-03-23 ASSESSMENT — ENCOUNTER SYMPTOMS
GASTROINTESTINAL NEGATIVE: 1
EYES NEGATIVE: 1
RESPIRATORY NEGATIVE: 1

## 2023-03-23 NOTE — PROGRESS NOTES
Visit Information    Have you changed or started any medications since your last visit including any over-the-counter medicines, vitamins, or herbal medicines? no   Are you having any side effects from any of your medications? -  no  Have you stopped taking any of your medications? Is so, why? -  no    Have you seen any other physician or provider since your last visit? Yes - Records Obtained  Have you had any other diagnostic tests since your last visit? No  Have you been seen in the emergency room and/or had an admission to a hospital since we last saw you? No  Have you had your routine dental cleaning in the past 6 months? no    Have you activated your [employer] account? If not, what are your barriers?  No:      Patient Care Team:  Rosita Peabody, MD as PCP - General (Internal Medicine)  Rosita Peabody, MD as PCP - Empaneled Provider    Medical History Review  Past Medical, Family, and Social History reviewed and does contribute to the patient presenting condition    Health Maintenance   Topic Date Due    Hepatitis C screen  Never done    DTaP/Tdap/Td vaccine (1 - Tdap) Never done    COVID-19 Vaccine (4 - Booster for [place] series) 11/30/2021    Flu vaccine (1) 08/01/2022    Annual Wellness Visit (AWV)  12/18/2022    Lipids  12/09/2023    Depression Monitoring  02/27/2024    DEXA (modify frequency per FRAX score)  Completed    Shingles vaccine  Completed    Pneumococcal 65+ years Vaccine  Completed    Hepatitis A vaccine  Aged Out    Hib vaccine  Aged Out    Meningococcal (ACWY) vaccine  Aged Out
by mouth daily 30 tablet 1    potassium chloride (KLOR-CON M) 20 MEQ extended release tablet Take 1 tablet by mouth daily as needed (only take if taking lasix) 30 tablet 1    furosemide (LASIX) 20 MG tablet Take 1 tablet by mouth 2 times daily as needed (as needed) 60 tablet 1    clopidogrel (PLAVIX) 75 MG tablet Take 1 tablet by mouth daily 30 tablet 1    pantoprazole (PROTONIX) 40 MG tablet Take 1 tablet by mouth daily 30 tablet 1    amLODIPine (NORVASC) 10 MG tablet Take 1 tablet by mouth daily 30 tablet 1    FREESTYLE LITE strip       FreeStyle Lancets MISC       oxyCODONE-acetaminophen (PERCOCET) 5-325 MG per tablet TAKE 1 TABLET BY MOUTH TWO TIMES A DAY AS NEEDED      blood glucose monitor kit and supplies Dispense sufficient amount for indicated testing frequency plus additional to accommodate PRN testing needs. Dispense all needed supplies to include: monitor, strips, lancing device, lancets, control solutions, alcohol swabs. 1 kit 0    donepezil (ARICEPT) 5 MG tablet Take 1 tablet by mouth daily 90 tablet 3    albuterol sulfate HFA (PROVENTIL;VENTOLIN;PROAIR) 108 (90 Base) MCG/ACT inhaler USE 2 INHALATIONS EVERY 6 HOURS AS NEEDED FOR WHEEZING      ondansetron (ZOFRAN ODT) 4 MG disintegrating tablet Take 1 tablet by mouth every 8 hours as needed for Nausea or Vomiting (Patient not taking: No sig reported) 10 tablet 0     No current facility-administered medications for this visit.      Allergies   Allergen Reactions    Wasp Venom Protein Other (See Comments)     Redness & swelling of area that was stung    Dust Mite Extract     Seasonal     Guaifenesin Other (See Comments)     unsure       Health Maintenance   Topic Date Due    Hepatitis C screen  Never done    DTaP/Tdap/Td vaccine (1 - Tdap) Never done    COVID-19 Vaccine (4 - Booster for Pfizer series) 11/30/2021    Flu vaccine (1) 08/01/2022    Annual Wellness Visit (AWV)  12/18/2022    Lipids  12/09/2023    Depression Monitoring  02/27/2024    EDENILSON

## 2023-03-24 ENCOUNTER — OFFICE VISIT (OUTPATIENT)
Dept: ORTHOPEDIC SURGERY | Age: 78
End: 2023-03-24
Payer: MEDICARE

## 2023-03-24 ENCOUNTER — HOSPITAL ENCOUNTER (OUTPATIENT)
Dept: CARDIAC REHAB | Age: 78
Setting detail: THERAPIES SERIES
Discharge: HOME | End: 2023-03-24
Payer: MEDICARE

## 2023-03-24 VITALS — RESPIRATION RATE: 16 BRPM | BODY MASS INDEX: 27.83 KG/M2 | WEIGHT: 163 LBS | HEIGHT: 64 IN

## 2023-03-24 DIAGNOSIS — S63.501A SPRAIN OF RIGHT WRIST, INITIAL ENCOUNTER: ICD-10-CM

## 2023-03-24 DIAGNOSIS — S63.501A SPRAIN OF RIGHT WRIST, INITIAL ENCOUNTER: Primary | ICD-10-CM

## 2023-03-24 DIAGNOSIS — M19.031 LOCALIZED PRIMARY OSTEOARTHRITIS OF RIGHT WRIST: ICD-10-CM

## 2023-03-24 DIAGNOSIS — M19.031 LOCALIZED PRIMARY OSTEOARTHRITIS OF RIGHT WRIST: Primary | ICD-10-CM

## 2023-03-24 PROCEDURE — G8399 PT W/DXA RESULTS DOCUMENT: HCPCS | Performed by: ORTHOPAEDIC SURGERY

## 2023-03-24 PROCEDURE — 99203 OFFICE O/P NEW LOW 30 MIN: CPT | Performed by: ORTHOPAEDIC SURGERY

## 2023-03-24 PROCEDURE — G8417 CALC BMI ABV UP PARAM F/U: HCPCS | Performed by: ORTHOPAEDIC SURGERY

## 2023-03-24 PROCEDURE — 1123F ACP DISCUSS/DSCN MKR DOCD: CPT | Performed by: ORTHOPAEDIC SURGERY

## 2023-03-24 PROCEDURE — 1036F TOBACCO NON-USER: CPT | Performed by: ORTHOPAEDIC SURGERY

## 2023-03-24 PROCEDURE — G8484 FLU IMMUNIZE NO ADMIN: HCPCS | Performed by: ORTHOPAEDIC SURGERY

## 2023-03-24 PROCEDURE — G8427 DOCREV CUR MEDS BY ELIG CLIN: HCPCS | Performed by: ORTHOPAEDIC SURGERY

## 2023-03-24 PROCEDURE — 1090F PRES/ABSN URINE INCON ASSESS: CPT | Performed by: ORTHOPAEDIC SURGERY

## 2023-03-24 RX ORDER — DICLOFENAC SODIUM 75 MG/1
75 TABLET, DELAYED RELEASE ORAL 2 TIMES DAILY WITH MEALS
Qty: 28 TABLET | Refills: 0 | Status: SHIPPED | OUTPATIENT
Start: 2023-03-24

## 2023-03-24 NOTE — PROGRESS NOTES
P/C from pt to let us know she will not be in for cardiac rehab today d/t a MD appt to get her wrist evaluated.  Appt r/s

## 2023-03-24 NOTE — PROGRESS NOTES
ORTHOPEDIC PATIENT EVALUATION      HPI / Chief Complaint  Ag Tavaers is a 65 y.o. female who presents for evaluation of her right wrist.  About 5 days ago on 3/20/2023 she indicates that she tripped and fell trying to avoid a cat toy and hurt her right wrist.  She was seen in emergency department and has been referred to my clinic for further evaluation and treatment. Her pain is primarily localized to the dorsum of the wrist and is associated with swelling. She denies any numbness or tingling. She denies having any problems with this wrist prior to the recent injury. Past Medical History  Tanya Douglas  has a past medical history of Abnormal EKG, Arthritis, Asthma, Colon polyps, Gout, History of fall, Hyperlipidemia, Hypertension, Incomplete RBBB, Osteoarthritis, Osteopetrosis, Right knee meniscal tear, Sciatica, Seasonal allergies, Snoring, Type II or unspecified type diabetes mellitus without mention of complication, not stated as uncontrolled, Urinary incontinence, and Wears dentures. Past Surgical History  Tanya Douglas  has a past surgical history that includes Foot surgery (Left); Nose surgery; Tubal ligation; Hysterectomy; Elbow fracture surgery (Right); Colonoscopy; eye surgery (Bilateral); other surgical history; Hip fracture surgery (Right, 06/18/2021); Spinal cord stimulator implant; Finger trigger release (Right, 11/05/2018); Knee arthroscopy (Right, 6/21/2022); and Coronary artery bypass graft (N/A, 1/13/2023). Current Medications  Current Outpatient Medications   Medication Sig Dispense Refill    lisinopril (PRINIVIL;ZESTRIL) 20 MG tablet Take 20 mg by mouth daily      alendronate (FOSAMAX) 70 MG tablet TAKE 1 TABLET EVERY 7 DAYS ON AN EMPTY STOMACH, FIRST THING IN THE MORNING, WITH FULL GLASS OF WATER, STAY UPRIGHT FOR 30 MINUTES.  12 tablet 3    aspirin 81 MG chewable tablet Take 1 tablet by mouth daily 30 tablet 1    amiodarone (CORDARONE) 200 MG tablet Take 1 tablet by mouth 3 times daily 90

## 2023-03-27 ENCOUNTER — HOSPITAL ENCOUNTER (OUTPATIENT)
Dept: CARDIAC REHAB | Age: 78
Setting detail: THERAPIES SERIES
Discharge: HOME | End: 2023-03-27
Payer: MEDICARE

## 2023-03-27 DIAGNOSIS — M19.031 LOCALIZED PRIMARY OSTEOARTHRITIS OF RIGHT WRIST: Primary | ICD-10-CM

## 2023-03-27 DIAGNOSIS — S63.501A SPRAIN OF RIGHT WRIST, INITIAL ENCOUNTER: ICD-10-CM

## 2023-03-27 RX ORDER — DICLOFENAC SODIUM 75 MG/1
75 TABLET, DELAYED RELEASE ORAL 2 TIMES DAILY WITH MEALS
Qty: 28 TABLET | Refills: 0 | Status: SHIPPED | OUTPATIENT
Start: 2023-03-27 | End: 2023-04-10

## 2023-03-27 NOTE — TELEPHONE ENCOUNTER
Patient called stating the medication Dr Abbey Haque prescribed for her on Friday went to [facility] and should have been sent to [facility] on [facility]. She would like to pick this up today. Please advise if any concerns. Thank you.

## 2023-03-29 ENCOUNTER — HOSPITAL ENCOUNTER (OUTPATIENT)
Dept: CARDIAC REHAB | Age: 78
Setting detail: THERAPIES SERIES
Discharge: HOME | End: 2023-03-29
Payer: MEDICARE

## 2023-03-29 VITALS — BODY MASS INDEX: 27.69 KG/M2 | WEIGHT: 161.3 LBS

## 2023-03-29 PROCEDURE — 93798 PHYS/QHP OP CAR RHAB W/ECG: CPT

## 2023-03-29 ASSESSMENT — EXERCISE STRESS TEST
PEAK_RPE: 12
PEAK_BP: 130/60
PEAK_BP: 130/60
PEAK_HR: 97
PEAK_METS: 3

## 2023-03-29 ASSESSMENT — EJECTION FRACTION: EF_VALUE: 55

## 2023-03-29 NOTE — FLOWSHEET NOTE
03/29/23 1015   Treatment Diagnosis   Treatment Diagnosis 1 CABG  (X3)   CABG Date 01/13/23   Referral Date 01/13/23   Significant Cardiovascular History   (HTN)   Co-morbidities Diabetes  (HYPERLIPIDEMIA)   Oxygen Saturation / Titration    Stages of Change  Maintenance   Oxygen Intervention   Oxygen Use No   O2 Sat Greater Than 90% Yes   Individual Treatment Plan   ITP Visit Type Re-assessment   1st Date of Exercise  02/27/23   ITP Next Review Date 04/26/23   Visit #/Total Visits 11/36   EF% 55 %   Risk Stratification Moderate   ITP Exercise;Psychosocial;Tobacco;Nutrition;Education   Exercise    Stages of Change Action   Assisted Devices Cane;Walker  (AS NEEDED)   Exercise Prescription   Mode Treadmill;Bike;Stepper;Ergometer   Frequency per week 3   Duration Per Session 40 MIN   Intensity METS       3   RPE 12   Progression INCREASING METS   Resistance Training Yes   Exercise Blood Pressures   Resting /62   Peak /60   Is BP WDL Yes   Exercise Activity at Home   Type NONE; ENCOURAGED PT TO ADD TO 2-3 DAYS OF INTENTIONAL EXERCISE OUTSIDE OF CARDIAC REHAB   Resistance Training Yes   Exercise Education   Education Self pulse;Exercise safety;Signs/symptoms to report;RPE scale;Equipment orientation; Warm up/cool down;Physically active   Exercise Target Goal   Target Goal(s) Individual exercise RX;BP < 140/90 or < 130/80, if DM or CKD; Aerobic activity 30 + minutes/day  5 days/week   Patient Stated Exercise Goals TO BE ABLE TO WALK DECENT AGAIN   Psychosocial   Stages of Change Maintenance   Psychosocial Intervention   Interventions No intervention indicated   Currently Taking Psychotropic Meds No   Medication Changes No   Psychosocial Education   Education Benefits of CPR completion;Cardiac meds; Coping techniques; Environmental triggers; Impact self care behaviors on health;Relaxation techniques; Sexual activity; Signs/symptoms of depression   Psychosocial Target Goals   Target Goal(s) Demonstrates appropriate

## 2023-03-31 ENCOUNTER — HOSPITAL ENCOUNTER (OUTPATIENT)
Dept: CARDIAC REHAB | Age: 78
Setting detail: THERAPIES SERIES
Discharge: HOME | End: 2023-03-31
Payer: MEDICARE

## 2023-03-31 VITALS — BODY MASS INDEX: 27.57 KG/M2 | WEIGHT: 160.6 LBS

## 2023-03-31 PROCEDURE — 93798 PHYS/QHP OP CAR RHAB W/ECG: CPT

## 2023-03-31 ASSESSMENT — EXERCISE STRESS TEST
PEAK_METS: 3
PEAK_RPE: 12
PEAK_BP: 112/60
PEAK_HR: 70

## 2023-04-03 ENCOUNTER — HOSPITAL ENCOUNTER (OUTPATIENT)
Dept: CARDIAC REHAB | Age: 78
Setting detail: THERAPIES SERIES
Discharge: HOME | End: 2023-04-03
Payer: MEDICARE

## 2023-04-03 VITALS — WEIGHT: 159 LBS | BODY MASS INDEX: 27.29 KG/M2

## 2023-04-03 PROCEDURE — 93798 PHYS/QHP OP CAR RHAB W/ECG: CPT

## 2023-04-03 ASSESSMENT — EXERCISE STRESS TEST
PEAK_RPE: 13
PEAK_METS: 3.1
PEAK_HR: 70
PEAK_BP: 110/58

## 2023-04-05 ENCOUNTER — APPOINTMENT (OUTPATIENT)
Dept: CARDIAC REHAB | Age: 78
End: 2023-04-05
Payer: MEDICARE

## 2023-04-07 ENCOUNTER — HOSPITAL ENCOUNTER (OUTPATIENT)
Dept: CARDIAC REHAB | Age: 78
Setting detail: THERAPIES SERIES
Discharge: HOME | End: 2023-04-07
Payer: MEDICARE

## 2023-04-07 ENCOUNTER — APPOINTMENT (OUTPATIENT)
Dept: CARDIAC REHAB | Age: 78
End: 2023-04-07
Payer: MEDICARE

## 2023-04-07 VITALS — BODY MASS INDEX: 27.53 KG/M2 | WEIGHT: 160.4 LBS

## 2023-04-07 PROCEDURE — 93798 PHYS/QHP OP CAR RHAB W/ECG: CPT

## 2023-04-07 ASSESSMENT — EXERCISE STRESS TEST
PEAK_HR: 70
PEAK_BP: 112/62
PEAK_RPE: 13
PEAK_METS: 3.1

## 2023-04-10 ENCOUNTER — APPOINTMENT (OUTPATIENT)
Dept: CARDIAC REHAB | Age: 78
End: 2023-04-10
Payer: MEDICARE

## 2023-04-12 ENCOUNTER — APPOINTMENT (OUTPATIENT)
Dept: CARDIAC REHAB | Age: 78
End: 2023-04-12
Payer: MEDICARE

## 2023-04-14 ENCOUNTER — APPOINTMENT (OUTPATIENT)
Dept: CARDIAC REHAB | Age: 78
End: 2023-04-14
Payer: MEDICARE

## 2023-04-17 ENCOUNTER — HOSPITAL ENCOUNTER (OUTPATIENT)
Dept: CARDIAC REHAB | Age: 78
Setting detail: THERAPIES SERIES
Discharge: HOME | End: 2023-04-17
Payer: MEDICARE

## 2023-04-17 ENCOUNTER — APPOINTMENT (OUTPATIENT)
Dept: CARDIAC REHAB | Age: 78
End: 2023-04-17
Payer: MEDICARE

## 2023-04-17 VITALS — WEIGHT: 163.5 LBS | BODY MASS INDEX: 28.06 KG/M2

## 2023-04-17 PROCEDURE — 93798 PHYS/QHP OP CAR RHAB W/ECG: CPT

## 2023-04-17 ASSESSMENT — EXERCISE STRESS TEST
PEAK_RPE: 13
PEAK_HR: 92
PEAK_BP: 140/76
PEAK_METS: 3.1

## 2023-04-19 ENCOUNTER — APPOINTMENT (OUTPATIENT)
Dept: CARDIAC REHAB | Age: 78
End: 2023-04-19
Payer: MEDICARE

## 2023-04-21 ENCOUNTER — HOSPITAL ENCOUNTER (OUTPATIENT)
Dept: CARDIAC REHAB | Age: 78
Setting detail: THERAPIES SERIES
Discharge: HOME | End: 2023-04-21
Payer: MEDICARE

## 2023-04-21 ENCOUNTER — APPOINTMENT (OUTPATIENT)
Dept: CARDIAC REHAB | Age: 78
End: 2023-04-21
Payer: MEDICARE

## 2023-04-21 VITALS — WEIGHT: 162.9 LBS | BODY MASS INDEX: 27.96 KG/M2

## 2023-04-21 PROCEDURE — 93798 PHYS/QHP OP CAR RHAB W/ECG: CPT

## 2023-04-21 ASSESSMENT — EXERCISE STRESS TEST
PEAK_HR: 76
PEAK_RPE: 13
PEAK_BP: 150/55
PEAK_METS: 3

## 2023-04-24 ENCOUNTER — HOSPITAL ENCOUNTER (OUTPATIENT)
Dept: CARDIAC REHAB | Age: 78
Setting detail: THERAPIES SERIES
Discharge: HOME | End: 2023-04-24
Payer: MEDICARE

## 2023-04-24 ENCOUNTER — APPOINTMENT (OUTPATIENT)
Dept: CARDIAC REHAB | Age: 78
End: 2023-04-24
Payer: MEDICARE

## 2023-04-24 VITALS — BODY MASS INDEX: 28.24 KG/M2 | WEIGHT: 164.5 LBS

## 2023-04-24 PROCEDURE — 93798 PHYS/QHP OP CAR RHAB W/ECG: CPT

## 2023-04-24 ASSESSMENT — EXERCISE STRESS TEST
PEAK_METS: 2.9
PEAK_RPE: 13
PEAK_BP: 136/62
PEAK_HR: 78

## 2023-04-26 ENCOUNTER — APPOINTMENT (OUTPATIENT)
Dept: CARDIAC REHAB | Age: 78
End: 2023-04-26
Payer: MEDICARE

## 2023-04-28 ENCOUNTER — APPOINTMENT (OUTPATIENT)
Dept: CARDIAC REHAB | Age: 78
End: 2023-04-28
Payer: MEDICARE

## 2023-04-28 ENCOUNTER — HOSPITAL ENCOUNTER (OUTPATIENT)
Dept: CARDIAC REHAB | Age: 78
Setting detail: THERAPIES SERIES
Discharge: HOME | End: 2023-04-28
Payer: MEDICARE

## 2023-04-28 VITALS — WEIGHT: 166 LBS | BODY MASS INDEX: 28.49 KG/M2

## 2023-04-28 PROCEDURE — 93798 PHYS/QHP OP CAR RHAB W/ECG: CPT

## 2023-04-28 ASSESSMENT — EXERCISE STRESS TEST
PEAK_METS: 3
PEAK_HR: 97
PEAK_RPE: 13
PEAK_BP: 130/82
PEAK_BP: 130/82

## 2023-04-28 ASSESSMENT — EJECTION FRACTION: EF_VALUE: 55

## 2023-04-28 NOTE — FLOWSHEET NOTE
ACE/ARB Prescribed Yes   ACE/ARB Adherent Yes   ASA Prescribed Yes   ASA Adherent Yes   Antiplatelet Prescribed Yes   Antiplatelet Adherent Yes   BB Prescribed Yes   BB Adherent Yes   Statins Prescribed Yes   Statins Adherent Yes   Statin Intensity High   Education Target Goals   Target Goals Medication compliance; Risk factors; Understand target guidelines for lipids; Understand target guidelines for B/P   Staff Treatment Goals   Goals Exercise; Functional capacity   Exercise Goal TO INCREASE STRENGTH   Exercise Goal Status Progressing  (STATED IS FEELING STRONGER WITH EACH REHAB SESSION)   Exercise Goal Assessment Recommendations   Functional Capacity Goal TO INCREASE ENDURANCE   Functional Capacity Goal Status Progressing   Functional Capacity Goal Comments STATED INCRASE IN ENDURANCE WITH EACH SESSION   Functional Capacity Goal Assessment Recommendations

## 2023-05-01 ENCOUNTER — APPOINTMENT (OUTPATIENT)
Dept: CARDIAC REHAB | Age: 78
End: 2023-05-01
Payer: MEDICARE

## 2023-05-01 ENCOUNTER — HOSPITAL ENCOUNTER (OUTPATIENT)
Dept: CARDIAC REHAB | Age: 78
Setting detail: THERAPIES SERIES
Discharge: HOME | End: 2023-05-01
Payer: MEDICARE

## 2023-05-01 VITALS — BODY MASS INDEX: 28.84 KG/M2 | WEIGHT: 168 LBS

## 2023-05-01 PROCEDURE — 93798 PHYS/QHP OP CAR RHAB W/ECG: CPT

## 2023-05-01 ASSESSMENT — EXERCISE STRESS TEST
PEAK_HR: 88
PEAK_METS: 4
PEAK_BP: 114/72
PEAK_RPE: 13

## 2023-05-03 ENCOUNTER — APPOINTMENT (OUTPATIENT)
Dept: CARDIAC REHAB | Age: 78
End: 2023-05-03
Payer: MEDICARE

## 2023-05-05 ENCOUNTER — HOSPITAL ENCOUNTER (OUTPATIENT)
Dept: CARDIAC REHAB | Age: 78
Setting detail: THERAPIES SERIES
Discharge: HOME | End: 2023-05-05
Payer: MEDICARE

## 2023-05-05 ENCOUNTER — APPOINTMENT (OUTPATIENT)
Dept: CARDIAC REHAB | Age: 78
End: 2023-05-05
Payer: MEDICARE

## 2023-05-05 VITALS — WEIGHT: 164.8 LBS | BODY MASS INDEX: 28.29 KG/M2

## 2023-05-05 PROCEDURE — 93798 PHYS/QHP OP CAR RHAB W/ECG: CPT

## 2023-05-05 ASSESSMENT — EXERCISE STRESS TEST
PEAK_HR: 116
PEAK_METS: 4.3
PEAK_BP: 124/62
PEAK_RPE: 13

## 2023-05-08 ENCOUNTER — HOSPITAL ENCOUNTER (OUTPATIENT)
Dept: CARDIAC REHAB | Age: 78
Setting detail: THERAPIES SERIES
Discharge: HOME | End: 2023-05-08
Payer: MEDICARE

## 2023-05-08 ENCOUNTER — APPOINTMENT (OUTPATIENT)
Dept: CARDIAC REHAB | Age: 78
End: 2023-05-08
Payer: MEDICARE

## 2023-05-08 VITALS — BODY MASS INDEX: 28.67 KG/M2 | WEIGHT: 167 LBS

## 2023-05-08 PROCEDURE — 93798 PHYS/QHP OP CAR RHAB W/ECG: CPT

## 2023-05-08 ASSESSMENT — EXERCISE STRESS TEST
PEAK_METS: 4.2
PEAK_RPE: 13
PEAK_HR: 106
PEAK_BP: 100/68

## 2023-05-10 ENCOUNTER — APPOINTMENT (OUTPATIENT)
Dept: CARDIAC REHAB | Age: 78
End: 2023-05-10
Payer: MEDICARE

## 2023-05-12 ENCOUNTER — HOSPITAL ENCOUNTER (OUTPATIENT)
Dept: CARDIAC REHAB | Age: 78
Setting detail: THERAPIES SERIES
Discharge: HOME | End: 2023-05-12
Payer: MEDICARE

## 2023-05-12 ENCOUNTER — APPOINTMENT (OUTPATIENT)
Dept: CARDIAC REHAB | Age: 78
End: 2023-05-12
Payer: MEDICARE

## 2023-05-12 VITALS — WEIGHT: 168 LBS | BODY MASS INDEX: 28.84 KG/M2

## 2023-05-12 PROCEDURE — 93798 PHYS/QHP OP CAR RHAB W/ECG: CPT

## 2023-05-12 ASSESSMENT — EXERCISE STRESS TEST
PEAK_HR: 87
PEAK_METS: 4.2
PEAK_RPE: 13
PEAK_BP: 142/64

## 2023-05-15 ENCOUNTER — HOSPITAL ENCOUNTER (OUTPATIENT)
Dept: CARDIAC REHAB | Age: 78
Setting detail: THERAPIES SERIES
Discharge: HOME | End: 2023-05-15
Payer: MEDICARE

## 2023-05-15 ENCOUNTER — APPOINTMENT (OUTPATIENT)
Dept: CARDIAC REHAB | Age: 78
End: 2023-05-15
Payer: MEDICARE

## 2023-05-15 VITALS — WEIGHT: 168 LBS | BODY MASS INDEX: 28.84 KG/M2

## 2023-05-15 PROCEDURE — 93798 PHYS/QHP OP CAR RHAB W/ECG: CPT

## 2023-05-15 ASSESSMENT — EXERCISE STRESS TEST
PEAK_METS: 4.2
PEAK_RPE: 13
PEAK_HR: 100
PEAK_BP: 128/62

## 2023-05-17 ENCOUNTER — APPOINTMENT (OUTPATIENT)
Dept: CARDIAC REHAB | Age: 78
End: 2023-05-17
Payer: MEDICARE

## 2023-05-19 ENCOUNTER — HOSPITAL ENCOUNTER (OUTPATIENT)
Dept: CARDIAC REHAB | Age: 78
Setting detail: THERAPIES SERIES
Discharge: HOME | End: 2023-05-19
Payer: MEDICARE

## 2023-05-19 ENCOUNTER — APPOINTMENT (OUTPATIENT)
Dept: CARDIAC REHAB | Age: 78
End: 2023-05-19
Payer: MEDICARE

## 2023-05-19 VITALS — BODY MASS INDEX: 28.8 KG/M2 | WEIGHT: 167.8 LBS

## 2023-05-19 PROCEDURE — 93798 PHYS/QHP OP CAR RHAB W/ECG: CPT

## 2023-05-19 ASSESSMENT — EXERCISE STRESS TEST
PEAK_HR: 74
PEAK_RPE: 13
PEAK_METS: 4.1
PEAK_BP: 178/62

## 2023-05-22 ENCOUNTER — HOSPITAL ENCOUNTER (OUTPATIENT)
Dept: CARDIAC REHAB | Age: 78
Setting detail: THERAPIES SERIES
Discharge: HOME | End: 2023-05-22
Payer: MEDICARE

## 2023-05-22 ENCOUNTER — APPOINTMENT (OUTPATIENT)
Dept: CARDIAC REHAB | Age: 78
End: 2023-05-22
Payer: MEDICARE

## 2023-05-22 VITALS — BODY MASS INDEX: 28.46 KG/M2 | WEIGHT: 165.8 LBS

## 2023-05-22 PROCEDURE — 93798 PHYS/QHP OP CAR RHAB W/ECG: CPT

## 2023-05-22 ASSESSMENT — EXERCISE STRESS TEST
PEAK_METS: 4.4
PEAK_HR: 82
PEAK_RPE: 13
PEAK_BP: 140/70

## 2023-05-24 ENCOUNTER — APPOINTMENT (OUTPATIENT)
Dept: CARDIAC REHAB | Age: 78
End: 2023-05-24
Payer: MEDICARE

## 2023-05-26 ENCOUNTER — APPOINTMENT (OUTPATIENT)
Dept: CARDIAC REHAB | Age: 78
End: 2023-05-26
Payer: MEDICARE

## 2023-05-26 ENCOUNTER — HOSPITAL ENCOUNTER (OUTPATIENT)
Dept: CARDIAC REHAB | Age: 78
Setting detail: THERAPIES SERIES
Discharge: HOME | End: 2023-05-26
Payer: MEDICARE

## 2023-05-26 VITALS — WEIGHT: 169 LBS | BODY MASS INDEX: 29.01 KG/M2

## 2023-05-26 PROCEDURE — 93798 PHYS/QHP OP CAR RHAB W/ECG: CPT

## 2023-05-26 ASSESSMENT — EXERCISE STRESS TEST
PEAK_HR: 78
PEAK_BP: 142/68
PEAK_METS: 4.3
PEAK_RPE: 13

## 2023-06-02 ENCOUNTER — HOSPITAL ENCOUNTER (OUTPATIENT)
Dept: CARDIAC REHAB | Age: 78
Setting detail: THERAPIES SERIES
Discharge: HOME | End: 2023-06-02
Payer: MEDICARE

## 2023-06-02 VITALS — BODY MASS INDEX: 28.56 KG/M2 | WEIGHT: 166.4 LBS

## 2023-06-02 PROCEDURE — 93798 PHYS/QHP OP CAR RHAB W/ECG: CPT

## 2023-06-02 ASSESSMENT — EXERCISE STRESS TEST
PEAK_RPE: 13
PEAK_BP: 178/78
PEAK_HR: 72
PEAK_METS: 5.3

## 2023-06-05 ENCOUNTER — HOSPITAL ENCOUNTER (OUTPATIENT)
Dept: CARDIAC REHAB | Age: 78
Setting detail: THERAPIES SERIES
Discharge: HOME | End: 2023-06-05
Payer: MEDICARE

## 2023-06-05 VITALS — BODY MASS INDEX: 28.84 KG/M2 | WEIGHT: 168 LBS

## 2023-06-05 PROCEDURE — 93798 PHYS/QHP OP CAR RHAB W/ECG: CPT

## 2023-06-05 ASSESSMENT — EXERCISE STRESS TEST
PEAK_RPE: 13
PEAK_BP: 140/70
PEAK_HR: 70
PEAK_METS: 4.1
PEAK_BP: 140/70

## 2023-06-05 ASSESSMENT — EJECTION FRACTION: EF_VALUE: 55

## 2023-06-05 NOTE — FLOWSHEET NOTE
06/05/23 0934   Treatment Diagnosis   Treatment Diagnosis 1 CABG   CABG Date 01/13/23   Referral Date 01/13/23   Significant Cardiovascular History   (HTN)   Co-morbidities Diabetes  (HLD)   Oxygen Saturation / Titration    Stages of Change  Maintenance   Oxygen Intervention   Oxygen Use No   O2 Sat Greater Than 90% Yes   Nurse/Patient Discussion  YES   Individual Treatment Plan   ITP Visit Type Re-assessment   1st Date of Exercise  02/27/23   ITP Next Review Date 05/26/23   Visit #/Total Visits 30/36   EF% 55 %   Risk Stratification Moderate   ITP Psychosocial;Tobacco;Nutrition;Education   Exercise    Stages of Change Action   Assisted Devices Cane;Walker  (AS NEEDED)   Exercise Prescription   Mode Treadmill;Bike;Stepper;Ergometer   Frequency per week 3   Duration Per Session 49 MIN   Intensity METS       4.1   RPE 13   Progression PROGRESSING, INCREASED TIME AND METS   Resistance Training Yes   Exercise Blood Pressures   Resting /68   Peak /70   Is BP WDL No   Exercise Activity at Home   Type NO PURPOSEFUL EXERCISE ROUTINE BUT REPORTS THAT SHE HAS BEEN ACTIVE WITH HOUSEHOLD CHORES   Frequency DAILY   Duration 60 MIN   Resistance Training No   Exercise Education   Education Self pulse;Exercise safety;Signs/symptoms to report;RPE scale;Equipment orientation; Warm up/cool down;Physically active   Exercise Target Goal   Target Goal(s) Individual exercise RX;BP < 140/90 or < 130/80, if DM or CKD; Aerobic activity 30 + minutes/day  5 days/week   Patient Stated Exercise Goals \"WALK DECENT AGAIN\"   Psychosocial   Stages of Change Maintenance   Psychosocial Intervention   Interventions No intervention indicated   Currently Taking Psychotropic Meds No   Medication Changes No   Psychosocial Education   Education Cardiac meds; Coping techniques; Impact self care behaviors on health; Environmental triggers;Relaxation techniques; Sexual activity; Signs/symptoms of depression   Psychosocial Target Goals   Target Goal(s)

## 2023-06-09 ENCOUNTER — HOSPITAL ENCOUNTER (OUTPATIENT)
Dept: CARDIAC REHAB | Age: 78
Setting detail: THERAPIES SERIES
Discharge: HOME | End: 2023-06-09
Payer: MEDICARE

## 2023-06-09 VITALS — WEIGHT: 168.1 LBS | BODY MASS INDEX: 28.85 KG/M2

## 2023-06-09 PROCEDURE — 93798 PHYS/QHP OP CAR RHAB W/ECG: CPT

## 2023-06-09 ASSESSMENT — EXERCISE STRESS TEST
PEAK_BP: 132/60
PEAK_RPE: 13
PEAK_METS: 4.3
PEAK_HR: 80

## 2023-06-19 ENCOUNTER — HOSPITAL ENCOUNTER (OUTPATIENT)
Dept: CARDIAC REHAB | Age: 78
Setting detail: THERAPIES SERIES
Discharge: HOME | End: 2023-06-19
Payer: MEDICARE

## 2023-06-19 VITALS — WEIGHT: 168.4 LBS | BODY MASS INDEX: 28.91 KG/M2

## 2023-06-19 PROCEDURE — 93798 PHYS/QHP OP CAR RHAB W/ECG: CPT

## 2023-06-19 ASSESSMENT — EXERCISE STRESS TEST
PEAK_HR: 78
PEAK_RPE: 13
PEAK_BP: 138/84
PEAK_METS: 4.6

## 2023-06-23 ENCOUNTER — HOSPITAL ENCOUNTER (OUTPATIENT)
Dept: CARDIAC REHAB | Age: 78
Setting detail: THERAPIES SERIES
Discharge: HOME | End: 2023-06-23
Payer: MEDICARE

## 2023-06-23 VITALS — WEIGHT: 169 LBS | BODY MASS INDEX: 29.01 KG/M2

## 2023-06-23 PROCEDURE — 93798 PHYS/QHP OP CAR RHAB W/ECG: CPT

## 2023-06-23 ASSESSMENT — EXERCISE STRESS TEST
PEAK_HR: 71
PEAK_BP: 126/58
PEAK_METS: 4.5
PEAK_RPE: 13

## 2023-06-26 ENCOUNTER — HOSPITAL ENCOUNTER (OUTPATIENT)
Dept: CARDIAC REHAB | Age: 78
Setting detail: THERAPIES SERIES
Discharge: HOME | End: 2023-06-26
Payer: MEDICARE

## 2023-06-26 VITALS — WEIGHT: 169.6 LBS | BODY MASS INDEX: 29.11 KG/M2

## 2023-06-26 PROCEDURE — 93798 PHYS/QHP OP CAR RHAB W/ECG: CPT

## 2023-06-26 ASSESSMENT — PATIENT HEALTH QUESTIONNAIRE - PHQ9
8. MOVING OR SPEAKING SO SLOWLY THAT OTHER PEOPLE COULD HAVE NOTICED. OR THE OPPOSITE, BEING SO FIGETY OR RESTLESS THAT YOU HAVE BEEN MOVING AROUND A LOT MORE THAN USUAL: 0
6. FEELING BAD ABOUT YOURSELF - OR THAT YOU ARE A FAILURE OR HAVE LET YOURSELF OR YOUR FAMILY DOWN: 0
SUM OF ALL RESPONSES TO PHQ QUESTIONS 1-9: 5
5. POOR APPETITE OR OVEREATING: 0
3. TROUBLE FALLING OR STAYING ASLEEP: 1
SUM OF ALL RESPONSES TO PHQ QUESTIONS 1-9: 5
SUM OF ALL RESPONSES TO PHQ QUESTIONS 1-9: 5
10. IF YOU CHECKED OFF ANY PROBLEMS, HOW DIFFICULT HAVE THESE PROBLEMS MADE IT FOR YOU TO DO YOUR WORK, TAKE CARE OF THINGS AT HOME, OR GET ALONG WITH OTHER PEOPLE: 0
4. FEELING TIRED OR HAVING LITTLE ENERGY: 1
SUM OF ALL RESPONSES TO PHQ9 QUESTIONS 1 & 2: 3
1. LITTLE INTEREST OR PLEASURE IN DOING THINGS: 2
7. TROUBLE CONCENTRATING ON THINGS, SUCH AS READING THE NEWSPAPER OR WATCHING TELEVISION: 0
SUM OF ALL RESPONSES TO PHQ QUESTIONS 1-9: 5
9. THOUGHTS THAT YOU WOULD BE BETTER OFF DEAD, OR OF HURTING YOURSELF: 0
2. FEELING DOWN, DEPRESSED OR HOPELESS: 1

## 2023-06-26 ASSESSMENT — EXERCISE STRESS TEST
PEAK_BP: 146/60
PEAK_HR: 72
PEAK_BP: 146/60
PEAK_METS: 4.5
PEAK_RPE: 13

## 2023-06-26 ASSESSMENT — EJECTION FRACTION: EF_VALUE: 55

## 2023-07-06 ENCOUNTER — HOSPITAL ENCOUNTER (OUTPATIENT)
Dept: VASCULAR LAB | Age: 78
Discharge: HOME | End: 2023-07-06
Payer: MEDICARE

## 2023-07-06 DIAGNOSIS — I65.23 CAROTID STENOSIS, ASYMPTOMATIC, BILATERAL: ICD-10-CM

## 2023-07-06 PROCEDURE — 93880 EXTRACRANIAL BILAT STUDY: CPT

## 2023-07-20 ENCOUNTER — OFFICE VISIT (OUTPATIENT)
Dept: VASCULAR SURGERY | Age: 78
End: 2023-07-20
Payer: MEDICARE

## 2023-07-20 VITALS
RESPIRATION RATE: 17 BRPM | DIASTOLIC BLOOD PRESSURE: 62 MMHG | TEMPERATURE: 98 F | SYSTOLIC BLOOD PRESSURE: 138 MMHG | HEART RATE: 60 BPM | OXYGEN SATURATION: 98 % | HEIGHT: 64 IN | WEIGHT: 170 LBS | BODY MASS INDEX: 29.02 KG/M2

## 2023-07-20 DIAGNOSIS — I65.23 CAROTID STENOSIS, ASYMPTOMATIC, BILATERAL: Primary | ICD-10-CM

## 2023-07-20 PROCEDURE — G8399 PT W/DXA RESULTS DOCUMENT: HCPCS | Performed by: SURGERY

## 2023-07-20 PROCEDURE — 1090F PRES/ABSN URINE INCON ASSESS: CPT | Performed by: SURGERY

## 2023-07-20 PROCEDURE — G8427 DOCREV CUR MEDS BY ELIG CLIN: HCPCS | Performed by: SURGERY

## 2023-07-20 PROCEDURE — 3074F SYST BP LT 130 MM HG: CPT | Performed by: SURGERY

## 2023-07-20 PROCEDURE — 99214 OFFICE O/P EST MOD 30 MIN: CPT | Performed by: SURGERY

## 2023-07-20 PROCEDURE — G8417 CALC BMI ABV UP PARAM F/U: HCPCS | Performed by: SURGERY

## 2023-07-20 PROCEDURE — 1123F ACP DISCUSS/DSCN MKR DOCD: CPT | Performed by: SURGERY

## 2023-07-20 PROCEDURE — 1036F TOBACCO NON-USER: CPT | Performed by: SURGERY

## 2023-07-20 PROCEDURE — 3078F DIAST BP <80 MM HG: CPT | Performed by: SURGERY

## 2023-07-20 NOTE — PROGRESS NOTES
Division of Vascular Surgery        Follow Up      Chief Complaint:      Carotid stenosis    History of Present Illness:      Wayne Hinojosa is a 65 y.o. woman who presents for follow up regarding her carotid disease. She denies any unilateral weakness, numbness/tingling, facial droop, thick/slurred speech or symptoms of amaurosis fugax to suggest ischemic cerebral event. She has been having a lot of neck pain that seems to be making her depressed. She is anxious about having a stroke. She was taken off depression medications during one of her previous hospitalizations and was never restarted on it. She has developed worsening sciatic pain which has been aggravating everything as well. She has a spinal cord stimulator in but afraid to have it turned on. (1/19/23) Wanye Hinojosa is a 65 y.o. woman who presents for evaluation of carotid stenosis. She denies symptoms suggestive of ischemic cerebral infarction such as unilateral weakness, numbness/tingling, facial droop, thick/slurred speech, symptoms of amaurosis fugax. She recently underwent open heart surgery on 1/13/23 with CABG x 3. Still recovering from open heart surgery, discomfort and pain where wires were cut. Daughter Alvino Jameson) works in interventional radiology. She is doing cardiac rehab, shortness of breath is improving. Denies symptoms suggestive of lower extremity claudication.   Ask her about Wednesday Adams    Medical History:     Past Medical History:   Diagnosis Date    Abnormal EKG     Arthritis     Asthma     Noted per [facility] chart- patient denies    Colon polyps     Gout     History of fall     Hyperlipidemia     Hypertension     Incomplete RBBB     Osteoarthritis     Osteopetrosis     Right knee meniscal tear     Sciatica     Seasonal allergies     Snoring     Type II or unspecified type diabetes mellitus without mention of complication, not stated as uncontrolled     diet controlled    Urinary incontinence

## 2023-07-21 PROBLEM — I65.23 CAROTID STENOSIS, ASYMPTOMATIC, BILATERAL: Status: ACTIVE | Noted: 2023-07-21

## 2023-07-21 ASSESSMENT — ENCOUNTER SYMPTOMS
CHEST TIGHTNESS: 0
SHORTNESS OF BREATH: 0
BACK PAIN: 1
ALLERGIC/IMMUNOLOGIC NEGATIVE: 1
COLOR CHANGE: 0
ABDOMINAL PAIN: 0

## 2023-08-14 ENCOUNTER — OFFICE VISIT (OUTPATIENT)
Dept: INTERNAL MEDICINE CLINIC | Age: 78
End: 2023-08-14
Payer: MEDICARE

## 2023-08-14 VITALS
HEIGHT: 64 IN | WEIGHT: 169 LBS | OXYGEN SATURATION: 98 % | BODY MASS INDEX: 28.85 KG/M2 | SYSTOLIC BLOOD PRESSURE: 128 MMHG | DIASTOLIC BLOOD PRESSURE: 74 MMHG | HEART RATE: 66 BPM

## 2023-08-14 DIAGNOSIS — I15.2 HYPERTENSION ASSOCIATED WITH DIABETES (HCC): ICD-10-CM

## 2023-08-14 DIAGNOSIS — M46.1 SACROILIITIS, NOT ELSEWHERE CLASSIFIED (HCC): ICD-10-CM

## 2023-08-14 DIAGNOSIS — E11.9 TYPE 2 DIABETES MELLITUS WITHOUT COMPLICATION, WITHOUT LONG-TERM CURRENT USE OF INSULIN (HCC): Primary | ICD-10-CM

## 2023-08-14 DIAGNOSIS — G30.9 ALZHEIMER'S DISEASE, UNSPECIFIED (CODE) (HCC): ICD-10-CM

## 2023-08-14 DIAGNOSIS — F41.1 GENERALIZED ANXIETY DISORDER: ICD-10-CM

## 2023-08-14 DIAGNOSIS — F32.5 MAJOR DEPRESSIVE DISORDER WITH SINGLE EPISODE, IN FULL REMISSION (HCC): ICD-10-CM

## 2023-08-14 DIAGNOSIS — E11.59 HYPERTENSION ASSOCIATED WITH DIABETES (HCC): ICD-10-CM

## 2023-08-14 DIAGNOSIS — I25.10 CORONARY ARTERY DISEASE INVOLVING NATIVE CORONARY ARTERY OF NATIVE HEART WITHOUT ANGINA PECTORIS: ICD-10-CM

## 2023-08-14 DIAGNOSIS — N18.30 STAGE 3 CHRONIC KIDNEY DISEASE, UNSPECIFIED WHETHER STAGE 3A OR 3B CKD (HCC): ICD-10-CM

## 2023-08-14 PROCEDURE — G8427 DOCREV CUR MEDS BY ELIG CLIN: HCPCS | Performed by: INTERNAL MEDICINE

## 2023-08-14 PROCEDURE — 3078F DIAST BP <80 MM HG: CPT | Performed by: INTERNAL MEDICINE

## 2023-08-14 PROCEDURE — 99214 OFFICE O/P EST MOD 30 MIN: CPT | Performed by: INTERNAL MEDICINE

## 2023-08-14 PROCEDURE — G8399 PT W/DXA RESULTS DOCUMENT: HCPCS | Performed by: INTERNAL MEDICINE

## 2023-08-14 PROCEDURE — 3074F SYST BP LT 130 MM HG: CPT | Performed by: INTERNAL MEDICINE

## 2023-08-14 PROCEDURE — G8417 CALC BMI ABV UP PARAM F/U: HCPCS | Performed by: INTERNAL MEDICINE

## 2023-08-14 PROCEDURE — 1123F ACP DISCUSS/DSCN MKR DOCD: CPT | Performed by: INTERNAL MEDICINE

## 2023-08-14 PROCEDURE — 1036F TOBACCO NON-USER: CPT | Performed by: INTERNAL MEDICINE

## 2023-08-14 PROCEDURE — 1090F PRES/ABSN URINE INCON ASSESS: CPT | Performed by: INTERNAL MEDICINE

## 2023-08-14 NOTE — PROGRESS NOTES
Visit Information    Have you changed or started any medications since your last visit including any over-the-counter medicines, vitamins, or herbal medicines? no   Are you having any side effects from any of your medications? -  no  Have you stopped taking any of your medications? Is so, why? -  no    Have you seen any other physician or provider since your last visit? No  Have you had any other diagnostic tests since your last visit? No  Have you been seen in the emergency room and/or had an admission to a hospital since we last saw you? No  Have you had your routine dental cleaning in the past 6 months? no    Have you activated your [employer] account? If not, what are your barriers?       Patient Care Team:  Paty Carlos MD as PCP - General (Internal Medicine)  Paty Carlos MD as PCP - Empaneled Provider    Medical History Review  Past Medical, Family, and Social History reviewed and does contribute to the patient presenting condition    Health Maintenance   Topic Date Due    Hepatitis C screen  Never done    DTaP/Tdap/Td vaccine (1 - Tdap) Never done    COVID-19 Vaccine (4 - Booster for [place] series) 11/30/2021    Annual Wellness Visit (AWV)  12/18/2022    Flu vaccine (1) 08/01/2023    Lipids  12/09/2023    Depression Monitoring  06/26/2024    DEXA (modify frequency per FRAX score)  Completed    Shingles vaccine  Completed    Pneumococcal 65+ years Vaccine  Completed    Hepatitis A vaccine  Aged Out    Hib vaccine  Aged Out    Meningococcal (ACWY) vaccine  Aged Out     SUBJECTIVE:  Jose Raul Babin is a 65 y.o. female patient who  comes for complaints of   Chief Complaint   Patient presents with    Insomnia    Depression    Edema           Insomnia, feels anxious thrugh the day  Gettign only  2-3hr  sleep atnight  Was on effexor but stopped abut 1yr ago  Also on aricept for memory  Says never tried zolfot  Took celexa for a long time but had discontinued   [place] SI/HI currently  She does have h/o self

## 2023-08-16 ENCOUNTER — HOSPITAL ENCOUNTER (OUTPATIENT)
Age: 78
Setting detail: SPECIMEN
Discharge: HOME | End: 2023-08-16

## 2023-08-16 DIAGNOSIS — N18.30 STAGE 3 CHRONIC KIDNEY DISEASE, UNSPECIFIED WHETHER STAGE 3A OR 3B CKD (HCC): ICD-10-CM

## 2023-08-16 DIAGNOSIS — E11.9 TYPE 2 DIABETES MELLITUS WITHOUT COMPLICATION, WITHOUT LONG-TERM CURRENT USE OF INSULIN (HCC): ICD-10-CM

## 2023-08-16 DIAGNOSIS — I25.10 CORONARY ARTERY DISEASE INVOLVING NATIVE CORONARY ARTERY OF NATIVE HEART WITHOUT ANGINA PECTORIS: ICD-10-CM

## 2023-08-16 LAB
ANION GAP SERPL CALCULATED.3IONS-SCNC: 16 MMOL/L (ref 9–17)
BUN SERPL-MCNC: 20 MG/DL (ref 8–23)
CALCIUM SERPL-MCNC: 9.2 MG/DL (ref 8.6–10.4)
CHLORIDE SERPL-SCNC: 103 MMOL/L (ref 98–107)
CHOLEST SERPL-MCNC: 144 MG/DL
CHOLESTEROL/HDL RATIO: 4.1
CO2 SERPL-SCNC: 22 MMOL/L (ref 20–31)
CREAT SERPL-MCNC: 1 MG/DL (ref 0.5–0.9)
CREAT UR-MCNC: 160.4 MG/DL (ref 28–217)
EST. AVERAGE GLUCOSE BLD GHB EST-MCNC: 174 MG/DL
GFR SERPL CREATININE-BSD FRML MDRD: 58 ML/MIN/1.73M2
GLUCOSE SERPL-MCNC: 161 MG/DL (ref 70–99)
HBA1C MFR BLD: 7.7 % (ref 4–6)
HDLC SERPL-MCNC: 35 MG/DL
LDLC SERPL CALC-MCNC: 49 MG/DL (ref 0–130)
MICROALBUMIN UR-MCNC: 47 MG/L
MICROALBUMIN/CREAT UR-RTO: 29 MCG/MG CREAT
POTASSIUM SERPL-SCNC: 4.6 MMOL/L (ref 3.7–5.3)
SODIUM SERPL-SCNC: 141 MMOL/L (ref 135–144)
TRIGL SERPL-MCNC: 301 MG/DL

## 2023-08-18 NOTE — RESULT ENCOUNTER NOTE
Uptrending hemoglobin A1c, patient should be on medication for diabetes. Recommend start metformin. Ordering to her pharmacy.   Needs office visit

## 2023-09-11 ENCOUNTER — OFFICE VISIT (OUTPATIENT)
Dept: INTERNAL MEDICINE CLINIC | Age: 78
End: 2023-09-11
Payer: MEDICARE

## 2023-09-11 VITALS
DIASTOLIC BLOOD PRESSURE: 72 MMHG | BODY MASS INDEX: 27.83 KG/M2 | HEART RATE: 60 BPM | WEIGHT: 163 LBS | SYSTOLIC BLOOD PRESSURE: 134 MMHG | OXYGEN SATURATION: 98 % | HEIGHT: 64 IN

## 2023-09-11 DIAGNOSIS — F32.5 MAJOR DEPRESSIVE DISORDER WITH SINGLE EPISODE, IN FULL REMISSION (HCC): ICD-10-CM

## 2023-09-11 DIAGNOSIS — N18.30 STAGE 3 CHRONIC KIDNEY DISEASE, UNSPECIFIED WHETHER STAGE 3A OR 3B CKD (HCC): ICD-10-CM

## 2023-09-11 DIAGNOSIS — E11.9 TYPE 2 DIABETES MELLITUS WITHOUT COMPLICATION, WITHOUT LONG-TERM CURRENT USE OF INSULIN (HCC): Primary | ICD-10-CM

## 2023-09-11 DIAGNOSIS — Z00.00 MEDICARE ANNUAL WELLNESS VISIT, SUBSEQUENT: ICD-10-CM

## 2023-09-11 DIAGNOSIS — M79.89 LEG SWELLING: ICD-10-CM

## 2023-09-11 PROCEDURE — 1036F TOBACCO NON-USER: CPT | Performed by: INTERNAL MEDICINE

## 2023-09-11 PROCEDURE — G8399 PT W/DXA RESULTS DOCUMENT: HCPCS | Performed by: INTERNAL MEDICINE

## 2023-09-11 PROCEDURE — G8427 DOCREV CUR MEDS BY ELIG CLIN: HCPCS | Performed by: INTERNAL MEDICINE

## 2023-09-11 PROCEDURE — 3075F SYST BP GE 130 - 139MM HG: CPT | Performed by: INTERNAL MEDICINE

## 2023-09-11 PROCEDURE — G8417 CALC BMI ABV UP PARAM F/U: HCPCS | Performed by: INTERNAL MEDICINE

## 2023-09-11 PROCEDURE — 99213 OFFICE O/P EST LOW 20 MIN: CPT | Performed by: INTERNAL MEDICINE

## 2023-09-11 PROCEDURE — G0439 PPPS, SUBSEQ VISIT: HCPCS | Performed by: INTERNAL MEDICINE

## 2023-09-11 PROCEDURE — 1090F PRES/ABSN URINE INCON ASSESS: CPT | Performed by: INTERNAL MEDICINE

## 2023-09-11 PROCEDURE — 3078F DIAST BP <80 MM HG: CPT | Performed by: INTERNAL MEDICINE

## 2023-09-11 PROCEDURE — 3051F HG A1C>EQUAL 7.0%<8.0%: CPT | Performed by: INTERNAL MEDICINE

## 2023-09-11 PROCEDURE — 1123F ACP DISCUSS/DSCN MKR DOCD: CPT | Performed by: INTERNAL MEDICINE

## 2023-09-11 RX ORDER — FUROSEMIDE 20 MG/1
20 TABLET ORAL DAILY PRN
Qty: 30 TABLET | Refills: 1 | Status: SHIPPED | OUTPATIENT
Start: 2023-09-11

## 2023-09-11 ASSESSMENT — PATIENT HEALTH QUESTIONNAIRE - PHQ9
SUM OF ALL RESPONSES TO PHQ QUESTIONS 1-9: 0
5. POOR APPETITE OR OVEREATING: 0
10. IF YOU CHECKED OFF ANY PROBLEMS, HOW DIFFICULT HAVE THESE PROBLEMS MADE IT FOR YOU TO DO YOUR WORK, TAKE CARE OF THINGS AT HOME, OR GET ALONG WITH OTHER PEOPLE: 0
4. FEELING TIRED OR HAVING LITTLE ENERGY: 0
2. FEELING DOWN, DEPRESSED OR HOPELESS: 0
8. MOVING OR SPEAKING SO SLOWLY THAT OTHER PEOPLE COULD HAVE NOTICED. OR THE OPPOSITE, BEING SO FIGETY OR RESTLESS THAT YOU HAVE BEEN MOVING AROUND A LOT MORE THAN USUAL: 0
3. TROUBLE FALLING OR STAYING ASLEEP: 0
SUM OF ALL RESPONSES TO PHQ QUESTIONS 1-9: 0
SUM OF ALL RESPONSES TO PHQ9 QUESTIONS 1 & 2: 0
6. FEELING BAD ABOUT YOURSELF - OR THAT YOU ARE A FAILURE OR HAVE LET YOURSELF OR YOUR FAMILY DOWN: 0
1. LITTLE INTEREST OR PLEASURE IN DOING THINGS: 0
7. TROUBLE CONCENTRATING ON THINGS, SUCH AS READING THE NEWSPAPER OR WATCHING TELEVISION: 0
9. THOUGHTS THAT YOU WOULD BE BETTER OFF DEAD, OR OF HURTING YOURSELF: 0

## 2023-09-11 ASSESSMENT — LIFESTYLE VARIABLES
HOW MANY STANDARD DRINKS CONTAINING ALCOHOL DO YOU HAVE ON A TYPICAL DAY: PATIENT DOES NOT DRINK
HOW OFTEN DO YOU HAVE A DRINK CONTAINING ALCOHOL: NEVER

## 2023-09-11 NOTE — PATIENT INSTRUCTIONS
your family, and various assessments and screenings as appropriate. After reviewing your medical record and screening and assessments performed today your provider may have ordered immunizations, labs, imaging, and/or referrals for you. A list of these orders (if applicable) as well as your Preventive Care list are included within your After Visit Summary for your review. Other Preventive Recommendations:    A preventive eye exam performed by an eye specialist is recommended every 1-2 years to screen for glaucoma; cataracts, macular degeneration, and other eye disorders. A preventive dental visit is recommended every 6 months. Try to get at least 150 minutes of exercise per week or 10,000 steps per day on a pedometer . Order or download the FREE \"Exercise & Physical Activity: Your Everyday Guide\" from [employer]. Call 2-212.871.3674 or search [employer] online. You need 9607-8672 mg of calcium and 9166-4823 IU of vitamin D per day. It is possible to meet your calcium requirement with diet alone, but a vitamin D supplement is usually necessary to meet this goal.  When exposed to the sun, use a sunscreen that protects against both UVA and UVB radiation with an SPF of 30 or greater. Reapply every 2 to 3 hours or after sweating, drying off with a towel, or swimming. Always wear a seat belt when traveling in a car. Always wear a helmet when riding a bicycle or motorcycle.

## 2023-09-11 NOTE — PROGRESS NOTES
Visit Information    Have you changed or started any medications since your last visit including any over-the-counter medicines, vitamins, or herbal medicines? no   Are you having any side effects from any of your medications? -  no  Have you stopped taking any of your medications? Is so, why? -  no    Have you seen any other physician or provider since your last visit? No  Have you had any other diagnostic tests since your last visit? No  Have you been seen in the emergency room and/or had an admission to a hospital since we last saw you? No  Have you had your routine dental cleaning in the past 6 months? no    Have you activated your [email or web address] account? If not, what are your barriers?  No:      Patient Care Team:  Venkata Brooks MD as PCP - General (Internal Medicine)  Venkata Brooks MD as PCP - Empaneled Provider    Medical History Review  Past Medical, Family, and Social History reviewed and does contribute to the patient presenting condition    Health Maintenance   Topic Date Due    Hepatitis C screen  Never done    DTaP/Tdap/Td vaccine (1 - Tdap) Never done    Hepatitis B vaccine (1 of 3 - Risk 3-dose series) Never done    COVID-19 Vaccine (4 - Pfizer series) 11/30/2021    Annual Wellness Visit (AWV)  12/18/2022    Flu vaccine (1) 08/01/2023    Depression Monitoring  06/26/2024    Lipids  08/16/2024    DEXA (modify frequency per FRAX score)  Completed    Shingles vaccine  Completed    Pneumococcal 65+ years Vaccine  Completed    Hepatitis A vaccine  Aged Out    Hib vaccine  Aged Out    Meningococcal (ACWY) vaccine  Aged [place] [facility] Visit    Paula Bragg is here for Diabetes (A1C- 8/16/23= 7.7), Medicare AWV, and Discuss Labs    Assessment & Plan   Type 2 diabetes mellitus without complication, without long-term current use of insulin (HCC)  Stage 3 chronic kidney disease, unspecified whether stage 3a or 3b CKD ([address])  Major depressive disorder with single episode, in full remission

## 2023-09-18 ENCOUNTER — HOSPITAL ENCOUNTER (OUTPATIENT)
Age: 78
Setting detail: SPECIMEN
Discharge: HOME | End: 2023-09-18

## 2023-09-18 DIAGNOSIS — M79.89 LEG SWELLING: ICD-10-CM

## 2023-09-18 LAB
ANION GAP SERPL CALCULATED.3IONS-SCNC: 15 MMOL/L (ref 9–17)
BUN SERPL-MCNC: 24 MG/DL (ref 8–23)
CALCIUM SERPL-MCNC: 9.2 MG/DL (ref 8.6–10.4)
CHLORIDE SERPL-SCNC: 103 MMOL/L (ref 98–107)
CO2 SERPL-SCNC: 23 MMOL/L (ref 20–31)
CREAT SERPL-MCNC: 1 MG/DL (ref 0.5–0.9)
GFR SERPL CREATININE-BSD FRML MDRD: 58 ML/MIN/1.73M2
GLUCOSE SERPL-MCNC: 129 MG/DL (ref 70–99)
POTASSIUM SERPL-SCNC: 4.7 MMOL/L (ref 3.7–5.3)
SODIUM SERPL-SCNC: 141 MMOL/L (ref 135–144)

## 2023-09-19 NOTE — RESULT ENCOUNTER NOTE
Satisfactory results, no change in kidney numbers compared to previous test.  Continue current medications.

## 2023-10-19 ENCOUNTER — OFFICE VISIT (OUTPATIENT)
Dept: VASCULAR SURGERY | Age: 78
End: 2023-10-19
Payer: MEDICARE

## 2023-10-19 VITALS
HEIGHT: 64 IN | HEART RATE: 64 BPM | SYSTOLIC BLOOD PRESSURE: 149 MMHG | DIASTOLIC BLOOD PRESSURE: 63 MMHG | WEIGHT: 166 LBS | OXYGEN SATURATION: 96 % | RESPIRATION RATE: 18 BRPM | TEMPERATURE: 96.8 F | BODY MASS INDEX: 28.34 KG/M2

## 2023-10-19 DIAGNOSIS — R42 VERTIGO: ICD-10-CM

## 2023-10-19 DIAGNOSIS — M48.02 CERVICAL STENOSIS OF SPINE: ICD-10-CM

## 2023-10-19 DIAGNOSIS — I65.23 CAROTID STENOSIS, ASYMPTOMATIC, BILATERAL: Primary | ICD-10-CM

## 2023-10-19 DIAGNOSIS — R42 DIZZINESS: ICD-10-CM

## 2023-10-19 PROCEDURE — 1090F PRES/ABSN URINE INCON ASSESS: CPT | Performed by: SURGERY

## 2023-10-19 PROCEDURE — G8427 DOCREV CUR MEDS BY ELIG CLIN: HCPCS | Performed by: SURGERY

## 2023-10-19 PROCEDURE — G8417 CALC BMI ABV UP PARAM F/U: HCPCS | Performed by: SURGERY

## 2023-10-19 PROCEDURE — G8399 PT W/DXA RESULTS DOCUMENT: HCPCS | Performed by: SURGERY

## 2023-10-19 PROCEDURE — 3074F SYST BP LT 130 MM HG: CPT | Performed by: SURGERY

## 2023-10-19 PROCEDURE — 3078F DIAST BP <80 MM HG: CPT | Performed by: SURGERY

## 2023-10-19 PROCEDURE — 1036F TOBACCO NON-USER: CPT | Performed by: SURGERY

## 2023-10-19 PROCEDURE — 99214 OFFICE O/P EST MOD 30 MIN: CPT | Performed by: SURGERY

## 2023-10-19 PROCEDURE — 1123F ACP DISCUSS/DSCN MKR DOCD: CPT | Performed by: SURGERY

## 2023-10-19 PROCEDURE — G8484 FLU IMMUNIZE NO ADMIN: HCPCS | Performed by: SURGERY

## 2023-10-19 NOTE — PROGRESS NOTES
with asymptomatic carotid atherosclerosis. II. Glucose control to nearly normoglycemic levels (target hemoglobin A1C 7%) is recommended among diabetic patients to reduce microvascular complications and, with lesser certainty, macrovascular complications other than stroke. III. Patients with known atherosclerosis have demonstrated reduced stroke rates when treated with lipid-lowering therapy. And continued low dose statin will help stabilize plaque and decrease the inflammatory response of atherosclerosis. IV. Smoking nearly doubles the risk of stroke and with cessation there is a reduction in the risk of stroke. V. Antiplatelet therapy in asymptomatic patients with carotid atherosclerosis is recommended to reduce overall cardiovascular morbidity although it has not been shown to be effective in the primary prevention of stroke. Electronically signed by Arcadio Conti MD on 10/19/23 at 1:14 PM EDT      [address] [facility] [address] [facility] Se: [phone]  C: [phone]  Email: [email or web address]

## 2023-11-14 ASSESSMENT — ENCOUNTER SYMPTOMS
ABDOMINAL PAIN: 0
COLOR CHANGE: 0
ALLERGIC/IMMUNOLOGIC NEGATIVE: 1
SHORTNESS OF BREATH: 0
BACK PAIN: 1
CHEST TIGHTNESS: 0

## 2023-11-15 DIAGNOSIS — R41.81 AGE-RELATED COGNITIVE DECLINE: ICD-10-CM

## 2023-11-15 RX ORDER — DONEPEZIL HYDROCHLORIDE 5 MG/1
5 TABLET, FILM COATED ORAL DAILY
Qty: 90 TABLET | Refills: 3 | Status: SHIPPED | OUTPATIENT
Start: 2023-11-15

## 2023-12-15 ENCOUNTER — OFFICE VISIT (OUTPATIENT)
Dept: INTERNAL MEDICINE CLINIC | Age: 78
End: 2023-12-15

## 2023-12-15 VITALS
HEART RATE: 62 BPM | SYSTOLIC BLOOD PRESSURE: 128 MMHG | OXYGEN SATURATION: 97 % | WEIGHT: 165.4 LBS | DIASTOLIC BLOOD PRESSURE: 84 MMHG | BODY MASS INDEX: 28.39 KG/M2

## 2023-12-15 DIAGNOSIS — F32.5 MAJOR DEPRESSIVE DISORDER WITH SINGLE EPISODE, IN FULL REMISSION (HCC): ICD-10-CM

## 2023-12-15 DIAGNOSIS — E11.9 TYPE 2 DIABETES MELLITUS WITHOUT COMPLICATION, WITHOUT LONG-TERM CURRENT USE OF INSULIN (HCC): Primary | ICD-10-CM

## 2023-12-15 DIAGNOSIS — J01.90 ACUTE NON-RECURRENT SINUSITIS, UNSPECIFIED LOCATION: ICD-10-CM

## 2023-12-15 DIAGNOSIS — E11.59 HYPERTENSION ASSOCIATED WITH DIABETES (HCC): ICD-10-CM

## 2023-12-15 DIAGNOSIS — I15.2 HYPERTENSION ASSOCIATED WITH DIABETES (HCC): ICD-10-CM

## 2023-12-15 LAB — HBA1C MFR BLD: 7.6 %

## 2023-12-15 RX ORDER — FLUTICASONE PROPIONATE 50 MCG
1 SPRAY, SUSPENSION (ML) NASAL DAILY
Qty: 32 G | Refills: 1 | Status: SHIPPED | OUTPATIENT
Start: 2023-12-15

## 2023-12-15 RX ORDER — LORATADINE 10 MG/1
10 TABLET ORAL DAILY
Qty: 30 TABLET | Refills: 0 | Status: SHIPPED | OUTPATIENT
Start: 2023-12-15

## 2023-12-15 NOTE — PROGRESS NOTES
(3/23/2023)   Transportation Needs: Unknown (3/23/2023)    PRAPARE - Transportation     Lack of Transportation (Medical): Not on file     Lack of Transportation (Non-Medical): No   Physical Activity: Sufficiently Active (9/11/2023)    Exercise Vital Sign     Days of Exercise per Week: 5 days     Minutes of Exercise per Session: 40 min   Stress: Not on file   Social Connections: Not on file   Intimate Partner Violence: Not on file   Housing Stability: Unknown (3/23/2023)    Housing Stability Vital Sign     Unable to Pay for Housing in the Last Year: Not on file     Number of Places Lived in the Last Year: Not on file     Unstable Housing in the Last Year: No           CURRENT MEDICATIONS:  Current Outpatient Medications   Medication Sig Dispense Refill    donepezil (ARICEPT) 5 MG tablet TAKE 1 TABLET DAILY 90 tablet 3    furosemide (LASIX) 20 MG tablet Take 1 tablet by mouth daily as needed (as needed) 30 tablet 1    sertraline (ZOLOFT) 50 MG tablet Take 1 tablet by mouth daily Take half tablet for 7days then go to full tablet a day 30 tablet 5    losartan (COZAAR) 50 MG tablet TAKE 1 TABLET DAILY 90 tablet 3    atorvastatin (LIPITOR) 80 MG tablet Take 1 tablet by mouth nightly 90 tablet 3    Metoprolol Tartrate 37.5 MG TABS Take 37.5 mg by mouth 2 times daily 180 tablet 3    clopidogrel (PLAVIX) 75 MG tablet Take 1 tablet by mouth daily 90 tablet 3    alendronate (FOSAMAX) 70 MG tablet TAKE 1 TABLET EVERY 7 DAYS ON AN EMPTY STOMACH, FIRST THING IN THE MORNING, WITH FULL GLASS OF WATER, STAY UPRIGHT FOR 30 MINUTES.  12 tablet 3    aspirin 81 MG chewable tablet Take 1 tablet by mouth daily 30 tablet 1    amLODIPine (NORVASC) 10 MG tablet Take 1 tablet by mouth daily 30 tablet 1    FREESTYLE LITE strip       FreeStyle Lancets MISC       albuterol sulfate HFA (PROVENTIL;VENTOLIN;PROAIR) 108 (90 Base) MCG/ACT inhaler USE 2 INHALATIONS EVERY 6 HOURS AS NEEDED FOR WHEEZING       No current facility-administered medications

## 2024-01-02 DIAGNOSIS — J01.90 ACUTE NON-RECURRENT SINUSITIS, UNSPECIFIED LOCATION: ICD-10-CM

## 2024-01-02 RX ORDER — LORATADINE 10 MG/1
10 TABLET ORAL DAILY
Qty: 30 TABLET | Refills: 0 | OUTPATIENT
Start: 2024-01-02

## 2024-02-03 DIAGNOSIS — F41.1 GENERALIZED ANXIETY DISORDER: ICD-10-CM

## 2024-02-14 ENCOUNTER — OFFICE VISIT (OUTPATIENT)
Dept: INTERNAL MEDICINE CLINIC | Age: 79
End: 2024-02-14

## 2024-02-14 VITALS
SYSTOLIC BLOOD PRESSURE: 116 MMHG | HEIGHT: 64 IN | WEIGHT: 169 LBS | OXYGEN SATURATION: 96 % | DIASTOLIC BLOOD PRESSURE: 78 MMHG | BODY MASS INDEX: 28.85 KG/M2 | HEART RATE: 75 BPM

## 2024-02-14 DIAGNOSIS — N18.30 STAGE 3 CHRONIC KIDNEY DISEASE, UNSPECIFIED WHETHER STAGE 3A OR 3B CKD (HCC): ICD-10-CM

## 2024-02-14 DIAGNOSIS — I15.2 HYPERTENSION ASSOCIATED WITH DIABETES (HCC): ICD-10-CM

## 2024-02-14 DIAGNOSIS — E11.59 HYPERTENSION ASSOCIATED WITH DIABETES (HCC): ICD-10-CM

## 2024-02-14 DIAGNOSIS — E11.9 TYPE 2 DIABETES MELLITUS WITHOUT COMPLICATION, WITHOUT LONG-TERM CURRENT USE OF INSULIN (HCC): ICD-10-CM

## 2024-02-14 DIAGNOSIS — F41.1 GENERALIZED ANXIETY DISORDER: Primary | ICD-10-CM

## 2024-02-14 RX ORDER — ACETAMINOPHEN AND CODEINE PHOSPHATE 300; 30 MG/1; MG/1
TABLET ORAL
COMMUNITY
Start: 2024-01-15

## 2024-02-14 NOTE — PROGRESS NOTES
Visit Information    Have you changed or started any medications since your last visit including any over-the-counter medicines, vitamins, or herbal medicines? no   Are you having any side effects from any of your medications? -  no  Have you stopped taking any of your medications? Is so, why? -  no    Have you seen any other physician or provider since your last visit? No  Have you had any other diagnostic tests since your last visit? No  Have you been seen in the emergency room and/or had an admission to a hospital since we last saw you? No  Have you had your routine dental cleaning in the past 6 months? no    Have you activated your [employer] account? If not, what are your barriers? No     Patient Care Team:  Kelly Deal MD as PCP - General (Internal Medicine)  Kelly Deal MD as PCP - Empaneled Provider    Medical History Review  Past Medical, Family, and Social History reviewed and does contribute to the patient presenting condition    Health Maintenance   Topic Date Due    Hepatitis C screen  Never done    DTaP/Tdap/Td vaccine (1 - Tdap) Never done    Respiratory Syncytial Virus (RSV) Pregnant or age 60 yrs+ (1 - 1-dose 60+ series) Never done    COVID-19 Vaccine (4 - 2023-24 season) 09/01/2023    Lipids  08/16/2024    Depression Monitoring  09/11/2024    Annual Wellness Visit (Medicare)  09/11/2024    DEXA (modify frequency per FRAX score)  Completed    Flu vaccine  Completed    Shingles vaccine  Completed    Pneumococcal 65+ years Vaccine  Completed    Hepatitis A vaccine  Aged Out    Hepatitis B vaccine  Aged Out    Hib vaccine  Aged Out    Polio vaccine  Aged Out    Meningococcal (ACWY) vaccine  Aged Out     SUBJECTIVE:  Sydney De La Paz is a 78 y.o. female patient who  comes for complaints of   Chief Complaint   Patient presents with    Depression     Medication helping      Anxiety and depression  Controlled  C/w zoloft  [place] any SI/HI    Did get neck surgery  Doing well after    DIABETES

## 2024-03-04 ENCOUNTER — TELEPHONE (OUTPATIENT)
Dept: INTERNAL MEDICINE CLINIC | Age: 79
End: 2024-03-04

## 2024-03-04 NOTE — TELEPHONE ENCOUNTER
Patient called wanting something called in for a cold. I stated she would have to be seen and she stated that she is to sick to come into the office and if she gets a little better she will call to come in.

## 2024-03-07 ENCOUNTER — OFFICE VISIT (OUTPATIENT)
Dept: INTERNAL MEDICINE CLINIC | Age: 79
End: 2024-03-07

## 2024-03-07 VITALS
TEMPERATURE: 97.9 F | SYSTOLIC BLOOD PRESSURE: 122 MMHG | HEIGHT: 64 IN | DIASTOLIC BLOOD PRESSURE: 76 MMHG | WEIGHT: 165 LBS | OXYGEN SATURATION: 98 % | HEART RATE: 84 BPM | BODY MASS INDEX: 28.17 KG/M2

## 2024-03-07 DIAGNOSIS — J06.9 UPPER RESPIRATORY TRACT INFECTION, UNSPECIFIED TYPE: Primary | ICD-10-CM

## 2024-03-07 DIAGNOSIS — R05.9 COUGH, UNSPECIFIED TYPE: ICD-10-CM

## 2024-03-07 DIAGNOSIS — J34.89 NASAL DRAINAGE: ICD-10-CM

## 2024-03-07 LAB
INFLUENZA A ANTIBODY: NORMAL
INFLUENZA B ANTIBODY: NORMAL
Lab: NORMAL
QC PASS/FAIL: NORMAL
SARS-COV-2 RDRP RESP QL NAA+PROBE: NEGATIVE

## 2024-03-07 RX ORDER — AMOXICILLIN AND CLAVULANATE POTASSIUM 875; 125 MG/1; MG/1
1 TABLET, FILM COATED ORAL 2 TIMES DAILY
Qty: 14 TABLET | Refills: 0 | Status: SHIPPED | OUTPATIENT
Start: 2024-03-07 | End: 2024-03-14

## 2024-03-07 ASSESSMENT — ENCOUNTER SYMPTOMS
EYE DISCHARGE: 1
VOMITING: 0
SORE THROAT: 1
CONSTIPATION: 1
SHORTNESS OF BREATH: 1
WHEEZING: 1
NAUSEA: 0
COUGH: 1
CHEST TIGHTNESS: 0
DIARRHEA: 0
RHINORRHEA: 1

## 2024-03-07 NOTE — PROGRESS NOTES
Visit Information    Have you changed or started any medications since your last visit including any over-the-counter medicines, vitamins, or herbal medicines? no   Are you having any side effects from any of your medications? -  no  Have you stopped taking any of your medications? Is so, why? -  no    Have you seen any other physician or provider since your last visit? No  Have you had any other diagnostic tests since your last visit? No  Have you been seen in the emergency room and/or had an admission to a hospital since we last saw you? No  Have you had your routine dental cleaning in the past 6 months? no    Have you activated your [employer] account? If not, what are your barriers? No:      Patient Care Team:  Kelly Deal MD as PCP - General (Internal Medicine)  Kelly Deal MD as PCP - Empaneled Provider    Medical History Review  Past Medical, Family, and Social History reviewed and does contribute to the patient presenting condition    Health Maintenance   Topic Date Due    Hepatitis C screen  Never done    DTaP/Tdap/Td vaccine (1 - Tdap) Never done    Respiratory Syncytial Virus (RSV) Pregnant or age 60 yrs+ (1 - 1-dose 60+ series) Never done    COVID-19 Vaccine (4 - 2023-24 season) 09/01/2023    Lipids  08/16/2024    Annual Wellness Visit (Medicare)  09/11/2024    Depression Monitoring  02/14/2025    DEXA (modify frequency per FRAX score)  Completed    Flu vaccine  Completed    Shingles vaccine  Completed    Pneumococcal 65+ years Vaccine  Completed    Hepatitis A vaccine  Aged Out    Hepatitis B vaccine  Aged Out    Hib vaccine  Aged Out    Polio vaccine  Aged Out    Meningococcal (ACWY) vaccine  Aged Out        [facility]  [address] [address]  Dept: [phone]  Dept Fax: [phone]    Office Progress/Follow Up Note  Date of patient's visit: 3/7/2024   Patient's Name:  Sydney De La Paz  YOB: 1945            Patient Care

## 2024-03-18 ENCOUNTER — HOSPITAL ENCOUNTER (OUTPATIENT)
Age: 79
Setting detail: SPECIMEN
Discharge: HOME | End: 2024-03-18

## 2024-03-18 DIAGNOSIS — E11.9 TYPE 2 DIABETES MELLITUS WITHOUT COMPLICATION, WITHOUT LONG-TERM CURRENT USE OF INSULIN (HCC): ICD-10-CM

## 2024-03-18 LAB
EST. AVERAGE GLUCOSE BLD GHB EST-MCNC: 180 MG/DL
HBA1C MFR BLD: 7.9 % (ref 4–6)

## 2024-04-01 ENCOUNTER — OFFICE VISIT (OUTPATIENT)
Dept: NEUROSURGERY | Age: 79
End: 2024-04-01
Payer: MEDICARE

## 2024-04-01 VITALS
HEIGHT: 64 IN | HEART RATE: 60 BPM | SYSTOLIC BLOOD PRESSURE: 151 MMHG | WEIGHT: 165.8 LBS | BODY MASS INDEX: 28.31 KG/M2 | DIASTOLIC BLOOD PRESSURE: 64 MMHG

## 2024-04-01 DIAGNOSIS — M47.816 LUMBAR SPONDYLOSIS: ICD-10-CM

## 2024-04-01 DIAGNOSIS — M46.1 BILATERAL SACROILIITIS (HCC): ICD-10-CM

## 2024-04-01 DIAGNOSIS — G95.9 CERVICAL MYELOPATHY (HCC): Primary | ICD-10-CM

## 2024-04-01 PROCEDURE — 3077F SYST BP >= 140 MM HG: CPT | Performed by: NEUROLOGICAL SURGERY

## 2024-04-01 PROCEDURE — 3078F DIAST BP <80 MM HG: CPT | Performed by: NEUROLOGICAL SURGERY

## 2024-04-01 PROCEDURE — G8427 DOCREV CUR MEDS BY ELIG CLIN: HCPCS | Performed by: NEUROLOGICAL SURGERY

## 2024-04-01 PROCEDURE — G8417 CALC BMI ABV UP PARAM F/U: HCPCS | Performed by: NEUROLOGICAL SURGERY

## 2024-04-01 PROCEDURE — 1036F TOBACCO NON-USER: CPT | Performed by: NEUROLOGICAL SURGERY

## 2024-04-01 PROCEDURE — 1123F ACP DISCUSS/DSCN MKR DOCD: CPT | Performed by: NEUROLOGICAL SURGERY

## 2024-04-01 PROCEDURE — 99203 OFFICE O/P NEW LOW 30 MIN: CPT | Performed by: NEUROLOGICAL SURGERY

## 2024-04-01 PROCEDURE — G8399 PT W/DXA RESULTS DOCUMENT: HCPCS | Performed by: NEUROLOGICAL SURGERY

## 2024-04-01 PROCEDURE — 1090F PRES/ABSN URINE INCON ASSESS: CPT | Performed by: NEUROLOGICAL SURGERY

## 2024-04-01 NOTE — PROGRESS NOTES
(5' 4\")   Wt 75.2 kg (165 lb 12.8 oz)   BMI 28.46 kg/m²   Estimated body mass index is 28.46 kg/m² as calculated from the following:    Height as of this encounter: 1.626 m (5' 4\").    Weight as of this encounter: 75.2 kg (165 lb 12.8 oz).    General:  Sydney De La Paz is a 78 y.o. year old female who appears her stated age.   HEENT: Normocephalic atraumatic. Neck supple.  Chest: regular rate; pulses equal. Equal chest rise and fall  Abdomen: Soft nondistended.   Ext: DP equal with good capillary refill  Neuro    Mentation  Appropriate affect   oriented    Cranial Nerves:   Pupils equal and reactive to light  Extraocular motion intact  Face symmetric  No dysarthria  v1-3 sensation symmetric, masseter tone symmetric  Hearing symmetric and intact to finger rub    Sensation:   Intact     Motor  L deltoid 5/5; R deltoid 5/5  L biceps 5/5; R biceps 5/5  L triceps 5/5; R triceps 5/5  L wrist extension 5/5; R wrist extension 5/5  L intrinsics 5/5; R intrinsics 5/5     L iliopsoas 5/5 , R iliopsoas 5/5  L quadriceps 5/5; R quadriceps 5/5  L Dorsiflexion 5/5; R dorsiflexion 5/5  L Plantarflexion 5/5; R plantarflexion 5/5  L EHL 5/5; R EHL 5/5    Reflexes  L Brachioradialis 2+/4; R brachioradialis 2+/4  L Biceps 2+/4; R Biceps 2+/4  L Triceps 2+/4; R Triceps 2+/4  L Patellar 2+/4: R Patellar 2+/4  L Achilles 2+/4; R Achilles 2+/4    [place] b/l +  Clonus L: neg  Clonus R: neg  Babinski L: up  Babinski R; up    Tenderness over both SI joints.  No tenderness over the greater trochanter  Negative Vidal      none    Assessment and Plan:      1. Cervical myelopathy (HCC)    2. Bilateral sacroiliitis (HCC)    3. Lumbar spondylosis          Plan: Patient with significant long track signs including hyperreflexia as well as Park's of both upper extremities along with significant issues with balance despite the absence of dexterity issues.  Considering his constellation I would obtain an MRI of the cervical spine noncontrast

## 2024-04-19 ENCOUNTER — HOSPITAL ENCOUNTER (OUTPATIENT)
Dept: VASCULAR LAB | Age: 79
End: 2024-04-19
Attending: SURGERY
Payer: MEDICARE

## 2024-04-19 ENCOUNTER — HOSPITAL ENCOUNTER (OUTPATIENT)
Dept: GENERAL RADIOLOGY | Age: 79
End: 2024-04-19
Attending: SURGERY
Payer: MEDICARE

## 2024-04-19 ENCOUNTER — HOSPITAL ENCOUNTER (OUTPATIENT)
Age: 79
End: 2024-04-19
Attending: SURGERY
Payer: MEDICARE

## 2024-04-19 DIAGNOSIS — G95.9 CERVICAL MYELOPATHY (HCC): ICD-10-CM

## 2024-04-19 DIAGNOSIS — I65.23 CAROTID STENOSIS, ASYMPTOMATIC, BILATERAL: ICD-10-CM

## 2024-04-19 PROCEDURE — 72040 X-RAY EXAM NECK SPINE 2-3 VW: CPT

## 2024-04-19 PROCEDURE — 93880 EXTRACRANIAL BILAT STUDY: CPT

## 2024-04-21 LAB
VAS LEFT BULB EDV: 13.3 CM/S
VAS LEFT BULB PSV: 142 CM/S
VAS LEFT CCA DIST EDV: 18.3 CM/S
VAS LEFT CCA DIST PSV: 119 CM/S
VAS LEFT CCA MID EDV: 12.2 CM/S
VAS LEFT CCA MID PSV: 108 CM/S
VAS LEFT CCA PROX EDV: 15.8 CM/S
VAS LEFT CCA PROX PSV: 90.8 CM/S
VAS LEFT ECA EDV: 0 CM/S
VAS LEFT ECA PSV: 235 CM/S
VAS LEFT ICA MID EDV: 16 CM/S
VAS LEFT ICA MID PSV: 124 CM/S
VAS LEFT ICA PROX EDV: 40 CM/S
VAS LEFT ICA PROX PSV: 241 CM/S
VAS LEFT VERTEBRAL EDV: 0 CM/S
VAS LEFT VERTEBRAL PSV: 21.7 CM/S
VAS RIGHT BULB EDV: 11.5 CM/S
VAS RIGHT BULB PSV: 61.5 CM/S
VAS RIGHT CCA DIST EDV: 10.1 CM/S
VAS RIGHT CCA DIST PSV: 37.5 CM/S
VAS RIGHT CCA MID EDV: 9.87 CM/S
VAS RIGHT CCA MID PSV: 35.8 CM/S
VAS RIGHT CCA PROX EDV: 0 CM/S
VAS RIGHT CCA PROX PSV: 40.5 CM/S
VAS RIGHT ECA EDV: 10.2 CM/S
VAS RIGHT ECA PSV: 104 CM/S
VAS RIGHT ICA DIST EDV: 12.9 CM/S
VAS RIGHT ICA DIST PSV: 28.8 CM/S
VAS RIGHT ICA MID EDV: 15.8 CM/S
VAS RIGHT ICA MID PSV: 53.5 CM/S
VAS RIGHT ICA PROX EDV: 10.3 CM/S
VAS RIGHT ICA PROX PSV: 38.4 CM/S
VAS RIGHT VERTEBRAL EDV: 15.7 CM/S
VAS RIGHT VERTEBRAL PSV: 114 CM/S

## 2024-04-21 PROCEDURE — 93880 EXTRACRANIAL BILAT STUDY: CPT | Performed by: STUDENT IN AN ORGANIZED HEALTH CARE EDUCATION/TRAINING PROGRAM

## 2024-04-25 ENCOUNTER — OFFICE VISIT (OUTPATIENT)
Dept: VASCULAR SURGERY | Age: 79
End: 2024-04-25
Payer: MEDICARE

## 2024-04-25 VITALS
HEIGHT: 64 IN | WEIGHT: 165 LBS | BODY MASS INDEX: 28.17 KG/M2 | RESPIRATION RATE: 18 BRPM | OXYGEN SATURATION: 96 % | DIASTOLIC BLOOD PRESSURE: 54 MMHG | HEART RATE: 60 BPM | SYSTOLIC BLOOD PRESSURE: 149 MMHG

## 2024-04-25 DIAGNOSIS — I65.23 CAROTID STENOSIS, ASYMPTOMATIC, BILATERAL: Primary | ICD-10-CM

## 2024-04-25 PROCEDURE — 3078F DIAST BP <80 MM HG: CPT | Performed by: SURGERY

## 2024-04-25 PROCEDURE — 1090F PRES/ABSN URINE INCON ASSESS: CPT | Performed by: SURGERY

## 2024-04-25 PROCEDURE — G8417 CALC BMI ABV UP PARAM F/U: HCPCS | Performed by: SURGERY

## 2024-04-25 PROCEDURE — G8427 DOCREV CUR MEDS BY ELIG CLIN: HCPCS | Performed by: SURGERY

## 2024-04-25 PROCEDURE — 1123F ACP DISCUSS/DSCN MKR DOCD: CPT | Performed by: SURGERY

## 2024-04-25 PROCEDURE — 3077F SYST BP >= 140 MM HG: CPT | Performed by: SURGERY

## 2024-04-25 PROCEDURE — 99214 OFFICE O/P EST MOD 30 MIN: CPT | Performed by: SURGERY

## 2024-04-25 PROCEDURE — 1036F TOBACCO NON-USER: CPT | Performed by: SURGERY

## 2024-04-25 PROCEDURE — G8399 PT W/DXA RESULTS DOCUMENT: HCPCS | Performed by: SURGERY

## 2024-04-25 NOTE — PROGRESS NOTES
1.626 m (5' 4\")   Wt 74.8 kg (165 lb)   SpO2 96%   BMI 28.32 kg/m²     Physical Exam  Constitutional:       Appearance: She is well-developed and well-groomed.   Eyes:      Extraocular Movements: Extraocular movements intact.      Conjunctiva/sclera: Conjunctivae normal.   Neck:      Vascular: Carotid bruit present.   Cardiovascular:      Rate and Rhythm: Normal rate and regular rhythm.      Pulses:           Carotid pulses are  on the left side with bruit.       Radial pulses are 2+ on the right side and 2+ on the left side.        Dorsalis pedis pulses are 2+ on the right side and 2+ on the left side.   Pulmonary:      Effort: Pulmonary effort is normal. No respiratory distress.   Abdominal:      Palpations: Abdomen is soft.      Tenderness: There is no abdominal tenderness.   Musculoskeletal:      Cervical back: Full passive range of motion without pain.      Right lower leg: No swelling (mild) or tenderness. No edema.      Left lower leg: No swelling (mild) or tenderness. No edema.   Feet:      Right foot:      Skin integrity: No ulcer or skin breakdown.      Left foot:      Skin integrity: No ulcer or skin breakdown.   Skin:     General: Skin is warm.      Capillary Refill: Capillary refill takes less than 2 seconds.   Neurological:      Mental Status: She is alert and oriented to person, place, and time.      GCS: GCS eye subscore is 4. GCS verbal subscore is 5. GCS motor subscore is 6.      Sensory: Sensation is intact.      Motor: Motor function is intact.   Psychiatric:         Mood and Affect: Mood normal.         Speech: Speech normal.         Behavior: Behavior normal.         Thought Content: Thought content normal.       Imaging/Labs:     Left internal carotid artery >70% stenosis upon my review of images                  Assessment and Plan:     Asymptomatic bilateral carotid stenosis, left side >70%  Continue optimal medical therapy with antiplatelet and statins, BP control  We had discussion

## 2024-05-06 ENCOUNTER — ANESTHESIA (OUTPATIENT)
Dept: OPERATING ROOM | Age: 79
DRG: 039 | End: 2024-05-06
Payer: MEDICARE

## 2024-05-06 ENCOUNTER — ANESTHESIA EVENT (OUTPATIENT)
Dept: OPERATING ROOM | Age: 79
DRG: 039 | End: 2024-05-06
Payer: MEDICARE

## 2024-05-06 ENCOUNTER — HOSPITAL ENCOUNTER (INPATIENT)
Age: 79
LOS: 1 days | Discharge: HOME | DRG: 039 | End: 2024-05-07
Attending: SURGERY | Admitting: SURGERY
Payer: MEDICARE

## 2024-05-06 DIAGNOSIS — I65.22 STENOSIS OF LEFT CAROTID ARTERY: ICD-10-CM

## 2024-05-06 LAB
BUN BLD-MCNC: 37 MG/DL (ref 8–26)
CHLORIDE BLD-SCNC: 109 MMOL/L (ref 98–107)
EGFR, POC: 51 ML/MIN/1.73M2
GLUCOSE BLD-MCNC: 160 MG/DL (ref 74–100)
GLUCOSE BLD-MCNC: 286 MG/DL (ref 65–105)
HCT VFR BLD AUTO: 39 % (ref 36–46)
POC CREATININE: 1.1 MG/DL (ref 0.51–1.19)
POC HEMOGLOBIN (CALC): 13.3 G/DL (ref 12–16)
POTASSIUM BLD-SCNC: 4.7 MMOL/L (ref 3.5–4.5)
SODIUM BLD-SCNC: 142 MMOL/L (ref 138–146)

## 2024-05-06 PROCEDURE — 82947 ASSAY GLUCOSE BLOOD QUANT: CPT

## 2024-05-06 PROCEDURE — 6370000000 HC RX 637 (ALT 250 FOR IP)

## 2024-05-06 PROCEDURE — A4217 STERILE WATER/SALINE, 500 ML: HCPCS | Performed by: SURGERY

## 2024-05-06 PROCEDURE — 03UL0KZ SUPPLEMENT LEFT INTERNAL CAROTID ARTERY WITH NONAUTOLOGOUS TISSUE SUBSTITUTE, OPEN APPROACH: ICD-10-PCS | Performed by: SURGERY

## 2024-05-06 PROCEDURE — 84295 ASSAY OF SERUM SODIUM: CPT

## 2024-05-06 PROCEDURE — 3700000001 HC ADD 15 MINUTES (ANESTHESIA): Performed by: SURGERY

## 2024-05-06 PROCEDURE — 82565 ASSAY OF CREATININE: CPT

## 2024-05-06 PROCEDURE — 94761 N-INVAS EAR/PLS OXIMETRY MLT: CPT

## 2024-05-06 PROCEDURE — 2580000003 HC RX 258

## 2024-05-06 PROCEDURE — 2709999900 HC NON-CHARGEABLE SUPPLY: Performed by: SURGERY

## 2024-05-06 PROCEDURE — 93005 ELECTROCARDIOGRAM TRACING: CPT

## 2024-05-06 PROCEDURE — 85014 HEMATOCRIT: CPT

## 2024-05-06 PROCEDURE — 2500000003 HC RX 250 WO HCPCS

## 2024-05-06 PROCEDURE — 6360000002 HC RX W HCPCS

## 2024-05-06 PROCEDURE — 84132 ASSAY OF SERUM POTASSIUM: CPT

## 2024-05-06 PROCEDURE — 88311 DECALCIFY TISSUE: CPT

## 2024-05-06 PROCEDURE — 6370000000 HC RX 637 (ALT 250 FOR IP): Performed by: SURGERY

## 2024-05-06 PROCEDURE — 3700000000 HC ANESTHESIA ATTENDED CARE: Performed by: SURGERY

## 2024-05-06 PROCEDURE — 82435 ASSAY OF BLOOD CHLORIDE: CPT

## 2024-05-06 PROCEDURE — 84520 ASSAY OF UREA NITROGEN: CPT

## 2024-05-06 PROCEDURE — 2500000003 HC RX 250 WO HCPCS: Performed by: REGISTERED NURSE

## 2024-05-06 PROCEDURE — 3600000004 HC SURGERY LEVEL 4 BASE: Performed by: SURGERY

## 2024-05-06 PROCEDURE — 2580000003 HC RX 258: Performed by: ANESTHESIOLOGY

## 2024-05-06 PROCEDURE — 03CL0ZZ EXTIRPATION OF MATTER FROM LEFT INTERNAL CAROTID ARTERY, OPEN APPROACH: ICD-10-PCS | Performed by: SURGERY

## 2024-05-06 PROCEDURE — C1763 CONN TISS, NON-HUMAN: HCPCS | Performed by: SURGERY

## 2024-05-06 PROCEDURE — 2700000000 HC OXYGEN THERAPY PER DAY

## 2024-05-06 PROCEDURE — 6360000002 HC RX W HCPCS: Performed by: SURGERY

## 2024-05-06 PROCEDURE — 3600000014 HC SURGERY LEVEL 4 ADDTL 15MIN: Performed by: SURGERY

## 2024-05-06 PROCEDURE — 2000000000 HC ICU R&B

## 2024-05-06 PROCEDURE — 35301 RECHANNELING OF ARTERY: CPT | Performed by: SURGERY

## 2024-05-06 PROCEDURE — 88304 TISSUE EXAM BY PATHOLOGIST: CPT

## 2024-05-06 PROCEDURE — 2580000003 HC RX 258: Performed by: SURGERY

## 2024-05-06 DEVICE — IMPLANTABLE DEVICE: Type: IMPLANTABLE DEVICE | Site: NECK | Status: FUNCTIONAL

## 2024-05-06 DEVICE — DECELLULARIZED BOVINE PERICARDIUM
Type: IMPLANTABLE DEVICE | Site: NECK | Status: FUNCTIONAL
Brand: PHOTOFIX DECELLULARIZED BOVINE PERICARDIUM

## 2024-05-06 RX ORDER — PROPOFOL 10 MG/ML
INJECTION, EMULSION INTRAVENOUS PRN
Status: DISCONTINUED | OUTPATIENT
Start: 2024-05-06 | End: 2024-05-06 | Stop reason: SDUPTHER

## 2024-05-06 RX ORDER — OXYCODONE HYDROCHLORIDE 5 MG/1
5 TABLET ORAL EVERY 4 HOURS PRN
Status: DISCONTINUED | OUTPATIENT
Start: 2024-05-06 | End: 2024-05-07 | Stop reason: HOSPADM

## 2024-05-06 RX ORDER — NICARDIPINE HYDROCHLORIDE 0.1 MG/ML
INJECTION INTRAVENOUS
Status: DISPENSED
Start: 2024-05-06 | End: 2024-05-07

## 2024-05-06 RX ORDER — GLUCAGON 1 MG/ML
1 KIT INJECTION PRN
Status: DISCONTINUED | OUTPATIENT
Start: 2024-05-06 | End: 2024-05-07 | Stop reason: HOSPADM

## 2024-05-06 RX ORDER — ONDANSETRON 2 MG/ML
4 INJECTION INTRAMUSCULAR; INTRAVENOUS
Status: ACTIVE | OUTPATIENT
Start: 2024-05-06 | End: 2024-05-07

## 2024-05-06 RX ORDER — ASPIRIN 81 MG/1
81 TABLET, CHEWABLE ORAL DAILY
Status: DISCONTINUED | OUTPATIENT
Start: 2024-05-06 | End: 2024-05-07 | Stop reason: HOSPADM

## 2024-05-06 RX ORDER — SODIUM CHLORIDE 9 MG/ML
INJECTION, SOLUTION INTRAVENOUS PRN
Status: DISCONTINUED | OUTPATIENT
Start: 2024-05-06 | End: 2024-05-07 | Stop reason: HOSPADM

## 2024-05-06 RX ORDER — LIDOCAINE HYDROCHLORIDE 10 MG/ML
INJECTION, SOLUTION INFILTRATION; PERINEURAL PRN
Status: DISCONTINUED | OUTPATIENT
Start: 2024-05-06 | End: 2024-05-06 | Stop reason: ALTCHOICE

## 2024-05-06 RX ORDER — INSULIN LISPRO 100 [IU]/ML
0-4 INJECTION, SOLUTION INTRAVENOUS; SUBCUTANEOUS NIGHTLY
Status: DISCONTINUED | OUTPATIENT
Start: 2024-05-06 | End: 2024-05-07 | Stop reason: HOSPADM

## 2024-05-06 RX ORDER — ATORVASTATIN CALCIUM 80 MG/1
80 TABLET, FILM COATED ORAL NIGHTLY
Status: DISCONTINUED | OUTPATIENT
Start: 2024-05-06 | End: 2024-05-07 | Stop reason: HOSPADM

## 2024-05-06 RX ORDER — BUPIVACAINE HYDROCHLORIDE 2.5 MG/ML
INJECTION, SOLUTION EPIDURAL; INFILTRATION; INTRACAUDAL
Status: DISPENSED
Start: 2024-05-06 | End: 2024-05-07

## 2024-05-06 RX ORDER — NICARDIPINE HYDROCHLORIDE 0.1 MG/ML
2.5-15 INJECTION INTRAVENOUS CONTINUOUS
Status: DISCONTINUED | OUTPATIENT
Start: 2024-05-06 | End: 2024-05-07 | Stop reason: HOSPADM

## 2024-05-06 RX ORDER — LABETALOL HYDROCHLORIDE 5 MG/ML
INJECTION, SOLUTION INTRAVENOUS PRN
Status: DISCONTINUED | OUTPATIENT
Start: 2024-05-06 | End: 2024-05-06 | Stop reason: SDUPTHER

## 2024-05-06 RX ORDER — DIPHENHYDRAMINE HCL 25 MG
25 TABLET ORAL EVERY 6 HOURS PRN
COMMUNITY

## 2024-05-06 RX ORDER — LOSARTAN POTASSIUM 50 MG/1
50 TABLET ORAL DAILY
Status: DISCONTINUED | OUTPATIENT
Start: 2024-05-07 | End: 2024-05-07 | Stop reason: HOSPADM

## 2024-05-06 RX ORDER — ACETAMINOPHEN 500 MG
1000 TABLET ORAL EVERY 8 HOURS SCHEDULED
Status: DISCONTINUED | OUTPATIENT
Start: 2024-05-06 | End: 2024-05-07 | Stop reason: HOSPADM

## 2024-05-06 RX ORDER — SODIUM CHLORIDE 9 MG/ML
INJECTION, SOLUTION INTRAVENOUS CONTINUOUS PRN
Status: DISCONTINUED | OUTPATIENT
Start: 2024-05-06 | End: 2024-05-06 | Stop reason: SDUPTHER

## 2024-05-06 RX ORDER — FENTANYL CITRATE 50 UG/ML
INJECTION, SOLUTION INTRAMUSCULAR; INTRAVENOUS PRN
Status: DISCONTINUED | OUTPATIENT
Start: 2024-05-06 | End: 2024-05-06 | Stop reason: SDUPTHER

## 2024-05-06 RX ORDER — LIDOCAINE HYDROCHLORIDE 10 MG/ML
INJECTION, SOLUTION EPIDURAL; INFILTRATION; INTRACAUDAL; PERINEURAL PRN
Status: DISCONTINUED | OUTPATIENT
Start: 2024-05-06 | End: 2024-05-06 | Stop reason: SDUPTHER

## 2024-05-06 RX ORDER — SODIUM CHLORIDE 0.9 % (FLUSH) 0.9 %
5-40 SYRINGE (ML) INJECTION PRN
Status: DISCONTINUED | OUTPATIENT
Start: 2024-05-06 | End: 2024-05-07 | Stop reason: HOSPADM

## 2024-05-06 RX ORDER — NITROGLYCERIN 20 MG/100ML
INJECTION INTRAVENOUS PRN
Status: DISCONTINUED | OUTPATIENT
Start: 2024-05-06 | End: 2024-05-06 | Stop reason: SDUPTHER

## 2024-05-06 RX ORDER — BUPIVACAINE HYDROCHLORIDE 2.5 MG/ML
INJECTION, SOLUTION INFILTRATION; PERINEURAL PRN
Status: DISCONTINUED | OUTPATIENT
Start: 2024-05-06 | End: 2024-05-06 | Stop reason: ALTCHOICE

## 2024-05-06 RX ORDER — CEFAZOLIN SODIUM 1 G/3ML
INJECTION, POWDER, FOR SOLUTION INTRAMUSCULAR; INTRAVENOUS PRN
Status: DISCONTINUED | OUTPATIENT
Start: 2024-05-06 | End: 2024-05-06 | Stop reason: SDUPTHER

## 2024-05-06 RX ORDER — ONDANSETRON 2 MG/ML
4 INJECTION INTRAMUSCULAR; INTRAVENOUS EVERY 6 HOURS PRN
Status: DISCONTINUED | OUTPATIENT
Start: 2024-05-06 | End: 2024-05-07 | Stop reason: HOSPADM

## 2024-05-06 RX ORDER — DONEPEZIL HYDROCHLORIDE 5 MG/1
5 TABLET, FILM COATED ORAL DAILY
Status: DISCONTINUED | OUTPATIENT
Start: 2024-05-07 | End: 2024-05-07 | Stop reason: HOSPADM

## 2024-05-06 RX ORDER — ONDANSETRON 4 MG/1
4 TABLET, ORALLY DISINTEGRATING ORAL EVERY 8 HOURS PRN
Status: DISCONTINUED | OUTPATIENT
Start: 2024-05-06 | End: 2024-05-07 | Stop reason: HOSPADM

## 2024-05-06 RX ORDER — SODIUM CHLORIDE 9 MG/ML
INJECTION, SOLUTION INTRAVENOUS CONTINUOUS
Status: DISCONTINUED | OUTPATIENT
Start: 2024-05-06 | End: 2024-05-06 | Stop reason: HOSPADM

## 2024-05-06 RX ORDER — GLYCOPYRROLATE 0.2 MG/ML
INJECTION INTRAMUSCULAR; INTRAVENOUS PRN
Status: DISCONTINUED | OUTPATIENT
Start: 2024-05-06 | End: 2024-05-06 | Stop reason: SDUPTHER

## 2024-05-06 RX ORDER — DEXTROSE MONOHYDRATE 100 MG/ML
INJECTION, SOLUTION INTRAVENOUS CONTINUOUS PRN
Status: DISCONTINUED | OUTPATIENT
Start: 2024-05-06 | End: 2024-05-07 | Stop reason: HOSPADM

## 2024-05-06 RX ORDER — ROCURONIUM BROMIDE 10 MG/ML
INJECTION, SOLUTION INTRAVENOUS PRN
Status: DISCONTINUED | OUTPATIENT
Start: 2024-05-06 | End: 2024-05-06 | Stop reason: SDUPTHER

## 2024-05-06 RX ORDER — HEPARIN SODIUM 1000 [USP'U]/ML
INJECTION, SOLUTION INTRAVENOUS; SUBCUTANEOUS PRN
Status: DISCONTINUED | OUTPATIENT
Start: 2024-05-06 | End: 2024-05-06 | Stop reason: SDUPTHER

## 2024-05-06 RX ORDER — SODIUM CHLORIDE 0.9 % (FLUSH) 0.9 %
5-40 SYRINGE (ML) INJECTION EVERY 12 HOURS SCHEDULED
Status: DISCONTINUED | OUTPATIENT
Start: 2024-05-06 | End: 2024-05-07 | Stop reason: HOSPADM

## 2024-05-06 RX ORDER — HYDRALAZINE HYDROCHLORIDE 20 MG/ML
10 INJECTION INTRAMUSCULAR; INTRAVENOUS EVERY 6 HOURS PRN
Status: DISCONTINUED | OUTPATIENT
Start: 2024-05-06 | End: 2024-05-07 | Stop reason: HOSPADM

## 2024-05-06 RX ORDER — NALOXONE HYDROCHLORIDE 0.4 MG/ML
INJECTION, SOLUTION INTRAMUSCULAR; INTRAVENOUS; SUBCUTANEOUS PRN
Status: DISCONTINUED | OUTPATIENT
Start: 2024-05-06 | End: 2024-05-07 | Stop reason: HOSPADM

## 2024-05-06 RX ORDER — CLOPIDOGREL BISULFATE 75 MG/1
75 TABLET ORAL DAILY
Status: DISCONTINUED | OUTPATIENT
Start: 2024-05-07 | End: 2024-05-07 | Stop reason: HOSPADM

## 2024-05-06 RX ORDER — FENTANYL CITRATE 50 UG/ML
25 INJECTION, SOLUTION INTRAMUSCULAR; INTRAVENOUS EVERY 5 MIN PRN
Status: DISCONTINUED | OUTPATIENT
Start: 2024-05-06 | End: 2024-05-07 | Stop reason: HOSPADM

## 2024-05-06 RX ORDER — EPHEDRINE SULFATE/0.9% NACL/PF 25 MG/5 ML
SYRINGE (ML) INTRAVENOUS PRN
Status: DISCONTINUED | OUTPATIENT
Start: 2024-05-06 | End: 2024-05-06 | Stop reason: SDUPTHER

## 2024-05-06 RX ORDER — ONDANSETRON 2 MG/ML
INJECTION INTRAMUSCULAR; INTRAVENOUS PRN
Status: DISCONTINUED | OUTPATIENT
Start: 2024-05-06 | End: 2024-05-06 | Stop reason: SDUPTHER

## 2024-05-06 RX ORDER — AMLODIPINE BESYLATE 10 MG/1
10 TABLET ORAL DAILY
Status: DISCONTINUED | OUTPATIENT
Start: 2024-05-07 | End: 2024-05-07 | Stop reason: HOSPADM

## 2024-05-06 RX ORDER — INSULIN LISPRO 100 [IU]/ML
0-8 INJECTION, SOLUTION INTRAVENOUS; SUBCUTANEOUS
Status: DISCONTINUED | OUTPATIENT
Start: 2024-05-07 | End: 2024-05-07 | Stop reason: HOSPADM

## 2024-05-06 RX ORDER — DEXAMETHASONE SODIUM PHOSPHATE 10 MG/ML
INJECTION, SOLUTION INTRAMUSCULAR; INTRAVENOUS PRN
Status: DISCONTINUED | OUTPATIENT
Start: 2024-05-06 | End: 2024-05-06 | Stop reason: SDUPTHER

## 2024-05-06 RX ORDER — MAGNESIUM HYDROXIDE 1200 MG/15ML
LIQUID ORAL CONTINUOUS PRN
Status: COMPLETED | OUTPATIENT
Start: 2024-05-06 | End: 2024-05-06

## 2024-05-06 RX ADMIN — EPHEDRINE SULFATE 5 MG: 5 INJECTION INTRAVENOUS at 15:06

## 2024-05-06 RX ADMIN — Medication 5 MG: at 15:32

## 2024-05-06 RX ADMIN — SODIUM CHLORIDE: 9 INJECTION, SOLUTION INTRAVENOUS at 12:49

## 2024-05-06 RX ADMIN — NICARDIPINE HYDROCHLORIDE 2.5 MG/HR: 0.1 INJECTION INTRAVENOUS at 22:23

## 2024-05-06 RX ADMIN — FENTANYL CITRATE 150 MCG: 0.05 INJECTION, SOLUTION INTRAMUSCULAR; INTRAVENOUS at 12:55

## 2024-05-06 RX ADMIN — NITROGLYCERIN 20 MCG: 20 INJECTION INTRAVENOUS at 15:27

## 2024-05-06 RX ADMIN — SUGAMMADEX 200 MG: 100 INJECTION, SOLUTION INTRAVENOUS at 15:27

## 2024-05-06 RX ADMIN — NITROGLYCERIN 20 MCG: 20 INJECTION INTRAVENOUS at 14:25

## 2024-05-06 RX ADMIN — Medication 10 MG: at 15:45

## 2024-05-06 RX ADMIN — Medication 2.5 MG: at 15:29

## 2024-05-06 RX ADMIN — NITROGLYCERIN 20 MCG: 20 INJECTION INTRAVENOUS at 13:54

## 2024-05-06 RX ADMIN — NITROGLYCERIN 40 MCG: 20 INJECTION INTRAVENOUS at 14:52

## 2024-05-06 RX ADMIN — PROPOFOL 120 MG: 10 INJECTION, EMULSION INTRAVENOUS at 12:55

## 2024-05-06 RX ADMIN — EPHEDRINE SULFATE 5 MG: 5 INJECTION INTRAVENOUS at 13:32

## 2024-05-06 RX ADMIN — PROPOFOL 30 MG: 10 INJECTION, EMULSION INTRAVENOUS at 13:18

## 2024-05-06 RX ADMIN — Medication 5 MG: at 15:34

## 2024-05-06 RX ADMIN — SODIUM CHLORIDE: 9 INJECTION, SOLUTION INTRAVENOUS at 13:50

## 2024-05-06 RX ADMIN — NICARDIPINE HYDROCHLORIDE 5 MG/HR: 0.1 INJECTION INTRAVENOUS at 16:03

## 2024-05-06 RX ADMIN — GLYCOPYRROLATE 0.2 MG: 0.2 INJECTION INTRAMUSCULAR; INTRAVENOUS at 13:31

## 2024-05-06 RX ADMIN — Medication 5 MG: at 14:52

## 2024-05-06 RX ADMIN — SERTRALINE 50 MG: 50 TABLET, FILM COATED ORAL at 17:43

## 2024-05-06 RX ADMIN — SODIUM CHLORIDE: 9 INJECTION, SOLUTION INTRAVENOUS at 12:58

## 2024-05-06 RX ADMIN — ONDANSETRON 4 MG: 2 INJECTION INTRAMUSCULAR; INTRAVENOUS at 15:09

## 2024-05-06 RX ADMIN — NITROGLYCERIN 40 MCG: 20 INJECTION INTRAVENOUS at 13:49

## 2024-05-06 RX ADMIN — EPHEDRINE SULFATE 5 MG: 5 INJECTION INTRAVENOUS at 15:12

## 2024-05-06 RX ADMIN — FENTANYL CITRATE 50 MCG: 0.05 INJECTION, SOLUTION INTRAMUSCULAR; INTRAVENOUS at 13:45

## 2024-05-06 RX ADMIN — NITROGLYCERIN 20 MCG: 20 INJECTION INTRAVENOUS at 14:03

## 2024-05-06 RX ADMIN — EPHEDRINE SULFATE 5 MG: 5 INJECTION INTRAVENOUS at 15:19

## 2024-05-06 RX ADMIN — EPHEDRINE SULFATE 5 MG: 5 INJECTION INTRAVENOUS at 13:12

## 2024-05-06 RX ADMIN — ATORVASTATIN CALCIUM 80 MG: 80 TABLET, FILM COATED ORAL at 21:22

## 2024-05-06 RX ADMIN — LIDOCAINE HYDROCHLORIDE 50 MG: 10 INJECTION, SOLUTION EPIDURAL; INFILTRATION; INTRACAUDAL; PERINEURAL at 12:55

## 2024-05-06 RX ADMIN — DEXAMETHASONE SODIUM PHOSPHATE 10 MG: 10 INJECTION INTRAMUSCULAR; INTRAVENOUS at 13:36

## 2024-05-06 RX ADMIN — ACETAMINOPHEN 1000 MG: 500 TABLET ORAL at 17:42

## 2024-05-06 RX ADMIN — ROCURONIUM BROMIDE 40 MG: 10 SOLUTION INTRAVENOUS at 12:55

## 2024-05-06 RX ADMIN — ROCURONIUM BROMIDE 20 MG: 10 SOLUTION INTRAVENOUS at 13:54

## 2024-05-06 RX ADMIN — NITROGLYCERIN 20 MCG: 20 INJECTION INTRAVENOUS at 14:20

## 2024-05-06 RX ADMIN — Medication 2.5 MG: at 15:00

## 2024-05-06 RX ADMIN — HEPARIN SODIUM 8000 UNITS: 1000 INJECTION INTRAVENOUS; SUBCUTANEOUS at 13:51

## 2024-05-06 RX ADMIN — ROCURONIUM BROMIDE 20 MG: 10 SOLUTION INTRAVENOUS at 14:30

## 2024-05-06 RX ADMIN — CEFAZOLIN 2 G: 1 INJECTION, POWDER, FOR SOLUTION INTRAMUSCULAR; INTRAVENOUS at 13:21

## 2024-05-06 RX ADMIN — ACETAMINOPHEN 1000 MG: 500 TABLET ORAL at 21:22

## 2024-05-06 RX ADMIN — Medication 2.5 MG: at 15:26

## 2024-05-06 RX ADMIN — ASPIRIN 81 MG 81 MG: 81 TABLET ORAL at 17:42

## 2024-05-06 RX ADMIN — FENTANYL CITRATE 50 MCG: 0.05 INJECTION, SOLUTION INTRAMUSCULAR; INTRAVENOUS at 13:23

## 2024-05-06 RX ADMIN — NITROGLYCERIN 20 MCG: 20 INJECTION INTRAVENOUS at 14:22

## 2024-05-06 RX ADMIN — NITROGLYCERIN 40 MCG: 20 INJECTION INTRAVENOUS at 13:45

## 2024-05-06 RX ADMIN — PHENYLEPHRINE HYDROCHLORIDE 50 MCG: 10 INJECTION INTRAVENOUS at 14:40

## 2024-05-06 RX ADMIN — SODIUM CHLORIDE, PRESERVATIVE FREE 10 ML: 5 INJECTION INTRAVENOUS at 21:23

## 2024-05-06 NOTE — H&P
Negative for abdominal pain.   Endocrine: Negative.    Genitourinary: Negative.    Musculoskeletal:  Positive for arthralgias, back pain and neck pain.   Skin:  Negative for color change and wound.   Allergic/Immunologic: Negative.    Neurological:  Positive for numbness. Negative for facial asymmetry, speech difficulty and weakness.   Hematological: Negative.    Psychiatric/Behavioral: Negative.        Physical Exam:      Vitals:  BP (!) 149/54 (Site: Left Upper Arm, Position: Sitting, Cuff Size: Medium Adult)   Pulse 60   Resp 18   Ht 1.626 m (5' 4\")   Wt 74.8 kg (165 lb)   SpO2 96%   BMI 28.32 kg/m²      Physical Exam  Constitutional:       Appearance: She is well-developed and well-groomed.   Eyes:      Extraocular Movements: Extraocular movements intact.      Conjunctiva/sclera: Conjunctivae normal.   Neck:      Vascular: Carotid bruit present.   Cardiovascular:      Rate and Rhythm: Normal rate and regular rhythm.      Pulses:           Carotid pulses are  on the left side with bruit.       Radial pulses are 2+ on the right side and 2+ on the left side.        Dorsalis pedis pulses are 2+ on the right side and 2+ on the left side.   Pulmonary:      Effort: Pulmonary effort is normal. No respiratory distress.   Abdominal:      Palpations: Abdomen is soft.      Tenderness: There is no abdominal tenderness.   Musculoskeletal:      Cervical back: Full passive range of motion without pain.      Right lower leg: No swelling (mild) or tenderness. No edema.      Left lower leg: No swelling (mild) or tenderness. No edema.   Feet:      Right foot:      Skin integrity: No ulcer or skin breakdown.      Left foot:      Skin integrity: No ulcer or skin breakdown.   Skin:     General: Skin is warm.      Capillary Refill: Capillary refill takes less than 2 seconds.   Neurological:      Mental Status: She is alert and oriented to person, place, and time.      GCS: GCS eye subscore is 4. GCS verbal subscore is 5. GCS

## 2024-05-06 NOTE — OP NOTE
Operative Note      Patient: Sydney De La Paz  YOB: 1945  MRN: [id number]    Date of Procedure: 5/6/2024    Pre-Op Diagnosis Codes:     * Stenosis of left carotid artery [I65.22]    Post-Op Diagnosis: Same       Procedure: Left carotid endarterectomy with patch angioplasty    Surgeon(s):  Kathe Carrera MD    Assistant: Dr. JO-ANN Null    Anesthesia: General    Estimated Blood Loss (mL): 33 ml    Complications: None    Specimens:   ID Type Source Tests Collected by Time Destination   A : LEFT CAROTID ARTERY PLAQUE Tissue Carotid Arteries SURGICAL PATHOLOGY Kathe Carrera MD 5/6/2024 1444         Implants:  Implant Name Type Inv. Item Serial No.  Lot No. LRB No. Used Action   CLIP INT WECK SM WIDE RED TI TRNSVRS GRV CHEVRON SHP W/ PERCIS TIP 6 PER PK - [id number]  CLIP INT WECK SM WIDE RED TI TRNSVRS GRV CHEVRON SHP W/ PERCIS TIP 6 PER PK  [employer]  Left 1 Implanted   GRAFT BIO TISS W0.8XL8CM DECELLULARIZED BOV PERICARD PTC - [id number]  GRAFT BIO TISS W0.8XL8CM DECELLULARIZED BOV PERICARD PTC  [employer] [id number] Left 1 Implanted       Drains: None    Findings:  Infection Present At Time Of Surgery (PATOS) (choose all levels that have infection present):  No infection present    Other Findings: extensive calcific plaque from common carotid to internal carotid.  Completion duplex reveals no flaps, velocity profile in the 50s.  NO EEG changes during clamp time of 40 minutes.  Neurologically intact moving all 4 extremities at completion.    Detailed Description of Procedure:     Sydney De La Paz was brought to the operating room for left carotid endarterectomy.  After appropriate anesthesia delivered, appropriate lines placed and SSEP neuro monitoring set up.  Patient positioned with head up and legs slightly down position.  The carotid bifurcation was marked with ultrasound and the neck and chest prepped and draped in standard sterile fashion.  Timeout

## 2024-05-06 NOTE — CARE COORDINATION
Case Management Assessment  Initial Evaluation    Date/Time of Evaluation: 5/6/2024 4:32 PM  Assessment Completed by: LUCERO OAKES RN    If patient is discharged prior to next notation, then this note serves as note for discharge by case management.    Patient Name: Sydney De La Paz                   YOB: 1945  Diagnosis: Stenosis of left carotid artery [I65.22]  Carotid stenosis, asymptomatic, left [I65.22]  Left carotid artery stenosis [I65.22]                   Date / Time: 5/6/2024 10:35 AM    Patient Admission Status: Inpatient   Readmission Risk (Low < 19, Mod (19-27), High > 27): Readmission Risk Score: 10.8    Current PCP: Kelly Deal MD  PCP verified by CM? (P) Yes    Chart Reviewed: Yes      History Provided by: (P) Patient  Patient Orientation: (P) Alert and Oriented, Person, Place, Situation, Self    Patient Cognition: (P) Alert    Hospitalization in the last 30 days (Readmission):  No    If yes, Readmission Assessment in  Navigator will be completed.    Advance Directives:      Code Status: Full Code   Patient's Primary Decision Maker is: (P) Legal Next of Kin    Primary Decision Maker: Rebel De La Paz - Spouse - [phone]    Discharge Planning:    Patient lives with: (P) Spouse/Significant Other Type of Home: (P) House  Primary Care Giver: (P) Self  Patient Support Systems include: (P) Spouse/Significant Other   Current Financial resources: (P) Medicare  Current community resources: (P) None  Current services prior to admission: (P) None            Current DME:              Type of Home Care services:  (P) None    ADLS  Prior functional level: (P) Independent in ADLs/IADLs  Current functional level: (P) Bathing, Assistance with the following:, Dressing, Toileting    PT AM-PAC:   /24  OT AM-PAC:   /24    Family can provide assistance at DC: (P) Yes  Would you like Case Management to discuss the discharge plan with any other family members/significant others, and

## 2024-05-06 NOTE — ANESTHESIA PRE PROCEDURE
Department of Anesthesiology  Preprocedure Note       Name:  Sydney De La Paz   Age:  78 y.o.  :  1945                                          MRN:  [id number]         Date:  2024      Surgeon: Surgeon(s):  Kathe Carrera MD    Procedure: Procedure(s):  CAROTID ENDARTERECTOMY (SSEP MONITORING, EVOKES CONF# [id number])    Medications prior to admission:   Prior to Admission medications    Medication Sig Start Date End Date Taking? Authorizing Provider   amLODIPine (NORVASC) 10 MG tablet Take 1 tablet by mouth daily 24   Marck Lima MD   acetaminophen-codeine (TYLENOL #3) 300-30 MG per tablet TAKE 1 TABLET BY MOUTH 2 TIMES A DAY AS NEEDED FOR PAIN 1/15/24   ProviderShaunna MD   sertraline (ZOLOFT) 50 MG tablet Take 1 tablet by mouth daily 24  Kelly Deal MD   fluticasone (FLONASE) 50 MCG/ACT nasal spray 1 spray by Each Nostril route daily 12/15/23   Kelly Deal MD   loratadine (CLARITIN) 10 MG tablet Take 1 tablet by mouth daily  Patient not taking: Reported on 2024 12/15/23   Kelly Deal MD   donepezil (ARICEPT) 5 MG tablet TAKE 1 TABLET DAILY 11/15/23   Kelly Deal MD   losartan (COZAAR) 50 MG tablet TAKE 1 TABLET DAILY 23   Marck Lima MD   atorvastatin (LIPITOR) 80 MG tablet Take 1 tablet by mouth nightly 23   Marck Lima MD   Metoprolol Tartrate 37.5 MG TABS Take 37.5 mg by mouth 2 times daily  Patient taking differently: Take 37.5 mg by mouth daily 23  Marck Lima MD   clopidogrel (PLAVIX) 75 MG tablet Take 1 tablet by mouth daily 23   Marck Lima MD   alendronate (FOSAMAX) 70 MG tablet TAKE 1 TABLET EVERY 7 DAYS ON AN EMPTY STOMACH, FIRST THING IN THE MORNING, WITH FULL GLASS OF WATER, STAY UPRIGHT FOR 30 MINUTES.  Patient not taking: Reported on 2024   Donnell Duggan PA   aspirin 81 MG chewable tablet Take 1 tablet by mouth daily 23   Terry Oates MD

## 2024-05-06 NOTE — PROGRESS NOTES
Admitted to pcc room 14 per ambulation. Assessment and vtials as charted. All procedures explained prior to implementation.  Patinet ready for procedure.

## 2024-05-06 NOTE — ANESTHESIA POSTPROCEDURE EVALUATION
Department of Anesthesiology  Postprocedure Note    Patient: Sydney De La Paz  MRN: [id number]  YOB: 1945  Date of evaluation: 5/6/2024    Procedure Summary       Date: 05/06/24 Room / Location: [place] [address] / [facility]    Anesthesia Start: 1249 Anesthesia Stop: 1546    Procedure: CAROTID ENDARTERECTOMY (SSEP MONITORING, EVOKES CONF# [id number]) / PHOTOFIX BOVINE PATCH 0.8 CM X 8 CM (Left: Neck) Diagnosis:       Stenosis of left carotid artery      (Stenosis of left carotid artery [I65.22])    Surgeons: Kathe Carrera MD Responsible Provider: Mindy Rosales MD    Anesthesia Type: general ASA Status: 3            Anesthesia Type: No value filed.    Isela Phase I: Isela Score: 10    Isela Phase II:      Anesthesia Post Evaluation    Patient location during evaluation: PACU  Patient participation: complete - patient participated  Level of consciousness: awake and alert  Airway patency: patent  Nausea & Vomiting: no nausea and no vomiting  Cardiovascular status: blood pressure returned to baseline  Respiratory status: acceptable  Hydration status: euvolemic  Pain management: adequate    No notable events documented.

## 2024-05-07 VITALS
HEART RATE: 81 BPM | HEIGHT: 64 IN | BODY MASS INDEX: 28 KG/M2 | SYSTOLIC BLOOD PRESSURE: 139 MMHG | OXYGEN SATURATION: 91 % | WEIGHT: 164 LBS | RESPIRATION RATE: 18 BRPM | TEMPERATURE: 98.5 F | DIASTOLIC BLOOD PRESSURE: 52 MMHG

## 2024-05-07 LAB
ANION GAP SERPL CALCULATED.3IONS-SCNC: 14 MMOL/L (ref 9–16)
BASOPHILS # BLD: 0 K/UL (ref 0–0.2)
BASOPHILS NFR BLD: 0 % (ref 0–2)
BUN SERPL-MCNC: 33 MG/DL (ref 8–23)
CALCIUM SERPL-MCNC: 8 MG/DL (ref 8.6–10.4)
CHLORIDE SERPL-SCNC: 106 MMOL/L (ref 98–107)
CREAT SERPL-MCNC: 1.3 MG/DL (ref 0.5–0.9)
EOSINOPHIL # BLD: 0 K/UL (ref 0–0.4)
EOSINOPHILS RELATIVE PERCENT: 0 % (ref 1–4)
ERYTHROCYTE [DISTWIDTH] IN BLOOD BY AUTOMATED COUNT: 14 % (ref 11.8–14.4)
GFR, ESTIMATED: 41 ML/MIN/1.73M2
GLUCOSE BLD-MCNC: 180 MG/DL (ref 65–105)
GLUCOSE BLD-MCNC: 206 MG/DL (ref 65–105)
GLUCOSE BLD-MCNC: 258 MG/DL (ref 65–105)
GLUCOSE SERPL-MCNC: 280 MG/DL (ref 74–99)
HCT VFR BLD AUTO: 31.4 % (ref 36.3–47.1)
HGB BLD-MCNC: 10 G/DL (ref 11.9–15.1)
IMM GRANULOCYTES # BLD AUTO: 0 K/UL (ref 0–0.3)
IMM GRANULOCYTES NFR BLD: 0 %
LYMPHOCYTES NFR BLD: 0.34 K/UL (ref 1–4.8)
LYMPHOCYTES RELATIVE PERCENT: 4 % (ref 24–44)
MCH RBC QN AUTO: 28.9 PG (ref 25.2–33.5)
MCHC RBC AUTO-ENTMCNC: 31.8 G/DL (ref 28.4–34.8)
MCV RBC AUTO: 90.8 FL (ref 82.6–102.9)
MONOCYTES NFR BLD: 0.25 K/UL (ref 0.1–0.8)
MONOCYTES NFR BLD: 3 % (ref 1–7)
MORPHOLOGY: NORMAL
NEUTROPHILS NFR BLD: 93 % (ref 36–66)
NEUTS SEG NFR BLD: 7.81 K/UL (ref 1.8–7.7)
NRBC BLD-RTO: 0 PER 100 WBC
PLATELET # BLD AUTO: 170 K/UL (ref 138–453)
PMV BLD AUTO: 10.9 FL (ref 8.1–13.5)
POTASSIUM SERPL-SCNC: 4.9 MMOL/L (ref 3.7–5.3)
RBC # BLD AUTO: 3.46 M/UL (ref 3.95–5.11)
SODIUM SERPL-SCNC: 138 MMOL/L (ref 136–145)
WBC OTHER # BLD: 8.4 K/UL (ref 3.5–11.3)

## 2024-05-07 PROCEDURE — 6360000002 HC RX W HCPCS

## 2024-05-07 PROCEDURE — 94761 N-INVAS EAR/PLS OXIMETRY MLT: CPT

## 2024-05-07 PROCEDURE — 82947 ASSAY GLUCOSE BLOOD QUANT: CPT

## 2024-05-07 PROCEDURE — 80048 BASIC METABOLIC PNL TOTAL CA: CPT

## 2024-05-07 PROCEDURE — 85025 COMPLETE CBC W/AUTO DIFF WBC: CPT

## 2024-05-07 PROCEDURE — 2580000003 HC RX 258

## 2024-05-07 PROCEDURE — 6370000000 HC RX 637 (ALT 250 FOR IP)

## 2024-05-07 RX ORDER — LABETALOL HYDROCHLORIDE 5 MG/ML
5 INJECTION, SOLUTION INTRAVENOUS EVERY 6 HOURS PRN
Status: DISCONTINUED | OUTPATIENT
Start: 2024-05-07 | End: 2024-05-07 | Stop reason: HOSPADM

## 2024-05-07 RX ORDER — METOPROLOL TARTRATE 37.5 MG/1
37.5 TABLET, FILM COATED ORAL DAILY
Status: DISCONTINUED | OUTPATIENT
Start: 2024-05-07 | End: 2024-05-07 | Stop reason: HOSPADM

## 2024-05-07 RX ADMIN — DONEPEZIL HYDROCHLORIDE 5 MG: 5 TABLET, FILM COATED ORAL at 08:20

## 2024-05-07 RX ADMIN — CLOPIDOGREL BISULFATE 75 MG: 75 TABLET ORAL at 08:20

## 2024-05-07 RX ADMIN — HYDRALAZINE HYDROCHLORIDE 10 MG: 20 INJECTION, SOLUTION INTRAMUSCULAR; INTRAVENOUS at 15:14

## 2024-05-07 RX ADMIN — OXYCODONE 5 MG: 5 TABLET ORAL at 11:56

## 2024-05-07 RX ADMIN — INSULIN LISPRO 2 UNITS: 100 INJECTION, SOLUTION INTRAVENOUS; SUBCUTANEOUS at 08:57

## 2024-05-07 RX ADMIN — LOSARTAN POTASSIUM 50 MG: 50 TABLET, FILM COATED ORAL at 08:20

## 2024-05-07 RX ADMIN — AMLODIPINE BESYLATE 10 MG: 10 TABLET ORAL at 08:20

## 2024-05-07 RX ADMIN — SODIUM CHLORIDE, PRESERVATIVE FREE 10 ML: 5 INJECTION INTRAVENOUS at 08:21

## 2024-05-07 RX ADMIN — ACETAMINOPHEN 1000 MG: 500 TABLET ORAL at 14:04

## 2024-05-07 RX ADMIN — METOPROLOL TARTRATE 37.5 MG: 25 TABLET, FILM COATED ORAL at 15:38

## 2024-05-07 RX ADMIN — INSULIN LISPRO 4 UNITS: 100 INJECTION, SOLUTION INTRAVENOUS; SUBCUTANEOUS at 12:59

## 2024-05-07 RX ADMIN — ACETAMINOPHEN 1000 MG: 500 TABLET ORAL at 06:19

## 2024-05-07 RX ADMIN — ASPIRIN 81 MG 81 MG: 81 TABLET ORAL at 08:20

## 2024-05-07 RX ADMIN — HYDRALAZINE HYDROCHLORIDE 10 MG: 20 INJECTION, SOLUTION INTRAMUSCULAR; INTRAVENOUS at 10:04

## 2024-05-07 RX ADMIN — SERTRALINE 50 MG: 50 TABLET, FILM COATED ORAL at 08:20

## 2024-05-07 RX ADMIN — Medication 5 MG: at 16:33

## 2024-05-07 ASSESSMENT — PAIN SCALES - GENERAL
PAINLEVEL_OUTOF10: 7
PAINLEVEL_OUTOF10: 0
PAINLEVEL_OUTOF10: 0

## 2024-05-07 ASSESSMENT — PAIN DESCRIPTION - LOCATION: LOCATION: HEAD;NECK

## 2024-05-07 ASSESSMENT — PAIN - FUNCTIONAL ASSESSMENT: PAIN_FUNCTIONAL_ASSESSMENT: ACTIVITIES ARE NOT PREVENTED

## 2024-05-07 ASSESSMENT — PAIN DESCRIPTION - ORIENTATION: ORIENTATION: LEFT;MID

## 2024-05-07 ASSESSMENT — PAIN DESCRIPTION - DESCRIPTORS: DESCRIPTORS: ACHING;DISCOMFORT

## 2024-05-07 NOTE — PLAN OF CARE
Problem: Chronic Conditions and Co-morbidities  Goal: Patient's chronic conditions and co-morbidity symptoms are monitored and maintained or improved  5/7/2024 1658 by Maddison Gama RN  Outcome: Progressing  5/7/2024 0748 by Juju Rojo RN  Outcome: Progressing     Problem: Discharge Planning  Goal: Discharge to home or other facility with appropriate resources  5/7/2024 1658 by Maddison Gama RN  Outcome: Progressing  5/7/2024 0748 by Juju Rojo RN  Outcome: Progressing     Problem: Safety - Adult  Goal: Free from fall injury  5/7/2024 1658 by Maddison Gama RN  Outcome: Progressing  5/7/2024 0748 by Juju Rojo RN  Outcome: Progressing     Problem: ABCDS Injury Assessment  Goal: Absence of physical injury  Outcome: Progressing

## 2024-05-07 NOTE — DISCHARGE INSTRUCTIONS
Continue aspirin 81 mg daily    Restart plavix over the weekend    Ok to shower    Remove steristrip over the weekend    Monitor blood pressure at home, if greater then 150 consistently call PCP or cardiologist to adjust medications    Goal BP should be 120-140 ideally    If having headache, especially on left side and BP is elevated call Dr. Carrera or come in to emergency room    Dr. Carrera's office will call to arrange outpatient follow up, you will get carotid duplex same day or just prior to appointment for evaluation of carotid artery after surgery    Call [phone] Dr. Carrera's office if there are any questions or concerns

## 2024-05-07 NOTE — PROGRESS NOTES
Notified Dr. Null that pt was given dose of hydralazine at 1004. Repeat BP was 159/45 HR 84. No new orders placed.

## 2024-05-07 NOTE — PROGRESS NOTES
Pt given discharge instructions. Pt educated to monitor BP at home. Pt educate to take all medications as prescribed. Pt educated make follow up appointments as directed. All questions answered in full with  at bedside. Pt wheeled to entrance of hospital per PCT. Pt discharged safely with all personal belongings.

## 2024-05-07 NOTE — PROGRESS NOTES
Division of Vascular Surgery             Progress Note      Name: Sydney De La Paz  MRN: [id number]         Overnight Events:      Cardene turned off around midnight      Subjective:     Patient reports that she slept well.  She is not experiencing any pain to the left side of her neck.  She denies any issues with swallowing or breathing.    Later: Patient complained of left-sided headache during the morning when she was hypertensive with SBP in the 150s.  Around 12:30 PM, she reported that her headache had resolved.    Physical Exam:     Vitals:  BP (!) 150/50   Pulse 90   Temp 98.6 °F (37 °C) (Oral)   Resp 20   Ht 1.626 m (5' 4\")   Wt 74.4 kg (164 lb)   SpO2 91%   BMI 28.15 kg/m²     General appearance - alert, well appearing and in no acute distress  Mental status - oriented to person, place and time with normal affect  Head - normocephalic and atraumatic  Neck -dressings clean dry and intact to left neck incision, hematoma present at superior aspect of incision, supple, nontender to palpation  Chest - clear to auscultation, normal effort  Heart - normal rate, regular rhythm, no murmurs  Abdomen - soft, non-tender, non-distended, no masses  Neurological - normal speech, no focal findings or movement disorder noted, cranial nerves II through XII grossly intact  Extremities - peripheral pulses palpable, no pedal edema or calf pain with palpation  Skin - no gross lesions, rashes, or induration noted  Foot Exam -warm, well-perfused.  Vascular Exam -palpable bilateral radial pulses.      Imaging:   No results found.        Assessment:     Patient is a 78-year-old female with history of of asymptomatic left internal carotid stenosis who is POD 1 status post left carotid endarterectomy with patch angioplasty.      Plan:     Okay for regular diet  Patient may be transferred to stepdown.  Anticipate discharge in the next 24 hours.  Please continue to monitor BP and maintain SBP less than 140 with

## 2024-05-07 NOTE — CARE COORDINATION
TRANSITIONAL CARE/DISCHARGE PLANNING ONGOING EVALUATION      Reason for Admission: Stenosis of left carotid artery [I65.22]  Carotid stenosis, asymptomatic, left [I65.22]  Left carotid artery stenosis [I65.22]         Patient goals/Transitional Plan:    Spoke with patient about transition and discharge planning, plan remains to return home independent with     Discharge Report    [facility]  Clinical Case Management Department  Written by: Leah Stewart RN    Patient Name: Sydney De La Paz  Attending Provider: Kathe Carrera,*  Admit Date: 2024 10:35 AM  MRN: [id number]  Account: [id number]                     : 1945  Discharge Date:       Disposition: home    Leah Stewart RN

## 2024-05-07 NOTE — PLAN OF CARE
Problem: Chronic Conditions and Co-morbidities  Goal: Patient's chronic conditions and co-morbidity symptoms are monitored and maintained or improved  5/7/2024 1723 by Maddison Gama RN  Outcome: Completed  5/7/2024 1658 by Maddison Gama RN  Outcome: Progressing  5/7/2024 0748 by Juju Rojo RN  Outcome: Progressing     Problem: Discharge Planning  Goal: Discharge to home or other facility with appropriate resources  5/7/2024 1723 by Maddison Gama RN  Outcome: Completed  5/7/2024 1658 by Maddison Gama RN  Outcome: Progressing  5/7/2024 0748 by Juju Rojo RN  Outcome: Progressing     Problem: Safety - Adult  Goal: Free from fall injury  5/7/2024 1723 by Maddison Gama RN  Outcome: Completed  5/7/2024 1658 by Maddison Gama RN  Outcome: Progressing  5/7/2024 0748 by Juju Rojo RN  Outcome: Progressing     Problem: ABCDS Injury Assessment  Goal: Absence of physical injury  5/7/2024 1723 by Maddison Gama RN  Outcome: Completed  5/7/2024 1658 by Maddison Gama RN  Outcome: Progressing

## 2024-05-07 NOTE — DISCHARGE SUMMARY
controlled once home medications resumed.  One episode of headache that resolved within a few minutes.  At time of discharge, Sydney De La Paz was tolerating a regular diet, having bowel movements, ambulating on her own accord, had adequate analgesia on oral pain medications, and had no signs of symptoms of complications.  She was deemed medically stable and discharged to home on 5/7/24 with instructions to follow up and monitor blood pressure at home.  Pt expressed understanding of and agreement with DC plans.      LABS:     Recent Labs     05/06/24  1124 05/07/24  0427   WBC  --  8.4   HGB  --  10.0*   HCT  --  31.4*   PLT  --  170   NA  --  138   K  --  4.9   CL  --  106   CO2  --  18*   BUN  --  33*   CREATININE 1.1 1.3*       DIAGNOSTIC TESTS:     No results found.    DISCHARGE INSTRUCTIONS:      Discharge Medications:        Medication List        CONTINUE taking these medications      FreeStyle Lancets Misc            ASK your doctor about these medications      acetaminophen-codeine 300-30 MG per tablet  Commonly known as: TYLENOL #3     albuterol sulfate  (90 Base) MCG/ACT inhaler  Commonly known as: PROVENTIL;VENTOLIN;PROAIR     alendronate 70 MG tablet  Commonly known as: FOSAMAX  TAKE 1 TABLET EVERY 7 DAYS ON AN EMPTY STOMACH, FIRST THING IN THE MORNING, WITH FULL GLASS OF WATER, STAY UPRIGHT FOR 30 MINUTES.     amLODIPine 10 MG tablet  Commonly known as: NORVASC  Take 1 tablet by mouth daily     aspirin 81 MG chewable tablet  Take 1 tablet by mouth daily     atorvastatin 80 MG tablet  Commonly known as: LIPITOR  Take 1 tablet by mouth nightly     clopidogrel 75 MG tablet  Commonly known as: PLAVIX  Take 1 tablet by mouth daily     diphenhydrAMINE 25 MG tablet  Commonly known as: BENADRYL     donepezil 5 MG tablet  Commonly known as: ARICEPT  TAKE 1 TABLET DAILY     fluticasone 50 MCG/ACT nasal spray  Commonly known as: FLONASE  1 spray by Each Nostril route daily     FREESTYLE LITE

## 2024-05-08 ENCOUNTER — COMMUNITY CARE MANAGEMENT (OUTPATIENT)
Facility: CLINIC | Age: 79
End: 2024-05-08

## 2024-05-08 DIAGNOSIS — I65.23 CAROTID STENOSIS, ASYMPTOMATIC, BILATERAL: Primary | ICD-10-CM

## 2024-05-08 LAB — SURGICAL PATHOLOGY REPORT: NORMAL

## 2024-05-10 LAB
EKG ATRIAL RATE: 53 BPM
EKG P AXIS: -7 DEGREES
EKG P-R INTERVAL: 202 MS
EKG Q-T INTERVAL: 462 MS
EKG QRS DURATION: 102 MS
EKG QTC CALCULATION (BAZETT): 433 MS
EKG R AXIS: -44 DEGREES
EKG T AXIS: -28 DEGREES
EKG VENTRICULAR RATE: 53 BPM

## 2024-05-20 ENCOUNTER — OFFICE VISIT (OUTPATIENT)
Dept: INTERNAL MEDICINE CLINIC | Age: 79
End: 2024-05-20
Payer: MEDICARE

## 2024-05-20 VITALS
SYSTOLIC BLOOD PRESSURE: 136 MMHG | WEIGHT: 162 LBS | BODY MASS INDEX: 27.66 KG/M2 | HEIGHT: 64 IN | OXYGEN SATURATION: 96 % | DIASTOLIC BLOOD PRESSURE: 76 MMHG | HEART RATE: 86 BPM

## 2024-05-20 DIAGNOSIS — R79.89 ELEVATED SERUM CREATININE: ICD-10-CM

## 2024-05-20 DIAGNOSIS — Z95.1 S/P CABG (CORONARY ARTERY BYPASS GRAFT): ICD-10-CM

## 2024-05-20 DIAGNOSIS — F32.5 MAJOR DEPRESSIVE DISORDER WITH SINGLE EPISODE, IN FULL REMISSION (HCC): ICD-10-CM

## 2024-05-20 DIAGNOSIS — G30.9 ALZHEIMER'S DISEASE, UNSPECIFIED (CODE) (HCC): ICD-10-CM

## 2024-05-20 DIAGNOSIS — N18.30 STAGE 3 CHRONIC KIDNEY DISEASE, UNSPECIFIED WHETHER STAGE 3A OR 3B CKD (HCC): ICD-10-CM

## 2024-05-20 DIAGNOSIS — R35.1 NOCTURIA: ICD-10-CM

## 2024-05-20 DIAGNOSIS — E11.59 HYPERTENSION ASSOCIATED WITH DIABETES (HCC): ICD-10-CM

## 2024-05-20 DIAGNOSIS — E11.9 TYPE 2 DIABETES MELLITUS WITHOUT COMPLICATION, WITHOUT LONG-TERM CURRENT USE OF INSULIN (HCC): Primary | ICD-10-CM

## 2024-05-20 DIAGNOSIS — I25.10 CORONARY ARTERY DISEASE INVOLVING NATIVE CORONARY ARTERY OF NATIVE HEART WITHOUT ANGINA PECTORIS: ICD-10-CM

## 2024-05-20 DIAGNOSIS — L29.9 ITCHY SKIN: ICD-10-CM

## 2024-05-20 DIAGNOSIS — I15.2 HYPERTENSION ASSOCIATED WITH DIABETES (HCC): ICD-10-CM

## 2024-05-20 PROCEDURE — G8417 CALC BMI ABV UP PARAM F/U: HCPCS | Performed by: INTERNAL MEDICINE

## 2024-05-20 PROCEDURE — 1123F ACP DISCUSS/DSCN MKR DOCD: CPT | Performed by: INTERNAL MEDICINE

## 2024-05-20 PROCEDURE — G8399 PT W/DXA RESULTS DOCUMENT: HCPCS | Performed by: INTERNAL MEDICINE

## 2024-05-20 PROCEDURE — 1036F TOBACCO NON-USER: CPT | Performed by: INTERNAL MEDICINE

## 2024-05-20 PROCEDURE — 3078F DIAST BP <80 MM HG: CPT | Performed by: INTERNAL MEDICINE

## 2024-05-20 PROCEDURE — 1090F PRES/ABSN URINE INCON ASSESS: CPT | Performed by: INTERNAL MEDICINE

## 2024-05-20 PROCEDURE — G8427 DOCREV CUR MEDS BY ELIG CLIN: HCPCS | Performed by: INTERNAL MEDICINE

## 2024-05-20 PROCEDURE — 99214 OFFICE O/P EST MOD 30 MIN: CPT | Performed by: INTERNAL MEDICINE

## 2024-05-20 PROCEDURE — 3051F HG A1C>EQUAL 7.0%<8.0%: CPT | Performed by: INTERNAL MEDICINE

## 2024-05-20 PROCEDURE — 1111F DSCHRG MED/CURRENT MED MERGE: CPT | Performed by: INTERNAL MEDICINE

## 2024-05-20 PROCEDURE — 3075F SYST BP GE 130 - 139MM HG: CPT | Performed by: INTERNAL MEDICINE

## 2024-05-20 RX ORDER — TOLTERODINE 4 MG/1
4 CAPSULE, EXTENDED RELEASE ORAL DAILY
Qty: 30 CAPSULE | Refills: 3 | Status: SHIPPED | OUTPATIENT
Start: 2024-05-20

## 2024-05-20 RX ORDER — CITALOPRAM 20 MG/1
20 TABLET ORAL
COMMUNITY
Start: 2021-08-16 | End: 2024-05-20

## 2024-05-20 SDOH — ECONOMIC STABILITY: HOUSING INSECURITY
IN THE LAST 12 MONTHS, WAS THERE A TIME WHEN YOU DID NOT HAVE A STEADY PLACE TO SLEEP OR SLEPT IN A SHELTER (INCLUDING NOW)?: PATIENT DECLINED

## 2024-05-20 SDOH — ECONOMIC STABILITY: FOOD INSECURITY: WITHIN THE PAST 12 MONTHS, YOU WORRIED THAT YOUR FOOD WOULD RUN OUT BEFORE YOU GOT MONEY TO BUY MORE.: PATIENT DECLINED

## 2024-05-20 SDOH — ECONOMIC STABILITY: FOOD INSECURITY: WITHIN THE PAST 12 MONTHS, THE FOOD YOU BOUGHT JUST DIDN'T LAST AND YOU DIDN'T HAVE MONEY TO GET MORE.: PATIENT DECLINED

## 2024-05-20 SDOH — ECONOMIC STABILITY: INCOME INSECURITY: HOW HARD IS IT FOR YOU TO PAY FOR THE VERY BASICS LIKE FOOD, HOUSING, MEDICAL CARE, AND HEATING?: PATIENT DECLINED

## 2024-05-20 NOTE — PROGRESS NOTES
MCG/ACT inhaler       fluticasone (FLONASE) 50 MCG/ACT nasal spray 1 spray by Each Nostril route daily (Patient not taking: Reported on 5/20/2024) 32 g 1    loratadine (CLARITIN) 10 MG tablet Take 1 tablet by mouth daily (Patient not taking: Reported on 4/1/2024) 30 tablet 0     No current facility-administered medications for this visit.         OBJECTIVE:  Vitals:    05/20/24 1007   BP: 136/76   Pulse:    SpO2:      Body mass index is 27.81 kg/m².        Focal exam:    General exam (except above):  General appearance - well appearing, alert, in no acute distress  Head - Atraumatic, normocephalic  Eyes - EOMI, no jaundice or pallor  Lungs - Lungs clear to auscultation.  No wheezing, rhonchi, rales  Heart - RRR without murmur, gallop, or rubs.  No ectopy  Abdomen - Abdomen soft, non-tender. Bowel sounds normal. No masses, organomegaly  Extremities -No significant edema, or skin discoloration. Good capillary refill.  Neuro - Pt Alert, awake and oriented x 3. No gross focal neurological deficits    ASSESSMENT AND PLAN (MEDICAL DECISION MAKING):    Sydney was seen today for post-op problem.    Diagnoses and all orders for this visit:    Type 2 diabetes mellitus without complication, without long-term current use of insulin ([facility])    Stage 3 chronic kidney disease, unspecified whether stage 3a or 3b CKD ([facility])  -     Basic Metabolic Panel; Future    Alzheimer's disease, unspecified    Major depressive disorder with single episode, in full remission ([facility])    Coronary artery disease involving native coronary artery of native heart without angina pectoris    Hypertension associated with diabetes ([facility])    S/P CABG (coronary artery bypass graft)  Comments:  asymptonmatic, c/w aSA, lipitor, BB, losatan, plavix    Nocturia  -     tolterodine (DETROL LA) 4 MG extended release capsule; Take 1 capsule by mouth daily    Elevated serum creatinine  -     Basic Metabolic Panel; Future    Itchy skin  Comments:  at site of incision  only

## 2024-05-31 ENCOUNTER — HOSPITAL ENCOUNTER (OUTPATIENT)
Age: 79
Setting detail: SPECIMEN
Discharge: HOME | End: 2024-05-31

## 2024-05-31 DIAGNOSIS — R79.89 ELEVATED SERUM CREATININE: ICD-10-CM

## 2024-05-31 DIAGNOSIS — N18.30 STAGE 3 CHRONIC KIDNEY DISEASE, UNSPECIFIED WHETHER STAGE 3A OR 3B CKD (HCC): ICD-10-CM

## 2024-05-31 LAB
ANION GAP SERPL CALCULATED.3IONS-SCNC: 13 MMOL/L (ref 9–16)
BUN SERPL-MCNC: 37 MG/DL (ref 8–23)
CALCIUM SERPL-MCNC: 8.9 MG/DL (ref 8.6–10.4)
CHLORIDE SERPL-SCNC: 102 MMOL/L (ref 98–107)
CO2 SERPL-SCNC: 24 MMOL/L (ref 20–31)
CREAT SERPL-MCNC: 1.4 MG/DL (ref 0.5–0.9)
GFR, ESTIMATED: 39 ML/MIN/1.73M2
GLUCOSE SERPL-MCNC: 205 MG/DL (ref 74–99)
POTASSIUM SERPL-SCNC: 4.3 MMOL/L (ref 3.7–5.3)
SODIUM SERPL-SCNC: 139 MMOL/L (ref 136–145)

## 2024-06-06 ENCOUNTER — OFFICE VISIT (OUTPATIENT)
Dept: VASCULAR SURGERY | Age: 79
End: 2024-06-06

## 2024-06-06 ENCOUNTER — HOSPITAL ENCOUNTER (OUTPATIENT)
Dept: VASCULAR LAB | Age: 79
Discharge: HOME | End: 2024-06-08
Attending: SURGERY
Payer: MEDICARE

## 2024-06-06 VITALS
HEART RATE: 66 BPM | BODY MASS INDEX: 27.62 KG/M2 | SYSTOLIC BLOOD PRESSURE: 159 MMHG | OXYGEN SATURATION: 96 % | HEIGHT: 64 IN | WEIGHT: 161.8 LBS | DIASTOLIC BLOOD PRESSURE: 61 MMHG

## 2024-06-06 DIAGNOSIS — Z98.890 S/P CAROTID ENDARTERECTOMY: Primary | ICD-10-CM

## 2024-06-06 DIAGNOSIS — I65.23 CAROTID STENOSIS, ASYMPTOMATIC, BILATERAL: ICD-10-CM

## 2024-06-06 LAB
VAS LEFT CCA DIST EDV: 13.3 CM/S
VAS LEFT CCA DIST PSV: 111 CM/S
VAS LEFT CCA MID EDV: 11 CM/S
VAS LEFT CCA MID PSV: 112 CM/S
VAS LEFT CCA PROX EDV: 17.2 CM/S
VAS LEFT CCA PROX PSV: 105 CM/S
VAS LEFT ECA EDV: 10.6 CM/S
VAS LEFT ECA PSV: 106 CM/S
VAS LEFT ICA DIST EDV: 30.5 CM/S
VAS LEFT ICA DIST PSV: 159 CM/S
VAS LEFT ICA MID EDV: 29.3 CM/S
VAS LEFT ICA MID PSV: 172 CM/S
VAS LEFT ICA PROX EDV: 34.1 CM/S
VAS LEFT ICA PROX PSV: 216 CM/S
VAS LEFT SUBCLAVIAN MID PSV: 162 CM/S
VAS LEFT VERTEBRAL EDV: 5.71 CM/S
VAS LEFT VERTEBRAL PSV: 17.1 CM/S
VAS RIGHT CCA DIST EDV: 11 CM/S
VAS RIGHT CCA DIST PSV: 36.1 CM/S
VAS RIGHT CCA MID EDV: 11 CM/S
VAS RIGHT CCA MID PSV: 40 CM/S
VAS RIGHT CCA PROX EDV: 11.8 CM/S
VAS RIGHT CCA PROX PSV: 45.5 CM/S
VAS RIGHT ECA EDV: 12.1 CM/S
VAS RIGHT ECA PSV: 89.5 CM/S
VAS RIGHT ICA DIST EDV: 22.4 CM/S
VAS RIGHT ICA DIST PSV: 61 CM/S
VAS RIGHT ICA MID EDV: 25.9 CM/S
VAS RIGHT ICA MID PSV: 57.5 CM/S
VAS RIGHT ICA PROX EDV: 23.7 CM/S
VAS RIGHT ICA PROX PSV: 61.9 CM/S
VAS RIGHT ICA/CCA PSV: 1.71
VAS RIGHT SUBCLAVIAN MID PSV: 150 CM/S
VAS RIGHT SUBCLAVIAN PROX PSV: 191 CM/S
VAS RIGHT VERTEBRAL EDV: 20.3 CM/S
VAS RIGHT VERTEBRAL PSV: 109 CM/S

## 2024-06-06 PROCEDURE — 99024 POSTOP FOLLOW-UP VISIT: CPT | Performed by: SURGERY

## 2024-06-06 PROCEDURE — 93880 EXTRACRANIAL BILAT STUDY: CPT

## 2024-06-06 NOTE — PROGRESS NOTES
care    Optimal medical management is an important part of overall treatment of all patients with carotid bifurcation disease, regardless of the degree of stenosis or the plan for intervention. This therapy is directed both at the reduction of stroke and overall cardiovascular events, including cardiovascular-related mortality.    Clinical guidelines issued by the American Heart Association and the American Stroke Association recommends:    I. Lowering blood pressure to a target 140/90 mm Hg by lifestyle interventions and antihypertensive treatment is recommended in individuals who have hypertension with asymptomatic carotid atherosclerosis.    II. Glucose control to nearly normoglycemic levels (target hemoglobin A1C 7%) is recommended among diabetic patients to reduce microvascular complications and, with lesser certainty, macrovascular complications other than stroke.    III.  Patients with known atherosclerosis have demonstrated reduced stroke rates when treated with lipid-lowering therapy.  And continued low dose statin will help stabilize plaque and decrease the inflammatory response of atherosclerosis.    IV.  Smoking nearly doubles the risk of stroke and with cessation there is a reduction in the risk of stroke.    V.  Antiplatelet therapy in asymptomatic patients with carotid atherosclerosis is recommended to reduce overall cardiovascular morbidity although it has not been shown to be effective in the primary prevention of stroke.      Electronically signed by Kathe Carrera MD on 6/6/24 at 10:19 AM [facility] [place]  O: [phone]  C: [phone]  Email: [email or web address]

## 2024-06-07 DIAGNOSIS — M81.0 AGE-RELATED OSTEOPOROSIS WITHOUT CURRENT PATHOLOGICAL FRACTURE: ICD-10-CM

## 2024-06-07 RX ORDER — ALENDRONATE SODIUM 70 MG/1
TABLET ORAL
Qty: 12 TABLET | Refills: 3 | Status: SHIPPED | OUTPATIENT
Start: 2024-06-07

## 2024-06-10 ENCOUNTER — HOSPITAL ENCOUNTER (OUTPATIENT)
Age: 79
Setting detail: SPECIMEN
Discharge: HOME | End: 2024-06-10

## 2024-06-10 DIAGNOSIS — N18.30 STAGE 3 CHRONIC KIDNEY DISEASE, UNSPECIFIED WHETHER STAGE 3A OR 3B CKD (HCC): ICD-10-CM

## 2024-06-10 LAB
ANION GAP SERPL CALCULATED.3IONS-SCNC: 13 MMOL/L (ref 9–16)
BUN SERPL-MCNC: 28 MG/DL (ref 8–23)
CALCIUM SERPL-MCNC: 8.9 MG/DL (ref 8.6–10.4)
CHLORIDE SERPL-SCNC: 104 MMOL/L (ref 98–107)
CO2 SERPL-SCNC: 22 MMOL/L (ref 20–31)
CREAT SERPL-MCNC: 1.2 MG/DL (ref 0.5–0.9)
GFR, ESTIMATED: 45 ML/MIN/1.73M2
GLUCOSE SERPL-MCNC: 185 MG/DL (ref 74–99)
POTASSIUM SERPL-SCNC: 4.1 MMOL/L (ref 3.7–5.3)
SODIUM SERPL-SCNC: 139 MMOL/L (ref 136–145)

## 2024-06-12 PROBLEM — Z98.890 S/P CAROTID ENDARTERECTOMY: Status: ACTIVE | Noted: 2024-06-12

## 2024-06-12 ASSESSMENT — ENCOUNTER SYMPTOMS
ABDOMINAL PAIN: 0
BACK PAIN: 1
ALLERGIC/IMMUNOLOGIC NEGATIVE: 1
CHEST TIGHTNESS: 0
SHORTNESS OF BREATH: 0
COLOR CHANGE: 0

## 2024-08-15 ENCOUNTER — TELEPHONE (OUTPATIENT)
Dept: INTERNAL MEDICINE CLINIC | Age: 79
End: 2024-08-15

## 2024-08-24 DIAGNOSIS — F41.1 GENERALIZED ANXIETY DISORDER: ICD-10-CM

## 2024-11-05 ENCOUNTER — OFFICE VISIT (OUTPATIENT)
Dept: INTERNAL MEDICINE CLINIC | Age: 79
End: 2024-11-05

## 2024-11-05 ENCOUNTER — TELEPHONE (OUTPATIENT)
Dept: INTERNAL MEDICINE CLINIC | Age: 79
End: 2024-11-05

## 2024-11-05 VITALS
HEART RATE: 66 BPM | WEIGHT: 158 LBS | DIASTOLIC BLOOD PRESSURE: 68 MMHG | OXYGEN SATURATION: 98 % | SYSTOLIC BLOOD PRESSURE: 120 MMHG | HEIGHT: 64 IN | BODY MASS INDEX: 26.98 KG/M2

## 2024-11-05 DIAGNOSIS — E11.9 TYPE 2 DIABETES MELLITUS WITHOUT COMPLICATION, WITHOUT LONG-TERM CURRENT USE OF INSULIN (HCC): Primary | ICD-10-CM

## 2024-11-05 DIAGNOSIS — G89.29 OTHER CHRONIC PAIN: ICD-10-CM

## 2024-11-05 DIAGNOSIS — Z00.00 MEDICARE ANNUAL WELLNESS VISIT, SUBSEQUENT: ICD-10-CM

## 2024-11-05 LAB — HBA1C MFR BLD: 7.4 %

## 2024-11-05 RX ORDER — GABAPENTIN 100 MG/1
100 CAPSULE ORAL
Qty: 90 CAPSULE | Refills: 0 | Status: SHIPPED | OUTPATIENT
Start: 2024-11-05 | End: 2025-02-03

## 2024-11-05 RX ORDER — CLOPIDOGREL BISULFATE 75 MG/1
75 TABLET ORAL DAILY
Qty: 90 TABLET | Refills: 3 | Status: SHIPPED | OUTPATIENT
Start: 2024-11-05

## 2024-11-05 RX ORDER — METOPROLOL TARTRATE 37.5 MG/1
1 TABLET, FILM COATED ORAL 2 TIMES DAILY
Qty: 180 TABLET | Refills: 1 | Status: SHIPPED | OUTPATIENT
Start: 2024-11-05

## 2024-11-05 ASSESSMENT — PATIENT HEALTH QUESTIONNAIRE - PHQ9
7. TROUBLE CONCENTRATING ON THINGS, SUCH AS READING THE NEWSPAPER OR WATCHING TELEVISION: NOT AT ALL
6. FEELING BAD ABOUT YOURSELF - OR THAT YOU ARE A FAILURE OR HAVE LET YOURSELF OR YOUR FAMILY DOWN: NOT AT ALL
2. FEELING DOWN, DEPRESSED OR HOPELESS: NOT AT ALL
5. POOR APPETITE OR OVEREATING: NOT AT ALL
10. IF YOU CHECKED OFF ANY PROBLEMS, HOW DIFFICULT HAVE THESE PROBLEMS MADE IT FOR YOU TO DO YOUR WORK, TAKE CARE OF THINGS AT HOME, OR GET ALONG WITH OTHER PEOPLE: NOT DIFFICULT AT ALL
SUM OF ALL RESPONSES TO PHQ QUESTIONS 1-9: 0
9. THOUGHTS THAT YOU WOULD BE BETTER OFF DEAD, OR OF HURTING YOURSELF: NOT AT ALL
3. TROUBLE FALLING OR STAYING ASLEEP: NOT AT ALL
SUM OF ALL RESPONSES TO PHQ9 QUESTIONS 1 & 2: 0
SUM OF ALL RESPONSES TO PHQ QUESTIONS 1-9: 0
8. MOVING OR SPEAKING SO SLOWLY THAT OTHER PEOPLE COULD HAVE NOTICED. OR THE OPPOSITE, BEING SO FIGETY OR RESTLESS THAT YOU HAVE BEEN MOVING AROUND A LOT MORE THAN USUAL: NOT AT ALL
4. FEELING TIRED OR HAVING LITTLE ENERGY: NOT AT ALL
SUM OF ALL RESPONSES TO PHQ QUESTIONS 1-9: 0
SUM OF ALL RESPONSES TO PHQ QUESTIONS 1-9: 0
1. LITTLE INTEREST OR PLEASURE IN DOING THINGS: NOT AT ALL

## 2024-11-05 NOTE — TELEPHONE ENCOUNTER
LVM for patient not to do this test, advised if she had any questions to call the office   full range of motion in all extremities

## 2024-11-05 NOTE — PROGRESS NOTES
2  Total Score Interpretation: Abnormal Mini-Cog    Interventions:  C/w donepezil        Controlled Medication Review:    Today's Pain Level: No data recorded   Opioid Risk: (Low risk score <55) Opioid risk score: 11    Patient is low risk for opioid use disorder or overdose.    Last PDMP [place] as Reviewed:  Review User Review Instant Review Result   JAMMIE MEDELLIN 5/5/2022  2:09 PM     Reviewed PDMP [1]          Inactivity:  On average, how many days per week do you engage in moderate to strenuous exercise (like a brisk walk)?: 0 days (!) Abnormal  On average, how many minutes do you engage in exercise at this level?: 0 min    Interventions:  Regular exercise recommended                          Objective   Vitals:    11/05/24 1142   BP: 120/68   Site: Left Upper Arm   Pulse: 66   SpO2: 98%   Weight: 71.7 kg (158 lb)   Height: 1.626 m (5' 4\")      Body mass index is 27.12 kg/m².          General appearance:  alert, cooperative and no distress  Eyes: Anicteric sclera. Pupils are equally round and reactive to light.  Extraocular movements are intact.  Lungs:  clear to auscultation bilaterally, normal effort  Heart:  regular rate and rhythm, no murmur  Abdomen:  soft, nontender, nondistended, normal bowel sounds, no masses,   Extremities:  no  edema, redness, tenderness in the calves  Skin:  no gross lesions, rashes, induration  Neuro:  Alert, oriented X 3, Gait unsteady, uses cane Non-focal.                     Allergies   Allergen Reactions    Wasp Venom Protein Other (See Comments)     Redness & swelling of area that was stung    Dust Mite Extract     Poison Ivy Extract     Seasonal     Guaifenesin Other (See Comments)     unsure     Prior to Visit Medications    Medication Sig Taking? Authorizing Provider   atorvastatin (LIPITOR) 80 MG tablet Take 1 tablet by mouth nightly Yes Marck Lima MD   Metoprolol Tartrate 37.5 MG TABS TAKE 1 TABLET BY MOUTH 2 TIMES A DAY Yes Marck Lima MD   sertraline (ZOLOFT)

## 2024-11-05 NOTE — TELEPHONE ENCOUNTER
Diagnosis code for the Lipid is firing an ABN and is not covered by insurance please change code and reorder.

## 2024-12-11 ENCOUNTER — OFFICE VISIT (OUTPATIENT)
Dept: INTERNAL MEDICINE CLINIC | Age: 79
End: 2024-12-11
Payer: MEDICARE

## 2024-12-11 VITALS
SYSTOLIC BLOOD PRESSURE: 158 MMHG | BODY MASS INDEX: 28.51 KG/M2 | WEIGHT: 167 LBS | HEART RATE: 68 BPM | OXYGEN SATURATION: 97 % | HEIGHT: 64 IN | DIASTOLIC BLOOD PRESSURE: 94 MMHG

## 2024-12-11 DIAGNOSIS — G89.29 OTHER CHRONIC PAIN: ICD-10-CM

## 2024-12-11 DIAGNOSIS — F32.5 MAJOR DEPRESSIVE DISORDER WITH SINGLE EPISODE, IN FULL REMISSION (HCC): ICD-10-CM

## 2024-12-11 DIAGNOSIS — E11.9 TYPE 2 DIABETES MELLITUS WITHOUT COMPLICATION, WITHOUT LONG-TERM CURRENT USE OF INSULIN (HCC): ICD-10-CM

## 2024-12-11 DIAGNOSIS — G30.9 ALZHEIMER'S DISEASE, UNSPECIFIED (CODE) (HCC): ICD-10-CM

## 2024-12-11 DIAGNOSIS — I25.10 CORONARY ARTERY DISEASE INVOLVING NATIVE CORONARY ARTERY OF NATIVE HEART WITHOUT ANGINA PECTORIS: ICD-10-CM

## 2024-12-11 DIAGNOSIS — J02.0 STREP SORE THROAT: Primary | ICD-10-CM

## 2024-12-11 DIAGNOSIS — N18.30 STAGE 3 CHRONIC KIDNEY DISEASE, UNSPECIFIED WHETHER STAGE 3A OR 3B CKD (HCC): ICD-10-CM

## 2024-12-11 DIAGNOSIS — M81.0 AGE-RELATED OSTEOPOROSIS WITHOUT CURRENT PATHOLOGICAL FRACTURE: ICD-10-CM

## 2024-12-11 LAB
STREP PYOGENES DNA, POC: NEGATIVE
VALID INTERNAL CONTROL, POC: NORMAL

## 2024-12-11 PROCEDURE — G8484 FLU IMMUNIZE NO ADMIN: HCPCS | Performed by: INTERNAL MEDICINE

## 2024-12-11 PROCEDURE — G8399 PT W/DXA RESULTS DOCUMENT: HCPCS | Performed by: INTERNAL MEDICINE

## 2024-12-11 PROCEDURE — 1036F TOBACCO NON-USER: CPT | Performed by: INTERNAL MEDICINE

## 2024-12-11 PROCEDURE — 3051F HG A1C>EQUAL 7.0%<8.0%: CPT | Performed by: INTERNAL MEDICINE

## 2024-12-11 PROCEDURE — 1090F PRES/ABSN URINE INCON ASSESS: CPT | Performed by: INTERNAL MEDICINE

## 2024-12-11 PROCEDURE — 87651 STREP A DNA AMP PROBE: CPT | Performed by: INTERNAL MEDICINE

## 2024-12-11 PROCEDURE — 3079F DIAST BP 80-89 MM HG: CPT | Performed by: INTERNAL MEDICINE

## 2024-12-11 PROCEDURE — 1159F MED LIST DOCD IN RCRD: CPT | Performed by: INTERNAL MEDICINE

## 2024-12-11 PROCEDURE — G8417 CALC BMI ABV UP PARAM F/U: HCPCS | Performed by: INTERNAL MEDICINE

## 2024-12-11 PROCEDURE — G8427 DOCREV CUR MEDS BY ELIG CLIN: HCPCS | Performed by: INTERNAL MEDICINE

## 2024-12-11 PROCEDURE — 99214 OFFICE O/P EST MOD 30 MIN: CPT | Performed by: INTERNAL MEDICINE

## 2024-12-11 PROCEDURE — 1123F ACP DISCUSS/DSCN MKR DOCD: CPT | Performed by: INTERNAL MEDICINE

## 2024-12-11 PROCEDURE — 3077F SYST BP >= 140 MM HG: CPT | Performed by: INTERNAL MEDICINE

## 2024-12-11 NOTE — PROGRESS NOTES
\"Have you been to the ER, urgent care clinic since your last visit?  Hospitalized since your last visit?\"    NO    “Have you seen or consulted any other health care providers outside our system since your last visit?”    NO             
Laterality Date    CAROTID ENDARTERECTOMY Left 5/6/2024    CAROTID ENDARTERECTOMY (SSEP MONITORING, EVOKES CONF# [id number]) / PHOTOFIX BOVINE PATCH 0.8 CM X 8 CM performed by Kathe Carrera MD at [facility]    COLONOSCOPY      x 3    CORONARY ARTERY BYPASS GRAFT N/A 1/13/2023    CORONARY ARTERY BYPASS X 3 ON PUMP,  ROBSON performed by Terry Oates MD at [facility]    ELBOW FRACTURE SURGERY Right     EYE SURGERY Bilateral     optic nerve surgery as a child    FINGER TRIGGER RELEASE Right 11/05/2018    Long middle and ring    FOOT SURGERY Left     2 surgeries    HIP FRACTURE SURGERY Right 06/18/2021    with hardware /  Procedure: INSERTION INTRAMEDULLARY TROCHANTERIC FIXATION NAIL HIP; Surgeon: Serg Escalona MD; Location: [facility]; Service: Orthopedics; Laterality: Right; fluro and ortho table    HYSTERECTOMY (CERVIX STATUS UNKNOWN)      vaginal    KNEE ARTHROSCOPY Right 6/21/2022    KNEE ARTHROSCOPY FOR PARTIAL MEDIAL MENISCECTOMY AND PARTIAL LATERAL MENISCECTOMY performed by Alan Rucker MD at [facility] OR    NOSE SURGERY      3 surgeries    OTHER SURGICAL HISTORY      excision cysts from ovaries    SPINAL CORD STIMULATOR IMPLANT      Patient states it does not work    TUBAL LIGATION          Medications:      Current Outpatient Medications:     amLODIPine (NORVASC) 10 MG tablet, Take 1 tablet by mouth at bedtime, Disp: 90 tablet, Rfl: 3    clopidogrel (PLAVIX) 75 MG tablet, Take 1 tablet by mouth daily, Disp: 90 tablet, Rfl: 3    gabapentin (NEURONTIN) 100 MG capsule, Take 1 capsule by mouth nightly for 90 days., Disp: 90 capsule, Rfl: 0    atorvastatin (LIPITOR) 80 MG tablet, Take 1 tablet by mouth nightly, Disp: 90 tablet, Rfl: 0    sertraline (ZOLOFT) 50 MG tablet, TAKE 1 TABLET DAILY, Disp: 90 tablet, Rfl: 3    losartan (COZAAR) 50 MG tablet, Take 1 tablet by mouth daily, Disp: 90 tablet, Rfl: 0    alendronate (FOSAMAX) 70 MG tablet, TAKE 1 TABLET EVERY 7 DAYS ON AN EMPTY STOMACH, FIRST

## 2024-12-12 ENCOUNTER — TELEPHONE (OUTPATIENT)
Dept: INTERNAL MEDICINE CLINIC | Age: 79
End: 2024-12-12

## 2024-12-12 RX ORDER — AZITHROMYCIN 250 MG/1
TABLET, FILM COATED ORAL
Qty: 6 TABLET | Refills: 0 | Status: SHIPPED | OUTPATIENT
Start: 2024-12-12 | End: 2024-12-22

## 2024-12-12 RX ORDER — AZITHROMYCIN 250 MG/1
TABLET, FILM COATED ORAL
Qty: 6 TABLET | Refills: 0 | Status: SHIPPED | OUTPATIENT
Start: 2024-12-12 | End: 2024-12-12

## 2024-12-12 NOTE — TELEPHONE ENCOUNTER
Patient called stating that her cold is now in full effect and stated that you told her to call so you could send medication to [facility] for her.    Also I relayed results to patient who voiced understanding.

## 2024-12-12 NOTE — TELEPHONE ENCOUNTER
Called patient to let her know that her strep test done yesterday at appointment was negative.   Unable to reach, lvm to call office back

## 2024-12-23 DIAGNOSIS — R41.81 AGE-RELATED COGNITIVE DECLINE: ICD-10-CM

## 2024-12-23 RX ORDER — DONEPEZIL HYDROCHLORIDE 5 MG/1
5 TABLET, FILM COATED ORAL DAILY
Qty: 90 TABLET | Refills: 3 | Status: SHIPPED | OUTPATIENT
Start: 2024-12-23

## 2025-02-24 ENCOUNTER — HOSPITAL ENCOUNTER (OUTPATIENT)
Dept: GENERAL RADIOLOGY | Age: 80
Discharge: HOME | End: 2025-02-26
Payer: MEDICARE

## 2025-02-24 ENCOUNTER — HOSPITAL ENCOUNTER (OUTPATIENT)
Age: 80
Discharge: HOME | End: 2025-02-26
Payer: MEDICARE

## 2025-02-24 DIAGNOSIS — M46.1 SACROILIITIS, NOT ELSEWHERE CLASSIFIED: ICD-10-CM

## 2025-02-24 DIAGNOSIS — M96.1 POSTLAMINECTOMY SYNDROME, THORACIC REGION: ICD-10-CM

## 2025-02-24 PROCEDURE — 72170 X-RAY EXAM OF PELVIS: CPT

## 2025-02-24 PROCEDURE — 72072 X-RAY EXAM THORAC SPINE 3VWS: CPT

## 2025-03-03 ENCOUNTER — OFFICE VISIT (OUTPATIENT)
Dept: INTERNAL MEDICINE CLINIC | Age: 80
End: 2025-03-03
Payer: MEDICARE

## 2025-03-03 ENCOUNTER — HOSPITAL ENCOUNTER (OUTPATIENT)
Age: 80
Setting detail: SPECIMEN
Discharge: HOME | End: 2025-03-03

## 2025-03-03 VITALS
HEART RATE: 70 BPM | SYSTOLIC BLOOD PRESSURE: 134 MMHG | BODY MASS INDEX: 27.98 KG/M2 | OXYGEN SATURATION: 97 % | WEIGHT: 163 LBS | DIASTOLIC BLOOD PRESSURE: 72 MMHG

## 2025-03-03 DIAGNOSIS — I15.2 HYPERTENSION ASSOCIATED WITH DIABETES (HCC): ICD-10-CM

## 2025-03-03 DIAGNOSIS — E11.9 TYPE 2 DIABETES MELLITUS WITHOUT COMPLICATION, WITHOUT LONG-TERM CURRENT USE OF INSULIN (HCC): Primary | ICD-10-CM

## 2025-03-03 DIAGNOSIS — E55.9 VITAMIN D DEFICIENCY: ICD-10-CM

## 2025-03-03 DIAGNOSIS — E53.8 VITAMIN B12 DEFICIENCY: ICD-10-CM

## 2025-03-03 DIAGNOSIS — E11.59 HYPERTENSION ASSOCIATED WITH DIABETES (HCC): ICD-10-CM

## 2025-03-03 DIAGNOSIS — E11.9 TYPE 2 DIABETES MELLITUS WITHOUT COMPLICATION, WITHOUT LONG-TERM CURRENT USE OF INSULIN (HCC): ICD-10-CM

## 2025-03-03 LAB
25(OH)D3 SERPL-MCNC: 41.4 NG/ML (ref 30–100)
ALBUMIN SERPL-MCNC: 4.3 G/DL (ref 3.5–5.2)
ALBUMIN/GLOB SERPL: 1.5 {RATIO} (ref 1–2.5)
ALP SERPL-CCNC: 83 U/L (ref 35–104)
ALT SERPL-CCNC: 27 U/L (ref 10–35)
ANION GAP SERPL CALCULATED.3IONS-SCNC: 11 MMOL/L (ref 9–16)
AST SERPL-CCNC: 26 U/L (ref 10–35)
BASOPHILS # BLD: 0.06 K/UL (ref 0–0.2)
BASOPHILS NFR BLD: 1 % (ref 0–2)
BILIRUB SERPL-MCNC: 0.3 MG/DL (ref 0–1.2)
BUN SERPL-MCNC: 40 MG/DL (ref 8–23)
CALCIUM SERPL-MCNC: 9.1 MG/DL (ref 8.6–10.4)
CHLORIDE SERPL-SCNC: 104 MMOL/L (ref 98–107)
CO2 SERPL-SCNC: 25 MMOL/L (ref 20–31)
CREAT SERPL-MCNC: 1.2 MG/DL (ref 0.6–0.9)
EOSINOPHIL # BLD: 0.22 K/UL (ref 0–0.44)
EOSINOPHILS RELATIVE PERCENT: 4 % (ref 1–4)
ERYTHROCYTE [DISTWIDTH] IN BLOOD BY AUTOMATED COUNT: 13.2 % (ref 11.8–14.4)
FOLATE SERPL-MCNC: 16.3 NG/ML (ref 4.8–24.2)
GFR, ESTIMATED: 46 ML/MIN/1.73M2
GLUCOSE SERPL-MCNC: 181 MG/DL (ref 74–99)
HBA1C MFR BLD: 7.7 %
HCT VFR BLD AUTO: 40.4 % (ref 36.3–47.1)
HGB BLD-MCNC: 12.2 G/DL (ref 11.9–15.1)
IMM GRANULOCYTES # BLD AUTO: <0.03 K/UL (ref 0–0.3)
IMM GRANULOCYTES NFR BLD: 0 %
LYMPHOCYTES NFR BLD: 1.33 K/UL (ref 1.1–3.7)
LYMPHOCYTES RELATIVE PERCENT: 24 % (ref 24–43)
MCH RBC QN AUTO: 28.7 PG (ref 25.2–33.5)
MCHC RBC AUTO-ENTMCNC: 30.2 G/DL (ref 28.4–34.8)
MCV RBC AUTO: 95.1 FL (ref 82.6–102.9)
MONOCYTES NFR BLD: 0.47 K/UL (ref 0.1–1.2)
MONOCYTES NFR BLD: 9 % (ref 3–12)
NEUTROPHILS NFR BLD: 62 % (ref 36–65)
NEUTS SEG NFR BLD: 3.41 K/UL (ref 1.5–8.1)
NRBC BLD-RTO: 0 PER 100 WBC
PLATELET # BLD AUTO: 239 K/UL (ref 138–453)
PMV BLD AUTO: 11.2 FL (ref 8.1–13.5)
POTASSIUM SERPL-SCNC: 4.3 MMOL/L (ref 3.7–5.3)
PROT SERPL-MCNC: 7.1 G/DL (ref 6.6–8.7)
RBC # BLD AUTO: 4.25 M/UL (ref 3.95–5.11)
SODIUM SERPL-SCNC: 140 MMOL/L (ref 136–145)
VIT B12 SERPL-MCNC: 701 PG/ML (ref 232–1245)
WBC OTHER # BLD: 5.5 K/UL (ref 3.5–11.3)

## 2025-03-03 PROCEDURE — 1036F TOBACCO NON-USER: CPT | Performed by: INTERNAL MEDICINE

## 2025-03-03 PROCEDURE — 1159F MED LIST DOCD IN RCRD: CPT | Performed by: INTERNAL MEDICINE

## 2025-03-03 PROCEDURE — 3051F HG A1C>EQUAL 7.0%<8.0%: CPT | Performed by: INTERNAL MEDICINE

## 2025-03-03 PROCEDURE — 1123F ACP DISCUSS/DSCN MKR DOCD: CPT | Performed by: INTERNAL MEDICINE

## 2025-03-03 PROCEDURE — 99214 OFFICE O/P EST MOD 30 MIN: CPT | Performed by: INTERNAL MEDICINE

## 2025-03-03 PROCEDURE — G8417 CALC BMI ABV UP PARAM F/U: HCPCS | Performed by: INTERNAL MEDICINE

## 2025-03-03 PROCEDURE — 83036 HEMOGLOBIN GLYCOSYLATED A1C: CPT | Performed by: INTERNAL MEDICINE

## 2025-03-03 PROCEDURE — 1090F PRES/ABSN URINE INCON ASSESS: CPT | Performed by: INTERNAL MEDICINE

## 2025-03-03 PROCEDURE — 3078F DIAST BP <80 MM HG: CPT | Performed by: INTERNAL MEDICINE

## 2025-03-03 PROCEDURE — G8427 DOCREV CUR MEDS BY ELIG CLIN: HCPCS | Performed by: INTERNAL MEDICINE

## 2025-03-03 PROCEDURE — G8399 PT W/DXA RESULTS DOCUMENT: HCPCS | Performed by: INTERNAL MEDICINE

## 2025-03-03 PROCEDURE — 3075F SYST BP GE 130 - 139MM HG: CPT | Performed by: INTERNAL MEDICINE

## 2025-03-03 RX ORDER — GLIPIZIDE 2.5 MG/1
2.5 TABLET ORAL
Qty: 60 TABLET | Refills: 3 | Status: SHIPPED | OUTPATIENT
Start: 2025-03-03 | End: 2025-04-02

## 2025-03-03 RX ORDER — ATORVASTATIN CALCIUM 80 MG/1
80 TABLET, FILM COATED ORAL NIGHTLY
Qty: 90 TABLET | Refills: 2 | Status: SHIPPED | OUTPATIENT
Start: 2025-03-03

## 2025-03-03 RX ORDER — METOPROLOL SUCCINATE 25 MG/1
25 TABLET, EXTENDED RELEASE ORAL NIGHTLY
Qty: 90 TABLET | Refills: 3 | Status: SHIPPED | OUTPATIENT
Start: 2025-03-03

## 2025-03-03 RX ORDER — GLIPIZIDE 5 MG/1
5 TABLET ORAL 2 TIMES DAILY
Qty: 60 TABLET | Refills: 3 | Status: SHIPPED | OUTPATIENT
Start: 2025-03-03 | End: 2025-03-03

## 2025-03-03 SDOH — ECONOMIC STABILITY: FOOD INSECURITY: WITHIN THE PAST 12 MONTHS, THE FOOD YOU BOUGHT JUST DIDN'T LAST AND YOU DIDN'T HAVE MONEY TO GET MORE.: NEVER TRUE

## 2025-03-03 SDOH — ECONOMIC STABILITY: FOOD INSECURITY: WITHIN THE PAST 12 MONTHS, YOU WORRIED THAT YOUR FOOD WOULD RUN OUT BEFORE YOU GOT MONEY TO BUY MORE.: NEVER TRUE

## 2025-03-03 ASSESSMENT — PATIENT HEALTH QUESTIONNAIRE - PHQ9
SUM OF ALL RESPONSES TO PHQ QUESTIONS 1-9: 0
5. POOR APPETITE OR OVEREATING: NOT AT ALL
SUM OF ALL RESPONSES TO PHQ QUESTIONS 1-9: 0
6. FEELING BAD ABOUT YOURSELF - OR THAT YOU ARE A FAILURE OR HAVE LET YOURSELF OR YOUR FAMILY DOWN: NOT AT ALL
2. FEELING DOWN, DEPRESSED OR HOPELESS: NOT AT ALL
SUM OF ALL RESPONSES TO PHQ QUESTIONS 1-9: 0
9. THOUGHTS THAT YOU WOULD BE BETTER OFF DEAD, OR OF HURTING YOURSELF: NOT AT ALL
3. TROUBLE FALLING OR STAYING ASLEEP: NOT AT ALL
10. IF YOU CHECKED OFF ANY PROBLEMS, HOW DIFFICULT HAVE THESE PROBLEMS MADE IT FOR YOU TO DO YOUR WORK, TAKE CARE OF THINGS AT HOME, OR GET ALONG WITH OTHER PEOPLE: NOT DIFFICULT AT ALL
4. FEELING TIRED OR HAVING LITTLE ENERGY: NOT AT ALL
8. MOVING OR SPEAKING SO SLOWLY THAT OTHER PEOPLE COULD HAVE NOTICED. OR THE OPPOSITE, BEING SO FIGETY OR RESTLESS THAT YOU HAVE BEEN MOVING AROUND A LOT MORE THAN USUAL: NOT AT ALL
1. LITTLE INTEREST OR PLEASURE IN DOING THINGS: NOT AT ALL
7. TROUBLE CONCENTRATING ON THINGS, SUCH AS READING THE NEWSPAPER OR WATCHING TELEVISION: NOT AT ALL
SUM OF ALL RESPONSES TO PHQ QUESTIONS 1-9: 0

## 2025-03-03 NOTE — PROGRESS NOTES
\"Have you been to the ER, urgent care clinic since your last visit?  Hospitalized since your last visit?\"    NO    “Have you seen or consulted any other health care providers outside our system since your last visit?”    NO            
Panel      4. Vitamin D deficiency  Vitamin B12 & Folate    Vitamin D 25 Hydroxy    CBC with Auto Differential    Comprehensive Metabolic Panel          1. Type 2 diabetes mellitus without complication, without long-term current use of insulin (HCC)  -     Albumin/Creatinine Ratio, Urine (uACR); Future  -     POCT glycosylated hemoglobin (Hb A1C)  -     glipiZIDE 2.5 MG TABS; Take 2.5 mg by mouth 2 times daily (before meals), Disp-60 tablet, R-3Normal  -     Vitamin D 25 Hydroxy; Future  -     CBC with Auto Differential; Future  -     Comprehensive Metabolic Panel; Future  2. Hypertension associated with diabetes (HCC)  -     CBC with Auto Differential; Future  -     Comprehensive Metabolic Panel; Future  3. Vitamin B12 deficiency  -     Vitamin B12 & Folate; Future  -     Vitamin D 25 Hydroxy; Future  -     CBC with Auto Differential; Future  -     Comprehensive Metabolic Panel; Future  4. Vitamin D deficiency  -     Vitamin B12 & Folate; Future  -     Vitamin D 25 Hydroxy; Future  -     CBC with Auto Differential; Future  -     Comprehensive Metabolic Panel; Future               Completed Refills   Requested Prescriptions     Signed Prescriptions Disp Refills    atorvastatin (LIPITOR) 80 MG tablet 90 tablet 2     Sig: Take 1 tablet by mouth nightly     No follow-ups on file.  Orders Placed This Encounter   Medications    atorvastatin (LIPITOR) 80 MG tablet     Sig: Take 1 tablet by mouth nightly     Dispense:  90 tablet     Refill:  2     Orders Placed This Encounter   Procedures    Albumin/Creatinine Ratio, Urine (uACR)     Standing Status:   Future     Standing Expiration Date:   2/26/2026    POCT glycosylated hemoglobin (Hb A1C)       Electronically signed by Elena Lambert MD on 3/3/2025 at 12:51 PM

## 2025-05-28 ENCOUNTER — OFFICE VISIT (OUTPATIENT)
Dept: ORTHOPEDIC SURGERY | Age: 80
End: 2025-05-28
Payer: MEDICARE

## 2025-05-28 VITALS — RESPIRATION RATE: 18 BRPM | BODY MASS INDEX: 27.83 KG/M2 | WEIGHT: 163 LBS | HEIGHT: 64 IN

## 2025-05-28 DIAGNOSIS — M25.561 RIGHT KNEE PAIN, UNSPECIFIED CHRONICITY: Primary | ICD-10-CM

## 2025-05-28 PROCEDURE — G8417 CALC BMI ABV UP PARAM F/U: HCPCS | Performed by: ORTHOPAEDIC SURGERY

## 2025-05-28 PROCEDURE — 99203 OFFICE O/P NEW LOW 30 MIN: CPT | Performed by: ORTHOPAEDIC SURGERY

## 2025-05-28 PROCEDURE — 1036F TOBACCO NON-USER: CPT | Performed by: ORTHOPAEDIC SURGERY

## 2025-05-28 PROCEDURE — 1123F ACP DISCUSS/DSCN MKR DOCD: CPT | Performed by: ORTHOPAEDIC SURGERY

## 2025-05-28 PROCEDURE — G8427 DOCREV CUR MEDS BY ELIG CLIN: HCPCS | Performed by: ORTHOPAEDIC SURGERY

## 2025-05-28 PROCEDURE — 1125F AMNT PAIN NOTED PAIN PRSNT: CPT | Performed by: ORTHOPAEDIC SURGERY

## 2025-05-28 PROCEDURE — G8399 PT W/DXA RESULTS DOCUMENT: HCPCS | Performed by: ORTHOPAEDIC SURGERY

## 2025-05-28 PROCEDURE — 1090F PRES/ABSN URINE INCON ASSESS: CPT | Performed by: ORTHOPAEDIC SURGERY

## 2025-05-28 PROCEDURE — 1159F MED LIST DOCD IN RCRD: CPT | Performed by: ORTHOPAEDIC SURGERY

## 2025-05-28 NOTE — PROGRESS NOTES
[facility]                   Alan Rucker M.D.            [address], [address]               [place], [place] [address]           Dept Phone: [phone]           Dept Fax:  [phone] [address] [facility]                       [address]           [place], [place] [address]          Dept Phone: [phone]           Dept Fax:  [phone]      Chief Compliant:  Chief Complaint   Patient presents with    Pain     RT KNEE        History of Present Illness:  This is a 79 y.o. female who presents to the clinic today for evaluation / follow up of right knee pain and discomfort.  Patient is a very active 79-year-old female who states that she has had multiple falls and is landed on her right knee.  She states that sometimes she feels like her legs will not give.  She was told that she has a left dropfoot but based on examination I do not see this.  She is just here primarily to make sure nothing drastically going on with her right knee..       Review of Systems   Constitutional: Negative for fever, chills, sweats.   Eyes: Negative for changes in vision, or pain.   HENT: Negative for ear ache, epistaxis, or sore throat.  Respiratory/Cardio: Negative for Chest pain, palpitations, SOB, or cough.  Gastrointestinal: Negative for abdominal pain, N/V/D.   Genitourinary: Negative for dysuria, frequency, urgency, or hematuria.   Neurological: Negative for headache, numbness, or weakness.   Integumentary: Negative for rash, itching, laceration, or abrasion.   Musculoskeletal: Positive for Pain (RT KNEE)       Physical Exam:  Constitutional: Patient is oriented to person, place, and time. Patient appears well-developed and well nourished.   HENT: Negative otherwise noted  Head: Normocephalic and Atraumatic  Nose: Normal  Eyes: Conjunctivae and EOM are normal  Neck: Normal range of motion Neck supple.    Respiratory/Cardio: Effort normal. No respiratory

## 2025-05-29 ENCOUNTER — OFFICE VISIT (OUTPATIENT)
Dept: ORTHOPEDIC SURGERY | Age: 80
End: 2025-05-29
Payer: MEDICARE

## 2025-05-29 VITALS — BODY MASS INDEX: 27.83 KG/M2 | WEIGHT: 163 LBS | HEIGHT: 64 IN | RESPIRATION RATE: 14 BRPM

## 2025-05-29 DIAGNOSIS — Z98.1 HISTORY OF LUMBAR FUSION: ICD-10-CM

## 2025-05-29 DIAGNOSIS — M54.41 CHRONIC RIGHT-SIDED LOW BACK PAIN WITH RIGHT-SIDED SCIATICA: Primary | ICD-10-CM

## 2025-05-29 DIAGNOSIS — Z45.42 BATTERY END OF LIFE OF SPINAL CORD STIMULATOR: ICD-10-CM

## 2025-05-29 DIAGNOSIS — G89.29 CHRONIC RIGHT-SIDED LOW BACK PAIN WITH RIGHT-SIDED SCIATICA: Primary | ICD-10-CM

## 2025-05-29 PROCEDURE — 1123F ACP DISCUSS/DSCN MKR DOCD: CPT | Performed by: PHYSICIAN ASSISTANT

## 2025-05-29 PROCEDURE — G8399 PT W/DXA RESULTS DOCUMENT: HCPCS | Performed by: PHYSICIAN ASSISTANT

## 2025-05-29 PROCEDURE — G8428 CUR MEDS NOT DOCUMENT: HCPCS | Performed by: PHYSICIAN ASSISTANT

## 2025-05-29 PROCEDURE — G8417 CALC BMI ABV UP PARAM F/U: HCPCS | Performed by: PHYSICIAN ASSISTANT

## 2025-05-29 PROCEDURE — 1036F TOBACCO NON-USER: CPT | Performed by: PHYSICIAN ASSISTANT

## 2025-05-29 PROCEDURE — 99213 OFFICE O/P EST LOW 20 MIN: CPT | Performed by: PHYSICIAN ASSISTANT

## 2025-05-29 PROCEDURE — 1090F PRES/ABSN URINE INCON ASSESS: CPT | Performed by: PHYSICIAN ASSISTANT

## 2025-05-29 NOTE — PROGRESS NOTES
her spin.  At this time patient was given a referral for physical therapy and advised to see Dr. Chavez in regards to her spinal cord stimulator no longer providing relief/working..       Follow up in 6 weeks with Dr. Chavez for further discussion.      Nellie Rich PA-C    6/3/2025 9:16 AM    Please note that this chart was generated using voice recognition Dragon dictation software.  Although every effort was made to ensure the accuracy of this automated transcription, some errors in transcription may have occurred.

## 2025-06-02 ENCOUNTER — TELEPHONE (OUTPATIENT)
Dept: INTERNAL MEDICINE CLINIC | Age: 80
End: 2025-06-02

## 2025-06-02 NOTE — TELEPHONE ENCOUNTER
Left message informing pt that her appt on 8/5/25 has been cancelled. Please reschedule pt to next available.

## 2025-06-03 NOTE — TELEPHONE ENCOUNTER
06/03/25 - Patient called back to reschedule. Writer assisted patient in rescheduling her appointment. Patient is scheduled for 08/18 at 1:15pm.

## 2025-06-16 DIAGNOSIS — R41.81 AGE-RELATED COGNITIVE DECLINE: ICD-10-CM

## 2025-06-16 DIAGNOSIS — F41.1 GENERALIZED ANXIETY DISORDER: ICD-10-CM

## 2025-06-16 RX ORDER — METOPROLOL SUCCINATE 25 MG/1
25 TABLET, EXTENDED RELEASE ORAL NIGHTLY
Qty: 90 TABLET | Refills: 1 | Status: SHIPPED | OUTPATIENT
Start: 2025-06-16

## 2025-06-16 RX ORDER — DONEPEZIL HYDROCHLORIDE 5 MG/1
5 TABLET, FILM COATED ORAL DAILY
Qty: 90 TABLET | Refills: 1 | Status: SHIPPED | OUTPATIENT
Start: 2025-06-16

## 2025-06-16 RX ORDER — AMLODIPINE BESYLATE 10 MG/1
10 TABLET ORAL NIGHTLY
Qty: 90 TABLET | Refills: 1 | Status: SHIPPED | OUTPATIENT
Start: 2025-06-16

## 2025-06-16 RX ORDER — CLOPIDOGREL BISULFATE 75 MG/1
75 TABLET ORAL DAILY
Qty: 90 TABLET | Refills: 1 | Status: SHIPPED | OUTPATIENT
Start: 2025-06-16

## 2025-06-16 NOTE — TELEPHONE ENCOUNTER
Last visit: 3/2025    Last Med refill: 11/2024  Does patient have enough medication for 72 hours:   Next Visit Date:  Future Appointments   Date Time Provider Department Center   6/18/2025 11:30 AM [facility]   8/18/2025  1:15 PM Elena Lambert MD [facility] DEP       Health Maintenance   Topic Date Due    DTaP/Tdap/Td vaccine (1 - Tdap) Never done    Respiratory Syncytial Virus (RSV) Pregnant or age 60 yrs+ (1 - 1-dose 75+ series) Never done    Diabetic Alb to Cr ratio (uACR) test  08/16/2024    Lipids  08/16/2024    COVID-19 Vaccine (4 - 2024-25 season) 09/01/2024    Flu vaccine (Season Ended) 08/01/2025    Annual Wellness Visit (Medicare)  11/06/2025    Depression Monitoring  03/03/2026    GFR test (Diabetes, CKD 3-4, OR last GFR 15-59)  03/03/2026    DEXA (modify frequency per FRAX score)  Completed    Shingles vaccine  Completed    Pneumococcal 50+ years Vaccine  Completed    Hepatitis A vaccine  Aged Out    Hepatitis B vaccine  Aged Out    Hib vaccine  Aged Out    Polio vaccine  Aged Out    Meningococcal (ACWY) vaccine  Aged Out    Meningococcal B vaccine  Aged Out       Hemoglobin A1C (%)   Date Value   03/03/2025 7.7   11/05/2024 7.4   03/18/2024 7.9 (H)             ( goal A1C is < 7)   No components found for: \"LABMICR\"  No components found for: \"LDLCHOLESTEROL\", \"LDLCALC\"    (goal LDL is <100)   AST (U/L)   Date Value   03/03/2025 26     ALT (U/L)   Date Value   03/03/2025 27     BUN (mg/dL)   Date Value   03/03/2025 40 (H)     BP Readings from Last 3 Encounters:   03/03/25 134/72   12/11/24 (!) 158/94   11/26/24 (!) 160/90          (goal 120/80)    All Future Testing planned in [facility]  Lab Frequency Next Occurrence   Vascular duplex carotid bilateral Once 06/11/2025   Lipid, Fasting Once 11/05/2024   Albumin/Creatinine Ratio, Urine (uACR) Once 03/26/2025               Patient Active Problem List:     Closed fracture of neck of radius     Type 2 diabetes

## 2025-07-10 ENCOUNTER — HOSPITAL ENCOUNTER (OUTPATIENT)
Dept: PHYSICAL THERAPY | Age: 80
Setting detail: THERAPIES SERIES
Discharge: HOME | End: 2025-07-10
Payer: MEDICARE

## 2025-07-10 PROCEDURE — 97163 PT EVAL HIGH COMPLEX 45 MIN: CPT

## 2025-07-10 NOTE — CONSULTS
Yulissa Gant is a 75 y.o. female presenting for Follow Up Chronic Condition (6 mo fu)  . 1. Have you been to the ER, urgent care clinic since your last visit? Hospitalized since your last visit? No    2. Have you seen or consulted any other health care providers outside of the [address] [place] [address] since your last visit? Include any pap smears or colon screening. No    Fall Risk Assessment, last 12 mths 5/24/2021   Able to walk? Yes   Fall in past 12 months? 0   Do you feel unsteady? 0   Are you worried about falling 0         Abuse Screening Questionnaire 5/20/2020   Do you ever feel afraid of your partner? N   Are you in a relationship with someone who physically or mentally threatens you? N   Is it safe for you to go home? Y       3 most recent PHQ Screens 5/24/2021   Little interest or pleasure in doing things Not at all   Feeling down, depressed, irritable, or hopeless Not at all   Total Score PHQ 2 0       There are no discontinued medications. [x] 4+   Clinical presentation (progression) [] Stable [] Evolving  [x] Unstable   Decision Making [] Low [] Moderate [x] High    [] Low Complexity [] Moderate Complexity [x] High Complexity       Treatment Charges: Mins Units Time in/out   [x] Evaluation       []  Low       []  Moderate       [x]  High 52 1    []  Modalities      []  Ther Exercise      []  Manual Therapy      []  Ther Activities      []  Aquatics      []  Neuromuscular       []  Gait      []  Vasocompression      []  Other              TOTAL TREATMENT TIME: 52    Time in:  103 pm      Time out:  200 pm    Electronically signed by: Laura Reyes PT    Physician Signature:________________________________Date:__________________  By signing above or cosigning this note, I have reviewed this plan of care and certify a need for medically necessary rehabilitation services.     *PLEASE SIGN ABOVE AND FAX BACK ALL PAGES*

## 2025-07-16 ENCOUNTER — APPOINTMENT (OUTPATIENT)
Dept: PHYSICAL THERAPY | Age: 80
End: 2025-07-16
Payer: MEDICARE

## 2025-07-21 ENCOUNTER — OFFICE VISIT (OUTPATIENT)
Dept: FAMILY MEDICINE CLINIC | Age: 80
End: 2025-07-21
Payer: MEDICARE

## 2025-07-21 ENCOUNTER — HOSPITAL ENCOUNTER (OUTPATIENT)
Dept: GENERAL RADIOLOGY | Age: 80
Discharge: HOME | End: 2025-07-23
Payer: MEDICARE

## 2025-07-21 VITALS
TEMPERATURE: 97.1 F | WEIGHT: 163 LBS | HEART RATE: 71 BPM | SYSTOLIC BLOOD PRESSURE: 170 MMHG | DIASTOLIC BLOOD PRESSURE: 62 MMHG | BODY MASS INDEX: 27.83 KG/M2 | HEIGHT: 64 IN | OXYGEN SATURATION: 99 %

## 2025-07-21 DIAGNOSIS — M25.551 PAIN IN JOINT INVOLVING RIGHT PELVIC REGION AND THIGH: ICD-10-CM

## 2025-07-21 DIAGNOSIS — M25.551 HIP PAIN, ACUTE, RIGHT: ICD-10-CM
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
## 2025-07-21 PROCEDURE — 3077F SYST BP >= 140 MM HG: CPT | Performed by: NURSE PRACTITIONER

## 2025-07-21 PROCEDURE — 73502 X-RAY EXAM HIP UNI 2-3 VIEWS: CPT

## 2025-07-21 PROCEDURE — 1090F PRES/ABSN URINE INCON ASSESS: CPT | Performed by: NURSE PRACTITIONER

## 2025-07-21 PROCEDURE — 1036F TOBACCO NON-USER: CPT | Performed by: NURSE PRACTITIONER

## 2025-07-21 PROCEDURE — 3078F DIAST BP <80 MM HG: CPT | Performed by: NURSE PRACTITIONER

## 2025-07-21 PROCEDURE — 99214 OFFICE O/P EST MOD 30 MIN: CPT | Performed by: NURSE PRACTITIONER

## 2025-07-21 PROCEDURE — G8427 DOCREV CUR MEDS BY ELIG CLIN: HCPCS | Performed by: NURSE PRACTITIONER

## 2025-07-21 PROCEDURE — G8417 CALC BMI ABV UP PARAM F/U: HCPCS | Performed by: NURSE PRACTITIONER

## 2025-07-21 PROCEDURE — 1125F AMNT PAIN NOTED PAIN PRSNT: CPT | Performed by: NURSE PRACTITIONER

## 2025-07-21 PROCEDURE — 1123F ACP DISCUSS/DSCN MKR DOCD: CPT | Performed by: NURSE PRACTITIONER

## 2025-07-21 PROCEDURE — 1159F MED LIST DOCD IN RCRD: CPT | Performed by: NURSE PRACTITIONER

## 2025-07-21 PROCEDURE — G8399 PT W/DXA RESULTS DOCUMENT: HCPCS | Performed by: NURSE PRACTITIONER

## 2025-07-21 ASSESSMENT — ENCOUNTER SYMPTOMS: COLOR CHANGE: 0

## 2025-07-21 NOTE — PROGRESS NOTES
[facility] PHYSICIANS [facility]  [address]  [address]  [facility] [address]  Dept: [phone]  Dept Fax: [phone]    Sydney De La Paz is a 79 y.o. female who presents to the urgent care today for her medical conditions/complaints as notedbelow.  Sydney De La Paz is c/o of Hip Pain (right) and Pelvic Pain (right)      HPI:     79-year-old female presents for right hip pain status post fall 1-1 and half weeks ago  Right lateral hip and right groin pain.  Recalls no discoloration to the affected areas. worse with walking and sitting.  Patient and her spouse do not recall specifics of the fall.  She has a walker and cane at home.  Has not been using.  Attends physical therapy for right knee pain  Hx right fractured hip with ORIF in past with Dr. Escalona approx 4 yrs ago.  Had left over tylenol 4 - used those with relief. Now is out and wants pain meds  History Alzheimer's    Hip Pain   The incident occurred more than 1 week ago. The incident occurred at home. The injury mechanism was a fall. The pain is present in the right hip. The quality of the pain is described as aching. The pain is moderate. The pain has been Constant since onset. Pertinent negatives include no inability to bear weight, loss of motion, loss of sensation, muscle weakness, numbness or tingling. She reports no foreign bodies present. The symptoms are aggravated by weight bearing (Sitting). Treatments tried: Tylenol No. 4 with codeine. The treatment provided moderate relief.       Past Medical History:   Diagnosis Date    Abnormal EKG     Arthritis     Asthma     Noted per [facility] chart- patient denies    Colon polyps     Gout     History of fall     Hyperlipidemia     Hypertension     Incomplete RBBB     Osteoarthritis     Osteopetrosis     Right knee meniscal tear     Sciatica     Seasonal allergies     Snoring     Type II or unspecified type diabetes mellitus

## 2025-07-23 ENCOUNTER — HOSPITAL ENCOUNTER (OUTPATIENT)
Dept: PHYSICAL THERAPY | Age: 80
Setting detail: THERAPIES SERIES
Discharge: HOME | End: 2025-07-23
Payer: MEDICARE

## 2025-07-23 PROCEDURE — 97110 THERAPEUTIC EXERCISES: CPT

## 2025-07-23 PROCEDURE — 97116 GAIT TRAINING THERAPY: CPT

## 2025-07-23 NOTE — FLOWSHEET NOTE
awareness/correction w/ static activities, in order to improve sitting and  tolerance to > 1 hour  []  []  []       Independent with Home Exercise Programs in order to maintain gains made with therapy interventions  []  []  []       Demonstrate Knowledge of fall prevention []  []  []                  LTGs  (to be met in 14 treatments) :            Date Addressed:           Decrease/centralize pain/symptoms to </= 0-5/10, and decrease frequency, in order to increase ability/tolerance w/ walking, housechores  []  []  []      Increase ROM throughout Lx/Trunk by >/= 25% all planes, except ext, in order to increase mobility for self care, transfers, ambulation, house chores            Increase strength to 4+-5/5 throughout B LEs, full bridge, and core WFLs, in order to increase ability/stability w/ transfers, stairs, household chores  []  []  []      Improve gait mechanics to WNLs/symmetrical throughout, in order to decrease strain on spine w/ ambulation and to decrease fall risk.  []  []  []      Increase function AEB Modified Oswestry </= 20% disability/impairment  []  []  []      Bowman w/ HEP in order to maintain gains made with therapy interventions upon discharge.  []  []  []      Decrease fall risk AEB Tinetti >/= 19/28 []  []  []         Patient goals: not be in pain as much    Pt. Education:  [x] Yes  [] No  [] Reviewed Prior HEP/Ed  Method of Education: [x] Verbal  [x] Demo  [] Written  Comprehension of Education:  [x] Verbalizes understanding.  [x] Demonstrates understanding.  [] Needs review.  [] Demonstrates/verbalizes HEP/Ed previously given.     Plan: [x] Continue per plan of care.   [] Other:      Treatment Charges: Mins Units   []  Modalities     [x]  Ther Exercise 42 2   []  Manual Therapy     []  Ther Activities     []  Aquatics     []  Neuromuscular     [] Vasocompression     [x] Gait Training 8 1   [] Dry needling        [] 1 or 2 muscles        [] 3 or more muscles     []  Other     Total

## 2025-07-25 ENCOUNTER — HOSPITAL ENCOUNTER (OUTPATIENT)
Dept: PHYSICAL THERAPY | Age: 80
Setting detail: THERAPIES SERIES
Discharge: HOME | End: 2025-07-25
Payer: MEDICARE

## 2025-07-25 PROCEDURE — 97116 GAIT TRAINING THERAPY: CPT

## 2025-07-25 PROCEDURE — 97112 NEUROMUSCULAR REEDUCATION: CPT

## 2025-07-25 PROCEDURE — 97110 THERAPEUTIC EXERCISES: CPT

## 2025-07-25 NOTE — FLOWSHEET NOTE
[facility]   Outpatient Rehabilitation & Therapy  [address]  P: [phone]   F: [phone]    Physical Therapy Daily Treatment Note      Date:  2025  Patient Name:  Sydney De La Paz  \"Ramiro\"     :  1945  MRN: [id number]  Physician: Nellie Rich PA                          Insurance: Medicare /  for life Medicare supp $0 copay follow medicare guidelines / based on medical necessity   BENEFITS   Medical Diagnosis: M54.50, G89.29 (ICD-10-CM) - Chronic low back pain, unspecified back pain laterality, unspecified whether sciatica present   Rehab Codes:   M25.60 Stiffness NEC ,   M62.81 weakness,  R26.81 unsteadiness on feet  Visit# / total visits: 3/14  Cancels/No Shows: 0/1  High fall risk: falls several x/week - stay close to pt (better w/ RW > Rollator)    Subjective:    Due to last treatment, realized she is more stable using Rollator/RW; therefore, she has been increasing use of RW in home and rollator in community ( as she keeps in the car).  Put her canes away. Denies falls since last visit (2 days ago). Has been duplicating ex's at home.    Pain:  [x] Yes  [] No Location: R lat hip/thigh pain   Pain Rating: (0-10 scale) 8-9/10, and general aches and pains  Pain altered Tx:  [x] No  [] Yes  Action:  Comments:    Objective:  Modalities:   PRECAUTIONS: Freq falls (several x/week). 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 R hip ORIF . CABG . SCS - no e-stim. L3-5 fusion. Urinary incontinence.   High fall risk: falls several x/week - stay close to pt  Exercise       Reps/ Time Weight/ Level Completed  today Comments   *SBA/CGA for all standing/walking due to freq. Falls*           Table           Gentle LTR 10x 5\" hold   x     SKTC w/ belt  30\" x3   x Able to perform on B hips  w/o pain   Glut sets   - Attempted ; see note   Glut sets/shallow bridge  10x3\"   -  Attempted ; see note   Abd bracing 10x 5\" hold    x Increased time    + SKFO

## 2025-07-30 ENCOUNTER — HOSPITAL ENCOUNTER (OUTPATIENT)
Dept: PHYSICAL THERAPY | Age: 80
Setting detail: THERAPIES SERIES
End: 2025-07-30
Payer: MEDICARE

## 2025-08-08 ENCOUNTER — HOSPITAL ENCOUNTER (OUTPATIENT)
Dept: PHYSICAL THERAPY | Age: 80
Setting detail: THERAPIES SERIES
Discharge: HOME | End: 2025-08-08
Payer: MEDICARE

## 2025-08-08 PROCEDURE — 97112 NEUROMUSCULAR REEDUCATION: CPT

## 2025-08-08 PROCEDURE — 97110 THERAPEUTIC EXERCISES: CPT

## 2025-08-08 PROCEDURE — 97530 THERAPEUTIC ACTIVITIES: CPT

## 2025-08-11 ENCOUNTER — HOSPITAL ENCOUNTER (OUTPATIENT)
Dept: PHYSICAL THERAPY | Age: 80
Setting detail: THERAPIES SERIES
Discharge: HOME | End: 2025-08-11
Payer: MEDICARE

## 2025-08-11 PROCEDURE — 97110 THERAPEUTIC EXERCISES: CPT

## 2025-08-11 PROCEDURE — 97530 THERAPEUTIC ACTIVITIES: CPT

## 2025-08-11 PROCEDURE — 97112 NEUROMUSCULAR REEDUCATION: CPT

## 2025-08-13 ENCOUNTER — OFFICE VISIT (OUTPATIENT)
Dept: INTERNAL MEDICINE CLINIC | Age: 80
End: 2025-08-13
Payer: MEDICARE

## 2025-08-13 VITALS
SYSTOLIC BLOOD PRESSURE: 126 MMHG | WEIGHT: 159 LBS | DIASTOLIC BLOOD PRESSURE: 72 MMHG | HEIGHT: 64 IN | OXYGEN SATURATION: 98 % | BODY MASS INDEX: 27.14 KG/M2 | HEART RATE: 68 BPM

## 2025-08-13 DIAGNOSIS — E11.9 TYPE 2 DIABETES MELLITUS WITHOUT COMPLICATION, WITHOUT LONG-TERM CURRENT USE OF INSULIN (HCC): Primary | ICD-10-CM

## 2025-08-13 DIAGNOSIS — G95.9 CERVICAL MYELOPATHY (HCC): ICD-10-CM

## 2025-08-13 DIAGNOSIS — M81.0 AGE-RELATED OSTEOPOROSIS WITHOUT CURRENT PATHOLOGICAL FRACTURE: ICD-10-CM

## 2025-08-13 DIAGNOSIS — N18.30 TYPE 2 DIABETES MELLITUS WITH STAGE 3 CHRONIC KIDNEY DISEASE, UNSPECIFIED WHETHER LONG TERM INSULIN USE, UNSPECIFIED WHETHER STAGE 3A OR 3B CKD (HCC): ICD-10-CM

## 2025-08-13 DIAGNOSIS — N18.30 STAGE 3 CHRONIC KIDNEY DISEASE, UNSPECIFIED WHETHER STAGE 3A OR 3B CKD (HCC): ICD-10-CM

## 2025-08-13 DIAGNOSIS — G30.9 ALZHEIMER'S DISEASE, UNSPECIFIED (CODE) (HCC): ICD-10-CM

## 2025-08-13 DIAGNOSIS — E11.22 TYPE 2 DIABETES MELLITUS WITH STAGE 3 CHRONIC KIDNEY DISEASE, UNSPECIFIED WHETHER LONG TERM INSULIN USE, UNSPECIFIED WHETHER STAGE 3A OR 3B CKD (HCC): ICD-10-CM

## 2025-08-13 DIAGNOSIS — R41.81 AGE-RELATED COGNITIVE DECLINE: ICD-10-CM

## 2025-08-13 DIAGNOSIS — F41.1 GENERALIZED ANXIETY DISORDER: ICD-10-CM

## 2025-08-13 LAB — HBA1C MFR BLD: 7.6 %

## 2025-08-13 PROCEDURE — 1036F TOBACCO NON-USER: CPT | Performed by: INTERNAL MEDICINE

## 2025-08-13 PROCEDURE — 1090F PRES/ABSN URINE INCON ASSESS: CPT | Performed by: INTERNAL MEDICINE

## 2025-08-13 PROCEDURE — G8399 PT W/DXA RESULTS DOCUMENT: HCPCS | Performed by: INTERNAL MEDICINE

## 2025-08-13 PROCEDURE — 3051F HG A1C>EQUAL 7.0%<8.0%: CPT | Performed by: INTERNAL MEDICINE

## 2025-08-13 PROCEDURE — 1159F MED LIST DOCD IN RCRD: CPT | Performed by: INTERNAL MEDICINE

## 2025-08-13 PROCEDURE — 83036 HEMOGLOBIN GLYCOSYLATED A1C: CPT | Performed by: INTERNAL MEDICINE

## 2025-08-13 PROCEDURE — 1123F ACP DISCUSS/DSCN MKR DOCD: CPT | Performed by: INTERNAL MEDICINE

## 2025-08-13 PROCEDURE — G8417 CALC BMI ABV UP PARAM F/U: HCPCS | Performed by: INTERNAL MEDICINE

## 2025-08-13 PROCEDURE — G8427 DOCREV CUR MEDS BY ELIG CLIN: HCPCS | Performed by: INTERNAL MEDICINE

## 2025-08-13 PROCEDURE — 1160F RVW MEDS BY RX/DR IN RCRD: CPT | Performed by: INTERNAL MEDICINE

## 2025-08-13 PROCEDURE — 3074F SYST BP LT 130 MM HG: CPT | Performed by: INTERNAL MEDICINE

## 2025-08-13 PROCEDURE — 99214 OFFICE O/P EST MOD 30 MIN: CPT | Performed by: INTERNAL MEDICINE

## 2025-08-13 PROCEDURE — 3078F DIAST BP <80 MM HG: CPT | Performed by: INTERNAL MEDICINE

## 2025-08-13 RX ORDER — ALENDRONATE SODIUM 70 MG/1
TABLET ORAL
Qty: 12 TABLET | Refills: 3 | Status: SHIPPED | OUTPATIENT
Start: 2025-08-13

## 2025-08-13 RX ORDER — AMLODIPINE BESYLATE 10 MG/1
10 TABLET ORAL NIGHTLY
Qty: 90 TABLET | Refills: 1 | Status: SHIPPED | OUTPATIENT
Start: 2025-08-13

## 2025-08-13 RX ORDER — CLOPIDOGREL BISULFATE 75 MG/1
75 TABLET ORAL DAILY
Qty: 90 TABLET | Refills: 1 | Status: SHIPPED | OUTPATIENT
Start: 2025-08-13

## 2025-08-13 RX ORDER — METOPROLOL SUCCINATE 25 MG/1
25 TABLET, EXTENDED RELEASE ORAL NIGHTLY
Qty: 90 TABLET | Refills: 1 | Status: SHIPPED | OUTPATIENT
Start: 2025-08-13

## 2025-08-13 RX ORDER — DONEPEZIL HYDROCHLORIDE 5 MG/1
5 TABLET, FILM COATED ORAL DAILY
Qty: 90 TABLET | Refills: 1 | Status: SHIPPED | OUTPATIENT
Start: 2025-08-13

## 2025-08-13 RX ORDER — HYDROXYZINE HYDROCHLORIDE 10 MG/1
10 TABLET, FILM COATED ORAL EVERY 8 HOURS PRN
Qty: 30 TABLET | Refills: 0 | Status: SHIPPED | OUTPATIENT
Start: 2025-08-13 | End: 2025-09-12

## 2025-08-13 RX ORDER — ATORVASTATIN CALCIUM 80 MG/1
80 TABLET, FILM COATED ORAL NIGHTLY
Qty: 90 TABLET | Refills: 2 | Status: SHIPPED | OUTPATIENT
Start: 2025-08-13

## 2025-08-13 RX ORDER — ACETAMINOPHEN AND CODEINE PHOSPHATE 300; 60 MG/1; MG/1
TABLET ORAL
COMMUNITY
Start: 2025-07-14

## 2025-08-13 RX ORDER — GLIPIZIDE 2.5 MG/1
2.5 TABLET ORAL
Qty: 60 TABLET | Refills: 3 | Status: SHIPPED | OUTPATIENT
Start: 2025-08-13 | End: 2025-09-12

## 2025-08-15 ENCOUNTER — HOSPITAL ENCOUNTER (OUTPATIENT)
Dept: PHYSICAL THERAPY | Age: 80
Setting detail: THERAPIES SERIES
Discharge: HOME | End: 2025-08-15
Payer: MEDICARE

## 2025-08-15 PROCEDURE — 97110 THERAPEUTIC EXERCISES: CPT

## 2025-08-15 PROCEDURE — 97112 NEUROMUSCULAR REEDUCATION: CPT

## 2025-08-20 ENCOUNTER — HOSPITAL ENCOUNTER (OUTPATIENT)
Dept: PHYSICAL THERAPY | Age: 80
Setting detail: THERAPIES SERIES
Discharge: HOME | End: 2025-08-20
Payer: MEDICARE

## 2025-08-20 PROCEDURE — 97110 THERAPEUTIC EXERCISES: CPT

## 2025-08-20 PROCEDURE — 97112 NEUROMUSCULAR REEDUCATION: CPT

## 2025-08-22 ENCOUNTER — HOSPITAL ENCOUNTER (OUTPATIENT)
Dept: PHYSICAL THERAPY | Age: 80
Setting detail: THERAPIES SERIES
Discharge: HOME | End: 2025-08-22
Payer: MEDICARE

## 2025-08-22 PROCEDURE — 97110 THERAPEUTIC EXERCISES: CPT

## 2025-08-22 PROCEDURE — 97112 NEUROMUSCULAR REEDUCATION: CPT

## 2025-08-27 ENCOUNTER — HOSPITAL ENCOUNTER (OUTPATIENT)
Dept: PHYSICAL THERAPY | Age: 80
Setting detail: THERAPIES SERIES
Discharge: HOME | End: 2025-08-27
Payer: MEDICARE

## 2025-08-29 ENCOUNTER — APPOINTMENT (OUTPATIENT)
Dept: PHYSICAL THERAPY | Age: 80
End: 2025-08-29
Payer: MEDICARE

## 2025-09-03 ENCOUNTER — HOSPITAL ENCOUNTER (OUTPATIENT)
Age: 80
Setting detail: SPECIMEN
Discharge: HOME | End: 2025-09-03

## 2025-09-03 DIAGNOSIS — R41.81 AGE-RELATED COGNITIVE DECLINE: ICD-10-CM

## 2025-09-03 DIAGNOSIS — E11.9 TYPE 2 DIABETES MELLITUS WITHOUT COMPLICATION, WITHOUT LONG-TERM CURRENT USE OF INSULIN (HCC): ICD-10-CM

## 2025-09-03 DIAGNOSIS — F41.1 GENERALIZED ANXIETY DISORDER: ICD-10-CM

## 2025-09-03 LAB
CHOLEST SERPL-MCNC: 137 MG/DL (ref 0–199)
CHOLESTEROL/HDL RATIO: 4.3
CREAT UR-MCNC: 84.8 MG/DL (ref 28–217)
HDLC SERPL-MCNC: 32 MG/DL
LDLC SERPL CALC-MCNC: 62 MG/DL (ref 0–100)
MICROALBUMIN UR-MCNC: <12 MG/L (ref 0–20)
MICROALBUMIN/CREAT UR-RTO: NORMAL MCG/MG CREAT (ref 0–25)
TRIGL SERPL-MCNC: 216 MG/DL (ref 0–149)
VLDLC SERPL CALC-MCNC: 43 MG/DL (ref 1–30)

## (undated) DEVICE — PROTECTOR ULN NRV PUR FOAM HK LOOP STRP ANATOMICALLY

## (undated) DEVICE — SUTURE MONOCRYL + SZ 4 0 L18IN ABSRB UD PC 3 L16MM 3 8 CIR PRIM MCP845G

## (undated) DEVICE — SYRINGE, LUER LOCK, 10ML: Brand: MEDLINE

## (undated) DEVICE — 3M™ STERI-STRIP™ COMPOUND BENZOIN TINCTURE 40 BAGS/CARTON 4 CARTONS/CASE C1544: Brand: 3M™ STERI-STRIP™

## (undated) DEVICE — INSUFFLATION TUBING SET WITH FILTER, FUNNEL CONNECTOR AND LUER LOCK: Brand: JOSNOE MEDICAL INC

## (undated) DEVICE — PROVE COVER: Brand: UNBRANDED

## (undated) DEVICE — GOWN,AURORA,NONREINFORCED,LARGE: Brand: MEDLINE

## (undated) DEVICE — SUTURE S STL SZ 6 L18IN NONABSORBABLE SIL L48MM CCS 1/2 CIR M654G

## (undated) DEVICE — TTL1LYR 16FR10ML 100%SIL TMPST TR: Brand: MEDLINE

## (undated) DEVICE — SUTURE NONABSORBABLE MONOFILAMENT 5-0 C-1 1X24 IN PROLENE 8725H

## (undated) DEVICE — BLADE OPHTH D5MM 15DEG GRN W/ RND KNURLED HNDL MICRO-SHARP

## (undated) DEVICE — STRIP,CLOSURE,WOUND,MEDI-STRIP,1/2X4: Brand: MEDLINE

## (undated) DEVICE — GOWN,SIRUS,NONRNF,SETINSLV,XL,20/CS: Brand: MEDLINE

## (undated) DEVICE — COUNTER NDL 10 COUNT HLD 20 FOAM BLK SGL MAG

## (undated) DEVICE — DRAIN SURG SGL COLL PT TB FOR ATS BG OASIS

## (undated) DEVICE — SUTURE VCRL SZ 0 L27IN ABSRB VLT L48MM CTX 1/2 CIR TAPR PNT J364H

## (undated) DEVICE — CATHETER,URETHRAL,REDRUBBER,STERILE,20FR: Brand: MEDLINE

## (undated) DEVICE — GLOVE SURG SZ 65 L12IN FNGR THK79MIL GRN LTX FREE

## (undated) DEVICE — APPLICATOR MEDICATED 26 CC SOLUTION HI LT ORNG CHLORAPREP

## (undated) DEVICE — Z DISCONTINUED USE 2220190 SUTURE VICRYL SZ 3-0 L27IN ABSRB UD L26MM SH 1/2 CIR J416H

## (undated) DEVICE — Z DISCONTINUED NO SUB IDED DRAIN SURG 2 COLL PT TB FOR ATS BG OASIS

## (undated) DEVICE — COVER,LIGHT HANDLE,FLX,2/PK: Brand: MEDLINE INDUSTRIES, INC.

## (undated) DEVICE — Device

## (undated) DEVICE — POSITIONER,HEAD,MULTIRING,36CS: Brand: MEDLINE

## (undated) DEVICE — PADDING CAST W6INXL4YD POLY POR SPUN DACRON SYN VERSATILE

## (undated) DEVICE — 3M™ IOBAN™ 2 ANTIMICROBIAL INCISE DRAPE 6651EZ: Brand: IOBAN™ 2

## (undated) DEVICE — APPLICATOR MEDICATED 10.5 CC SOLUTION HI LT ORNG CHLORAPREP

## (undated) DEVICE — SUTURE VICRYL + SZ 3-0 L27IN ABSRB UD L26MM SH 1/2 CIR VCP416H

## (undated) DEVICE — CATHETER IV 18GA L1.16IN OD1.27-1.3462MM ID0.9398-1.016MM

## (undated) DEVICE — CATHETER THOR 36FR L23IN PVC R ANG 5 EYE TAPR CONN TIP SFT

## (undated) DEVICE — CANNULA PERF L2IN BLNT TIP 2MM VES CLR RADPQ BODY FEM LUER

## (undated) DEVICE — SUTURE ETHIB EXCL BR GRN TAPR PT 2-0 30 X563H X563H

## (undated) DEVICE — SUTURE VCRL + SZ 2-0 L27IN ABSRB CLR CT-1 1/2 CIR TAPERCUT VCP259H

## (undated) DEVICE — 3M™ IOBAN™ 2 ANTIMICROBIAL INCISE DRAPE 6650EZ: Brand: IOBAN™ 2

## (undated) DEVICE — PREVENA INCISION MANAGEMENT SYSTEM- PEEL & PLACE DRESSING: Brand: PREVENA™ PEEL & PLACE™

## (undated) DEVICE — GLOVE SURG SZ 75 L12IN FNGR THK79MIL GRN LTX FREE

## (undated) DEVICE — DRAPE,UTILITY,XL,4/PK,STERILE: Brand: MEDLINE

## (undated) DEVICE — INTENDED FOR TISSUE SEPARATION, AND OTHER PROCEDURES THAT REQUIRE A SHARP SURGICAL BLADE TO PUNCTURE OR CUT.: Brand: BARD-PARKER ® CARBON RIB-BACK BLADES

## (undated) DEVICE — GLOVE SURG SZ 8 CRM LTX FREE POLYISOPRENE POLYMER BEAD ANTI

## (undated) DEVICE — SUTURE PROL SZ 7-0 L24IN NONABSORBABLE BLU L8MM BV175-6 3/8 8735H

## (undated) DEVICE — AGENT HEMSTAT W2XL14IN OXIDIZED REGENERATED CELOS ABSRB FOR

## (undated) DEVICE — DRAPE,UTILTY,TAPE,15X26, 4EA/PK: Brand: MEDLINE

## (undated) DEVICE — DRESSING,GAUZE,XEROFORM,CURAD,1"X8",ST: Brand: CURAD

## (undated) DEVICE — TAPE UMB 72X7/32 IN

## (undated) DEVICE — SUTURE NONABSORBABLE MONOFILAMENT 7-0 BV-1 1X24 IN PROLENE 8702H

## (undated) DEVICE — SUTURE PROL SZ 4-0 L36IN NONABSORBABLE BLU L26MM SH 1/2 CIR 8521H

## (undated) DEVICE — BNDG,ELSTC,MATRIX,STRL,6"X5YD,LF,HOOK&LP: Brand: MEDLINE

## (undated) DEVICE — STERNUM BLADE, OFFSET (32.0 X 0.8 X 6.3MM)

## (undated) DEVICE — BLADE ES ELASTOMERIC COAT INSUL DURABLE BEND UPTO 90DEG

## (undated) DEVICE — TAPE MED W1/8XL30IN WHT POLY

## (undated) DEVICE — SUTURE PERMAHAND SZ 4-0 L18IN NONABSORBABLE BLK SILK BRAID A183H

## (undated) DEVICE — GLOVE SURG SZ 7 CRM LTX FREE POLYISOPRENE POLYMER BEAD ANTI

## (undated) DEVICE — 1/4 FORCE SURGICAL SPRING CLIP: Brand: STEALTH® SPRING CLIP

## (undated) DEVICE — FOGARTY - HYDRAGRIP SURGICAL - CLAMP INSERTS: Brand: FOGARTY HYDRAJAW

## (undated) DEVICE — STRAP,POSITIONING,KNEE/BODY,FOAM,4X60": Brand: MEDLINE

## (undated) DEVICE — DRAPE SLUSH DISC W44XL66IN ST FOR RND BSIN HUSH SLUSH SYS

## (undated) DEVICE — GLOVE SURG SZ 7 L12IN FNGR THK79MIL GRN LTX FREE

## (undated) DEVICE — DRAPE,THYROID,SOFT,STERILE: Brand: MEDLINE

## (undated) DEVICE — Device: Brand: VIRTUOSAPH PLUS WITH RADIAL INDICATION

## (undated) DEVICE — PACK PROCEDURE SURG OPN HRT

## (undated) DEVICE — SURGICAL SUCTION CONNECTING TUBE WITH MALE CONNECTOR AND SUCTION CLAMP, 2 FT. LONG (.6 M), 5 MM I.D.: Brand: CONMED

## (undated) DEVICE — SUTURE PROL SZ 6-0 L24IN NONABSORBABLE BLU L13MM C-1 3/8 8726H

## (undated) DEVICE — GLOVE ORTHO 8   MSG9480

## (undated) DEVICE — CATHETER IV 24GA L0.56IN SM VEIN SHLD SFTY WNG AUTOGRD

## (undated) DEVICE — SUTURE MONOCRYL SZ 4-0 L18IN ABSRB UD L19MM PS-2 3/8 CIR PRIM Y496G

## (undated) DEVICE — SUTURE MONOCRYL SZ 5-0 L18IN ABSRB UD PC-3 L16MM 3/8 CIR Y844G

## (undated) DEVICE — SUTURE VCRL + SZ 4-0 L27IN ABSRB UD L26MM SH 1/2 CIR VCP415H

## (undated) DEVICE — SUTURE VCRL + SZ 4-0 L18IN ABSRB UD L19MM PS-2 3/8 CIR PRIM VCP496H

## (undated) DEVICE — SUTURE PROL SZ 6-0 L24IN NONABSORBABLE BLU L9.3MM BV-1 3/8 8805H

## (undated) DEVICE — GLOVE SURG SZ 75 CRM LTX FREE POLYISOPRENE POLYMER BEAD ANTI

## (undated) DEVICE — ADHESIVE SKIN CLOSURE TOP 36 CC HI VISC DERMBND MINI

## (undated) DEVICE — SINGLE PORT MANIFOLD: Brand: NEPTUNE 2

## (undated) DEVICE — SUTURE VICRYL + SZ 4-0 L27IN ABSRB UD L26MM SH 1/2 CIR VCP415H

## (undated) DEVICE — MEDI-TRACE CADENCE ADULT, DEFIBRILLATION ELECTRODE -RTS  (10 PR/PK) - PHYSIO-CONTROL: Brand: MEDI-TRACE CADENCE

## (undated) DEVICE — GOWN,SIRUS,NONRNF,SETINSLV,2XL,18/CS: Brand: MEDLINE

## (undated) DEVICE — TOWEL,OR,DSP,ST,BLUE,DLX,XR,4/PK,20PK/CS: Brand: MEDLINE

## (undated) DEVICE — SOLUTION IV IRRIG LACTATED RINGERS 3000ML 2B7487

## (undated) DEVICE — GAUZE,SPONGE,FLUFF,6"X6.75",STRL,5/TRAY: Brand: MEDLINE

## (undated) DEVICE — SUTURE ETHLN SZ 3-0 L18IN NONABSORBABLE BLK FS-1 L24MM 3/8 663H

## (undated) DEVICE — [TOMCAT CUTTER, ARTHROSCOPIC SHAVER BLADE,  DO NOT RESTERILIZE,  DO NOT USE IF PACKAGE IS DAMAGED,  KEEP DRY,  KEEP AWAY FROM SUNLIGHT]: Brand: FORMULA

## (undated) DEVICE — DRESSING TRNSPAR W5XL4.5IN FLM SHT SEMIPERMEABLE WIND

## (undated) DEVICE — PREMIUM DRY TRAY LF: Brand: MEDLINE INDUSTRIES, INC.

## (undated) DEVICE — PACK ARTHRO W PCH

## (undated) DEVICE — PLATELET CONCENTRATION PACK PROC 14-20 ML SMARTPREP 2

## (undated) DEVICE — ZIMMER® STERILE DISPOSABLE TOURNIQUET CUFF WITH PLC, DUAL PORT, SINGLE BLADDER, 30 IN. (76 CM)

## (undated) DEVICE — MASTISOL ADHESIVE LIQ 2/3ML

## (undated) DEVICE — PACK PROCEDURE SURG OPN HRT ADD ON

## (undated) DEVICE — CATHETER THOR 36FR L23CM DIA12MM POLYVI CHL TAPR CONN TIP

## (undated) DEVICE — MERCY HEALTH ST CHARLES: Brand: MEDLINE INDUSTRIES, INC.

## (undated) DEVICE — CONTAINER,SPECIMEN,4OZ,OR STRL: Brand: MEDLINE

## (undated) DEVICE — SUTURE NONABSORBABLE MONOFILAMENT 4-0 RB-1 36 IN BLU PROLENE 8557H

## (undated) DEVICE — SUTURE PERMA-HAND SZ 0 L18IN NONABSORBABLE BLK CT-2 L26MM C027D

## (undated) DEVICE — BLADE,STAINLESS-STEEL,15,STRL,DISPOSABLE: Brand: MEDLINE

## (undated) DEVICE — BNDG,ELSTC,MATRIX,STRL,4"X5YD,LF,HOOK&LP: Brand: MEDLINE